# Patient Record
Sex: FEMALE | Race: WHITE | NOT HISPANIC OR LATINO | Employment: OTHER | URBAN - METROPOLITAN AREA
[De-identification: names, ages, dates, MRNs, and addresses within clinical notes are randomized per-mention and may not be internally consistent; named-entity substitution may affect disease eponyms.]

---

## 2017-01-03 DIAGNOSIS — E03.9 HYPOTHYROIDISM, UNSPECIFIED TYPE: ICD-10-CM

## 2017-01-03 DIAGNOSIS — Z00.00 ANNUAL PHYSICAL EXAM: Primary | ICD-10-CM

## 2017-01-03 RX ORDER — LEVOTHYROXINE SODIUM 125 UG/1
TABLET ORAL
Qty: 30 TABLET | Refills: 0 | Status: SHIPPED | OUTPATIENT
Start: 2017-01-03 | End: 2017-01-31 | Stop reason: SDUPTHER

## 2017-01-03 RX ORDER — LEVOTHYROXINE SODIUM 125 UG/1
125 TABLET ORAL DAILY
Qty: 30 TABLET | Refills: 0 | Status: SHIPPED | OUTPATIENT
Start: 2017-01-03 | End: 2017-01-03 | Stop reason: SDUPTHER

## 2017-01-03 NOTE — TELEPHONE ENCOUNTER
Please notify pt that she is overdue for repeat thyroid labs. Per our records her dose was decreased to 125 and repeat labs are needed to determine if the dosing is correct. Please schedule pt for annual exam and for thyroid labs as well as other annual labs as ordered.

## 2017-01-03 NOTE — TELEPHONE ENCOUNTER
Received RF from pharmacy via fax for levothyroxine    msg sent to pt's portal to schedule appt/ please refer        LCV 10/13/15    Please auth/advise

## 2017-01-30 ENCOUNTER — LAB VISIT (OUTPATIENT)
Dept: LAB | Facility: HOSPITAL | Age: 67
End: 2017-01-30
Attending: INTERNAL MEDICINE
Payer: MEDICARE

## 2017-01-30 DIAGNOSIS — Z00.00 ANNUAL PHYSICAL EXAM: ICD-10-CM

## 2017-01-30 DIAGNOSIS — E03.9 HYPOTHYROIDISM, UNSPECIFIED TYPE: ICD-10-CM

## 2017-01-30 DIAGNOSIS — I10 ESSENTIAL HYPERTENSION: ICD-10-CM

## 2017-01-30 LAB
ALBUMIN SERPL BCP-MCNC: 3.6 G/DL
ALP SERPL-CCNC: 108 U/L
ALT SERPL W/O P-5'-P-CCNC: 37 U/L
ANION GAP SERPL CALC-SCNC: 9 MMOL/L
AST SERPL-CCNC: 31 U/L
BASOPHILS # BLD AUTO: 0.03 K/UL
BASOPHILS NFR BLD: 0.4 %
BILIRUB SERPL-MCNC: 0.4 MG/DL
BUN SERPL-MCNC: 13 MG/DL
CALCIUM SERPL-MCNC: 9.2 MG/DL
CHLORIDE SERPL-SCNC: 104 MMOL/L
CHOLEST/HDLC SERPL: 2.6 {RATIO}
CO2 SERPL-SCNC: 28 MMOL/L
CREAT SERPL-MCNC: 0.8 MG/DL
DIFFERENTIAL METHOD: ABNORMAL
EOSINOPHIL # BLD AUTO: 0.1 K/UL
EOSINOPHIL NFR BLD: 1.6 %
ERYTHROCYTE [DISTWIDTH] IN BLOOD BY AUTOMATED COUNT: 15.3 %
EST. GFR  (AFRICAN AMERICAN): >60 ML/MIN/1.73 M^2
EST. GFR  (NON AFRICAN AMERICAN): >60 ML/MIN/1.73 M^2
GLUCOSE SERPL-MCNC: 103 MG/DL
HCT VFR BLD AUTO: 42.3 %
HDL/CHOLESTEROL RATIO: 38 %
HDLC SERPL-MCNC: 166 MG/DL
HDLC SERPL-MCNC: 63 MG/DL
HGB BLD-MCNC: 13.4 G/DL
LDLC SERPL CALC-MCNC: 83.2 MG/DL
LYMPHOCYTES # BLD AUTO: 1.5 K/UL
LYMPHOCYTES NFR BLD: 18.8 %
MCH RBC QN AUTO: 28.2 PG
MCHC RBC AUTO-ENTMCNC: 31.7 %
MCV RBC AUTO: 89 FL
MONOCYTES # BLD AUTO: 0.5 K/UL
MONOCYTES NFR BLD: 6.6 %
NEUTROPHILS # BLD AUTO: 5.7 K/UL
NEUTROPHILS NFR BLD: 72.5 %
NONHDLC SERPL-MCNC: 103 MG/DL
PLATELET # BLD AUTO: 287 K/UL
PMV BLD AUTO: 11.5 FL
POTASSIUM SERPL-SCNC: 4.1 MMOL/L
PROT SERPL-MCNC: 6.9 G/DL
RBC # BLD AUTO: 4.75 M/UL
SODIUM SERPL-SCNC: 141 MMOL/L
T4 FREE SERPL-MCNC: 1.11 NG/DL
TRIGL SERPL-MCNC: 99 MG/DL
TSH SERPL DL<=0.005 MIU/L-ACNC: 0.85 UIU/ML
WBC # BLD AUTO: 7.92 K/UL

## 2017-01-30 PROCEDURE — 80053 COMPREHEN METABOLIC PANEL: CPT

## 2017-01-30 PROCEDURE — 84439 ASSAY OF FREE THYROXINE: CPT

## 2017-01-30 PROCEDURE — 85025 COMPLETE CBC W/AUTO DIFF WBC: CPT

## 2017-01-30 PROCEDURE — 84443 ASSAY THYROID STIM HORMONE: CPT

## 2017-01-30 PROCEDURE — 36415 COLL VENOUS BLD VENIPUNCTURE: CPT | Mod: PO

## 2017-01-30 PROCEDURE — 80061 LIPID PANEL: CPT

## 2017-01-30 RX ORDER — LOSARTAN POTASSIUM 100 MG/1
TABLET ORAL
Qty: 90 TABLET | Refills: 0 | OUTPATIENT
Start: 2017-01-30

## 2017-01-31 DIAGNOSIS — I10 ESSENTIAL HYPERTENSION: ICD-10-CM

## 2017-01-31 NOTE — TELEPHONE ENCOUNTER
Pt's requesting RF on pended rx's sent to preferred pharmacy until her appt with new PCP in March /pt had labs done yesterday and states that she's out of her medication     LCV 10/13/2015    Please auth request

## 2017-01-31 NOTE — TELEPHONE ENCOUNTER
Detailed msg left on pt's VM to schedule appt for additional RF's    msg sent to pt's portal/ please refer

## 2017-01-31 NOTE — TELEPHONE ENCOUNTER
----- Message from Angeline Farr sent at 1/31/2017  2:57 PM CST -----  _  1st Request  _  2nd Request  _  3rd Request    Please refill the medication(s) listed below  Medication #1levothyroxine (SYNTHROID) 125 MCG tablet    Medication #2losartan (COZAAR) 100 MG tablet    Pt will be changing to a new PCP closer to her home so she is requesting about 30 tabs until she sees the new doctor in March thank you. Please call pt an let her know [phone]    Preferred Pharmacy:[facility]  Telephone Fax  [phone]

## 2017-02-01 DIAGNOSIS — I10 ESSENTIAL HYPERTENSION: ICD-10-CM

## 2017-02-01 RX ORDER — LOSARTAN POTASSIUM 100 MG/1
100 TABLET ORAL DAILY
Qty: 30 TABLET | Refills: 0 | Status: SHIPPED | OUTPATIENT
Start: 2017-02-01 | End: 2017-03-09

## 2017-02-01 RX ORDER — LOSARTAN POTASSIUM 100 MG/1
TABLET ORAL
Qty: 90 TABLET | Refills: 0 | OUTPATIENT
Start: 2017-02-01

## 2017-02-01 RX ORDER — LEVOTHYROXINE SODIUM 125 UG/1
TABLET ORAL
Qty: 30 TABLET | Refills: 0 | Status: SHIPPED | OUTPATIENT
Start: 2017-02-01 | End: 2017-03-09 | Stop reason: SDUPTHER

## 2017-02-01 NOTE — TELEPHONE ENCOUNTER
----- Message from Nayana Isaacs sent at 2/1/2017  9:16 AM CST -----  Contact: pt  _  1st Request  x  2nd Request  _  3rd Request    Please refill the medication(s) listed below. Please call the patient when the prescription(s) is ready for  at the phone number (___)(___-_____) .[phone]    Medication #1losartan (COZAAR) 100 MG tablet- wants to have thirty day supply [phone]    Medication #2call her office [phone]      Preferred Pharmacy:

## 2017-02-01 NOTE — TELEPHONE ENCOUNTER
Pt's requesting pended rx's sent to preferred pharmacy until her appt in March/ pt had labs done/please refer

## 2017-03-09 ENCOUNTER — OFFICE VISIT (OUTPATIENT)
Dept: FAMILY MEDICINE | Facility: CLINIC | Age: 67
End: 2017-03-09
Payer: MEDICARE

## 2017-03-09 VITALS
BODY MASS INDEX: 33.91 KG/M2 | HEIGHT: 68 IN | OXYGEN SATURATION: 98 % | SYSTOLIC BLOOD PRESSURE: 136 MMHG | DIASTOLIC BLOOD PRESSURE: 84 MMHG | HEART RATE: 68 BPM | WEIGHT: 223.75 LBS

## 2017-03-09 DIAGNOSIS — Z00.00 ANNUAL PHYSICAL EXAM: Primary | ICD-10-CM

## 2017-03-09 DIAGNOSIS — L71.9 ROSACEA: ICD-10-CM

## 2017-03-09 DIAGNOSIS — E03.2 HYPOTHYROIDISM, IATROGENIC: ICD-10-CM

## 2017-03-09 DIAGNOSIS — I10 ESSENTIAL HYPERTENSION: ICD-10-CM

## 2017-03-09 PROCEDURE — 3075F SYST BP GE 130 - 139MM HG: CPT | Mod: S$GLB,,, | Performed by: FAMILY MEDICINE

## 2017-03-09 PROCEDURE — 3079F DIAST BP 80-89 MM HG: CPT | Mod: S$GLB,,, | Performed by: FAMILY MEDICINE

## 2017-03-09 PROCEDURE — 99397 PER PM REEVAL EST PAT 65+ YR: CPT | Mod: S$GLB,,, | Performed by: FAMILY MEDICINE

## 2017-03-09 PROCEDURE — 99499 UNLISTED E&M SERVICE: CPT | Mod: S$PBB,,, | Performed by: FAMILY MEDICINE

## 2017-03-09 PROCEDURE — 99999 PR PBB SHADOW E&M-EST. PATIENT-LVL III: CPT | Mod: PBBFAC,,, | Performed by: FAMILY MEDICINE

## 2017-03-09 RX ORDER — LOSARTAN POTASSIUM 50 MG/1
50 TABLET ORAL DAILY
Qty: 90 TABLET | Refills: 3 | Status: SHIPPED | OUTPATIENT
Start: 2017-03-09 | End: 2017-11-30 | Stop reason: SDUPTHER

## 2017-03-09 RX ORDER — LIOTHYRONINE SODIUM 5 UG/1
5 TABLET ORAL DAILY
Qty: 90 TABLET | Refills: 3 | Status: SHIPPED | OUTPATIENT
Start: 2017-03-09 | End: 2017-10-03 | Stop reason: SDUPTHER

## 2017-03-09 RX ORDER — LIOTHYRONINE SODIUM 5 UG/1
25 TABLET ORAL DAILY
Qty: 90 TABLET | Refills: 3 | Status: SHIPPED | OUTPATIENT
Start: 2017-03-09 | End: 2017-03-09

## 2017-03-09 RX ORDER — LEVOTHYROXINE SODIUM 125 UG/1
125 TABLET ORAL
Qty: 90 TABLET | Refills: 3 | Status: SHIPPED | OUTPATIENT
Start: 2017-03-09 | End: 2017-11-30 | Stop reason: SDUPTHER

## 2017-03-09 RX ORDER — DOXYCYCLINE 50 MG/1
50 CAPSULE ORAL DAILY
Qty: 30 CAPSULE | Refills: 0 | Status: SHIPPED | OUTPATIENT
Start: 2017-03-09 | End: 2017-04-13 | Stop reason: SDUPTHER

## 2017-03-09 NOTE — PROGRESS NOTES
Subjective:       Patient ID: Violet Mirza is a 66 y.o. female.    Chief Complaint: Establish Care and Knee Pain    HPI Comments: 66 years old female who came to the clinic for her physical examination.  Patient last thyroid test was normal but patient still feels sluggish.  Blood pressure today is stable.  Patient was not taking the losartan.  No chest pain palpitations orthopnea or PND.  Patient is obese with a BMI of 34 currently trying to lose weight.  Patient requests a medicine for the rosacea.    Knee Pain        Review of Systems   Constitutional: Negative.    HENT: Negative.    Eyes: Negative.    Respiratory: Negative.    Cardiovascular: Negative.  Negative for chest pain, palpitations and leg swelling.   Gastrointestinal: Negative.    Endocrine: Negative for cold intolerance, heat intolerance, polydipsia, polyphagia and polyuria.   Genitourinary: Negative.    Musculoskeletal: Negative.    Skin: Negative.    Neurological: Negative.    Psychiatric/Behavioral: Negative.        Objective:      Physical Exam   Constitutional: She is oriented to person, place, and time. She appears well-developed and well-nourished. No distress.   HENT:   Head: Normocephalic and atraumatic.   Right Ear: External ear normal.   Left Ear: External ear normal.   Nose: Nose normal.   Mouth/Throat: Oropharynx is clear and moist. No oropharyngeal exudate.   Eyes: Conjunctivae and EOM are normal. Pupils are equal, round, and reactive to light. Right eye exhibits no discharge. Left eye exhibits no discharge. No scleral icterus.   Neck: Normal range of motion. Neck supple. No JVD present. No tracheal deviation present. No thyromegaly present.   Cardiovascular: Normal rate, regular rhythm, normal heart sounds and intact distal pulses.  Exam reveals no gallop and no friction rub.    No murmur heard.  Pulmonary/Chest: Effort normal and breath sounds normal. No stridor. No respiratory distress. She has no wheezes. She has no rales. She  exhibits no tenderness.   Abdominal: Soft. Bowel sounds are normal. She exhibits no distension and no mass. There is no tenderness. There is no rebound and no guarding.   Musculoskeletal: Normal range of motion. She exhibits no edema or tenderness.   Lymphadenopathy:     She has no cervical adenopathy.   Neurological: She is alert and oriented to person, place, and time. She has normal reflexes. No cranial nerve deficit. She exhibits normal muscle tone. Coordination normal.   Skin: Skin is warm and dry. No rash noted. She is not diaphoretic. No erythema. No pallor.   Psychiatric: She has a normal mood and affect. Her behavior is normal. Judgment and thought content normal.       Assessment:       1. Annual physical exam    2. Hypothyroidism, iatrogenic    3. BMI 34.0-34.9,adult    4. Essential hypertension    5. Rosacea        Plan:         Violet was seen today for establish care and knee pain.    Diagnoses and all orders for this visit:    Annual physical exam    Hypothyroidism, iatrogenic  -     levothyroxine (SYNTHROID) 125 MCG tablet; Take 1 tablet (125 mcg total) by mouth before breakfast. TAKE 1 TABLET(125 MCG) BY MOUTH EVERY DAY  -     liothyronine (CYTOMEL) 5 MCG Tab; Take 1 tablet by mouth once daily.  -     TSH; Future  -     T3; Future  -     T4, free; Future    BMI 34.0-34.9,adult    Essential hypertension  -     losartan (COZAAR) 50 MG tablet; Take 1 tablet (50 mg total) by mouth once daily.  -     Comprehensive metabolic panel; Future  -     Lipid panel; Future    Rosacea  -     doxycycline (MONODOX) 50 MG Cap; Take 1 capsule (50 mg total) by mouth once daily.    Continue monitoring blood pressure at home, low sodium diet.   Diet and physical activity to promote weight loss.

## 2017-03-09 NOTE — MR AVS SNAPSHOT
[facility]   [place]  [address] [address]  Phone: [phone]  Fax: [phone]                  Violet Mirza   3/9/2017 8:00 AM   Office Visit    Description:  Female : 1950   Provider:  Geovany Henning MD   Department:  [facility]           Reason for Visit     Establish Care     Knee Pain           Diagnoses this Visit        Comments    Annual physical exam    -  Primary     Hypothyroidism, iatrogenic         BMI 34.0-34.9,adult         Essential hypertension         Rosacea                To Do List           Future Appointments        Provider Department Dept Phone    2017 8:00 AM Geovany Henning MD [facility] [phone]      Goals (5 Years of Data)     None      Follow-Up and Disposition     Return in about 6 months (around 2017), or if symptoms worsen or fail to improve.       These Medications        Disp Refills Start End    losartan (COZAAR) 50 MG tablet 90 tablet 3 3/9/2017 3/9/2018    Take 1 tablet (50 mg total) by mouth once daily. - Oral    Pharmacy: [facility] [address] [place] [address] [address] [facility] Ph #: [phone]       levothyroxine (SYNTHROID) 125 MCG tablet 90 tablet 3 3/9/2017     Take 1 tablet (125 mcg total) by mouth before breakfast. TAKE 1 TABLET(125 MCG) BY MOUTH EVERY DAY - Oral    Pharmacy: [facility] [address] [place] [address] [address] AT [facility] Ph #: [phone]       Notes to Pharmacy: Pt needs labs and appt prior to additional refills    doxycycline (MONODOX) 50 MG Cap 30 capsule 0 3/9/2017     Take 1 capsule (50 mg total) by mouth once daily. - Oral    Pharmacy: [facility] [address] [place] [address] [address] AT [facility] Ph #: [phone]       liothyronine (CYTOMEL) 5 MCG Tab 90 tablet 3 3/9/2017 3/9/2018    Take 1 tablet (5 mcg total) by mouth once  daily. - Oral    Pharmacy: [id number] [address] AT [facility] [address]  #: [phone]         [facility] On Call     [facility] On Call Nurse Care Line - 24/7 Assistance  Registered nurses in the [facility] provide clinical advisement, health education, appointment booking, and other advisory services.  Call for this free service at [phone].             Medications           Message regarding Medications     Verify the changes and/or additions to your medication regime listed below are the same as discussed with your clinician today.  If any of these changes or additions are incorrect, please notify your healthcare provider.        START taking these NEW medications        Refills    losartan (COZAAR) 50 MG tablet 3    Sig: Take 1 tablet (50 mg total) by mouth once daily.    Class: Normal    Route: Oral    doxycycline (MONODOX) 50 MG Cap 0    Sig: Take 1 capsule (50 mg total) by mouth once daily.    Class: Normal    Route: Oral    liothyronine (CYTOMEL) 5 MCG Tab 3    Sig: Take 1 tablet (5 mcg total) by mouth once daily.    Class: Normal    Route: Oral      CHANGE how you are taking these medications     Start Taking Instead of    levothyroxine (SYNTHROID) 125 MCG tablet levothyroxine (SYNTHROID) 125 MCG tablet    Dosage:  Take 1 tablet (125 mcg total) by mouth before breakfast. TAKE 1 TABLET(125 MCG) BY MOUTH EVERY DAY Dosage:  TAKE 1 TABLET(125 MCG) BY MOUTH EVERY DAY    Reason for Change:  Reorder       STOP taking these medications     glucosamine-chondroitin 500-400 mg tablet Take 1 tablet by mouth 3 (three) times daily.    calcium carbonate-vit D3-min (CALCIUM PLUS) 600 mg calcium- 400 unit Tab Take 1 tablet by mouth once daily.    doxycycline (VIBRAMYCIN) 50 MG capsule Take 50 mg by mouth Daily.           Verify that the below list of medications is an accurate representation of the medications you are currently taking.  If none reported,  "the list may be blank. If incorrect, please contact your healthcare provider. Carry this list with you in case of emergency.           Current Medications     epinastine 0.05 % ophthalmic solution     fish oil-omega-3 fatty acids 300-1,000 mg capsule Take 1 g by mouth once daily.    levothyroxine (SYNTHROID) 125 MCG tablet Take 1 tablet (125 mcg total) by mouth before breakfast. TAKE 1 TABLET(125 MCG) BY MOUTH EVERY DAY    tramadol (ULTRAM) 50 mg tablet Take 50 mg by mouth daily as needed.     VIT C/VIT E/LUTEIN/MIN/OMEGA-3 (OCUVITE ORAL) Take by mouth once daily.    doxycycline (MONODOX) 50 MG Cap Take 1 capsule (50 mg total) by mouth once daily.    liothyronine (CYTOMEL) 5 MCG Tab Take 1 tablet (5 mcg total) by mouth once daily.    losartan (COZAAR) 50 MG tablet Take 1 tablet (50 mg total) by mouth once daily.           Clinical Reference Information           Your Vitals Were     BP Pulse Height Weight SpO2 BMI    136/84 (BP Location: Right arm, Patient Position: Sitting, BP Method: Manual) 68 5' 8" (1.727 m) 101.5 kg (223 lb 12.3 oz) 98% 34.02 kg/m2      Blood Pressure          Most Recent Value    BP  136/84      Allergies as of 3/9/2017     No Known Allergies      Immunizations Administered on Date of Encounter - 3/9/2017     None      Orders Placed During Today's Visit     Future Labs/Procedures Expected by Expires    Comprehensive metabolic panel  3/9/2017 6/7/2017    Lipid panel  3/9/2017 6/7/2017    T3  3/9/2017 6/7/2017    T4, free  3/9/2017 6/7/2017    TSH  3/9/2017 6/7/2017      Instructions      Exercise for a Healthier Heart  You may wonder how you can improve the health of your heart. If youre thinking about exercise, youre on the right track. You dont need to become an athlete, but you do need a certain amount of brisk exercise to help strengthen your heart. If you have been diagnosed with a heart condition, your doctor may recommend exercise to help stabilize your condition. To help make " exercise a habit, choose safe, fun activities.     Exercise with a friend. When activity is fun, you're more likely to stick with it.     Be sure to check with your healthcare provider before starting an exercise program.   Why exercise?  Exercising regularly offers many healthy rewards. It can help you do all of the following:  · Improve your blood cholesterol level to help prevent further heart trouble  · Lower your blood pressure to help prevent a stroke or heart attack  · Control diabetes, or reduce your risk of getting this disease  · Improve your heart and lung function  · Reach and maintain a healthy weight  · Make your muscles stronger and more limber so you can stay active  · Prevent falls and fractures by slowing the loss of bone mass (osteoporosis)  · Manage stress better  · Reduce your blood pressure  · Improve your sense of self and your body image  Exercise tips  Ease into your routine. Set small goals. Then build on them.  Exercise on most days. Aim for a total of 150 or more minutes of moderate to  vigorous intensity activity each week. Consider 40 minutes, 3 to 4 times a week. For best results, activity should last for 40 minutes on average. It is OK to work up to the 40 minute period over time. Examples of moderate-intensity activity is walking 1 mile in 15 minutes or 30 to 45 minutes of yard work.  Step up your daily activity level. Along with your exercise program, try being more active throughout the day. Walk instead of drive. Do more household tasks or yard work.  Choose one or more activities you enjoy. Walking is one of the easiest things you can do. You can also try swimming, riding a bike, dancing, or taking an exercise class.  Stop exercising and call your doctor if you:  · Have chest pain or feel dizzy or lightheaded  · Feel burning, tightness, pressure, or heaviness in your chest, neck, shoulders, back, or arms  · Have unusual shortness of breath  · Have increased joint or muscle  pain  · Have palpitations or an irregular heartbeat   Date Last Reviewed: 5/1/2016  © 8862-2328 [employer]. [address] [place] [address], [place] [address]. All rights reserved. This information is not intended as a substitute for professional medical care. Always follow your healthcare professional's instructions.             Language Assistance Services     ATTENTION: Language assistance services are available, free of charge. Please call [phone].      ATENCIÓN: Si habla nancy, tiene a alexandra disposición servicios gratuitos de asistencia lingüística. Llame al [phone].     CHÚ Ý: N?u b?n nói Ti?ng Vi?t, có các d?ch v? h? tr? ngôn ng? mi?n phí dành cho b?n. G?i s? [phone].         [facility] complies with applicable Federal civil rights laws and does not discriminate on the basis of race, color, national origin, age, disability, or sex.

## 2017-04-13 DIAGNOSIS — L71.9 ROSACEA: ICD-10-CM

## 2017-04-13 RX ORDER — DOXYCYCLINE 50 MG/1
50 CAPSULE ORAL DAILY
Qty: 30 CAPSULE | Refills: 3 | Status: SHIPPED | OUTPATIENT
Start: 2017-04-13 | End: 2017-08-23 | Stop reason: SDUPTHER

## 2017-05-19 ENCOUNTER — TELEPHONE (OUTPATIENT)
Dept: FAMILY MEDICINE | Facility: CLINIC | Age: 67
End: 2017-05-19

## 2017-05-19 NOTE — TELEPHONE ENCOUNTER
Blood work was review from January, last CBC was stable, chest x-ray without evidence of acute processes, patient currently stable for surgical procedure.

## 2017-05-19 NOTE — TELEPHONE ENCOUNTER
Patient  hemodynamically stable for surgical procedure.  Blood work was reviewed from January.  EKG and chest x-ray were normal.  Last CBC was normal.

## 2017-08-23 DIAGNOSIS — L71.9 ROSACEA: ICD-10-CM

## 2017-08-23 RX ORDER — DOXYCYCLINE 50 MG/1
50 CAPSULE ORAL DAILY
Qty: 30 CAPSULE | Refills: 3 | Status: SHIPPED | OUTPATIENT
Start: 2017-08-23 | End: 2017-11-22 | Stop reason: SDUPTHER

## 2017-08-23 NOTE — TELEPHONE ENCOUNTER
----- Message from Xiao Gunter sent at 8/23/2017  1:21 PM CDT -----  Contact: [phone]/self  Patient would like a refill of doxycycline (MONODOX) 50 MG Cap 90 day supply sent to [facility] on [address].  Please advise.

## 2017-09-02 ENCOUNTER — LAB VISIT (OUTPATIENT)
Dept: LAB | Facility: HOSPITAL | Age: 67
End: 2017-09-02
Attending: FAMILY MEDICINE
Payer: MEDICARE

## 2017-09-02 DIAGNOSIS — I10 ESSENTIAL HYPERTENSION: ICD-10-CM

## 2017-09-02 DIAGNOSIS — E03.2 HYPOTHYROIDISM, IATROGENIC: ICD-10-CM

## 2017-09-02 LAB
ALBUMIN SERPL BCP-MCNC: 3.6 G/DL
ALP SERPL-CCNC: 133 U/L
ALT SERPL W/O P-5'-P-CCNC: 19 U/L
ANION GAP SERPL CALC-SCNC: 10 MMOL/L
AST SERPL-CCNC: 23 U/L
BILIRUB SERPL-MCNC: 0.3 MG/DL
BUN SERPL-MCNC: 12 MG/DL
CALCIUM SERPL-MCNC: 9.7 MG/DL
CHLORIDE SERPL-SCNC: 104 MMOL/L
CHOLEST SERPL-MCNC: 222 MG/DL
CHOLEST/HDLC SERPL: 3 {RATIO}
CO2 SERPL-SCNC: 26 MMOL/L
CREAT SERPL-MCNC: 0.8 MG/DL
EST. GFR  (AFRICAN AMERICAN): >60 ML/MIN/1.73 M^2
EST. GFR  (NON AFRICAN AMERICAN): >60 ML/MIN/1.73 M^2
GLUCOSE SERPL-MCNC: 96 MG/DL
HDLC SERPL-MCNC: 74 MG/DL
HDLC SERPL: 33.3 %
LDLC SERPL CALC-MCNC: 126.4 MG/DL
NONHDLC SERPL-MCNC: 148 MG/DL
POTASSIUM SERPL-SCNC: 3.9 MMOL/L
PROT SERPL-MCNC: 7.3 G/DL
SODIUM SERPL-SCNC: 140 MMOL/L
T3 SERPL-MCNC: 105 NG/DL
T4 FREE SERPL-MCNC: 1.18 NG/DL
TRIGL SERPL-MCNC: 108 MG/DL
TSH SERPL DL<=0.005 MIU/L-ACNC: 0.4 UIU/ML

## 2017-09-02 PROCEDURE — 84480 ASSAY TRIIODOTHYRONINE (T3): CPT

## 2017-09-02 PROCEDURE — 36415 COLL VENOUS BLD VENIPUNCTURE: CPT | Mod: PO

## 2017-09-02 PROCEDURE — 80061 LIPID PANEL: CPT

## 2017-09-02 PROCEDURE — 80053 COMPREHEN METABOLIC PANEL: CPT

## 2017-09-02 PROCEDURE — 84439 ASSAY OF FREE THYROXINE: CPT

## 2017-09-02 PROCEDURE — 84443 ASSAY THYROID STIM HORMONE: CPT

## 2017-09-07 ENCOUNTER — OFFICE VISIT (OUTPATIENT)
Dept: FAMILY MEDICINE | Facility: CLINIC | Age: 67
End: 2017-09-07
Payer: MEDICARE

## 2017-09-07 VITALS
WEIGHT: 235.25 LBS | BODY MASS INDEX: 35.65 KG/M2 | HEIGHT: 68 IN | HEART RATE: 75 BPM | OXYGEN SATURATION: 96 % | DIASTOLIC BLOOD PRESSURE: 77 MMHG | SYSTOLIC BLOOD PRESSURE: 130 MMHG

## 2017-09-07 DIAGNOSIS — I10 ESSENTIAL HYPERTENSION: ICD-10-CM

## 2017-09-07 DIAGNOSIS — M17.12 OSTEOARTHRITIS OF LEFT KNEE, UNSPECIFIED OSTEOARTHRITIS TYPE: ICD-10-CM

## 2017-09-07 DIAGNOSIS — E03.9 HYPOTHYROIDISM, UNSPECIFIED TYPE: Primary | ICD-10-CM

## 2017-09-07 DIAGNOSIS — E78.5 DYSLIPIDEMIA: ICD-10-CM

## 2017-09-07 DIAGNOSIS — E66.01 SEVERE OBESITY (BMI 35.0-39.9): ICD-10-CM

## 2017-09-07 PROCEDURE — 1126F AMNT PAIN NOTED NONE PRSNT: CPT | Mod: S$GLB,,, | Performed by: FAMILY MEDICINE

## 2017-09-07 PROCEDURE — 1159F MED LIST DOCD IN RCRD: CPT | Mod: S$GLB,,, | Performed by: FAMILY MEDICINE

## 2017-09-07 PROCEDURE — 99999 PR PBB SHADOW E&M-EST. PATIENT-LVL III: CPT | Mod: PBBFAC,,, | Performed by: FAMILY MEDICINE

## 2017-09-07 PROCEDURE — 3075F SYST BP GE 130 - 139MM HG: CPT | Mod: S$GLB,,, | Performed by: FAMILY MEDICINE

## 2017-09-07 PROCEDURE — 3008F BODY MASS INDEX DOCD: CPT | Mod: S$GLB,,, | Performed by: FAMILY MEDICINE

## 2017-09-07 PROCEDURE — 99499 UNLISTED E&M SERVICE: CPT | Mod: S$PBB,,, | Performed by: FAMILY MEDICINE

## 2017-09-07 PROCEDURE — 99214 OFFICE O/P EST MOD 30 MIN: CPT | Mod: S$GLB,,, | Performed by: FAMILY MEDICINE

## 2017-09-07 PROCEDURE — 3078F DIAST BP <80 MM HG: CPT | Mod: S$GLB,,, | Performed by: FAMILY MEDICINE

## 2017-09-07 RX ORDER — TRAMADOL HYDROCHLORIDE 50 MG/1
50 TABLET ORAL EVERY 12 HOURS PRN
Qty: 40 TABLET | Refills: 0 | Status: SHIPPED | OUTPATIENT
Start: 2017-09-07 | End: 2017-11-29

## 2017-09-07 NOTE — PATIENT INSTRUCTIONS
What is Arthritis?  Arthritis is a disease that affects the joints (the parts where bones meet and move). It can affect any joint in your body. There are many types of arthritis, including osteoarthritis and rheumatoid arthrtitis. If your symptoms are mild, medications may be enough to reduce pain and swelling. For more severe arthritis, surgery may be needed to improve the condition of the joint or replace the joint entirely.                  What causes arthritis?  Cartilage is a smooth substance that protects the ends of your bones and provides cushioning. When you have arthritis, this cartilage breaks down and can no longer protect your bones. The bones rub against each other, causing pain and swelling. Over time, bone spurs (small pieces of rough or splintered bone) may develop, and the joint's range of motion can become limited.  Symptoms  Some of the more common symptoms of arthritis include:  · Joint pain and stiffness. Pain and stiffness get worse with long periods of rest or using a joint too long or too hard.  · Joints that have lost normal shape and motion.  · Tender, inflamed joints. They may look red and feel warm.  · Grinding or popping noise with joint movement.   · Feeling tired all the time.  Reducing symptoms  Following a healthy lifestyle by losing weight and exercising can help reduce symptoms of osteoarthritis. Medicines can be very helpful for arthritis.     Date Last Reviewed: 9/10/2015  © 1359-5134 [employer]. [address] [place] [address], [place] [address]. All rights reserved. This information is not intended as a substitute for professional medical care. Always follow your healthcare professional's instructions.

## 2017-09-07 NOTE — PROGRESS NOTES
Subjective:       Patient ID: Violet Mirza is a 67 y.o. female.    Chief Complaint: Follow-up (x6 month ) and Orders (review blood work result )    67 years old female who came to the clinic for blood pressure check.  Blood pressure today stable.  No chest pain palpitations orthopnea or PND.  Patient with elevated cholesterol.  Last thyroid testing was normal.  Patient with good compliance with medical regimen.  Patient with a BMI of 35 currently trying to lose weight.  Patient requests refill of the medicine for pain of the left knee.      Review of Systems   Constitutional: Negative.    HENT: Negative.    Eyes: Negative.    Respiratory: Negative.    Cardiovascular: Negative.  Negative for chest pain, palpitations and leg swelling.   Gastrointestinal: Negative.    Genitourinary: Negative.    Musculoskeletal: Positive for arthralgias.   Skin: Negative.    Neurological: Negative.    Psychiatric/Behavioral: Negative.        Objective:      Physical Exam   Constitutional: She is oriented to person, place, and time. She appears well-developed and well-nourished. No distress.   HENT:   Head: Normocephalic and atraumatic.   Right Ear: External ear normal.   Left Ear: External ear normal.   Nose: Nose normal.   Mouth/Throat: Oropharynx is clear and moist. No oropharyngeal exudate.   Eyes: Conjunctivae and EOM are normal. Pupils are equal, round, and reactive to light. Right eye exhibits no discharge. Left eye exhibits no discharge. No scleral icterus.   Neck: Normal range of motion. Neck supple. No JVD present. No tracheal deviation present. No thyromegaly present.   Cardiovascular: Normal rate, regular rhythm, normal heart sounds and intact distal pulses.  Exam reveals no gallop and no friction rub.    No murmur heard.  Pulmonary/Chest: Effort normal and breath sounds normal. No stridor. No respiratory distress. She has no wheezes. She has no rales. She exhibits no tenderness.   Abdominal: Soft. Bowel sounds are  normal. She exhibits no distension and no mass. There is no tenderness. There is no rebound and no guarding.   Musculoskeletal: Normal range of motion. She exhibits no edema.        Left knee: Tenderness found. Medial joint line and lateral joint line tenderness noted.   Lymphadenopathy:     She has no cervical adenopathy.   Neurological: She is alert and oriented to person, place, and time. She has normal reflexes. No cranial nerve deficit. She exhibits normal muscle tone. Coordination normal.   Skin: Skin is warm and dry. No rash noted. She is not diaphoretic. No erythema. No pallor.   Psychiatric: She has a normal mood and affect. Her behavior is normal. Judgment and thought content normal.       Assessment:       1. Hypothyroidism, unspecified type    2. Essential hypertension    3. Dyslipidemia    4. Severe obesity (BMI 35.0-39.9)    5. Osteoarthritis of left knee, unspecified osteoarthritis type        Plan:         Violet was seen today for follow-up and orders.    Diagnoses and all orders for this visit:    Hypothyroidism, unspecified type    Essential hypertension    Dyslipidemia    Severe obesity (BMI 35.0-39.9)    Osteoarthritis of left knee, unspecified osteoarthritis type  -     tramadol (ULTRAM) 50 mg tablet; Take 1 tablet (50 mg total) by mouth every 12 (twelve) hours as needed.    Continue monitoring blood pressure at home, low sodium diet.   Diet and physical activity to promote weight loss.  Continue with current medicine for thyroid.

## 2017-10-03 ENCOUNTER — TELEPHONE (OUTPATIENT)
Dept: FAMILY MEDICINE | Facility: CLINIC | Age: 67
End: 2017-10-03

## 2017-10-03 ENCOUNTER — PATIENT MESSAGE (OUTPATIENT)
Dept: FAMILY MEDICINE | Facility: CLINIC | Age: 67
End: 2017-10-03

## 2017-10-03 DIAGNOSIS — E03.2 HYPOTHYROIDISM, IATROGENIC: ICD-10-CM

## 2017-10-03 RX ORDER — LIOTHYRONINE SODIUM 25 UG/1
25 TABLET ORAL DAILY
Qty: 30 TABLET | Refills: 0 | Status: SHIPPED | OUTPATIENT
Start: 2017-10-03 | End: 2017-11-02 | Stop reason: SDUPTHER

## 2017-10-03 NOTE — TELEPHONE ENCOUNTER
Cytomel was increased.    Please notify the patient the prescription is in the pharmacy.  Repeat thyroid testing in a couple of weeks.

## 2017-10-16 ENCOUNTER — PATIENT MESSAGE (OUTPATIENT)
Dept: FAMILY MEDICINE | Facility: CLINIC | Age: 67
End: 2017-10-16

## 2017-10-30 ENCOUNTER — OFFICE VISIT (OUTPATIENT)
Dept: FAMILY MEDICINE | Facility: CLINIC | Age: 67
End: 2017-10-30
Payer: MEDICARE

## 2017-10-30 VITALS
HEIGHT: 68 IN | SYSTOLIC BLOOD PRESSURE: 128 MMHG | HEART RATE: 85 BPM | WEIGHT: 239.44 LBS | BODY MASS INDEX: 36.29 KG/M2 | DIASTOLIC BLOOD PRESSURE: 70 MMHG | OXYGEN SATURATION: 97 %

## 2017-10-30 DIAGNOSIS — J45.901 EXTRINSIC ASTHMA WITH ACUTE EXACERBATION, UNSPECIFIED ASTHMA SEVERITY, UNSPECIFIED WHETHER PERSISTENT: Primary | ICD-10-CM

## 2017-10-30 DIAGNOSIS — H61.23 IMPACTED CERUMEN OF BOTH EARS: ICD-10-CM

## 2017-10-30 DIAGNOSIS — R45.89 ANXIETY ABOUT HEALTH: ICD-10-CM

## 2017-10-30 PROBLEM — F41.8 ANXIETY ABOUT HEALTH: Status: ACTIVE | Noted: 2017-10-30

## 2017-10-30 PROCEDURE — 96372 THER/PROPH/DIAG INJ SC/IM: CPT | Mod: 59,S$GLB,, | Performed by: FAMILY MEDICINE

## 2017-10-30 PROCEDURE — 94640 AIRWAY INHALATION TREATMENT: CPT | Mod: S$GLB,,, | Performed by: FAMILY MEDICINE

## 2017-10-30 PROCEDURE — 99214 OFFICE O/P EST MOD 30 MIN: CPT | Mod: 25,S$GLB,, | Performed by: NURSE PRACTITIONER

## 2017-10-30 PROCEDURE — 99999 PR PBB SHADOW E&M-EST. PATIENT-LVL III: CPT | Mod: PBBFAC,,, | Performed by: NURSE PRACTITIONER

## 2017-10-30 PROCEDURE — 99499 UNLISTED E&M SERVICE: CPT | Mod: S$PBB,,, | Performed by: NURSE PRACTITIONER

## 2017-10-30 RX ORDER — ALBUTEROL SULFATE 90 UG/1
2 AEROSOL, METERED RESPIRATORY (INHALATION) EVERY 6 HOURS PRN
Qty: 1 INHALER | Refills: 1 | Status: SHIPPED | OUTPATIENT
Start: 2017-10-30 | End: 2018-01-30

## 2017-10-30 RX ORDER — CETIRIZINE HYDROCHLORIDE 10 MG/1
10 TABLET ORAL DAILY
Qty: 30 TABLET | Refills: 2 | Status: SHIPPED | OUTPATIENT
Start: 2017-10-30 | End: 2017-11-28 | Stop reason: SDUPTHER

## 2017-10-30 RX ORDER — TRIAMCINOLONE ACETONIDE 40 MG/ML
40 INJECTION, SUSPENSION INTRA-ARTICULAR; INTRAMUSCULAR
Status: COMPLETED | OUTPATIENT
Start: 2017-10-30 | End: 2017-10-30

## 2017-10-30 RX ORDER — IPRATROPIUM BROMIDE AND ALBUTEROL SULFATE 2.5; .5 MG/3ML; MG/3ML
3 SOLUTION RESPIRATORY (INHALATION)
Status: COMPLETED | OUTPATIENT
Start: 2017-10-30 | End: 2017-10-30

## 2017-10-30 RX ORDER — HYDROXYZINE HYDROCHLORIDE 25 MG/1
25 TABLET, FILM COATED ORAL 3 TIMES DAILY PRN
Qty: 90 TABLET | Refills: 0 | Status: SHIPPED | OUTPATIENT
Start: 2017-10-30 | End: 2017-11-03

## 2017-10-30 RX ADMIN — TRIAMCINOLONE ACETONIDE 40 MG: 40 INJECTION, SUSPENSION INTRA-ARTICULAR; INTRAMUSCULAR at 10:10

## 2017-10-30 RX ADMIN — IPRATROPIUM BROMIDE AND ALBUTEROL SULFATE 3 ML: 2.5; .5 SOLUTION RESPIRATORY (INHALATION) at 10:10

## 2017-10-30 NOTE — PROGRESS NOTES
Subjective:       Patient ID: Violet Mirza is a 67 y.o. female.    Chief Complaint: Cough and Shortness of Breath    Shortness of Breath   This is a new problem. The current episode started in the past 7 days. The problem occurs intermittently. The problem has been gradually worsening. Associated symptoms include wheezing. The symptoms are aggravated by any activity, exercise and weather changes. The patient has no known risk factors for DVT/PE. She has tried nothing for the symptoms.   Cough   This is a new problem. The current episode started 1 to 4 weeks ago. The problem has been unchanged. The problem occurs every few minutes. The cough is non-productive. Associated symptoms include shortness of breath and wheezing. The symptoms are aggravated by exercise and cold air. She has tried nothing for the symptoms.     Review of Systems   Respiratory: Positive for cough, chest tightness, shortness of breath and wheezing.    All other systems reviewed and are negative.      Objective:      Physical Exam   Constitutional: She is oriented to person, place, and time. She appears well-developed. No distress.   obese   HENT:   Head: Normocephalic and atraumatic.   Eyes: EOM are normal. Pupils are equal, round, and reactive to light.   Neck: Neck supple. No JVD present. No tracheal deviation present.   Cardiovascular: Normal rate, regular rhythm, normal heart sounds and intact distal pulses.    No murmur heard.  Pulmonary/Chest: Effort normal and breath sounds normal. Tachypnea noted. No respiratory distress. She has no wheezes. She has no rales.   Abdominal: Soft. Bowel sounds are normal. She exhibits no distension and no mass. There is no tenderness.   Musculoskeletal: Normal range of motion. She exhibits no edema or tenderness.   Neurological: She is alert and oriented to person, place, and time. Coordination normal.   Skin: Skin is warm and dry. No erythema. No pallor.   Psychiatric: Her behavior is normal. Judgment  and thought content normal. Her mood appears anxious (crying). Her speech is rapid and/or pressured. Cognition and memory are normal. She expresses no homicidal and no suicidal ideation.   Nursing note and vitals reviewed.      Assessment:       1. Extrinsic asthma with acute exacerbation, unspecified asthma severity, unspecified whether persistent    2. Anxiety about health    3. Impacted cerumen of both ears        Plan:       Violet was seen today for cough and shortness of breath.    Diagnoses and all orders for this visit:    Extrinsic asthma with acute exacerbation, unspecified asthma severity, unspecified whether persistent  -     albuterol 90 mcg/actuation inhaler; Inhale 2 puffs into the lungs every 6 (six) hours as needed for Wheezing. Rescue  -     albuterol-ipratropium 2.5mg-0.5mg/3mL nebulizer solution 3 mL; Take 3 mLs by nebulization one time.  -     triamcinolone acetonide injection 40 mg; Inject 1 mL (40 mg total) into the muscle one time.  -     cetirizine (ZYRTEC) 10 MG tablet; Take 1 tablet (10 mg total) by mouth once daily.    Anxiety about health  -     hydrOXYzine HCl (ATARAX) 25 MG tablet; Take 1 tablet (25 mg total) by mouth 3 (three) times daily as needed for Anxiety.    Impacted cerumen of both ears  -     Ambulatory referral to ENT    Follow-up with PCP if symptoms worsen or fail to improve.

## 2017-11-02 ENCOUNTER — PATIENT MESSAGE (OUTPATIENT)
Dept: FAMILY MEDICINE | Facility: CLINIC | Age: 67
End: 2017-11-02

## 2017-11-02 DIAGNOSIS — E03.2 HYPOTHYROIDISM, IATROGENIC: ICD-10-CM

## 2017-11-02 DIAGNOSIS — F41.9 ANXIETY: Primary | ICD-10-CM

## 2017-11-02 DIAGNOSIS — R06.02 SHORTNESS OF BREATH: ICD-10-CM

## 2017-11-03 PROBLEM — R06.02 SHORTNESS OF BREATH: Status: ACTIVE | Noted: 2017-11-03

## 2017-11-03 PROBLEM — F41.9 ANXIETY: Status: ACTIVE | Noted: 2017-10-30

## 2017-11-03 RX ORDER — ESCITALOPRAM OXALATE 10 MG/1
10 TABLET ORAL DAILY
Qty: 30 TABLET | Refills: 11 | Status: SHIPPED | OUTPATIENT
Start: 2017-11-03 | End: 2017-11-29

## 2017-11-03 RX ORDER — LIOTHYRONINE SODIUM 25 UG/1
25 TABLET ORAL DAILY
Qty: 30 TABLET | Refills: 0 | Status: SHIPPED | OUTPATIENT
Start: 2017-11-03 | End: 2017-12-05 | Stop reason: SDUPTHER

## 2017-11-21 ENCOUNTER — PATIENT MESSAGE (OUTPATIENT)
Dept: FAMILY MEDICINE | Facility: CLINIC | Age: 67
End: 2017-11-21

## 2017-11-22 DIAGNOSIS — L71.9 ROSACEA: ICD-10-CM

## 2017-11-22 RX ORDER — DOXYCYCLINE 50 MG/1
50 CAPSULE ORAL DAILY
Qty: 30 CAPSULE | Refills: 3 | Status: SHIPPED | OUTPATIENT
Start: 2017-11-22 | End: 2018-03-13

## 2017-11-28 DIAGNOSIS — J45.901 EXTRINSIC ASTHMA WITH ACUTE EXACERBATION, UNSPECIFIED ASTHMA SEVERITY, UNSPECIFIED WHETHER PERSISTENT: ICD-10-CM

## 2017-11-28 RX ORDER — CETIRIZINE HYDROCHLORIDE 10 MG/1
10 TABLET ORAL DAILY
Qty: 30 TABLET | Refills: 2 | Status: SHIPPED | OUTPATIENT
Start: 2017-11-28 | End: 2018-02-07

## 2017-11-29 ENCOUNTER — CLINICAL SUPPORT (OUTPATIENT)
Dept: OTOLARYNGOLOGY | Facility: CLINIC | Age: 67
End: 2017-11-29
Payer: MEDICARE

## 2017-11-29 ENCOUNTER — LAB VISIT (OUTPATIENT)
Dept: LAB | Facility: HOSPITAL | Age: 67
End: 2017-11-29
Attending: FAMILY MEDICINE
Payer: MEDICARE

## 2017-11-29 ENCOUNTER — OFFICE VISIT (OUTPATIENT)
Dept: OTOLARYNGOLOGY | Facility: CLINIC | Age: 67
End: 2017-11-29
Payer: MEDICARE

## 2017-11-29 VITALS
HEIGHT: 68 IN | TEMPERATURE: 97 F | DIASTOLIC BLOOD PRESSURE: 79 MMHG | SYSTOLIC BLOOD PRESSURE: 136 MMHG | WEIGHT: 235.44 LBS | HEART RATE: 89 BPM | BODY MASS INDEX: 35.68 KG/M2

## 2017-11-29 DIAGNOSIS — J34.3 HYPERTROPHY OF INFERIOR NASAL TURBINATE: ICD-10-CM

## 2017-11-29 DIAGNOSIS — J34.2 NASAL SEPTAL DEVIATION: ICD-10-CM

## 2017-11-29 DIAGNOSIS — H61.23 BILATERAL IMPACTED CERUMEN: ICD-10-CM

## 2017-11-29 DIAGNOSIS — J30.89 CHRONIC NON-SEASONAL ALLERGIC RHINITIS, UNSPECIFIED TRIGGER: Primary | ICD-10-CM

## 2017-11-29 DIAGNOSIS — E03.2 HYPOTHYROIDISM, IATROGENIC: ICD-10-CM

## 2017-11-29 DIAGNOSIS — H90.3 SENSORINEURAL HEARING LOSS, BILATERAL: Primary | ICD-10-CM

## 2017-11-29 DIAGNOSIS — H90.3 SENSORINEURAL HEARING LOSS (SNHL), BILATERAL: ICD-10-CM

## 2017-11-29 LAB
T3 SERPL-MCNC: 112 NG/DL
T4 FREE SERPL-MCNC: 1 NG/DL
TSH SERPL DL<=0.005 MIU/L-ACNC: <0.01 UIU/ML

## 2017-11-29 PROCEDURE — 92567 TYMPANOMETRY: CPT | Mod: S$GLB,,, | Performed by: AUDIOLOGIST-HEARING AID FITTER

## 2017-11-29 PROCEDURE — 84480 ASSAY TRIIODOTHYRONINE (T3): CPT

## 2017-11-29 PROCEDURE — 99999 PR PBB SHADOW E&M-EST. PATIENT-LVL III: CPT | Mod: PBBFAC,,, | Performed by: OTOLARYNGOLOGY

## 2017-11-29 PROCEDURE — 99204 OFFICE O/P NEW MOD 45 MIN: CPT | Mod: 25,S$GLB,, | Performed by: OTOLARYNGOLOGY

## 2017-11-29 PROCEDURE — 84443 ASSAY THYROID STIM HORMONE: CPT

## 2017-11-29 PROCEDURE — 92557 COMPREHENSIVE HEARING TEST: CPT | Mod: S$GLB,,, | Performed by: AUDIOLOGIST-HEARING AID FITTER

## 2017-11-29 PROCEDURE — 84439 ASSAY OF FREE THYROXINE: CPT

## 2017-11-29 PROCEDURE — 36415 COLL VENOUS BLD VENIPUNCTURE: CPT | Mod: PO

## 2017-11-29 RX ORDER — ESTRADIOL 0.1 MG/G
CREAM VAGINAL
Refills: 12 | COMMUNITY
Start: 2017-11-20 | End: 2018-04-02 | Stop reason: HOSPADM

## 2017-11-29 NOTE — PROGRESS NOTES
Chief Complaint   Patient presents with    Cerumen Impaction     bilateral    .     HPI:  Mrs. Mirza is a 67 year old female who is referred by NP Shyanne Mirza is here to see me today for evaluation of a possible wax impaction in bilateral ears.  She has complaints of hearing loss in the affected ears, but denies pain or drainage.  This has been an issue in the past.  The patient has not been using any sort of ear drop to soften the wax.      She also reports post-nasal drip and nasal congestion which has been present and worsening over the last 1-2 months. She notes nasal congestion, sneezing, and watery eyes  as well. She denies facial pain or pressure, hyposmia, or maxillary tooth pain or pressure. She has been on cetirizine without relief of the post nasal drip.     Past Medical History:   Diagnosis Date    Chondromalacia of right patella     MRI 6/2016 at [facility]    Diverticulitis     GERD (gastroesophageal reflux disease)     HTN (hypertension) 7/26/2013    Hypoglycemia 11/22/2014    OA (osteoarthritis) of knee     bilateraly, followed by Dr. Callejas, [facility], s/p synvisc injections    Osteopenia 7/26/2013    Personal history of kidney stones 7/26/2013    Rosacea 7/26/2013    Tear of medial meniscus of right knee     MRI 6/2016     Social History     Social History    Marital status:      Spouse name: N/A    Number of children: y    Years of education: N/A     Occupational History          Works at Law firm     Social History Main Topics    Smoking status: Former Smoker     Packs/day: 0.25     Years: 30.00     Quit date: 2/1/2005    Smokeless tobacco: Former User    Alcohol use No    Drug use: No    Sexual activity: Not on file     Other Topics Concern    Not on file     Social History Narrative    Originally from [place], living in [place] with son         Past Surgical History:   Procedure Laterality Date    ADENOIDECTOMY      FOOT SURGERY  Bilateral     KNEE ARTHROSCOPY W/ MENISCAL REPAIR Right 5/14    TONSILLECTOMY      TUBAL LIGATION       Family History   Problem Relation Age of Onset    Hypertension Mother     Diabetes Mother     Glaucoma Mother     Cataracts Mother     Hypertension Father     Stroke Father     No Known Problems Sister     No Known Problems Brother     No Known Problems Daughter     No Known Problems Son     No Known Problems Sister     No Known Problems Daughter            Review of Systems  General: negative for chills, fever or weight loss  Psychological: negative for mood changes or depression  Ophthalmic: negative for blurry vision, photophobia or eye pain  ENT: see HPI  Respiratory ROS: no cough, shortness of breath, or wheezing  Cardiovascular: no chest pain or dyspnea on exertion  Gastrointestinal ROS: no abdominal pain, change in bowel habits, or black/ bloody stools  Musculoskeletal ROS: negative for gait disturbance or muscular weakness  Neurological: no syncope or seizures; no ataxia  Dermatological: negative for puritis,  rash and jaundice  Hematologic/lymphatic: no easy bruising, no new lumps or bumps      Physical Exam:    Vitals:    11/29/17 1400   BP: 136/79   Pulse: 89   Temp: 97 °F (36.1 °C)       Constitutional: Well appearing / communicating without difficutly.  NAD.  Eyes: EOM I Bilaterally  Head/Face: Normocephalic.  Negative paranasal sinus pressure/tenderness.  Salivary glands WNL.  House Brackmann I Bilaterally.    Right Ear: External ear normal and ear canal occluded. Decreased hearing is noted.   Left Ear: External ear normal and ear canal normal occluded. Decreased hearing is noted.   Bilateral complete cerumen impactions, removal described in a separate procedure note    Nose: Nasal septal deviation. Inferior Turbinates 3+ bilaterally. No septal perforation. No masses/lesions. External nasal skin appears normal without masses/lesions.  Oral Cavity: Gingiva/lips within normal limits.   Dentition/gingiva healthy appearing. Mucus membranes moist. Floor of mouth soft, no masses palpated. Oral Tongue mobile. Hard Palate appears normal.    Oropharynx: Base of tongue appears normal. No masses/lesions noted. Tonsillar fossa/pharyngeal wall without lesions. Posterior oropharynx WNL.  Soft palate without masses. Midline uvula.   Neck/Lymphatic: No LAD I-VI bilaterally.  No thyromegaly.  No masses noted on exam.    Mirror laryngoscopy/nasopharyngoscopy: Active gag reflex.  Unable to perform.    Neuro/Psychiatric: AOx3.  Normal mood and affect.   Cardiovascular: Normal carotid pulses bilaterally, no increasing jugular venous distention noted at cervical region bilaterally.    Respiratory: Normal respiratory effort, no stridor, no retractions noted.            Assessment:    ICD-10-CM ICD-9-CM    1. Chronic non-seasonal allergic rhinitis, unspecified trigger J30.89 477.9    2. Nasal septal deviation J34.2 470    3. Hypertrophy of inferior nasal turbinate J34.3 478.0    4. Bilateral impacted cerumen H61.23 380.4      The primary encounter diagnosis was Chronic non-seasonal allergic rhinitis, unspecified trigger. Diagnoses of Nasal septal deviation, Hypertrophy of inferior nasal turbinate, and Bilateral impacted cerumen were also pertinent to this visit.      Plan:  No orders of the defined types were placed in this encounter.    Chronic allergic rhinitis:  Continue Zyrtec. Add Flonase and Nasal saline irrigations BID.     We reviewed the patient's recent audiogram and hearing loss in detail.  We discussed that she is not at the point of hearing aid candidacy at this time. We also discussed the use hearing protection when exposed to loud noise, including lawn equipment.      Cerumen impaction:  Removed today without difficulty.  I would recommend the use of a wax softening drop, either over the counter Debrox or mineral oil, on a weekly basis.  I also instructed the patient to avoid Qtips.      Sofia Almanza  MD [facility]

## 2017-11-29 NOTE — LETTER
November 29, 2017      Shyanne Mirza NP  [address] [place] [address]           [facility]  [address] [place] [address], [address]  [place] [address]  Phone: [phone]  Fax: [phone]          Patient: Violet Mirza   MR Number: [id number]   YOB: 1950   Date of Visit: 11/29/2017       Dear Shyanne Mirza:    Thank you for referring Violet Mirza to me for evaluation. Attached you will find relevant portions of my assessment and plan of care.    If you have questions, please do not hesitate to call me. I look forward to following Violet Mirza along with you.    Sincerely,    Sofia Yoedr MD    Enclosure  CC:  No Recipients    If you would like to receive this communication electronically, please contact [email or web address] or [phone] to request more information on [facility] Link access.    For providers and/or their staff who would like to refer a patient to [facility], please contact us through our one-stop-shop provider referral line, [facility], at [phone].    If you feel you have received this communication in error or would no longer like to receive these types of communications, please e-mail [email or web address]

## 2017-11-29 NOTE — PROCEDURES
Ear Cerumen Removal  Date/Time: 11/29/2017 2:23 PM  Performed by: BLAYNE TAYLOR  Authorized by: BLAYNE TAYLOR     Consent Done?:  Yes (Verbal)    Procedure Note    CHIEF COMPLAINT:  Bilateral Cerumen Impaction    Description:  The patient was seated in an exam chair.  An ear speculum was placed in the right EAC and was examined under the microscope.  Suction and/or loop curettes were used to remove a large cerumen impaction.  The tympanic membrane was visualized and was normal in appearance.  The procedure was repeated on the left side in a similar fashion.  The TM was intact and normal on this side as well.  The patient tolerated the procedure well.

## 2017-11-29 NOTE — PROGRESS NOTES
Tracy Keith, F-AAA [facility] [address]  [address]  [place] [address]      Patient: Violet Mirza   MRN: [id number]  : 1950  SANTANA: 2017       AUDIOLOGICAL EVALUATION    CASE HISTORY:    Violet Mirza, 67 y.o., was seen on the above date for an audiological evaluation following cerumen removal by Dr. Almanza. Patient denied hearing loss and tinnitus. No further history significant to hearing loss was reported.    TEST RESULTS:    Imittance testing revealed normal middle ear compliance (Type A tympanograms) in both ears. Speech reception thresholds were obtained at 10 dB HL and 15 dB HL, in the right and left ears, respectively, which was consistent with pure tone results. Word recognitions scores of 100% were obtained in both ears using a presentation level of 50 dB HL in the right ear and 55 dB HL in the left ear.    IMPRESSION:   Audiological testing indicated that Violet Mirza has a mild, high frequency sensorineural hearing loss in both ears.    RECOMMENDATIONS:   It is recommended that she continue to receive audiological monitoring annually to monitor hearing status.     If you should have any questions or concerns regarding the above information, please do not hesitate to contact me at [phone].      _______________________________  Chiki Keith, [facility]  Clinical Audiologist    enclosure: audiogram

## 2017-11-30 ENCOUNTER — TELEPHONE (OUTPATIENT)
Dept: FAMILY MEDICINE | Facility: CLINIC | Age: 67
End: 2017-11-30

## 2017-11-30 DIAGNOSIS — I10 ESSENTIAL HYPERTENSION: ICD-10-CM

## 2017-11-30 DIAGNOSIS — E03.2 HYPOTHYROIDISM, IATROGENIC: Primary | ICD-10-CM

## 2017-11-30 DIAGNOSIS — E03.9 HYPOTHYROIDISM, UNSPECIFIED TYPE: ICD-10-CM

## 2017-11-30 RX ORDER — LOSARTAN POTASSIUM 50 MG/1
50 TABLET ORAL DAILY
Qty: 90 TABLET | Refills: 3 | Status: SHIPPED | OUTPATIENT
Start: 2017-11-30 | End: 2018-02-08 | Stop reason: SDUPTHER

## 2017-11-30 RX ORDER — LEVOTHYROXINE SODIUM 100 UG/1
100 TABLET ORAL
Qty: 90 TABLET | Refills: 0 | Status: SHIPPED | OUTPATIENT
Start: 2017-11-30 | End: 2018-01-19 | Stop reason: SDUPTHER

## 2017-12-01 NOTE — TELEPHONE ENCOUNTER
Low TSH.  Repeat testing in 3 months.  Levothyroxine was decreased to 100 µg.  Please notify the patient.  Prescription was sent to the pharmacy.

## 2017-12-04 ENCOUNTER — PATIENT MESSAGE (OUTPATIENT)
Dept: OTOLARYNGOLOGY | Facility: CLINIC | Age: 67
End: 2017-12-04

## 2017-12-04 RX ORDER — FLUTICASONE PROPIONATE 50 MCG
2 SPRAY, SUSPENSION (ML) NASAL DAILY
Qty: 1 BOTTLE | Refills: 12 | Status: SHIPPED | OUTPATIENT
Start: 2017-12-04 | End: 2018-01-30

## 2017-12-05 ENCOUNTER — PATIENT MESSAGE (OUTPATIENT)
Dept: FAMILY MEDICINE | Facility: CLINIC | Age: 67
End: 2017-12-05

## 2017-12-05 DIAGNOSIS — E03.2 HYPOTHYROIDISM, IATROGENIC: ICD-10-CM

## 2017-12-05 RX ORDER — LIOTHYRONINE SODIUM 25 UG/1
25 TABLET ORAL DAILY
Qty: 30 TABLET | Refills: 3 | Status: SHIPPED | OUTPATIENT
Start: 2017-12-05 | End: 2018-03-02

## 2017-12-05 RX ORDER — TRAMADOL HYDROCHLORIDE 50 MG/1
50 TABLET ORAL EVERY 6 HOURS PRN
Qty: 40 TABLET | Refills: 0 | Status: SHIPPED | OUTPATIENT
Start: 2017-12-05 | End: 2017-12-15

## 2018-01-03 ENCOUNTER — TELEPHONE (OUTPATIENT)
Dept: OTOLARYNGOLOGY | Facility: CLINIC | Age: 68
End: 2018-01-03

## 2018-01-03 NOTE — TELEPHONE ENCOUNTER
Spoke with patient she is c/o post-nasal drip, productive cough clear thick mucus, sinus pressure. Patient wanting to know if a medication can be called in or should she come in for a follow up appointment.. Please advise.

## 2018-01-03 NOTE — TELEPHONE ENCOUNTER
----- Message from Xiao Gunter sent at 1/3/2018 11:04 AM CST -----  Contact: [phone]/self  Patient requesting to speak with you regarding getting medication for nasal drip or appointment.  Patient stated that nasal dripping  is causing her to choke.  Please advise.

## 2018-01-04 ENCOUNTER — OFFICE VISIT (OUTPATIENT)
Dept: OTOLARYNGOLOGY | Facility: CLINIC | Age: 68
End: 2018-01-04
Payer: MEDICARE

## 2018-01-04 ENCOUNTER — HOSPITAL ENCOUNTER (OUTPATIENT)
Dept: RADIOLOGY | Facility: HOSPITAL | Age: 68
Discharge: HOME | End: 2018-01-04
Attending: OTOLARYNGOLOGY
Payer: MEDICARE

## 2018-01-04 VITALS
DIASTOLIC BLOOD PRESSURE: 113 MMHG | HEIGHT: 68 IN | TEMPERATURE: 97 F | WEIGHT: 238 LBS | SYSTOLIC BLOOD PRESSURE: 153 MMHG | HEART RATE: 53 BPM | BODY MASS INDEX: 36.07 KG/M2

## 2018-01-04 DIAGNOSIS — B96.89 ACUTE BACTERIAL SINUSITIS: Primary | ICD-10-CM

## 2018-01-04 DIAGNOSIS — R05.9 COUGH: ICD-10-CM

## 2018-01-04 DIAGNOSIS — J01.90 ACUTE BACTERIAL SINUSITIS: Primary | ICD-10-CM

## 2018-01-04 DIAGNOSIS — J45.901 EXACERBATION OF ASTHMA, UNSPECIFIED ASTHMA SEVERITY, UNSPECIFIED WHETHER PERSISTENT: ICD-10-CM

## 2018-01-04 DIAGNOSIS — J30.89 CHRONIC NON-SEASONAL ALLERGIC RHINITIS, UNSPECIFIED TRIGGER: ICD-10-CM

## 2018-01-04 PROCEDURE — 99999 PR PBB SHADOW E&M-EST. PATIENT-LVL III: CPT | Mod: PBBFAC,,, | Performed by: OTOLARYNGOLOGY

## 2018-01-04 PROCEDURE — 71046 X-RAY EXAM CHEST 2 VIEWS: CPT | Mod: 26,,, | Performed by: RADIOLOGY

## 2018-01-04 PROCEDURE — 71046 X-RAY EXAM CHEST 2 VIEWS: CPT | Mod: TC,FY

## 2018-01-04 PROCEDURE — 99213 OFFICE O/P EST LOW 20 MIN: CPT | Mod: S$GLB,,, | Performed by: OTOLARYNGOLOGY

## 2018-01-04 PROCEDURE — 99499 UNLISTED E&M SERVICE: CPT | Mod: S$GLB,,, | Performed by: OTOLARYNGOLOGY

## 2018-01-04 RX ORDER — LEVOFLOXACIN 500 MG/1
500 TABLET, FILM COATED ORAL DAILY
Qty: 7 TABLET | Refills: 0 | Status: SHIPPED | OUTPATIENT
Start: 2018-01-04 | End: 2018-01-11

## 2018-01-04 RX ORDER — BENZONATATE 200 MG/1
200 CAPSULE ORAL 3 TIMES DAILY PRN
Qty: 30 CAPSULE | Refills: 0 | Status: SHIPPED | OUTPATIENT
Start: 2018-01-04 | End: 2018-01-14

## 2018-01-04 RX ORDER — METHYLPREDNISOLONE ACETATE 40 MG/ML
40 INJECTION, SUSPENSION INTRA-ARTICULAR; INTRALESIONAL; INTRAMUSCULAR; SOFT TISSUE
Status: DISCONTINUED | OUTPATIENT
Start: 2018-01-04 | End: 2018-01-25 | Stop reason: HOSPADM

## 2018-01-04 RX ORDER — TRAMADOL HYDROCHLORIDE 50 MG/1
TABLET ORAL
Refills: 0 | COMMUNITY
Start: 2017-12-18 | End: 2019-11-01

## 2018-01-04 RX ORDER — CODEINE PHOSPHATE AND GUAIFENESIN 10; 100 MG/5ML; MG/5ML
5 SOLUTION ORAL 3 TIMES DAILY PRN
Qty: 240 ML | Refills: 0 | Status: SHIPPED | OUTPATIENT
Start: 2018-01-04 | End: 2018-01-14

## 2018-01-04 NOTE — PROGRESS NOTES
Chief Complaint   Patient presents with    Follow-up     post-nasal drip, cough, nasal congestion, sinus pressure, sore throat. pt reports started a week ago    .     HPI: Violet Mirza is a 67 y.o. female who presents for evaluation of a several day history of nasal congestion and postnasal drip. She has had a cough which has been worse at night.  She describes difficulty breathing at night. There is bilateral nasal obstruction. She does not use sinus rinses or nasal sprays. She admits to midface pain and pressure.  She admits to rhinorrhea and postnasal drip. There is not maxillary tooth pain. She  admits to headaches.  She has not had sinus or nasal surgery. There is no history of sinonasal trauma. She reports that she has had chills and sweats.  She has been having to use her inhaler more frequently.           Past Medical History:   Diagnosis Date    Chondromalacia of right patella     MRI 6/2016 at [facility]    Diverticulitis     GERD (gastroesophageal reflux disease)     HTN (hypertension) 7/26/2013    Hypoglycemia 11/22/2014    OA (osteoarthritis) of knee     bilateraly, followed by Dr. Callejas, [facility], s/p synvisc injections    Osteopenia 7/26/2013    Personal history of kidney stones 7/26/2013    Rosacea 7/26/2013    Tear of medial meniscus of right knee     MRI 6/2016     Social History     Social History    Marital status:      Spouse name: N/A    Number of children: y    Years of education: N/A     Occupational History          Works at Law firm     Social History Main Topics    Smoking status: Former Smoker     Packs/day: 0.25     Years: 30.00     Quit date: 2/1/2005    Smokeless tobacco: Former User    Alcohol use No    Drug use: No    Sexual activity: Not on file     Other Topics Concern    Not on file     Social History Narrative    Originally from [place], living in [place] with son         Past Surgical History:   Procedure  Laterality Date    ADENOIDECTOMY      FOOT SURGERY Bilateral     KNEE ARTHROSCOPY W/ MENISCAL REPAIR Right 5/14    TONSILLECTOMY      TUBAL LIGATION       Family History   Problem Relation Age of Onset    Hypertension Mother     Diabetes Mother     Glaucoma Mother     Cataracts Mother     Hypertension Father     Stroke Father     No Known Problems Sister     No Known Problems Brother     No Known Problems Daughter     No Known Problems Son     No Known Problems Sister     No Known Problems Daughter            Review of Systems  General: negative for chills, fever or weight loss  Psychological: negative for mood changes or depression  Ophthalmic: negative for blurry vision, photophobia or eye pain  ENT: see HPI  Respiratory: no cough, shortness of breath, or wheezing  Cardiovascular: no chest pain or dyspnea on exertion  Gastrointestinal: no abdominal pain, change in bowel habits, or black/ bloody stools  Musculoskeletal: negative for gait disturbance or muscular weakness  Neurological: no syncope or seizures; no ataxia  Dermatological: negative for puritis,  rash and jaundice  Hematologic/lymphatic: no easy bruising, no new lumps or bumps      Physical Exam:    Vitals:    01/04/18 1051   BP: (!) 153/113   Pulse: (!) 53   Temp: 97 °F (36.1 °C)     Pulse Ox: 98% on RA    Constitutional: Well appearing / communicating without difficutly.  NAD.  Eyes: EOM I Bilaterally  Head/Face: Normocephalic.  Negative paranasal sinus pressure/tenderness.  Salivary glands WNL.  House Brackmann I Bilaterally.    Right Ear: Auricle normal appearance. External Auditory Canal within normal limits no lesions or masses,TM w/o masses/lesions/perforations. TM mobility noted.   Left Ear: Auricle normal appearance. External Auditory Canal within normal limits no lesions or masses,TM w/o masses/lesions/perforations. TM mobility noted.  Nose: No gross nasal septal deviation. Inferior Turbinates 3+ bilaterally. No septal  perforation. No masses/lesions. External nasal skin appears normal without masses/lesions. Purulent rhinorrhea and crusting bilateral inferior meatus.  Oral Cavity: Gingiva/lips within normal limits.  Dentition/gingiva healthy appearing. Mucus membranes moist. Floor of mouth soft, no masses palpated. Oral Tongue mobile. Hard Palate appears normal.    Oropharynx: Base of tongue appears normal. No masses/lesions noted. Tonsillar fossa/pharyngeal wall without lesions. Posterior oropharynx WNL.  Soft palate without masses. Midline uvula.   Neck/Lymphatic: No LAD I-VI bilaterally.  No thyromegaly.  No masses noted on exam.    Mirror laryngoscopy/nasopharyngoscopy: Active gag reflex.  Unable to perform.    Neuro/Psychiatric: AOx3.  Normal mood and affect.   Cardiovascular: Normal carotid pulses bilaterally, no increasing jugular venous distention noted at cervical region bilaterally.    Respiratory: Normal respiratory effort, no stridor, no retractions noted.            Assessment:    ICD-10-CM ICD-9-CM    1. Acute bacterial sinusitis J01.90 461.9     B96.89     2. Chronic non-seasonal allergic rhinitis, unspecified trigger J30.89 477.9    3. Cough R05 786.2 X-Ray Chest PA And Lateral   4. Exacerbation of asthma, unspecified asthma severity, unspecified whether persistent J45.901 493.92      The primary encounter diagnosis was Acute bacterial sinusitis. Diagnoses of Chronic non-seasonal allergic rhinitis, unspecified trigger, Cough, and Exacerbation of asthma, unspecified asthma severity, unspecified whether persistent were also pertinent to this visit.      Plan:  Orders Placed This Encounter   Procedures    X-Ray Chest PA And Lateral     Acute non-recurrent sinusitis- he  will begin a course of   levaquin, nasal steroid, and antihistamine as noted above. she will begin nasal saline irrigations minimally twice daily. I will also order a depo-medrol IM injection. I will also obtain a stat chest x ray to rule out  pneumonia. She understands that she is to notify me for persistent symptoms or worsening of symptoms.      Sofia Yoder MD

## 2018-01-05 ENCOUNTER — TELEPHONE (OUTPATIENT)
Dept: OTOLARYNGOLOGY | Facility: CLINIC | Age: 68
End: 2018-01-05

## 2018-01-05 NOTE — TELEPHONE ENCOUNTER
----- Message from Sofia Yoder MD sent at 1/4/2018  4:42 PM CST -----  Please notify patient that her chest x ray did not show any signs of acute infection.  It did show possible vascular congestion and I recommend follow up with Dr. Henning to further assess.

## 2018-01-05 NOTE — TELEPHONE ENCOUNTER
Spoke with patient she was notified of chest xray results, showed no signs of pneumonia but showed vascular congestion. As per Dr Almanza told patient she needs to follow up with Dr Betlran to discuss further treatment. Patient verbalized understanding.

## 2018-01-08 ENCOUNTER — TELEPHONE (OUTPATIENT)
Dept: OTOLARYNGOLOGY | Facility: CLINIC | Age: 68
End: 2018-01-08

## 2018-01-08 RX ORDER — HYDROCODONE BITARTRATE AND HOMATROPINE METHYLBROMIDE ORAL SOLUTION 5; 1.5 MG/5ML; MG/5ML
5 LIQUID ORAL EVERY 6 HOURS PRN
Qty: 120 ML | Refills: 0 | Status: SHIPPED | OUTPATIENT
Start: 2018-01-08 | End: 2018-01-30

## 2018-01-08 NOTE — TELEPHONE ENCOUNTER
Spoke with patient she was notified Dr Almanza sent in another prescription for cough medication. She has an appointment with Dr Horvath on 1/16/18 for the follow up

## 2018-01-08 NOTE — TELEPHONE ENCOUNTER
Spoke with patient, she is calling to let Dr Almanza know she can't take the guaifenesin-codeine, medication makes patient's stomach upset. She is taking the Tessalon but patient reports she is still coughing and having trouble breathing. Discussed with patient Dr Almanza did want her to follow up with Dr Horvath sooner than March 1st if she is not better. Spoke with Nadine they scheduled her for a sooner appointment on 1/16/18. Patient wanting to know until the appointment if she could have something else prescribed for the cough.?

## 2018-01-08 NOTE — TELEPHONE ENCOUNTER
Please notify patient that I will send in another cough syrup for her but it too may cause nausea.  If she continues to have SOB, despite taking the antibiotics and her albuterol for her asthma I do recommend the soonest available with Dr. Horvath or one of his partners or urgent care.

## 2018-01-16 ENCOUNTER — OFFICE VISIT (OUTPATIENT)
Dept: FAMILY MEDICINE | Facility: CLINIC | Age: 68
End: 2018-01-16
Payer: MEDICARE

## 2018-01-16 VITALS
HEIGHT: 68 IN | OXYGEN SATURATION: 95 % | HEART RATE: 102 BPM | SYSTOLIC BLOOD PRESSURE: 140 MMHG | DIASTOLIC BLOOD PRESSURE: 82 MMHG | WEIGHT: 238 LBS | BODY MASS INDEX: 36.07 KG/M2

## 2018-01-16 DIAGNOSIS — E66.01 SEVERE OBESITY (BMI 35.0-39.9): ICD-10-CM

## 2018-01-16 DIAGNOSIS — I49.3 FREQUENT PVCS: ICD-10-CM

## 2018-01-16 DIAGNOSIS — M79.89 LEG SWELLING: ICD-10-CM

## 2018-01-16 DIAGNOSIS — R09.89 PULMONARY VASCULAR CONGESTION: ICD-10-CM

## 2018-01-16 DIAGNOSIS — R94.31 ABNORMAL EKG: ICD-10-CM

## 2018-01-16 DIAGNOSIS — E78.5 DYSLIPIDEMIA: ICD-10-CM

## 2018-01-16 DIAGNOSIS — E03.9 HYPOTHYROIDISM, UNSPECIFIED TYPE: ICD-10-CM

## 2018-01-16 DIAGNOSIS — R06.02 SHORTNESS OF BREATH: Primary | ICD-10-CM

## 2018-01-16 PROCEDURE — 93005 ELECTROCARDIOGRAM TRACING: CPT | Mod: S$GLB,,, | Performed by: FAMILY MEDICINE

## 2018-01-16 PROCEDURE — 99214 OFFICE O/P EST MOD 30 MIN: CPT | Mod: S$GLB,,, | Performed by: FAMILY MEDICINE

## 2018-01-16 PROCEDURE — 99999 PR PBB SHADOW E&M-EST. PATIENT-LVL IV: CPT | Mod: PBBFAC,,, | Performed by: FAMILY MEDICINE

## 2018-01-16 PROCEDURE — 99499 UNLISTED E&M SERVICE: CPT | Mod: S$GLB,,, | Performed by: FAMILY MEDICINE

## 2018-01-16 PROCEDURE — 93010 ELECTROCARDIOGRAM REPORT: CPT | Mod: S$GLB,,, | Performed by: INTERNAL MEDICINE

## 2018-01-16 RX ORDER — POTASSIUM CHLORIDE 600 MG/1
8 TABLET, FILM COATED, EXTENDED RELEASE ORAL DAILY
Qty: 30 TABLET | Refills: 0 | Status: ON HOLD | OUTPATIENT
Start: 2018-01-16 | End: 2018-01-25 | Stop reason: HOSPADM

## 2018-01-16 RX ORDER — HYDROCHLOROTHIAZIDE 12.5 MG/1
12.5 CAPSULE ORAL DAILY
Qty: 30 CAPSULE | Refills: 0 | Status: ON HOLD | OUTPATIENT
Start: 2018-01-16 | End: 2018-01-25 | Stop reason: HOSPADM

## 2018-01-16 NOTE — PROGRESS NOTES
Subjective:       Patient ID: Violet Mirza is a 67 y.o. female.    Chief Complaint: No chief complaint on file.    67 years old female came to the clinic with episodic shortness of breath associated with cough for the last couple of weeks.  Last chest x-ray with evidence of pulmonary vascular congestion.  Patient also reports legs swelling sometimes.  No palpitations orthopnea or PND.  Patient with a BMI of 36 currently trying to lose weight.      Review of Systems   Constitutional: Negative.    HENT: Negative.    Eyes: Negative.    Respiratory: Negative.    Cardiovascular: Positive for leg swelling. Negative for chest pain and palpitations.   Gastrointestinal: Negative.    Genitourinary: Negative.    Musculoskeletal: Negative.    Skin: Negative.    Neurological: Negative.    Psychiatric/Behavioral: Negative.        Objective:      Physical Exam   Constitutional: She is oriented to person, place, and time. She appears well-developed and well-nourished. No distress.   HENT:   Head: Normocephalic and atraumatic.   Right Ear: External ear normal.   Left Ear: External ear normal.   Nose: Nose normal.   Mouth/Throat: Oropharynx is clear and moist. No oropharyngeal exudate.   Eyes: Conjunctivae and EOM are normal. Pupils are equal, round, and reactive to light. Right eye exhibits no discharge. Left eye exhibits no discharge. No scleral icterus.   Neck: Normal range of motion. Neck supple. No JVD present. No tracheal deviation present. No thyromegaly present.   Cardiovascular: Normal rate, regular rhythm, normal heart sounds and intact distal pulses.  Exam reveals no gallop and no friction rub.    No murmur heard.  Pulmonary/Chest: Effort normal and breath sounds normal. No stridor. No respiratory distress. She has no wheezes. She has no rales. She exhibits no tenderness.   Abdominal: Soft. Bowel sounds are normal. She exhibits no distension and no mass. There is no tenderness. There is no rebound and no guarding.    Musculoskeletal: Normal range of motion. She exhibits no edema or tenderness.   Lymphadenopathy:     She has no cervical adenopathy.   Neurological: She is alert and oriented to person, place, and time. She has normal reflexes. No cranial nerve deficit. She exhibits normal muscle tone. Coordination normal.   Skin: Skin is warm and dry. No rash noted. She is not diaphoretic. No erythema. No pallor.   Psychiatric: She has a normal mood and affect. Her behavior is normal. Judgment and thought content normal.       Assessment:       1. Shortness of breath    2. Leg swelling    3. Pulmonary vascular congestion    4. Severe obesity (BMI 35.0-39.9)    5. Hypothyroidism, unspecified type    6. Dyslipidemia        Plan:         Diagnoses and all orders for this visit:    Shortness of breath  -     EKG 12-lead  -     2D echo with color flow doppler; Future    Leg swelling  -     hydroCHLOROthiazide (MICROZIDE) 12.5 mg capsule; Take 1 capsule (12.5 mg total) by mouth once daily.  -     TSH; Future  -     T3; Future  -     T4, free; Future  -     Brain natriuretic peptide; Future  -     Cortisol; Future  -     potassium chloride (KLOR-CON) 8 MEQ TbSR; Take 1 tablet (8 mEq total) by mouth once daily.    Pulmonary vascular congestion  -     Ambulatory referral to Pulmonology  -     EKG 12-lead  -     2D echo with color flow doppler; Future    Severe obesity (BMI 35.0-39.9)  -     Comprehensive metabolic panel; Future  -     Lipid panel; Future  -     TSH; Future    Hypothyroidism, unspecified type  -     TSH; Future  -     T3; Future  -     T4, free; Future    Dyslipidemia     Diet and physical activity to promote weight loss.  EKG was abnormal.  Cardiology referral was done.

## 2018-01-19 ENCOUNTER — LAB VISIT (OUTPATIENT)
Dept: LAB | Facility: HOSPITAL | Age: 68
End: 2018-01-19
Attending: FAMILY MEDICINE
Payer: MEDICARE

## 2018-01-19 ENCOUNTER — TELEPHONE (OUTPATIENT)
Dept: FAMILY MEDICINE | Facility: CLINIC | Age: 68
End: 2018-01-19

## 2018-01-19 ENCOUNTER — TELEPHONE (OUTPATIENT)
Dept: CARDIOLOGY | Facility: CLINIC | Age: 68
End: 2018-01-19

## 2018-01-19 DIAGNOSIS — R79.89 ELEVATED BRAIN NATRIURETIC PEPTIDE (BNP) LEVEL: ICD-10-CM

## 2018-01-19 DIAGNOSIS — R74.8 ABNORMAL LIVER ENZYMES: Primary | ICD-10-CM

## 2018-01-19 DIAGNOSIS — E03.9 HYPOTHYROIDISM, UNSPECIFIED TYPE: ICD-10-CM

## 2018-01-19 DIAGNOSIS — E66.01 SEVERE OBESITY (BMI 35.0-39.9): ICD-10-CM

## 2018-01-19 DIAGNOSIS — M79.89 LEG SWELLING: ICD-10-CM

## 2018-01-19 DIAGNOSIS — R73.01 IMPAIRED FASTING BLOOD SUGAR: ICD-10-CM

## 2018-01-19 LAB
ALBUMIN SERPL BCP-MCNC: 3.4 G/DL
ALP SERPL-CCNC: 125 U/L
ALT SERPL W/O P-5'-P-CCNC: 73 U/L
ANION GAP SERPL CALC-SCNC: 11 MMOL/L
AST SERPL-CCNC: 46 U/L
BILIRUB SERPL-MCNC: 0.6 MG/DL
BNP SERPL-MCNC: 873 PG/ML
BUN SERPL-MCNC: 12 MG/DL
CALCIUM SERPL-MCNC: 9.2 MG/DL
CHLORIDE SERPL-SCNC: 103 MMOL/L
CHOLEST SERPL-MCNC: 125 MG/DL
CHOLEST/HDLC SERPL: 2.5 {RATIO}
CO2 SERPL-SCNC: 27 MMOL/L
CORTIS SERPL-MCNC: 18.8 UG/DL
CREAT SERPL-MCNC: 0.9 MG/DL
EST. GFR  (AFRICAN AMERICAN): >60 ML/MIN/1.73 M^2
EST. GFR  (NON AFRICAN AMERICAN): >60 ML/MIN/1.73 M^2
GLUCOSE SERPL-MCNC: 135 MG/DL
HDLC SERPL-MCNC: 51 MG/DL
HDLC SERPL: 40.8 %
LDLC SERPL CALC-MCNC: 56.8 MG/DL
NONHDLC SERPL-MCNC: 74 MG/DL
POTASSIUM SERPL-SCNC: 3.7 MMOL/L
PROT SERPL-MCNC: 6.6 G/DL
SODIUM SERPL-SCNC: 141 MMOL/L
T3 SERPL-MCNC: 83 NG/DL
T4 FREE SERPL-MCNC: 1.13 NG/DL
TRIGL SERPL-MCNC: 86 MG/DL
TSH SERPL DL<=0.005 MIU/L-ACNC: 0.06 UIU/ML

## 2018-01-19 PROCEDURE — 84480 ASSAY TRIIODOTHYRONINE (T3): CPT

## 2018-01-19 PROCEDURE — 84443 ASSAY THYROID STIM HORMONE: CPT

## 2018-01-19 PROCEDURE — 82533 TOTAL CORTISOL: CPT

## 2018-01-19 PROCEDURE — 80061 LIPID PANEL: CPT

## 2018-01-19 PROCEDURE — 80053 COMPREHEN METABOLIC PANEL: CPT

## 2018-01-19 PROCEDURE — 83880 ASSAY OF NATRIURETIC PEPTIDE: CPT

## 2018-01-19 PROCEDURE — 36415 COLL VENOUS BLD VENIPUNCTURE: CPT | Mod: PO

## 2018-01-19 PROCEDURE — 84439 ASSAY OF FREE THYROXINE: CPT

## 2018-01-19 RX ORDER — LEVOTHYROXINE SODIUM 75 UG/1
75 TABLET ORAL
Qty: 30 TABLET | Refills: 1 | Status: SHIPPED | OUTPATIENT
Start: 2018-01-19 | End: 2018-03-02 | Stop reason: SDUPTHER

## 2018-01-19 NOTE — TELEPHONE ENCOUNTER
----- Message from Nayana Naylor sent at 1/19/2018  2:57 PM CST -----  Contact: self / [phone]  Patient is requesting a call back. Please advise

## 2018-01-20 ENCOUNTER — PATIENT MESSAGE (OUTPATIENT)
Dept: FAMILY MEDICINE | Facility: CLINIC | Age: 68
End: 2018-01-20

## 2018-01-20 NOTE — TELEPHONE ENCOUNTER
TSH was low.  Levothyroxine was reduced to 75 µg.  Prescription was sent to the pharmacy.  Repeat testing in 8 weeks.      BMP was positive possible heart weakness .  Cardiology referral was done.    Elevated blood sugar.  Testing to rule out diabetes was ordered.    Elevated liver enzymes.  Abdominal ultrasound was ordered and hepatitis panel.     Please notify the patient with appointments.

## 2018-01-22 ENCOUNTER — TELEPHONE (OUTPATIENT)
Dept: CARDIOLOGY | Facility: CLINIC | Age: 68
End: 2018-01-22

## 2018-01-22 ENCOUNTER — HOSPITAL ENCOUNTER (INPATIENT)
Facility: HOSPITAL | Age: 68
LOS: 3 days | Discharge: HOME | DRG: 286 | End: 2018-01-25
Attending: EMERGENCY MEDICINE | Admitting: INTERNAL MEDICINE
Payer: MEDICARE

## 2018-01-22 ENCOUNTER — CLINICAL SUPPORT (OUTPATIENT)
Dept: CARDIOLOGY | Facility: CLINIC | Age: 68
End: 2018-01-22
Attending: FAMILY MEDICINE
Payer: MEDICARE

## 2018-01-22 DIAGNOSIS — I48.0 PAROXYSMAL ATRIAL FIBRILLATION: ICD-10-CM

## 2018-01-22 DIAGNOSIS — I42.8 NONISCHEMIC CARDIOMYOPATHY: ICD-10-CM

## 2018-01-22 DIAGNOSIS — I50.9 NEW ONSET OF CONGESTIVE HEART FAILURE: Primary | ICD-10-CM

## 2018-01-22 DIAGNOSIS — I50.21 ACUTE SYSTOLIC HEART FAILURE: ICD-10-CM

## 2018-01-22 DIAGNOSIS — I10 ESSENTIAL HYPERTENSION: ICD-10-CM

## 2018-01-22 DIAGNOSIS — R06.02 SHORTNESS OF BREATH: ICD-10-CM

## 2018-01-22 DIAGNOSIS — R09.89 PULMONARY VASCULAR CONGESTION: ICD-10-CM

## 2018-01-22 DIAGNOSIS — R06.02 SHORTNESS OF BREATH: Primary | ICD-10-CM

## 2018-01-22 DIAGNOSIS — I48.91 ATRIAL FIBRILLATION: ICD-10-CM

## 2018-01-22 LAB
ALBUMIN SERPL BCP-MCNC: 3.9 G/DL
ALP SERPL-CCNC: 146 U/L
ALT SERPL W/O P-5'-P-CCNC: 80 U/L
ANION GAP SERPL CALC-SCNC: 12 MMOL/L
ANION GAP SERPL CALC-SCNC: 12 MMOL/L
AST SERPL-CCNC: 50 U/L
BILIRUB SERPL-MCNC: 0.7 MG/DL
BNP SERPL-MCNC: 1148 PG/ML
BUN SERPL-MCNC: 14 MG/DL
BUN SERPL-MCNC: 14 MG/DL
CALCIUM SERPL-MCNC: 10 MG/DL
CALCIUM SERPL-MCNC: 10 MG/DL
CHLORIDE SERPL-SCNC: 103 MMOL/L
CHLORIDE SERPL-SCNC: 103 MMOL/L
CO2 SERPL-SCNC: 27 MMOL/L
CO2 SERPL-SCNC: 27 MMOL/L
CREAT SERPL-MCNC: 0.8 MG/DL
CREAT SERPL-MCNC: 0.8 MG/DL
ERYTHROCYTE [DISTWIDTH] IN BLOOD BY AUTOMATED COUNT: 16.5 %
EST. GFR  (AFRICAN AMERICAN): >60 ML/MIN/1.73 M^2
EST. GFR  (AFRICAN AMERICAN): >60 ML/MIN/1.73 M^2
EST. GFR  (NON AFRICAN AMERICAN): >60 ML/MIN/1.73 M^2
EST. GFR  (NON AFRICAN AMERICAN): >60 ML/MIN/1.73 M^2
ESTIMATED PA SYSTOLIC PRESSURE: 68.29
GLUCOSE SERPL-MCNC: 118 MG/DL
GLUCOSE SERPL-MCNC: 118 MG/DL
HCT VFR BLD AUTO: 47.2 %
HGB BLD-MCNC: 15 G/DL
INR PPP: 1.1
MCH RBC QN AUTO: 27.5 PG
MCHC RBC AUTO-ENTMCNC: 31.8 G/DL
MCV RBC AUTO: 86 FL
MITRAL VALVE REGURGITATION: ABNORMAL
PLATELET # BLD AUTO: 321 K/UL
PMV BLD AUTO: 11.1 FL
POTASSIUM SERPL-SCNC: 3.5 MMOL/L
POTASSIUM SERPL-SCNC: 3.5 MMOL/L
PROT SERPL-MCNC: 7.2 G/DL
PROTHROMBIN TIME: 11.1 SEC
RBC # BLD AUTO: 5.46 M/UL
RETIRED EF AND QEF - SEE NOTES: 10 (ref 55–65)
SODIUM SERPL-SCNC: 142 MMOL/L
SODIUM SERPL-SCNC: 142 MMOL/L
TRICUSPID VALVE REGURGITATION: ABNORMAL
TROPONIN I SERPL DL<=0.01 NG/ML-MCNC: 0.04 NG/ML
WBC # BLD AUTO: 13.05 K/UL

## 2018-01-22 PROCEDURE — 85027 COMPLETE CBC AUTOMATED: CPT

## 2018-01-22 PROCEDURE — 93010 ELECTROCARDIOGRAM REPORT: CPT | Mod: S$GLB,,, | Performed by: INTERNAL MEDICINE

## 2018-01-22 PROCEDURE — 94640 AIRWAY INHALATION TREATMENT: CPT

## 2018-01-22 PROCEDURE — 25000003 PHARM REV CODE 250: Performed by: INTERNAL MEDICINE

## 2018-01-22 PROCEDURE — 21400001 HC TELEMETRY ROOM

## 2018-01-22 PROCEDURE — 93005 ELECTROCARDIOGRAM TRACING: CPT

## 2018-01-22 PROCEDURE — 96374 THER/PROPH/DIAG INJ IV PUSH: CPT

## 2018-01-22 PROCEDURE — 99285 EMERGENCY DEPT VISIT HI MDM: CPT | Mod: 25

## 2018-01-22 PROCEDURE — 63600175 PHARM REV CODE 636 W HCPCS: Performed by: NURSE PRACTITIONER

## 2018-01-22 PROCEDURE — 80053 COMPREHEN METABOLIC PANEL: CPT

## 2018-01-22 PROCEDURE — 96372 THER/PROPH/DIAG INJ SC/IM: CPT

## 2018-01-22 PROCEDURE — 94761 N-INVAS EAR/PLS OXIMETRY MLT: CPT

## 2018-01-22 PROCEDURE — 83880 ASSAY OF NATRIURETIC PEPTIDE: CPT

## 2018-01-22 PROCEDURE — 99223 1ST HOSP IP/OBS HIGH 75: CPT | Mod: ,,, | Performed by: INTERNAL MEDICINE

## 2018-01-22 PROCEDURE — 93005 ELECTROCARDIOGRAM TRACING: CPT | Mod: S$GLB,,, | Performed by: FAMILY MEDICINE

## 2018-01-22 PROCEDURE — 25000242 PHARM REV CODE 250 ALT 637 W/ HCPCS: Performed by: NURSE PRACTITIONER

## 2018-01-22 PROCEDURE — 93306 TTE W/DOPPLER COMPLETE: CPT | Mod: S$GLB,,, | Performed by: INTERNAL MEDICINE

## 2018-01-22 PROCEDURE — 96374 THER/PROPH/DIAG INJ IV PUSH: CPT | Mod: 59,S$GLB,, | Performed by: INTERNAL MEDICINE

## 2018-01-22 PROCEDURE — 25000003 PHARM REV CODE 250: Performed by: NURSE PRACTITIONER

## 2018-01-22 PROCEDURE — 96375 TX/PRO/DX INJ NEW DRUG ADDON: CPT

## 2018-01-22 PROCEDURE — 85610 PROTHROMBIN TIME: CPT

## 2018-01-22 PROCEDURE — 84484 ASSAY OF TROPONIN QUANT: CPT

## 2018-01-22 PROCEDURE — 25000003 PHARM REV CODE 250: Performed by: EMERGENCY MEDICINE

## 2018-01-22 RX ORDER — DILTIAZEM HYDROCHLORIDE 5 MG/ML
20 INJECTION INTRAVENOUS
Status: COMPLETED | OUTPATIENT
Start: 2018-01-22 | End: 2018-01-22

## 2018-01-22 RX ORDER — FUROSEMIDE 10 MG/ML
20 INJECTION INTRAMUSCULAR; INTRAVENOUS ONCE
Status: COMPLETED | OUTPATIENT
Start: 2018-01-22 | End: 2018-01-22

## 2018-01-22 RX ORDER — ONDANSETRON 8 MG/1
8 TABLET, ORALLY DISINTEGRATING ORAL EVERY 8 HOURS PRN
Status: DISCONTINUED | OUTPATIENT
Start: 2018-01-22 | End: 2018-01-25 | Stop reason: HOSPADM

## 2018-01-22 RX ORDER — SODIUM CHLORIDE 0.9 % (FLUSH) 0.9 %
3 SYRINGE (ML) INJECTION
Status: DISCONTINUED | OUTPATIENT
Start: 2018-01-22 | End: 2018-01-25 | Stop reason: HOSPADM

## 2018-01-22 RX ORDER — ENOXAPARIN SODIUM 100 MG/ML
1 INJECTION SUBCUTANEOUS
Status: DISCONTINUED | OUTPATIENT
Start: 2018-01-22 | End: 2018-01-24

## 2018-01-22 RX ORDER — PANTOPRAZOLE SODIUM 40 MG/1
40 TABLET, DELAYED RELEASE ORAL DAILY
Status: DISCONTINUED | OUTPATIENT
Start: 2018-01-23 | End: 2018-01-25 | Stop reason: HOSPADM

## 2018-01-22 RX ORDER — ASPIRIN 81 MG/1
81 TABLET ORAL DAILY
Status: DISCONTINUED | OUTPATIENT
Start: 2018-01-23 | End: 2018-01-25 | Stop reason: HOSPADM

## 2018-01-22 RX ORDER — LOSARTAN POTASSIUM 50 MG/1
50 TABLET ORAL DAILY
Status: DISCONTINUED | OUTPATIENT
Start: 2018-01-23 | End: 2018-01-25 | Stop reason: HOSPADM

## 2018-01-22 RX ORDER — HYDROCODONE BITARTRATE AND ACETAMINOPHEN 10; 325 MG/1; MG/1
1 TABLET ORAL EVERY 4 HOURS PRN
Status: DISCONTINUED | OUTPATIENT
Start: 2018-01-22 | End: 2018-01-25 | Stop reason: HOSPADM

## 2018-01-22 RX ORDER — CETIRIZINE HYDROCHLORIDE 10 MG/1
10 TABLET ORAL DAILY
Status: DISCONTINUED | OUTPATIENT
Start: 2018-01-23 | End: 2018-01-25 | Stop reason: HOSPADM

## 2018-01-22 RX ORDER — LIOTHYRONINE SODIUM 25 UG/1
25 TABLET ORAL DAILY
Status: DISCONTINUED | OUTPATIENT
Start: 2018-01-23 | End: 2018-01-25 | Stop reason: HOSPADM

## 2018-01-22 RX ORDER — TRAMADOL HYDROCHLORIDE 50 MG/1
50 TABLET ORAL EVERY 6 HOURS PRN
Status: DISCONTINUED | OUTPATIENT
Start: 2018-01-22 | End: 2018-01-25 | Stop reason: HOSPADM

## 2018-01-22 RX ORDER — LEVOTHYROXINE SODIUM 75 UG/1
75 TABLET ORAL
Status: DISCONTINUED | OUTPATIENT
Start: 2018-01-23 | End: 2018-01-25 | Stop reason: HOSPADM

## 2018-01-22 RX ORDER — AMIODARONE HYDROCHLORIDE 200 MG/1
400 TABLET ORAL 2 TIMES DAILY
Status: DISCONTINUED | OUTPATIENT
Start: 2018-01-22 | End: 2018-01-25 | Stop reason: HOSPADM

## 2018-01-22 RX ORDER — ALBUTEROL SULFATE 0.83 MG/ML
2.5 SOLUTION RESPIRATORY (INHALATION) EVERY 4 HOURS PRN
Status: DISCONTINUED | OUTPATIENT
Start: 2018-01-22 | End: 2018-01-25 | Stop reason: HOSPADM

## 2018-01-22 RX ORDER — POLYETHYLENE GLYCOL 3350 17 G/17G
17 POWDER, FOR SOLUTION ORAL 2 TIMES DAILY PRN
Status: DISCONTINUED | OUTPATIENT
Start: 2018-01-22 | End: 2018-01-25 | Stop reason: HOSPADM

## 2018-01-22 RX ORDER — LOPERAMIDE HYDROCHLORIDE 2 MG/1
2 CAPSULE ORAL
Status: DISCONTINUED | OUTPATIENT
Start: 2018-01-22 | End: 2018-01-25 | Stop reason: HOSPADM

## 2018-01-22 RX ORDER — RAMELTEON 8 MG/1
8 TABLET ORAL NIGHTLY PRN
Status: DISCONTINUED | OUTPATIENT
Start: 2018-01-22 | End: 2018-01-25 | Stop reason: HOSPADM

## 2018-01-22 RX ORDER — ACETAMINOPHEN 325 MG/1
650 TABLET ORAL EVERY 4 HOURS PRN
Status: DISCONTINUED | OUTPATIENT
Start: 2018-01-22 | End: 2018-01-25 | Stop reason: HOSPADM

## 2018-01-22 RX ADMIN — RAMELTEON 8 MG: 8 TABLET, FILM COATED ORAL at 10:01

## 2018-01-22 RX ADMIN — ENOXAPARIN SODIUM 100 MG: 100 INJECTION SUBCUTANEOUS at 05:01

## 2018-01-22 RX ADMIN — ALBUTEROL SULFATE 2.5 MG: 2.5 SOLUTION RESPIRATORY (INHALATION) at 11:01

## 2018-01-22 RX ADMIN — ALBUTEROL SULFATE 2.5 MG: 2.5 SOLUTION RESPIRATORY (INHALATION) at 05:01

## 2018-01-22 RX ADMIN — AMIODARONE HYDROCHLORIDE 400 MG: 200 TABLET ORAL at 05:01

## 2018-01-22 RX ADMIN — POTASSIUM BICARBONATE 50 MEQ: 25 TABLET, EFFERVESCENT ORAL at 03:01

## 2018-01-22 RX ADMIN — FUROSEMIDE 20 MG: 10 INJECTION, SOLUTION INTRAMUSCULAR; INTRAVENOUS at 03:01

## 2018-01-22 RX ADMIN — DILTIAZEM HYDROCHLORIDE 20 MG: 5 INJECTION INTRAVENOUS at 02:01

## 2018-01-22 NOTE — SUBJECTIVE & OBJECTIVE
Past Medical History:   Diagnosis Date    Chondromalacia of right patella     MRI 6/2016 at [facility]    Diverticulitis     GERD (gastroesophageal reflux disease)     HTN (hypertension) 7/26/2013    Hypoglycemia 11/22/2014    OA (osteoarthritis) of knee     bilateraly, followed by Dr. Callejas, [facility], s/p synvisc injections    Osteopenia 7/26/2013    Personal history of kidney stones 7/26/2013    Rosacea 7/26/2013    Tear of medial meniscus of right knee     MRI 6/2016       Past Surgical History:   Procedure Laterality Date    ADENOIDECTOMY      FOOT SURGERY Bilateral     KNEE ARTHROSCOPY W/ MENISCAL REPAIR Right 5/14    TONSILLECTOMY      TUBAL LIGATION         Review of patient's allergies indicates:  No Known Allergies    Current Facility-Administered Medications on File Prior to Encounter   Medication    methylPREDNISolone acetate injection 40 mg     Current Outpatient Prescriptions on File Prior to Encounter   Medication Sig    albuterol 90 mcg/actuation inhaler Inhale 2 puffs into the lungs every 6 (six) hours as needed for Wheezing. Rescue (Patient taking differently: Inhale 2 puffs into the lungs 2 (two) times daily. Rescue)    doxycycline (MONODOX) 50 MG Cap Take 1 capsule (50 mg total) by mouth once daily.    epinastine 0.05 % ophthalmic solution Place 2 drops into both eyes 2 (two) times daily.     hydroCHLOROthiazide (MICROZIDE) 12.5 mg capsule Take 1 capsule (12.5 mg total) by mouth once daily.    levothyroxine (SYNTHROID) 75 MCG tablet Take 1 tablet (75 mcg total) by mouth before breakfast.    liothyronine (CYTOMEL) 25 MCG Tab Take 1 tablet (25 mcg total) by mouth once daily.    losartan (COZAAR) 50 MG tablet Take 1 tablet (50 mg total) by mouth once daily.    potassium chloride (KLOR-CON) 8 MEQ TbSR Take 1 tablet (8 mEq total) by mouth once daily.    cetirizine (ZYRTEC) 10 MG tablet Take 1 tablet (10 mg total) by mouth once daily.    ESTRACE 0.01 % (0.1  mg/gram) vaginal cream USE 1GM THREE TIMES A WEEK THEN 1/2 GM TWICE A WEEK FOR DURATION VAGINALLY    fluticasone (FLONASE) 50 mcg/actuation nasal spray 2 sprays by Each Nare route once daily.    hydrocodone-homatropine 5-1.5 mg/5 ml (HYCODAN) 5-1.5 mg/5 mL Syrp Take 5 mLs by mouth every 6 (six) hours as needed (cough).    traMADol (ULTRAM) 50 mg tablet TK 1 T PO Q 6 H PRN     Family History     Problem Relation (Age of Onset)    Cataracts Mother    Diabetes Mother    Glaucoma Mother    Hypertension Mother, Father    No Known Problems Sister, Brother, Daughter, Son, Sister, Daughter    Stroke Father        Social History Main Topics    Smoking status: Former Smoker     Packs/day: 0.25     Years: 30.00     Quit date: 2/1/2005    Smokeless tobacco: Former User    Alcohol use No    Drug use: No    Sexual activity: Not on file     Review of Systems   Constitution: Positive for weight loss (5 pounds since starting HCTZ). Negative for diaphoresis, malaise/fatigue and weight gain.   HENT: Negative.    Eyes: Negative.    Cardiovascular: Positive for dyspnea on exertion, leg swelling, orthopnea and paroxysmal nocturnal dyspnea. Negative for chest pain, irregular heartbeat, near-syncope, palpitations and syncope.   Respiratory: Positive for cough, shortness of breath, sputum production and wheezing.    Endocrine: Negative.    Hematologic/Lymphatic: Negative.    Skin: Negative.    Musculoskeletal: Negative.    Gastrointestinal: Negative.    Genitourinary: Negative.    Neurological: Negative.  Negative for dizziness.   Psychiatric/Behavioral: Negative.    Allergic/Immunologic: Negative.      Objective:     Vital Signs (Most Recent):  Temp: 98.1 °F (36.7 °C) (01/22/18 1304)  Pulse: (!) 146 (01/22/18 1304)  Resp: (!) 24 (01/22/18 1304)  BP: (!) 153/48 (01/22/18 1418)  SpO2: 98 % (01/22/18 1304) Vital Signs (24h Range):  Temp:  [98.1 °F (36.7 °C)] 98.1 °F (36.7 °C)  Pulse:  [146] 146  Resp:  [24] 24  SpO2:  [98 %] 98 %  BP:  (153-164)/(48-94) 153/48     Weight: 102.1 kg (225 lb)  Body mass index is 34.21 kg/m².    SpO2: 98 %  O2 Device (Oxygen Therapy): room air    No intake or output data in the 24 hours ending 01/22/18 1535    Lines/Drains/Airways     Peripheral Intravenous Line                 Peripheral IV - Single Lumen 01/22/18 1331 Right Antecubital less than 1 day                Physical Exam   Constitutional: She is oriented to person, place, and time. She appears well-developed and well-nourished. No distress.   HENT:   Head: Normocephalic and atraumatic.   Eyes: Right eye exhibits no discharge. Left eye exhibits no discharge.   Neck: JVD present.   Cardiovascular: Normal rate and regular rhythm.  Frequent extrasystoles are present. Exam reveals no gallop.    Murmur heard.  Pulmonary/Chest: Effort normal. She has wheezes. She has rales.   Abdominal: Soft. Bowel sounds are normal.   Musculoskeletal: She exhibits no edema.   Neurological: She is alert and oriented to person, place, and time.   Skin: Skin is warm and dry. She is not diaphoretic.   Psychiatric: She has a normal mood and affect. Her behavior is normal. Judgment and thought content normal.       Significant Labs:   BMP:   Recent Labs  Lab 01/22/18  1351   *  118*     142   K 3.5  3.5     103   CO2 27  27   BUN 14  14   CREATININE 0.8  0.8   CALCIUM 10.0  10.0   , CMP   Recent Labs  Lab 01/22/18  1351     142   K 3.5  3.5     103   CO2 27  27   *  118*   BUN 14  14   CREATININE 0.8  0.8   CALCIUM 10.0  10.0   PROT 7.2   ALBUMIN 3.9   BILITOT 0.7   ALKPHOS 146*   AST 50*   ALT 80*   ANIONGAP 12  12   ESTGFRAFRICA >60  >60   EGFRNONAA >60  >60   , CBC   Recent Labs  Lab 01/22/18  1351   WBC 13.05*   HGB 15.0   HCT 47.2      , INR   Recent Labs  Lab 01/22/18  1351   INR 1.1   , Lipid Panel No results for input(s): CHOL, HDL, LDLCALC, TRIG, CHOLHDL in the last 48 hours., Troponin   Recent Labs  Lab  01/22/18  1351   TROPONINI 0.040*    and All pertinent lab results from the last 24 hours have been reviewed.    Significant Imaging: Echocardiogram:   2D echo with color flow doppler:   Results for orders placed or performed in visit on 01/22/18   2D echo with color flow doppler   Result Value Ref Range    EF 10 (A) 55 - 65    Mitral Valve Regurgitation MODERATE TO SEVERE (A)     Est. PA Systolic Pressure 68.29 (A)     Tricuspid Valve Regurgitation MODERATE (A)

## 2018-01-22 NOTE — HOSPITAL COURSE
01/22/2018 Patient to be admitted to telemetry for diuresis. Amiodarone initiated as well as IV Lasix, and Lovenox. Home ARB continued.   01/23/2018 SOB improved. SBP elevated overnight to the 180s. Intermittent tachycardia. Lasix increased to 40 mg daily. 900 cc output documented overnight. K+ 3.2 and replaced.   01/24/2017 Date of Procedure:  01/24/2018    A. Indication/Pre-Operative Diagnosis     The patient is a 67 year old female that was referred for catheterization by Dr. Josiah Brandt for atrial fibrillation and cardiomypathy or decreased LV function.     B. Summary/Post-Operative Diagnosis       Normal coronary arteries.    Diastolic dysfunction.    Severely depressed EF.    NICM.    C. HPI     I have reviewed the history and physical completed on 01/24/2018. The patient has been examined and I concur with the findings from 01/24/2018.     Patient history was obtained from the patient.     Counseling: The patient was counseled regarding the potential risks and benefits of this procedure, as well as possible alternative approaches to the problem and gave informed consent.    The ASA Score was Class III.     [facility] Risk Score for MACE is 2.80%. [facility] Risk Score for Death is 0.60%.     Height: 68 in.      Weight: 233 lbs.      BMI: 35.40 kg/m2    OUTPATIENT MEDICATIONS: Medications were reviewed.  ALLERGIES: Allergies were reviewed.    Laboratory data revealed:     01/22/2018 INR  1.1, PTI  11.1   01/24/2018 GLU  106, HGB  13.3, NA  134, K  4.5, HCT  43.2, PLT  298, CREAT  0.9, WBCIR  12.76, BUN  18            ACS Enzymes:     01/22/2018 TROP  0.040   01/23/2018 TROP  0.042              D. Hemodynamic Results     LVEDP (Pre): 29 mmHg  LVEDP (Post): 30 mmHg  Ejection Fraction: 10%  Global LV Function: severely depressed    E. Angiographic Results     Diagnostic:          Patient has a right dominant coronary artery.        - Left Main Coronary Artery:             The LM is normal. There is DEEPALI 3  flow.     - Left Anterior Descending Artery:             The LAD is normal. There is DEEPALI 3 flow.     - Left Circumflex Artery:             The LCX is normal. There is DEEPALI 3 flow.     - Right Coronary Artery:             The RCA is normal. There is DEEPALI 3 flow.     - Radial:             The radial was not studied.      Intervention          Radial:              The following items were used: Us Probe Cover.    F. Details of Procedure     PROCEDURES PERFORMED: LHC, Left Ventriculogram and Coronary Angio    ANESTHESIA: Conscious sedation was achieved with 100 mcg of FENTANYL 100MCG/2ML and 2 mg of Versed. Local anesthesia was achieved with 20 ml of Lidocaine 1%. Moderate conscious sedation was performed and cardiorespiratory functions were monitored the   entire procedure by Varsha Munoz RN. Sedation began at 01:00 PM and concluded at 01:23 PM, totalling 22.8 minutes.     PRIMARY SURGEON: Go Duran MD    COMPLICATIONS: There were no complications.    Medications given on sterile field: Lidocaine 1% (20 ml).    Medications given during procedure: Heparin 1000u/500cc Bag (2 bags), Verapamil Inj 2ml / 5 Mg (1.25 mg), Heparin 1000u/ml Inj (3750 units), Nitroglycerine (tridil) 5mg/ml (1250 mcg), Fentanyl 100mcg/2ml (100 mcg) and Versed (2 mg).    The patient was brought to the catheterization laboratory after premedication with oral Benadryl 50 mg. Bilateral groin prepped and draped. Right radial prepped and draped. The Kit, Manifold was flushed and connected to contrast. A Us Probe Cover was   applied to the right radial. After local anesthesia, a Sheath 4/5 Fr Glide Slender was inserted into the right Radial artery.     AORTA    A Wire Bentson 035 X 260 was inserted into the Aorta.     RCA    A Cath 5fr Alexx was inserted into the ostial RCA. The Wire Bentson 035 X 260 was removed. Angiography performed in multiple views in the right coronary arteries.     LM    The Cath 5fr Alexx was repositioned into the  ostial LM. Angiography performed in multiple views in the left coronary arteries. The Cath 5fr Alexx was removed.     Left  VENTRICLE    Attempted to insert Cath 5fr Pigtail 125cm into the left ventricle. The Cath 5fr Pigtail 125cm was removed. A Cath 4fr Pigtail 145 was inserted into the left ventricle. Hemodynamics recorded in the left ventricle. Angiography performed in the left   ventricle.     The Cath 4fr Pigtail 145 was removed. Total Visipaque injected was 70.0 ml. Total Visipaque used was 150.0 ml. The Sheath 4/5 Fr Glide Slender was removed. A Closure Vascband Radial R was applied to the right radial.       Fluoroscopy Time 5.5 minutes   Radiation Dose 727.5 mGy   Contrast Injected 70 ml Visipaque   Contrast Used  150 ml Visipaque            Procedure log documented by RT Jesusita and verified by Go Duran MD    ESTIMATED BLOOD LOSS is < 50 cc.    SPECIMEN: No specimen.     G. Recommendations     1. Routine post-cath care.  2. Maximize medical management.  3. Risk factor reductions.  4. Weight loss.  5. medical management of HFrEF    I certify that I was present for catheter insertion, catheter manipulation, angiography, angiographic interpretation, and all interventional procedures performed on this patient.     This document has been electronically    SIGNED BY: Go Duran MD On: 01/24/2018 13:54  01/25/2017 Patient was recovered overnight without incident. K+ noted 5.2 after initiation of Aldactone- discontinued. Medical management of HF optimized. Appropriate for discharge to follow up in clinic next week with BNP and BMP.

## 2018-01-22 NOTE — ASSESSMENT & PLAN NOTE
Echo today:   CONCLUSIONS     1 - Severely depressed left ventricular systolic function (EF 10-15%).     2 - Right ventricular enlargement with moderately to severely depressed systolic function.     3 - Biatrial enlargement.     4 - Pulmonary hypertension. The estimated PA systolic pressure is 68 mmHg.     5 - Moderate to severe mitral regurgitation.     6 - Moderate tricuspid regurgitation.     7 - Increased central venous pressure.  Etiology of HF ischemic vs arrhythmia induced; will need ischemic evaluation once near euvolemic- will add Aldactone prior to DC as well   CXR with pulmonary vascular congestion, elevated BNP, +JVD and rales on exam  K+ 3.5- replaced  Lasix 20 mg IV tonight and will monitor response   FLP in AM

## 2018-01-22 NOTE — TELEPHONE ENCOUNTER
Pt was seen today for echocardiogram.  Patient found to be in afib with RVR.  EKG performed and transmitted.  Patient noted to have poor EF based on echo images.  Patient dyspneic.  Dr. Belle at bedside.  Patient agreed to go to [facility].  Dr. Duran notified that patient would be going via personal vehicle with son.  Report called to [facility].

## 2018-01-22 NOTE — HPI
Violet Mirza is a 66 y/o Female who was sent to the ED following echocardiogram today.   Patient reports progressive SOB since December. She has been evaluated by her PCP who referred her to cardiology and ordered an echo. She also noted edema to her BLE which improved with the initiation of HCTZ. She denies chest pain, palps, syncope, dizziness. Endorses PND, orthopnea, productive cough. Patient was noted to be in AF RVR with rate in the 130s. LVEF 10-15%. CXR with pulmonary vascular congestion, elevated BNP, and mildly elevated TNI. Patient was given Cardizem IV in the ED with conversion to SR with frequent PVCs.

## 2018-01-22 NOTE — ED PROVIDER NOTES
Encounter Date: 1/22/2018       History     Chief Complaint   Patient presents with    Shortness of Breath     SOB x 1 week, had ultrasound today.  Was sent from Dr Duran's office for admission for CHF     68 y/o F presents for sob since October 2017.  She has been seen by her pcp several times for these symptoms.  She was initially dx with asthma and rx albuterol.  She reports improvement for a month or so.  She then returned to pcp for cough and sob in Dec.  She had an xray that was + for vascular congestion and told that she needed further work up.  Last week pt legs began to swell and she became more sob.  Pt was rx lasix last Tuesday and has taken one dose of lasix and potassium daily since last Wednesday.  Pt was scheduled for outpatient echo.  She had an echocardiogram done today and was told she had CHF and needed to come to ED and that she would see Dr. Duran immediately.      Pt denies chest pain.  + palpitations, sob, SANTANA, PND.  No diaphoresis.  No n/v.  No abd pain.           Review of patient's allergies indicates:  No Known Allergies  Past Medical History:   Diagnosis Date    Chondromalacia of right patella     MRI 6/2016 at [facility]    Diverticulitis     GERD (gastroesophageal reflux disease)     HTN (hypertension) 7/26/2013    Hypoglycemia 11/22/2014    OA (osteoarthritis) of knee     bilateraly, followed by Dr. Callejas, [facility], s/p synvisc injections    Osteopenia 7/26/2013    Personal history of kidney stones 7/26/2013    Rosacea 7/26/2013    Tear of medial meniscus of right knee     MRI 6/2016     Past Surgical History:   Procedure Laterality Date    ADENOIDECTOMY      FOOT SURGERY Bilateral     KNEE ARTHROSCOPY W/ MENISCAL REPAIR Right 5/14    TONSILLECTOMY      TUBAL LIGATION       Family History   Problem Relation Age of Onset    Hypertension Mother     Diabetes Mother     Glaucoma Mother     Cataracts Mother     Hypertension Father     Stroke  Father     No Known Problems Sister     No Known Problems Brother     No Known Problems Daughter     No Known Problems Son     No Known Problems Sister     No Known Problems Daughter      Social History   Substance Use Topics    Smoking status: Former Smoker     Packs/day: 0.25     Years: 30.00     Quit date: 2/1/2005    Smokeless tobacco: Former User    Alcohol use No     Review of Systems   Constitutional: Positive for activity change. Negative for appetite change, chills, diaphoresis and fever.   HENT: Negative for congestion.    Respiratory: Positive for cough and shortness of breath. Negative for chest tightness.    Cardiovascular: Positive for palpitations. Negative for chest pain and leg swelling.   Gastrointestinal: Negative for abdominal pain, diarrhea, nausea and vomiting.   Endocrine: Negative for polydipsia and polyphagia.   Genitourinary: Negative for difficulty urinating and dysuria.   Musculoskeletal: Negative for myalgias.        BLE swelling     Skin: Negative for pallor and rash.   Allergic/Immunologic: Negative for immunocompromised state.   Neurological: Negative for dizziness, weakness, numbness and headaches.   Psychiatric/Behavioral: Negative for confusion. The patient is nervous/anxious.    All other systems reviewed and are negative.      Physical Exam     Initial Vitals [01/22/18 1304]   BP Pulse Resp Temp SpO2   (!) 164/94 (!) 146 (!) 24 98.1 °F (36.7 °C) 98 %      MAP       117.33         Physical Exam    Nursing note and vitals reviewed.  Constitutional: She appears well-developed and well-nourished. She is not diaphoretic. No distress.   HENT:   Head: Normocephalic and atraumatic.   Mouth/Throat: Oropharynx is clear and moist.   Eyes: Conjunctivae and EOM are normal.   Neck: Normal range of motion. Neck supple.   Cardiovascular: Normal heart sounds and intact distal pulses.   afib with RVR   Pulmonary/Chest: Breath sounds normal. No respiratory distress. She has no wheezes. She  has no rhonchi. She has no rales.   Abdominal: Soft. Bowel sounds are normal. She exhibits no distension. There is no tenderness. There is no rebound and no guarding.   Musculoskeletal: Normal range of motion.   Trace BLE edema   Neurological: She is alert and oriented to person, place, and time.   Skin: Skin is warm and dry. No erythema.   Psychiatric: Judgment and thought content normal.   anxious         ED Course   Procedures  Labs Reviewed   CBC WITHOUT DIFFERENTIAL - Abnormal; Notable for the following:        Result Value    WBC 13.05 (*)     RBC 5.46 (*)     MCHC 31.8 (*)     RDW 16.5 (*)     All other components within normal limits   BASIC METABOLIC PANEL - Abnormal; Notable for the following:     Glucose 118 (*)     All other components within normal limits    Narrative:     Basic Metabolic Panel was cancelled on 01/22/2018 at 14:27 by DORIS;   absorbed by other test CMP   TROPONIN I - Abnormal; Notable for the following:     Troponin I 0.040 (*)     All other components within normal limits   B-TYPE NATRIURETIC PEPTIDE - Abnormal; Notable for the following:     BNP 1,148 (*)     All other components within normal limits   COMPREHENSIVE METABOLIC PANEL - Abnormal; Notable for the following:     Glucose 118 (*)     Alkaline Phosphatase 146 (*)     AST 50 (*)     ALT 80 (*)     All other components within normal limits   COMPREHENSIVE METABOLIC PANEL   PROTIME-INR   PROTIME-INR     EKG Readings: (Independently Interpreted)   Initial Reading: No STEMI. Rhythm: Atrial Fibrillation. Heart Rate: 134. Ectopy: PVCs. Clinical Impression: Atrial Fibrillation with RVR with PVCs   Other EKG Interpretations: Repeat EKG at 1519:  NSR at 100 bpm with frequent PVCs       X-Rays:   Independently Interpreted Readings:   Chest X-Ray: Cardiomegaly present. Cannot rule out left sided pleural effusion, no PTX     Medical Decision Making:   Initial Assessment:   68 y/o F presents for sob since October 2017.  She has been seen by  her pcp several times for these symptoms.  She was initially dx with asthma and rx albuterol.  She reports improvement for a month or so.  She then returned to pcp for cough and sob in Dec.  She had an xray that was + for vascular congestion and told that she needed further work up.  Last week pt legs began to swell and she became more sob.  Pt was rx lasix last Tuesday and has taken one dose of lasix and potassium daily since last Wednesday.  Pt was scheduled for outpatient echo.  She had an echocardiogram done today and was told she had CHF and needed to come to ED and that she would see Dr. Duran immediately.      Pt denies chest pain.  + palpitations, sob, SANTANA, PND.  No diaphoresis.  No n/v.  No abd pain.           Differential Diagnosis:   DDX: new onset CHF, arrhythmia, ACS, metabolic derangement, dehydration, pneumonia  Independently Interpreted Test(s):   I have ordered and independently interpreted X-rays - see prior notes.  I have ordered and independently interpreted EKG Reading(s) - see prior notes  Clinical Tests:   Lab Tests: Ordered and Reviewed  The following lab test(s) were unremarkable: CBC and CMP       <> Summary of Lab: Elevated trop  Radiological Study: Ordered and Reviewed  Medical Tests: Ordered and Reviewed  ED Management:  1430:  Bernadette Menard NP consulted and someone from Dr. Duran's team will see the patient in the ED  1455:  Dr. Duran in ED to evaluate the patient. Labs pending    Labs + for elevated BNP.  PT has been admitted by Dr. Duran's team.      Other:   I have discussed this case with another health care provider.                   ED Course      Clinical Impression:   The primary encounter diagnosis was New onset of congestive heart failure. Diagnoses of Shortness of breath and Atrial fibrillation were also pertinent to this visit.    Disposition:   Disposition: Admitted  Condition: Stable                        Arti Black MD  01/22/18 3506

## 2018-01-22 NOTE — PROGRESS NOTES
Pt was seen today for echocardiogram.  Patient found to be in afib with RVR.  EKG performed and transmitted.  Patient noted to have poor EF based on echo images.  Patient dyspneic.  Dr. Belle at bedside.  Patient agreed to go to [facility].  Dr. Duran notified that patient would be going via personal vehicle with son.  Report called to [facility].

## 2018-01-22 NOTE — ASSESSMENT & PLAN NOTE
SBP elevated in ED and improved with administration of Cardizem. Currently 120  Home Losartan continued

## 2018-01-22 NOTE — ED NOTES
APPEARANCE: Alert, oriented and in no acute distress.  PERIPHERAL VASCULAR: peripheral pulses present. Normal cap refill. No edema. Warm to touch.    RESPIRATORY:Normal rate and effort, breath sounds clear bilaterally throughout chest. Respirations are equal and unlabored no obvious signs of distress.  GASTRO: soft, bowel sounds normal, no tenderness, no abdominal distention.  MUSC: Full ROM. No bony tenderness or soft tissue tenderness. No obvious deformity.  SKIN: Skin is warm and dry, normal skin turgor, mucous membranes moist.  NEURO: 5/5 strength major flexors/extensors bilaterally. Sensory intact to light touch bilaterally. Kimberly coma scale: eyes open spontaneously-4, oriented & converses-5, obeys commands-6. No neurological abnormalities.   MENTAL STATUS: awake, alert and aware of environment.  EYE: PERRL, both eyes: pupils brisk and reactive to light. Normal size.  ENT: EARS: no obvious drainage. NOSE: no active bleeding.

## 2018-01-22 NOTE — ASSESSMENT & PLAN NOTE
Presented in AF RVR 130s, converted to SR with FPVCs after administration of Cardizem IVP  Amiodarone initiated as well as Lovenox; anticipate initiation of BB and NOAC prior to DC

## 2018-01-22 NOTE — H&P
[facility]  Cardiology  History and Physical     Patient Name: Violet Mirza  MRN: [id number]  Admission Date: 1/22/2018  Code Status: No Order   Attending Provider: Arit Black MD   Primary Care Physician: Geovany Henning MD  Principal Problem:Acute systolic heart failure    Patient information was obtained from patient and ER records.     Subjective:     Chief Complaint:  SOB     HPI:  Violet Mirza is a 66 y/o Female who was sent to the ED following echocardiogram today.   Patient reports progressive SOB since December. She has been evaluated by her PCP who referred her to cardiology and ordered an echo. She also noted edema to her BLE which improved with the initiation of HCTZ. She denies chest pain, palps, syncope, dizziness. Endorses PND, orthopnea, productive cough. Patient was noted to be in AF RVR with rate in the 130s. LVEF 10-15%. CXR with pulmonary vascular congestion, elevated BNP, and mildly elevated TNI. Patient was given Cardizem IV in the ED with conversion to SR with frequent PVCs.        Past Medical History:   Diagnosis Date    Chondromalacia of right patella     MRI 6/2016 at [facility]    Diverticulitis     GERD (gastroesophageal reflux disease)     HTN (hypertension) 7/26/2013    Hypoglycemia 11/22/2014    OA (osteoarthritis) of knee     bilateraly, followed by Dr. Callejas, [facility], s/p synvisc injections    Osteopenia 7/26/2013    Personal history of kidney stones 7/26/2013    Rosacea 7/26/2013    Tear of medial meniscus of right knee     MRI 6/2016       Past Surgical History:   Procedure Laterality Date    ADENOIDECTOMY      FOOT SURGERY Bilateral     KNEE ARTHROSCOPY W/ MENISCAL REPAIR Right 5/14    TONSILLECTOMY      TUBAL LIGATION         Review of patient's allergies indicates:  No Known Allergies    Current Facility-Administered Medications on File Prior to Encounter   Medication    methylPREDNISolone acetate  injection 40 mg     Current Outpatient Prescriptions on File Prior to Encounter   Medication Sig    albuterol 90 mcg/actuation inhaler Inhale 2 puffs into the lungs every 6 (six) hours as needed for Wheezing. Rescue (Patient taking differently: Inhale 2 puffs into the lungs 2 (two) times daily. Rescue)    doxycycline (MONODOX) 50 MG Cap Take 1 capsule (50 mg total) by mouth once daily.    epinastine 0.05 % ophthalmic solution Place 2 drops into both eyes 2 (two) times daily.     hydroCHLOROthiazide (MICROZIDE) 12.5 mg capsule Take 1 capsule (12.5 mg total) by mouth once daily.    levothyroxine (SYNTHROID) 75 MCG tablet Take 1 tablet (75 mcg total) by mouth before breakfast.    liothyronine (CYTOMEL) 25 MCG Tab Take 1 tablet (25 mcg total) by mouth once daily.    losartan (COZAAR) 50 MG tablet Take 1 tablet (50 mg total) by mouth once daily.    potassium chloride (KLOR-CON) 8 MEQ TbSR Take 1 tablet (8 mEq total) by mouth once daily.    cetirizine (ZYRTEC) 10 MG tablet Take 1 tablet (10 mg total) by mouth once daily.    ESTRACE 0.01 % (0.1 mg/gram) vaginal cream USE 1GM THREE TIMES A WEEK THEN 1/2 GM TWICE A WEEK FOR DURATION VAGINALLY    fluticasone (FLONASE) 50 mcg/actuation nasal spray 2 sprays by Each Nare route once daily.    hydrocodone-homatropine 5-1.5 mg/5 ml (HYCODAN) 5-1.5 mg/5 mL Syrp Take 5 mLs by mouth every 6 (six) hours as needed (cough).    traMADol (ULTRAM) 50 mg tablet TK 1 T PO Q 6 H PRN     Family History     Problem Relation (Age of Onset)    Cataracts Mother    Diabetes Mother    Glaucoma Mother    Hypertension Mother, Father    No Known Problems Sister, Brother, Daughter, Son, Sister, Daughter    Stroke Father        Social History Main Topics    Smoking status: Former Smoker     Packs/day: 0.25     Years: 30.00     Quit date: 2/1/2005    Smokeless tobacco: Former User    Alcohol use No    Drug use: No    Sexual activity: Not on file     Review of Systems   Constitution:  Positive for weight loss (5 pounds since starting HCTZ). Negative for diaphoresis, malaise/fatigue and weight gain.   HENT: Negative.    Eyes: Negative.    Cardiovascular: Positive for dyspnea on exertion, leg swelling, orthopnea and paroxysmal nocturnal dyspnea. Negative for chest pain, irregular heartbeat, near-syncope, palpitations and syncope.   Respiratory: Positive for cough, shortness of breath, sputum production and wheezing.    Endocrine: Negative.    Hematologic/Lymphatic: Negative.    Skin: Negative.    Musculoskeletal: Negative.    Gastrointestinal: Negative.    Genitourinary: Negative.    Neurological: Negative.  Negative for dizziness.   Psychiatric/Behavioral: Negative.    Allergic/Immunologic: Negative.      Objective:     Vital Signs (Most Recent):  Temp: 98.1 °F (36.7 °C) (01/22/18 1304)  Pulse: (!) 146 (01/22/18 1304)  Resp: (!) 24 (01/22/18 1304)  BP: (!) 153/48 (01/22/18 1418)  SpO2: 98 % (01/22/18 1304) Vital Signs (24h Range):  Temp:  [98.1 °F (36.7 °C)] 98.1 °F (36.7 °C)  Pulse:  [146] 146  Resp:  [24] 24  SpO2:  [98 %] 98 %  BP: (153-164)/(48-94) 153/48     Weight: 102.1 kg (225 lb)  Body mass index is 34.21 kg/m².    SpO2: 98 %  O2 Device (Oxygen Therapy): room air    No intake or output data in the 24 hours ending 01/22/18 1535    Lines/Drains/Airways     Peripheral Intravenous Line                 Peripheral IV - Single Lumen 01/22/18 1331 Right Antecubital less than 1 day                Physical Exam   Constitutional: She is oriented to person, place, and time. She appears well-developed and well-nourished. No distress.   HENT:   Head: Normocephalic and atraumatic.   Eyes: Right eye exhibits no discharge. Left eye exhibits no discharge.   Neck: JVD present.   Cardiovascular: Normal rate and regular rhythm.  Frequent extrasystoles are present. Exam reveals no gallop.    Murmur heard.  Pulmonary/Chest: Effort normal. She has wheezes. She has rales.   Abdominal: Soft. Bowel sounds are  normal.   Musculoskeletal: She exhibits no edema.   Neurological: She is alert and oriented to person, place, and time.   Skin: Skin is warm and dry. She is not diaphoretic.   Psychiatric: She has a normal mood and affect. Her behavior is normal. Judgment and thought content normal.       Significant Labs:   BMP:   Recent Labs  Lab 01/22/18  1351   *  118*     142   K 3.5  3.5     103   CO2 27  27   BUN 14  14   CREATININE 0.8  0.8   CALCIUM 10.0  10.0   , CMP   Recent Labs  Lab 01/22/18  1351     142   K 3.5  3.5     103   CO2 27  27   *  118*   BUN 14  14   CREATININE 0.8  0.8   CALCIUM 10.0  10.0   PROT 7.2   ALBUMIN 3.9   BILITOT 0.7   ALKPHOS 146*   AST 50*   ALT 80*   ANIONGAP 12  12   ESTGFRAFRICA >60  >60   EGFRNONAA >60  >60   , CBC   Recent Labs  Lab 01/22/18  1351   WBC 13.05*   HGB 15.0   HCT 47.2      , INR   Recent Labs  Lab 01/22/18  1351   INR 1.1   , Lipid Panel No results for input(s): CHOL, HDL, LDLCALC, TRIG, CHOLHDL in the last 48 hours., Troponin   Recent Labs  Lab 01/22/18  1351   TROPONINI 0.040*    and All pertinent lab results from the last 24 hours have been reviewed.    Significant Imaging: Echocardiogram:   2D echo with color flow doppler:   Results for orders placed or performed in visit on 01/22/18   2D echo with color flow doppler   Result Value Ref Range    EF 10 (A) 55 - 65    Mitral Valve Regurgitation MODERATE TO SEVERE (A)     Est. PA Systolic Pressure 68.29 (A)     Tricuspid Valve Regurgitation MODERATE (A)      Assessment and Plan:     * Acute systolic heart failure    Echo today:   CONCLUSIONS     1 - Severely depressed left ventricular systolic function (EF 10-15%).     2 - Right ventricular enlargement with moderately to severely depressed systolic function.     3 - Biatrial enlargement.     4 - Pulmonary hypertension. The estimated PA systolic pressure is 68 mmHg.     5 - Moderate to severe mitral  regurgitation.     6 - Moderate tricuspid regurgitation.     7 - Increased central venous pressure.  Etiology of HF ischemic vs arrhythmia induced; will need ischemic evaluation once near euvolemic- will add Aldactone prior to DC as well   CXR with pulmonary vascular congestion, elevated BNP, +JVD and rales on exam  K+ 3.5- replaced  Lasix 20 mg IV tonight and will monitor response   FLP in AM        Paroxysmal atrial fibrillation    Presented in AF RVR 130s, converted to SR with FPVCs after administration of Cardizem IVP  Amiodarone initiated as well as Lovenox; anticipate initiation of BB and NOAC prior to DC         HTN (hypertension)    SBP elevated in ED and improved with administration of Cardizem. Currently 120  Home Losartan continued            VTE Risk Mitigation     None          Jaren Medel, RAMAN  Cardiology   [facility]

## 2018-01-22 NOTE — ED NOTES
67 year old female presents to ed cc of sob. Patient had recent ultrasound and was told to come to er for further evaluation of chf. Patient states sob with exertion/ chest pain that does not radiate. Patient awake alert ox4 placed on cardiac monitor bp cuff and continuous pulse ox. Patient family at bedside nurse will continue to monitor

## 2018-01-23 LAB
ANION GAP SERPL CALC-SCNC: 13 MMOL/L
BASOPHILS # BLD AUTO: 0.03 K/UL
BASOPHILS NFR BLD: 0.2 %
BUN SERPL-MCNC: 11 MG/DL
CALCIUM SERPL-MCNC: 9.4 MG/DL
CHLORIDE SERPL-SCNC: 100 MMOL/L
CHOLEST SERPL-MCNC: 137 MG/DL
CHOLEST/HDLC SERPL: 2.9 {RATIO}
CO2 SERPL-SCNC: 26 MMOL/L
CREAT SERPL-MCNC: 0.8 MG/DL
DIFFERENTIAL METHOD: ABNORMAL
EOSINOPHIL # BLD AUTO: 0 K/UL
EOSINOPHIL NFR BLD: 0.3 %
ERYTHROCYTE [DISTWIDTH] IN BLOOD BY AUTOMATED COUNT: 16.4 %
EST. GFR  (AFRICAN AMERICAN): >60 ML/MIN/1.73 M^2
EST. GFR  (NON AFRICAN AMERICAN): >60 ML/MIN/1.73 M^2
ESTIMATED AVG GLUCOSE: 120 MG/DL
GLUCOSE SERPL-MCNC: 117 MG/DL
HBA1C MFR BLD HPLC: 5.8 %
HCT VFR BLD AUTO: 43.3 %
HDLC SERPL-MCNC: 47 MG/DL
HDLC SERPL: 34.3 %
HGB BLD-MCNC: 13.5 G/DL
LDLC SERPL CALC-MCNC: 72.4 MG/DL
LYMPHOCYTES # BLD AUTO: 2.4 K/UL
LYMPHOCYTES NFR BLD: 19.6 %
MCH RBC QN AUTO: 26.7 PG
MCHC RBC AUTO-ENTMCNC: 31.2 G/DL
MCV RBC AUTO: 86 FL
MONOCYTES # BLD AUTO: 0.8 K/UL
MONOCYTES NFR BLD: 6.5 %
NEUTROPHILS # BLD AUTO: 9.1 K/UL
NEUTROPHILS NFR BLD: 73.1 %
NONHDLC SERPL-MCNC: 90 MG/DL
PLATELET # BLD AUTO: 295 K/UL
PMV BLD AUTO: 11.3 FL
POTASSIUM SERPL-SCNC: 3.2 MMOL/L
RBC # BLD AUTO: 5.06 M/UL
SODIUM SERPL-SCNC: 139 MMOL/L
TRIGL SERPL-MCNC: 88 MG/DL
TROPONIN I SERPL DL<=0.01 NG/ML-MCNC: 0.04 NG/ML
WBC # BLD AUTO: 12.46 K/UL

## 2018-01-23 PROCEDURE — 63600175 PHARM REV CODE 636 W HCPCS: Performed by: NURSE PRACTITIONER

## 2018-01-23 PROCEDURE — 83036 HEMOGLOBIN GLYCOSYLATED A1C: CPT

## 2018-01-23 PROCEDURE — 80061 LIPID PANEL: CPT

## 2018-01-23 PROCEDURE — 93005 ELECTROCARDIOGRAM TRACING: CPT

## 2018-01-23 PROCEDURE — 25000003 PHARM REV CODE 250: Performed by: NURSE PRACTITIONER

## 2018-01-23 PROCEDURE — 80048 BASIC METABOLIC PNL TOTAL CA: CPT

## 2018-01-23 PROCEDURE — 94761 N-INVAS EAR/PLS OXIMETRY MLT: CPT

## 2018-01-23 PROCEDURE — 99233 SBSQ HOSP IP/OBS HIGH 50: CPT | Mod: ,,, | Performed by: INTERNAL MEDICINE

## 2018-01-23 PROCEDURE — 63600175 PHARM REV CODE 636 W HCPCS: Performed by: INTERNAL MEDICINE

## 2018-01-23 PROCEDURE — 85025 COMPLETE CBC W/AUTO DIFF WBC: CPT

## 2018-01-23 PROCEDURE — 36415 COLL VENOUS BLD VENIPUNCTURE: CPT

## 2018-01-23 PROCEDURE — 84484 ASSAY OF TROPONIN QUANT: CPT

## 2018-01-23 PROCEDURE — 21400001 HC TELEMETRY ROOM

## 2018-01-23 RX ORDER — ONDANSETRON 2 MG/ML
4 INJECTION INTRAMUSCULAR; INTRAVENOUS EVERY 4 HOURS PRN
Status: DISCONTINUED | OUTPATIENT
Start: 2018-01-23 | End: 2018-01-25 | Stop reason: HOSPADM

## 2018-01-23 RX ORDER — POTASSIUM CHLORIDE 20 MEQ/15ML
40 SOLUTION ORAL
Status: DISCONTINUED | OUTPATIENT
Start: 2018-01-23 | End: 2018-01-23

## 2018-01-23 RX ORDER — POTASSIUM CHLORIDE 20 MEQ/1
40 TABLET, EXTENDED RELEASE ORAL
Status: COMPLETED | OUTPATIENT
Start: 2018-01-23 | End: 2018-01-23

## 2018-01-23 RX ORDER — SPIRONOLACTONE 25 MG/1
25 TABLET ORAL DAILY
Status: DISCONTINUED | OUTPATIENT
Start: 2018-01-23 | End: 2018-01-25

## 2018-01-23 RX ORDER — FUROSEMIDE 10 MG/ML
40 INJECTION INTRAMUSCULAR; INTRAVENOUS ONCE
Status: COMPLETED | OUTPATIENT
Start: 2018-01-23 | End: 2018-01-23

## 2018-01-23 RX ORDER — DIPHENHYDRAMINE HCL 25 MG
50 CAPSULE ORAL ONCE
Status: DISCONTINUED | OUTPATIENT
Start: 2018-01-24 | End: 2018-01-25 | Stop reason: HOSPADM

## 2018-01-23 RX ORDER — METOPROLOL TARTRATE 50 MG/1
50 TABLET ORAL 2 TIMES DAILY
Status: DISCONTINUED | OUTPATIENT
Start: 2018-01-23 | End: 2018-01-24

## 2018-01-23 RX ADMIN — POTASSIUM CHLORIDE 40 MEQ: 20 TABLET, EXTENDED RELEASE ORAL at 10:01

## 2018-01-23 RX ADMIN — ENOXAPARIN SODIUM 100 MG: 100 INJECTION SUBCUTANEOUS at 04:01

## 2018-01-23 RX ADMIN — ASPIRIN 81 MG: 81 TABLET, COATED ORAL at 09:01

## 2018-01-23 RX ADMIN — SPIRONOLACTONE 25 MG: 25 TABLET, FILM COATED ORAL at 09:01

## 2018-01-23 RX ADMIN — METOPROLOL TARTRATE 50 MG: 50 TABLET ORAL at 09:01

## 2018-01-23 RX ADMIN — LEVOTHYROXINE SODIUM 75 MCG: 75 TABLET ORAL at 05:01

## 2018-01-23 RX ADMIN — AMIODARONE HYDROCHLORIDE 400 MG: 200 TABLET ORAL at 09:01

## 2018-01-23 RX ADMIN — LOSARTAN POTASSIUM 50 MG: 50 TABLET, FILM COATED ORAL at 09:01

## 2018-01-23 RX ADMIN — TRAMADOL HYDROCHLORIDE 50 MG: 50 TABLET, COATED ORAL at 12:01

## 2018-01-23 RX ADMIN — ENOXAPARIN SODIUM 100 MG: 100 INJECTION SUBCUTANEOUS at 05:01

## 2018-01-23 RX ADMIN — POTASSIUM CHLORIDE 40 MEQ: 20 TABLET, EXTENDED RELEASE ORAL at 12:01

## 2018-01-23 RX ADMIN — LIOTHYRONINE SODIUM 25 MCG: 25 TABLET ORAL at 09:01

## 2018-01-23 RX ADMIN — PANTOPRAZOLE SODIUM 40 MG: 40 TABLET, DELAYED RELEASE ORAL at 09:01

## 2018-01-23 RX ADMIN — FUROSEMIDE 40 MG: 10 INJECTION, SOLUTION INTRAMUSCULAR; INTRAVENOUS at 09:01

## 2018-01-23 RX ADMIN — ONDANSETRON 4 MG: 2 INJECTION INTRAMUSCULAR; INTRAVENOUS at 04:01

## 2018-01-23 RX ADMIN — TRAMADOL HYDROCHLORIDE 50 MG: 50 TABLET, COATED ORAL at 07:01

## 2018-01-23 RX ADMIN — CETIRIZINE HYDROCHLORIDE 10 MG: 10 TABLET, FILM COATED ORAL at 09:01

## 2018-01-23 NOTE — PROGRESS NOTES
.Pharmacy New Medication Education    Patient accepted medication education.    Pharmacy educated patient on name and purpose of medications and possible side effects, using the teach-back method.     D/C Current Inpatient Medication Orders   D/C acetaminophen tablet 650 mg   D/C albuterol nebulizer solution 2.5 mg   D/C amiodarone tablet 400 mg   D/C aspirin EC tablet 81 mg   D/C cetirizine tablet 10 mg   D/C enoxaparin injection 100 mg   D/C furosemide injection 40 mg   D/C hydrocodone-acetaminophen 10-325mg per tablet 1 tablet   D/C levothyroxine tablet 75 mcg   D/C liothyronine tablet 25 mcg   D/C loperamide capsule 2 mg   D/C losartan tablet 50 mg   D/C ondansetron disintegrating tablet 8 mg   D/C pantoprazole EC tablet 40 mg   D/C polyethylene glycol packet 17 g   D/C potassium chloride 10% solution 40 mEq   D/C ramelteon tablet 8 mg   D/C sodium chloride 0.9% flush 3 mL   D/C spironolactone tablet 25 mg   D/C traMADol tablet 50 mg       Learners of pharmacy medication education included:  Patient    Patient +/- learner response:  Verbalized Understanding, Teachback

## 2018-01-23 NOTE — ED NOTES
Awake and alert, resp labored with exertion,  Pt remains on O2 per Nc.  Denies pain,  Non productive cough noted. Lungs noted with decreased breath sounds to posterior lobes. abd soft with + BS noted to abd.  1+ edema noted to lower legs and ankles.  Pt restless and continuously getting out of bed and taking self off of monitor.  Instructed to remain in bed and pt states that she can't stand to stay in the bed.

## 2018-01-23 NOTE — PLAN OF CARE
"Patient AAOx3. Denies any pain. SOB on exertion. Patient has BBS diminished in the bases. Non productive cough. BLE edema +1. Patient instructed on bed and call light. Patient refuses bed alarm. Patient states  "I usually walk all night. I have not slept in two days. "  "

## 2018-01-23 NOTE — SUBJECTIVE & OBJECTIVE
Past Medical History:   Diagnosis Date    Chondromalacia of right patella     MRI 6/2016 at [facility]    Diverticulitis     GERD (gastroesophageal reflux disease)     HTN (hypertension) 7/26/2013    Hypoglycemia 11/22/2014    OA (osteoarthritis) of knee     bilateraly, followed by Dr. Callejas, [facility], s/p synvisc injections    Osteopenia 7/26/2013    Personal history of kidney stones 7/26/2013    Rosacea 7/26/2013    Tear of medial meniscus of right knee     MRI 6/2016       Past Surgical History:   Procedure Laterality Date    ADENOIDECTOMY      FOOT SURGERY Bilateral     KNEE ARTHROSCOPY W/ MENISCAL REPAIR Right 5/14    TONSILLECTOMY      TUBAL LIGATION         Review of patient's allergies indicates:  No Known Allergies    No current facility-administered medications on file prior to encounter.      Current Outpatient Prescriptions on File Prior to Encounter   Medication Sig    albuterol 90 mcg/actuation inhaler Inhale 2 puffs into the lungs every 6 (six) hours as needed for Wheezing. Rescue (Patient taking differently: Inhale 2 puffs into the lungs 2 (two) times daily. Rescue)    doxycycline (MONODOX) 50 MG Cap Take 1 capsule (50 mg total) by mouth once daily.    epinastine 0.05 % ophthalmic solution Place 2 drops into both eyes 2 (two) times daily.     hydroCHLOROthiazide (MICROZIDE) 12.5 mg capsule Take 1 capsule (12.5 mg total) by mouth once daily.    levothyroxine (SYNTHROID) 75 MCG tablet Take 1 tablet (75 mcg total) by mouth before breakfast.    liothyronine (CYTOMEL) 25 MCG Tab Take 1 tablet (25 mcg total) by mouth once daily.    losartan (COZAAR) 50 MG tablet Take 1 tablet (50 mg total) by mouth once daily.    potassium chloride (KLOR-CON) 8 MEQ TbSR Take 1 tablet (8 mEq total) by mouth once daily.    cetirizine (ZYRTEC) 10 MG tablet Take 1 tablet (10 mg total) by mouth once daily.    ESTRACE 0.01 % (0.1 mg/gram) vaginal cream USE 1GM THREE TIMES A WEEK THEN  1/2 GM TWICE A WEEK FOR DURATION VAGINALLY    fluticasone (FLONASE) 50 mcg/actuation nasal spray 2 sprays by Each Nare route once daily.    hydrocodone-homatropine 5-1.5 mg/5 ml (HYCODAN) 5-1.5 mg/5 mL Syrp Take 5 mLs by mouth every 6 (six) hours as needed (cough).    traMADol (ULTRAM) 50 mg tablet TK 1 T PO Q 6 H PRN     Family History     Problem Relation (Age of Onset)    Cataracts Mother    Diabetes Mother    Glaucoma Mother    Hypertension Mother, Father    No Known Problems Sister, Brother, Daughter, Son, Sister, Daughter    Stroke Father        Social History Main Topics    Smoking status: Former Smoker     Packs/day: 0.25     Years: 30.00     Quit date: 2/1/2005    Smokeless tobacco: Former User    Alcohol use No    Drug use: No    Sexual activity: Not on file     Review of Systems   Constitution: Positive for weight loss (5 pounds since starting HCTZ). Negative for diaphoresis, malaise/fatigue and weight gain.   HENT: Negative.    Eyes: Negative.    Cardiovascular: Positive for dyspnea on exertion, leg swelling, orthopnea and paroxysmal nocturnal dyspnea. Negative for chest pain, irregular heartbeat, near-syncope, palpitations and syncope.   Respiratory: Positive for cough, shortness of breath, sputum production and wheezing.    Endocrine: Negative.    Hematologic/Lymphatic: Negative.    Skin: Negative.    Musculoskeletal: Negative.    Gastrointestinal: Negative.    Genitourinary: Negative.    Neurological: Negative.  Negative for dizziness.   Psychiatric/Behavioral: Negative.    Allergic/Immunologic: Negative.      Objective:     Vital Signs (Most Recent):  Temp: 96.3 °F (35.7 °C) (01/23/18 0412)  Pulse: (!) 55 (01/23/18 0023)  Resp: 20 (01/23/18 0736)  BP: (!) 158/76 (01/23/18 0736)  SpO2: 97 % (01/23/18 0801) Vital Signs (24h Range):  Temp:  [96.3 °F (35.7 °C)-98.1 °F (36.7 °C)] 96.3 °F (35.7 °C)  Pulse:  [] 55  Resp:  [12-24] 20  SpO2:  [94 %-98 %] 97 %  BP: (145-181)/() 158/76      Weight: 102.1 kg (225 lb)  Body mass index is 34.21 kg/m².    SpO2: 97 %  O2 Device (Oxygen Therapy): room air      Intake/Output Summary (Last 24 hours) at 01/23/18 0943  Last data filed at 01/23/18 0558   Gross per 24 hour   Intake                0 ml   Output              900 ml   Net             -900 ml       Lines/Drains/Airways     Peripheral Intravenous Line                 Peripheral IV - Single Lumen 01/22/18 1331 Right Antecubital less than 1 day                Physical Exam   Constitutional: She is oriented to person, place, and time. She appears well-developed and well-nourished. No distress.   HENT:   Head: Normocephalic and atraumatic.   Eyes: Right eye exhibits no discharge. Left eye exhibits no discharge.   Neck: JVD present.   Cardiovascular: Normal rate and regular rhythm.  Frequent extrasystoles are present. Exam reveals no gallop.    Murmur heard.  Pulmonary/Chest: Effort normal. She has wheezes. She has rales.   Abdominal: Soft. Bowel sounds are normal.   Musculoskeletal: She exhibits no edema.   Neurological: She is alert and oriented to person, place, and time.   Skin: Skin is warm and dry. She is not diaphoretic.   Psychiatric: She has a normal mood and affect. Her behavior is normal. Judgment and thought content normal.       Significant Labs:   BMP:     Recent Labs  Lab 01/22/18  1351 01/23/18  0540   *  118* 117*     142 139   K 3.5  3.5 3.2*     103 100   CO2 27  27 26   BUN 14  14 11   CREATININE 0.8  0.8 0.8   CALCIUM 10.0  10.0 9.4   , CMP     Recent Labs  Lab 01/22/18  1351 01/23/18  0540     142 139   K 3.5  3.5 3.2*     103 100   CO2 27  27 26   *  118* 117*   BUN 14  14 11   CREATININE 0.8  0.8 0.8   CALCIUM 10.0  10.0 9.4   PROT 7.2  --    ALBUMIN 3.9  --    BILITOT 0.7  --    ALKPHOS 146*  --    AST 50*  --    ALT 80*  --    ANIONGAP 12  12 13   ESTGFRAFRICA >60  >60 >60   EGFRNONAA >60  >60 >60   , CBC     Recent  Labs  Lab 01/22/18  1351 01/23/18  0540   WBC 13.05* 12.46   HGB 15.0 13.5   HCT 47.2 43.3    295   , INR     Recent Labs  Lab 01/22/18  1351   INR 1.1   , Lipid Panel     Recent Labs  Lab 01/23/18  0540   CHOL 137   HDL 47   LDLCALC 72.4   TRIG 88   CHOLHDL 34.3   , Troponin     Recent Labs  Lab 01/22/18  1351 01/23/18  0540   TROPONINI 0.040* 0.042*    and All pertinent lab results from the last 24 hours have been reviewed.    Significant Imaging: Echocardiogram:   2D echo with color flow doppler:   Results for orders placed or performed in visit on 01/22/18   2D echo with color flow doppler   Result Value Ref Range    EF 10 (A) 55 - 65    Mitral Valve Regurgitation MODERATE TO SEVERE (A)     Est. PA Systolic Pressure 68.29 (A)     Tricuspid Valve Regurgitation MODERATE (A)

## 2018-01-23 NOTE — PLAN OF CARE
During morning assessment, pt refusing bed alarm and non slip socks, pt ambulatory in room, pt educated on fall and safety precautions, pt verbalized understanding, nurse to notify charge nurse.

## 2018-01-23 NOTE — PLAN OF CARE
Problem: Patient Care Overview  Goal: Plan of Care Review  Outcome: Ongoing (interventions implemented as appropriate)  Pt's SpO2 97% on RA. Prn tx not required. No respiratory distress noted. Will continue to monitor SpO2.

## 2018-01-23 NOTE — PLAN OF CARE
TN went to meet with patient, no family present at bedside.  Patient is independent and lives with her son at home.  She does not have home health or medical equipment at home. Her son will provide transport home on discharge. Patient has a follow-up scheduled with Dr. Duran already. No anticipated discharge needs, will continue to follow.    Future Appointments  Date Time Provider Department Center   2/6/2018 1:30 PM Everardo Casarez MD [facility] [address]   2/8/2018 9:40 AM Go Duran MD [facility] CARD[facility]   3/1/2018 8:00 AM Geovany Henning MD [facility]        01/23/18 0806   Discharge Assessment   Assessment Type Discharge Planning Assessment   Confirmed/corrected address and phone number on facesheet? Yes   Assessment information obtained from? Patient   Prior to hospitilization cognitive status: Alert/Oriented   Prior to hospitalization functional status: Independent   Current cognitive status: Alert/Oriented   Current Functional Status: Independent   Facility Arrived From: Home   Lives With child(edna), adult   Able to Return to Prior Arrangements yes   Is patient able to care for self after discharge? Yes   Who are your caregiver(s) and their phone number(s)? Shyam Mirza Son [phone]    Patient's perception of discharge disposition home or selfcare   Readmission Within The Last 30 Days no previous admission in last 30 days   Equipment Currently Used at Home none   Do you have any problems affording any of your prescribed medications? No   Is the patient taking medications as prescribed? yes   Does the patient have transportation home? Yes   Transportation Available family or friend will provide   Dialysis Name and Scheduled days N/A   Does the patient receive services at the [facility]? No   Discharge Plan A Home with family   Discharge Plan B Home with family;Home Health   Patient/Family In Agreement With Plan yes     Jazzmine Turner RN  Transition  Navigator  [phone]

## 2018-01-23 NOTE — ASSESSMENT & PLAN NOTE
Echo:   CONCLUSIONS     1 - Severely depressed left ventricular systolic function (EF 10-15%).     2 - Right ventricular enlargement with moderately to severely depressed systolic function.     3 - Biatrial enlargement.     4 - Pulmonary hypertension. The estimated PA systolic pressure is 68 mmHg.     5 - Moderate to severe mitral regurgitation.     6 - Moderate tricuspid regurgitation.     7 - Increased central venous pressure.  Etiology of HF ischemic vs arrhythmia induced  LHC planned potentially for 01/24/2018 if near euvolemic  CXR with pulmonary vascular congestion, elevated BNP, +JVD and rales on exam- improved but not to baseline   K+ 3.2- replaced  Lasix increased to 40 mg  900cc output documented overnight   Continue ARB, BB and aldactone initiated this AM

## 2018-01-23 NOTE — CONSULTS
Food & Nutrition  Education    Diet Education:  CHF and nutrition  Time Spent:15 min  Learners: Patient      Nutrition Education provided with handouts: CHF/nutrition and seasoning alternatives      Comments: Met with patient and reviewed benefits of low sodium diet for fluid retention issues r/t CHF. Patient receptive. Discussed label reading, seasoning alternatives and foods to avoid. Provided f/u resources.      All questions and concerns answered. Dietitian's contact information provided.       Follow-Up: LOS    Please Re-consult as needed        Thanks!    Eva Neely RD

## 2018-01-23 NOTE — PROGRESS NOTES
[facility]  Cardiology  Progress Note    Patient Name: Violet Mirza  MRN: [id number]  Admission Date: 1/22/2018  Hospital Length of Stay: 1 days  Code Status: Full Code   Attending Physician: Josiah Kelly MD   Primary Care Physician: Geovany Henning MD  Expected Discharge Date:   Principal Problem:Acute systolic heart failure    Subjective:     Hospital Course:   01/22/2018 Patient to be admitted to telemetry for diuresis. Amiodarone initiated as well as IV Lasix, and Lovenox. Home ARB continued.   01/23/2018 SOB improved. SBP elevated overnight to the 180s. Intermittent tachycardia. Lasix increased to 40 mg daily. 900 cc output documented overnight. K+ 3.2 and replaced.     Past Medical History:   Diagnosis Date    Chondromalacia of right patella     MRI 6/2016 at [facility]    Diverticulitis     GERD (gastroesophageal reflux disease)     HTN (hypertension) 7/26/2013    Hypoglycemia 11/22/2014    OA (osteoarthritis) of knee     bilateraly, followed by Dr. Callejas, [facility], s/p synvisc injections    Osteopenia 7/26/2013    Personal history of kidney stones 7/26/2013    Rosacea 7/26/2013    Tear of medial meniscus of right knee     MRI 6/2016       Past Surgical History:   Procedure Laterality Date    ADENOIDECTOMY      FOOT SURGERY Bilateral     KNEE ARTHROSCOPY W/ MENISCAL REPAIR Right 5/14    TONSILLECTOMY      TUBAL LIGATION         Review of patient's allergies indicates:  No Known Allergies    No current facility-administered medications on file prior to encounter.      Current Outpatient Prescriptions on File Prior to Encounter   Medication Sig    albuterol 90 mcg/actuation inhaler Inhale 2 puffs into the lungs every 6 (six) hours as needed for Wheezing. Rescue (Patient taking differently: Inhale 2 puffs into the lungs 2 (two) times daily. Rescue)    doxycycline (MONODOX) 50 MG Cap Take 1 capsule (50 mg total) by mouth once daily.     epinastine 0.05 % ophthalmic solution Place 2 drops into both eyes 2 (two) times daily.     hydroCHLOROthiazide (MICROZIDE) 12.5 mg capsule Take 1 capsule (12.5 mg total) by mouth once daily.    levothyroxine (SYNTHROID) 75 MCG tablet Take 1 tablet (75 mcg total) by mouth before breakfast.    liothyronine (CYTOMEL) 25 MCG Tab Take 1 tablet (25 mcg total) by mouth once daily.    losartan (COZAAR) 50 MG tablet Take 1 tablet (50 mg total) by mouth once daily.    potassium chloride (KLOR-CON) 8 MEQ TbSR Take 1 tablet (8 mEq total) by mouth once daily.    cetirizine (ZYRTEC) 10 MG tablet Take 1 tablet (10 mg total) by mouth once daily.    ESTRACE 0.01 % (0.1 mg/gram) vaginal cream USE 1GM THREE TIMES A WEEK THEN 1/2 GM TWICE A WEEK FOR DURATION VAGINALLY    fluticasone (FLONASE) 50 mcg/actuation nasal spray 2 sprays by Each Nare route once daily.    hydrocodone-homatropine 5-1.5 mg/5 ml (HYCODAN) 5-1.5 mg/5 mL Syrp Take 5 mLs by mouth every 6 (six) hours as needed (cough).    traMADol (ULTRAM) 50 mg tablet TK 1 T PO Q 6 H PRN     Family History     Problem Relation (Age of Onset)    Cataracts Mother    Diabetes Mother    Glaucoma Mother    Hypertension Mother, Father    No Known Problems Sister, Brother, Daughter, Son, Sister, Daughter    Stroke Father        Social History Main Topics    Smoking status: Former Smoker     Packs/day: 0.25     Years: 30.00     Quit date: 2/1/2005    Smokeless tobacco: Former User    Alcohol use No    Drug use: No    Sexual activity: Not on file     Review of Systems   Constitution: Positive for weight loss (5 pounds since starting HCTZ). Negative for diaphoresis, malaise/fatigue and weight gain.   HENT: Negative.    Eyes: Negative.    Cardiovascular: Positive for dyspnea on exertion, leg swelling, orthopnea and paroxysmal nocturnal dyspnea. Negative for chest pain, irregular heartbeat, near-syncope, palpitations and syncope.   Respiratory: Positive for cough, shortness of  breath, sputum production and wheezing.    Endocrine: Negative.    Hematologic/Lymphatic: Negative.    Skin: Negative.    Musculoskeletal: Negative.    Gastrointestinal: Negative.    Genitourinary: Negative.    Neurological: Negative.  Negative for dizziness.   Psychiatric/Behavioral: Negative.    Allergic/Immunologic: Negative.      Objective:     Vital Signs (Most Recent):  Temp: 96.3 °F (35.7 °C) (01/23/18 0412)  Pulse: (!) 55 (01/23/18 0023)  Resp: 20 (01/23/18 0736)  BP: (!) 158/76 (01/23/18 0736)  SpO2: 97 % (01/23/18 0801) Vital Signs (24h Range):  Temp:  [96.3 °F (35.7 °C)-98.1 °F (36.7 °C)] 96.3 °F (35.7 °C)  Pulse:  [] 55  Resp:  [12-24] 20  SpO2:  [94 %-98 %] 97 %  BP: (145-181)/() 158/76     Weight: 102.1 kg (225 lb)  Body mass index is 34.21 kg/m².    SpO2: 97 %  O2 Device (Oxygen Therapy): room air      Intake/Output Summary (Last 24 hours) at 01/23/18 0943  Last data filed at 01/23/18 0558   Gross per 24 hour   Intake                0 ml   Output              900 ml   Net             -900 ml       Lines/Drains/Airways     Peripheral Intravenous Line                 Peripheral IV - Single Lumen 01/22/18 1331 Right Antecubital less than 1 day                Physical Exam   Constitutional: She is oriented to person, place, and time. She appears well-developed and well-nourished. No distress.   HENT:   Head: Normocephalic and atraumatic.   Eyes: Right eye exhibits no discharge. Left eye exhibits no discharge.   Neck: JVD present.   Cardiovascular: Normal rate and regular rhythm.  Frequent extrasystoles are present. Exam reveals no gallop.    Murmur heard.  Pulmonary/Chest: Effort normal. She has wheezes. She has rales.   Abdominal: Soft. Bowel sounds are normal.   Musculoskeletal: She exhibits no edema.   Neurological: She is alert and oriented to person, place, and time.   Skin: Skin is warm and dry. She is not diaphoretic.   Psychiatric: She has a normal mood and affect. Her behavior is  normal. Judgment and thought content normal.       Significant Labs:   BMP:     Recent Labs  Lab 01/22/18  1351 01/23/18  0540   *  118* 117*     142 139   K 3.5  3.5 3.2*     103 100   CO2 27  27 26   BUN 14  14 11   CREATININE 0.8  0.8 0.8   CALCIUM 10.0  10.0 9.4   , CMP     Recent Labs  Lab 01/22/18  1351 01/23/18  0540     142 139   K 3.5  3.5 3.2*     103 100   CO2 27  27 26   *  118* 117*   BUN 14  14 11   CREATININE 0.8  0.8 0.8   CALCIUM 10.0  10.0 9.4   PROT 7.2  --    ALBUMIN 3.9  --    BILITOT 0.7  --    ALKPHOS 146*  --    AST 50*  --    ALT 80*  --    ANIONGAP 12  12 13   ESTGFRAFRICA >60  >60 >60   EGFRNONAA >60  >60 >60   , CBC     Recent Labs  Lab 01/22/18  1351 01/23/18  0540   WBC 13.05* 12.46   HGB 15.0 13.5   HCT 47.2 43.3    295   , INR     Recent Labs  Lab 01/22/18  1351   INR 1.1   , Lipid Panel     Recent Labs  Lab 01/23/18  0540   CHOL 137   HDL 47   LDLCALC 72.4   TRIG 88   CHOLHDL 34.3   , Troponin     Recent Labs  Lab 01/22/18  1351 01/23/18  0540   TROPONINI 0.040* 0.042*    and All pertinent lab results from the last 24 hours have been reviewed.    Significant Imaging: Echocardiogram:   2D echo with color flow doppler:   Results for orders placed or performed in visit on 01/22/18   2D echo with color flow doppler   Result Value Ref Range    EF 10 (A) 55 - 65    Mitral Valve Regurgitation MODERATE TO SEVERE (A)     Est. PA Systolic Pressure 68.29 (A)     Tricuspid Valve Regurgitation MODERATE (A)      Assessment and Plan:     Brief HPI: Patient seen this morning on rounds. Reports improved SOB and orthopnea. Edema, fatigue, and activity intolerance remains. POC discussed for continued diuresis today and potential LHC tomorrow.    * Acute systolic heart failure    Echo:   CONCLUSIONS     1 - Severely depressed left ventricular systolic function (EF 10-15%).     2 - Right ventricular enlargement with moderately to severely  depressed systolic function.     3 - Biatrial enlargement.     4 - Pulmonary hypertension. The estimated PA systolic pressure is 68 mmHg.     5 - Moderate to severe mitral regurgitation.     6 - Moderate tricuspid regurgitation.     7 - Increased central venous pressure.  Etiology of HF ischemic vs arrhythmia induced  LHC planned potentially for 01/24/2018 if near euvolemic  CXR with pulmonary vascular congestion, elevated BNP, +JVD and rales on exam- improved but not to baseline   K+ 3.2- replaced  Lasix increased to 40 mg  900cc output documented overnight   Continue ARB, BB and aldactone initiated this AM        Paroxysmal atrial fibrillation    Presented in AF RVR 130s, converted to SR with FPVCs after administration of Cardizem IVP  Amiodarone and Lovenox continued  BB initiated and will initiate NOAC prior to DC         HTN (hypertension)    SBP 140s-180s Losartan continued, Aldactone added to regimen as well as BB            VTE Risk Mitigation         Ordered     Low Risk of VTE  Once      01/22/18 2016     enoxaparin injection 100 mg  Every 12 hours (non-standard times)     Route:  Subcutaneous        01/22/18 6368          Jaren Medel NP  Cardiology  [facility]

## 2018-01-23 NOTE — ASSESSMENT & PLAN NOTE
Presented in AF RVR 130s, converted to SR with FPVCs after administration of Cardizem IVP  Amiodarone and Lovenox continued  BB initiated and will initiate NOAC prior to DC

## 2018-01-24 LAB
ANION GAP SERPL CALC-SCNC: 10 MMOL/L
BASOPHILS # BLD AUTO: 0.03 K/UL
BASOPHILS NFR BLD: 0.2 %
BUN SERPL-MCNC: 18 MG/DL
CALCIUM SERPL-MCNC: 9.1 MG/DL
CHLORIDE SERPL-SCNC: 97 MMOL/L
CO2 SERPL-SCNC: 27 MMOL/L
CREAT SERPL-MCNC: 0.9 MG/DL
DIFFERENTIAL METHOD: ABNORMAL
EOSINOPHIL # BLD AUTO: 0.2 K/UL
EOSINOPHIL NFR BLD: 1.4 %
ERYTHROCYTE [DISTWIDTH] IN BLOOD BY AUTOMATED COUNT: 16.6 %
EST. GFR  (AFRICAN AMERICAN): >60 ML/MIN/1.73 M^2
EST. GFR  (NON AFRICAN AMERICAN): >60 ML/MIN/1.73 M^2
GLUCOSE SERPL-MCNC: 106 MG/DL
HCT VFR BLD AUTO: 43.2 %
HGB BLD-MCNC: 13.3 G/DL
LYMPHOCYTES # BLD AUTO: 2.7 K/UL
LYMPHOCYTES NFR BLD: 21.2 %
MCH RBC QN AUTO: 26.8 PG
MCHC RBC AUTO-ENTMCNC: 30.8 G/DL
MCV RBC AUTO: 87 FL
MONOCYTES # BLD AUTO: 1 K/UL
MONOCYTES NFR BLD: 7.6 %
NEUTROPHILS # BLD AUTO: 8.8 K/UL
NEUTROPHILS NFR BLD: 69.4 %
PLATELET # BLD AUTO: 298 K/UL
PMV BLD AUTO: 11.1 FL
POTASSIUM SERPL-SCNC: 4.5 MMOL/L
RBC # BLD AUTO: 4.96 M/UL
SODIUM SERPL-SCNC: 134 MMOL/L
WBC # BLD AUTO: 12.76 K/UL

## 2018-01-24 PROCEDURE — B211YZZ FLUOROSCOPY OF MULTIPLE CORONARY ARTERIES USING OTHER CONTRAST: ICD-10-PCS | Performed by: INTERNAL MEDICINE

## 2018-01-24 PROCEDURE — 4A023N7 MEASUREMENT OF CARDIAC SAMPLING AND PRESSURE, LEFT HEART, PERCUTANEOUS APPROACH: ICD-10-PCS | Performed by: INTERNAL MEDICINE

## 2018-01-24 PROCEDURE — 21400001 HC TELEMETRY ROOM

## 2018-01-24 PROCEDURE — 94761 N-INVAS EAR/PLS OXIMETRY MLT: CPT

## 2018-01-24 PROCEDURE — 25500020 PHARM REV CODE 255

## 2018-01-24 PROCEDURE — 93458 L HRT ARTERY/VENTRICLE ANGIO: CPT

## 2018-01-24 PROCEDURE — 85025 COMPLETE CBC W/AUTO DIFF WBC: CPT

## 2018-01-24 PROCEDURE — 99152 MOD SED SAME PHYS/QHP 5/>YRS: CPT | Mod: ,,, | Performed by: INTERNAL MEDICINE

## 2018-01-24 PROCEDURE — 36415 COLL VENOUS BLD VENIPUNCTURE: CPT

## 2018-01-24 PROCEDURE — 93458 L HRT ARTERY/VENTRICLE ANGIO: CPT | Mod: 26,,, | Performed by: INTERNAL MEDICINE

## 2018-01-24 PROCEDURE — 25000003 PHARM REV CODE 250

## 2018-01-24 PROCEDURE — B215YZZ FLUOROSCOPY OF LEFT HEART USING OTHER CONTRAST: ICD-10-PCS | Performed by: INTERNAL MEDICINE

## 2018-01-24 PROCEDURE — 27200085 CATH LAB PROCEDURE

## 2018-01-24 PROCEDURE — 80048 BASIC METABOLIC PNL TOTAL CA: CPT

## 2018-01-24 PROCEDURE — 63600175 PHARM REV CODE 636 W HCPCS

## 2018-01-24 PROCEDURE — 63600175 PHARM REV CODE 636 W HCPCS: Performed by: INTERNAL MEDICINE

## 2018-01-24 PROCEDURE — 25000003 PHARM REV CODE 250: Performed by: NURSE PRACTITIONER

## 2018-01-24 RX ORDER — METOPROLOL SUCCINATE 50 MG/1
50 TABLET, EXTENDED RELEASE ORAL DAILY
Status: DISCONTINUED | OUTPATIENT
Start: 2018-01-24 | End: 2018-01-25

## 2018-01-24 RX ORDER — FUROSEMIDE 40 MG/1
40 TABLET ORAL DAILY
Status: DISCONTINUED | OUTPATIENT
Start: 2018-01-24 | End: 2018-01-25 | Stop reason: HOSPADM

## 2018-01-24 RX ADMIN — METOPROLOL TARTRATE 50 MG: 50 TABLET ORAL at 08:01

## 2018-01-24 RX ADMIN — FUROSEMIDE 40 MG: 40 TABLET ORAL at 03:01

## 2018-01-24 RX ADMIN — PANTOPRAZOLE SODIUM 40 MG: 40 TABLET, DELAYED RELEASE ORAL at 08:01

## 2018-01-24 RX ADMIN — SPIRONOLACTONE 25 MG: 25 TABLET, FILM COATED ORAL at 08:01

## 2018-01-24 RX ADMIN — LOSARTAN POTASSIUM 50 MG: 50 TABLET, FILM COATED ORAL at 08:01

## 2018-01-24 RX ADMIN — ASPIRIN 81 MG: 81 TABLET, COATED ORAL at 08:01

## 2018-01-24 RX ADMIN — APIXABAN 5 MG: 5 TABLET, FILM COATED ORAL at 09:01

## 2018-01-24 RX ADMIN — LIOTHYRONINE SODIUM 25 MCG: 25 TABLET ORAL at 08:01

## 2018-01-24 RX ADMIN — LEVOTHYROXINE SODIUM 75 MCG: 75 TABLET ORAL at 05:01

## 2018-01-24 RX ADMIN — CETIRIZINE HYDROCHLORIDE 10 MG: 10 TABLET, FILM COATED ORAL at 08:01

## 2018-01-24 RX ADMIN — METOPROLOL SUCCINATE 50 MG: 50 TABLET, EXTENDED RELEASE ORAL at 09:01

## 2018-01-24 RX ADMIN — AMIODARONE HYDROCHLORIDE 400 MG: 200 TABLET ORAL at 09:01

## 2018-01-24 RX ADMIN — ONDANSETRON 4 MG: 2 INJECTION INTRAMUSCULAR; INTRAVENOUS at 07:01

## 2018-01-24 RX ADMIN — AMIODARONE HYDROCHLORIDE 400 MG: 200 TABLET ORAL at 08:01

## 2018-01-24 NOTE — INTERVAL H&P NOTE
The patient has been examined and the H&P has been reviewed:    I concur with the findings and no changes have occurred since H&P was written.    Anesthesia/Surgery risks, benefits and alternative options discussed and understood by patient/family.          Active Hospital Problems    Diagnosis  POA    *Acute systolic heart failure [I50.21]  Unknown    Paroxysmal atrial fibrillation [I48.0]  Unknown    Shortness of breath [R06.02]  Yes    HTN (hypertension) [I10]  Yes      Resolved Hospital Problems    Diagnosis Date Resolved POA   No resolved problems to display.

## 2018-01-24 NOTE — PLAN OF CARE
Problem: Patient Care Overview  Goal: Plan of Care Review  Outcome: Ongoing (interventions implemented as appropriate)  Plan of care reviewed with patient. Patient verbalized complete understanding. Fall/safety precautions maintained. Bed alarm refused. Slip resistant socks on. Bed in lowest position, locked, call light within reach. Side rails up x's 2. Nurse instructed patient to notify staff for any assistance and pt verbalized complete understanding.       Pt remains with non productive cough, IV lasix given for tx of heart failure, pt vomited after oral potassium pills today, /Erica,NP made aware, IV zofran given, pt going for [facility] tomorrow, Pt SR with PVCs on telemetry, no ectopy or true red alarms noted.

## 2018-01-24 NOTE — NURSING TRANSFER
Patient transferred to floor on tele monitor. VSS no c/o of pain.  Patient attached to tele monitor upon arrival in room.   Right radial site reviewed with Sally MORAN.  CDI, no hematoma no bleeding.  All questions answered..

## 2018-01-24 NOTE — PLAN OF CARE
Problem: Patient Care Overview  Goal: Plan of Care Review  Outcome: Ongoing (interventions implemented as appropriate)  Pt on RA spo2 94%.  No respiratory distress.  Will continue to monitor.

## 2018-01-24 NOTE — PLAN OF CARE
Problem: Patient Care Overview  Goal: Plan of Care Review  Outcome: Ongoing (interventions implemented as appropriate)  Plan of care reviewed with patient. Voices understanding. [facility] recovery until 730pm. Right radial access with no signs of bleeding or hematoma noted. NSR on monitor with no red alarms noted. Will continue to monitor.

## 2018-01-24 NOTE — PROGRESS NOTES
.Pharmacy New Medication Education    Patient accepted medication education.    Pharmacy educated patient on name and purpose of medications and possible side effects, using the teach-back method.     diphenhydrAMINE capsule 50 mg     ondansetron injection 4 mg       Learners of pharmacy medication education included:  Patient    Patient +/- learner response:  Verbalized Understanding, Teachback

## 2018-01-24 NOTE — PLAN OF CARE
Problem: Patient Care Overview  Goal: Plan of Care Review  Outcome: Ongoing (interventions implemented as appropriate)  Pt's SpO2 98% on RA. Prn tx not required. No respiratory distress noted. Will continue to monitor SpO2.

## 2018-01-24 NOTE — PROCEDURES
Post Procedure Note: [facility]    The pt was brought to the cath lab and under sterile technique, right radial access was obtained without difficulty. Images were obtained in multiple views and normal coronaries noted with severely depressed LVEF. Please see full report for details. The pt tolerated the procedure well without complications. Radial band closure device used with successful hemostasis.     Vitals:    01/24/18 0730 01/24/18 0746 01/24/18 1000 01/24/18 1207   BP:       BP Location:       Patient Position:       Pulse: 80 74 75 78   Resp:       Temp:       TempSrc:       SpO2:  98%  97%   Weight:       Height:             Gen: NAD  Ext: 2+ radial pulse, no evidence of hematoma    Plan:  -Post cath care per protocol  -Medical management of NICM

## 2018-01-25 VITALS
DIASTOLIC BLOOD PRESSURE: 82 MMHG | HEART RATE: 80 BPM | BODY MASS INDEX: 35.55 KG/M2 | WEIGHT: 234.56 LBS | HEIGHT: 68 IN | OXYGEN SATURATION: 94 % | TEMPERATURE: 97 F | SYSTOLIC BLOOD PRESSURE: 112 MMHG | RESPIRATION RATE: 16 BRPM

## 2018-01-25 DIAGNOSIS — I10 ESSENTIAL HYPERTENSION: ICD-10-CM

## 2018-01-25 DIAGNOSIS — I50.21 ACUTE SYSTOLIC HEART FAILURE: Primary | ICD-10-CM

## 2018-01-25 LAB
ANION GAP SERPL CALC-SCNC: 8 MMOL/L
BASOPHILS # BLD AUTO: 0.02 K/UL
BASOPHILS NFR BLD: 0.2 %
BUN SERPL-MCNC: 20 MG/DL
CALCIUM SERPL-MCNC: 9.1 MG/DL
CHLORIDE SERPL-SCNC: 100 MMOL/L
CO2 SERPL-SCNC: 30 MMOL/L
CREAT SERPL-MCNC: 0.9 MG/DL
DIFFERENTIAL METHOD: ABNORMAL
EOSINOPHIL # BLD AUTO: 0.1 K/UL
EOSINOPHIL NFR BLD: 1.1 %
ERYTHROCYTE [DISTWIDTH] IN BLOOD BY AUTOMATED COUNT: 16.5 %
EST. GFR  (AFRICAN AMERICAN): >60 ML/MIN/1.73 M^2
EST. GFR  (NON AFRICAN AMERICAN): >60 ML/MIN/1.73 M^2
GLUCOSE SERPL-MCNC: 93 MG/DL
HCT VFR BLD AUTO: 42.5 %
HGB BLD-MCNC: 13 G/DL
LYMPHOCYTES # BLD AUTO: 2.2 K/UL
LYMPHOCYTES NFR BLD: 21.3 %
MCH RBC QN AUTO: 26.7 PG
MCHC RBC AUTO-ENTMCNC: 30.6 G/DL
MCV RBC AUTO: 87 FL
MONOCYTES # BLD AUTO: 0.9 K/UL
MONOCYTES NFR BLD: 8.2 %
NEUTROPHILS # BLD AUTO: 7.1 K/UL
NEUTROPHILS NFR BLD: 69 %
PLATELET # BLD AUTO: 273 K/UL
PMV BLD AUTO: 11.1 FL
POTASSIUM SERPL-SCNC: 5.2 MMOL/L
RBC # BLD AUTO: 4.86 M/UL
SODIUM SERPL-SCNC: 138 MMOL/L
WBC # BLD AUTO: 10.33 K/UL

## 2018-01-25 PROCEDURE — 25000003 PHARM REV CODE 250: Performed by: NURSE PRACTITIONER

## 2018-01-25 PROCEDURE — 85025 COMPLETE CBC W/AUTO DIFF WBC: CPT

## 2018-01-25 PROCEDURE — 99239 HOSP IP/OBS DSCHRG MGMT >30: CPT | Mod: ,,, | Performed by: NURSE PRACTITIONER

## 2018-01-25 PROCEDURE — 94761 N-INVAS EAR/PLS OXIMETRY MLT: CPT

## 2018-01-25 PROCEDURE — 80048 BASIC METABOLIC PNL TOTAL CA: CPT

## 2018-01-25 PROCEDURE — 36415 COLL VENOUS BLD VENIPUNCTURE: CPT

## 2018-01-25 RX ORDER — AMIODARONE HYDROCHLORIDE 400 MG/1
400 TABLET ORAL 2 TIMES DAILY
Qty: 14 TABLET | Refills: 0 | Status: SHIPPED | OUTPATIENT
Start: 2018-01-25 | End: 2018-01-30 | Stop reason: HOSPADM

## 2018-01-25 RX ORDER — AMIODARONE HYDROCHLORIDE 200 MG/1
200 TABLET ORAL DAILY
Qty: 30 TABLET | Refills: 11 | Status: SHIPPED | OUTPATIENT
Start: 2018-02-02 | End: 2018-01-30 | Stop reason: HOSPADM

## 2018-01-25 RX ORDER — FUROSEMIDE 40 MG/1
40 TABLET ORAL DAILY
Qty: 30 TABLET | Refills: 11 | Status: SHIPPED | OUTPATIENT
Start: 2018-01-25 | End: 2018-01-30 | Stop reason: DRUGHIGH

## 2018-01-25 RX ORDER — POLYETHYLENE GLYCOL 3350 17 G/17G
17 POWDER, FOR SOLUTION ORAL DAILY
Status: DISCONTINUED | OUTPATIENT
Start: 2018-01-25 | End: 2018-01-25 | Stop reason: HOSPADM

## 2018-01-25 RX ORDER — METOPROLOL SUCCINATE 100 MG/1
100 TABLET, EXTENDED RELEASE ORAL DAILY
Qty: 30 TABLET | Refills: 11 | Status: SHIPPED | OUTPATIENT
Start: 2018-01-25 | End: 2018-09-21 | Stop reason: SDUPTHER

## 2018-01-25 RX ORDER — POLYETHYLENE GLYCOL 3350 17 G/17G
17 POWDER, FOR SOLUTION ORAL 2 TIMES DAILY PRN
Qty: 30 EACH | Refills: 0 | Status: SHIPPED | OUTPATIENT
Start: 2018-01-25 | End: 2018-03-13

## 2018-01-25 RX ORDER — METOPROLOL SUCCINATE 50 MG/1
100 TABLET, EXTENDED RELEASE ORAL DAILY
Status: DISCONTINUED | OUTPATIENT
Start: 2018-01-25 | End: 2018-01-25 | Stop reason: HOSPADM

## 2018-01-25 RX ADMIN — LOSARTAN POTASSIUM 50 MG: 50 TABLET, FILM COATED ORAL at 08:01

## 2018-01-25 RX ADMIN — METOPROLOL SUCCINATE 100 MG: 50 TABLET, EXTENDED RELEASE ORAL at 08:01

## 2018-01-25 RX ADMIN — CETIRIZINE HYDROCHLORIDE 10 MG: 10 TABLET, FILM COATED ORAL at 08:01

## 2018-01-25 RX ADMIN — PANTOPRAZOLE SODIUM 40 MG: 40 TABLET, DELAYED RELEASE ORAL at 08:01

## 2018-01-25 RX ADMIN — AMIODARONE HYDROCHLORIDE 400 MG: 200 TABLET ORAL at 08:01

## 2018-01-25 RX ADMIN — FUROSEMIDE 40 MG: 40 TABLET ORAL at 08:01

## 2018-01-25 RX ADMIN — ASPIRIN 81 MG: 81 TABLET, COATED ORAL at 08:01

## 2018-01-25 RX ADMIN — APIXABAN 5 MG: 5 TABLET, FILM COATED ORAL at 08:01

## 2018-01-25 RX ADMIN — LEVOTHYROXINE SODIUM 75 MCG: 75 TABLET ORAL at 06:01

## 2018-01-25 RX ADMIN — LIOTHYRONINE SODIUM 25 MCG: 25 TABLET ORAL at 09:01

## 2018-01-25 RX ADMIN — POLYETHYLENE GLYCOL 3350 17 G: 17 POWDER, FOR SOLUTION ORAL at 09:01

## 2018-01-25 NOTE — PLAN OF CARE
Follow-up With  Details  Why  Contact Info   Go Duran MD  Go on 2/8/2018  @ 9:40am  [address]  [address]  [place] [address]  [phone]   Phyllis Gunter MD  Go on 4/16/2018  @ 9am  [address]  [address]  [place] [address]  [phone]   Jaren Medel NP  Go on 1/30/2018  This clinic will contact you with time.  [address]  [place] [address]  [phone]     Patient is not amicable to priority care clinic.  [facility] sent new PCP appt with Dr. Gunter as per patient request. Her daughter is coming to pick her up from the hospital.     01/25/18 1130   Final Note   Assessment Type Final Discharge Note   Discharge Disposition Home   Hospital Follow Up  Appt(s) scheduled? Yes   Discharge plans and expectations educations in teach back method with documentation complete? Yes   Right Care Referral Info   Post Acute Recommendation No Care

## 2018-01-25 NOTE — PLAN OF CARE
Problem: Cardiac: Heart Failure (Adult)  Goal: Signs and Symptoms of Listed Potential Problems Will be Absent, Minimized or Managed (Cardiac: Heart Failure)  Signs and symptoms of listed potential problems will be absent, minimized or managed by discharge/transition of care (reference Cardiac: Heart Failure (Adult) CPG).   Outcome: Unable to achieve outcome(s) by discharge  Will continue to monitor outpatient and take new medications.

## 2018-01-25 NOTE — PLAN OF CARE
Problem: Patient Care Overview  Goal: Plan of Care Review  Outcome: Ongoing (interventions implemented as appropriate)  Pt on RA with sats of 95%.  Will continue to monitor.

## 2018-01-25 NOTE — DISCHARGE SUMMARY
[facility]  Cardiology  Discharge Summary      Patient Name: Violet Mirza  MRN: [id number]  Admission Date: 1/22/2018  Hospital Length of Stay: 3 days  Discharge Date and Time:  01/25/2018 9:39 AM  Attending Physician: Josiah Brandt MD    Discharging Provider: Jaren Medel NP  Primary Care Physician: Geovany Henning MD    HPI:   Violet Mirza is a 66 y/o Female who was sent to the ED following echocardiogram today.   Patient reports progressive SOB since December. She has been evaluated by her PCP who referred her to cardiology and ordered an echo. She also noted edema to her BLE which improved with the initiation of HCTZ. She denies chest pain, palps, syncope, dizziness. Endorses PND, orthopnea, productive cough. Patient was noted to be in AF RVR with rate in the 130s. LVEF 10-15%. CXR with pulmonary vascular congestion, elevated BNP, and mildly elevated TNI. Patient was given Cardizem IV in the ED with conversion to SR with frequent PVCs.        Procedure(s) (LRB):  Left heart cath (Left)     Indwelling Lines/Drains at time of discharge:  Lines/Drains/Airways          No matching active lines, drains, or airways          Hospital Course:  01/22/2018 Patient to be admitted to telemetry for diuresis. Amiodarone initiated as well as IV Lasix, and Lovenox. Home ARB continued.   01/23/2018 SOB improved. SBP elevated overnight to the 180s. Intermittent tachycardia. Lasix increased to 40 mg daily. 900 cc output documented overnight. K+ 3.2 and replaced.   01/24/2017 Date of Procedure:  01/24/2018    A. Indication/Pre-Operative Diagnosis     The patient is a 67 year old female that was referred for catheterization by Dr. Josiah Brandt for atrial fibrillation and cardiomypathy or decreased LV function.     B. Summary/Post-Operative Diagnosis       Normal coronary arteries.    Diastolic dysfunction.    Severely depressed EF.    NICM.    C. HPI     I have reviewed the history and  physical completed on 01/24/2018. The patient has been examined and I concur with the findings from 01/24/2018.     Patient history was obtained from the patient.     Counseling: The patient was counseled regarding the potential risks and benefits of this procedure, as well as possible alternative approaches to the problem and gave informed consent.    The ASA Score was Class III.     [facility] Risk Score for MACE is 2.80%. [facility] Risk Score for Death is 0.60%.     Height: 68 in.      Weight: 233 lbs.      BMI: 35.40 kg/m2    OUTPATIENT MEDICATIONS: Medications were reviewed.  ALLERGIES: Allergies were reviewed.    Laboratory data revealed:     01/22/2018 INR  1.1, PTI  11.1   01/24/2018 GLU  106, HGB  13.3, NA  134, K  4.5, HCT  43.2, PLT  298, CREAT  0.9, WBCIR  12.76, BUN  18            ACS Enzymes:     01/22/2018 TROP  0.040   01/23/2018 TROP  0.042              D. Hemodynamic Results     LVEDP (Pre): 29 mmHg  LVEDP (Post): 30 mmHg  Ejection Fraction: 10%  Global LV Function: severely depressed    E. Angiographic Results     Diagnostic:          Patient has a right dominant coronary artery.        - Left Main Coronary Artery:             The LM is normal. There is DEEPALI 3 flow.     - Left Anterior Descending Artery:             The LAD is normal. There is DEEPALI 3 flow.     - Left Circumflex Artery:             The LCX is normal. There is DEEPALI 3 flow.     - Right Coronary Artery:             The RCA is normal. There is DEEPALI 3 flow.     - Radial:             The radial was not studied.      Intervention          Radial:              The following items were used: Us Probe Cover.    F. Details of Procedure     PROCEDURES PERFORMED: LHC, Left Ventriculogram and Coronary Angio    ANESTHESIA: Conscious sedation was achieved with 100 mcg of FENTANYL 100MCG/2ML and 2 mg of Versed. Local anesthesia was achieved with 20 ml of Lidocaine 1%. Moderate conscious sedation was performed and cardiorespiratory functions were  monitored the   entire procedure by Varsha Munoz RN. Sedation began at 01:00 PM and concluded at 01:23 PM, totalling 22.8 minutes.     PRIMARY SURGEON: Go Duran MD    COMPLICATIONS: There were no complications.    Medications given on sterile field: Lidocaine 1% (20 ml).    Medications given during procedure: Heparin 1000u/500cc Bag (2 bags), Verapamil Inj 2ml / 5 Mg (1.25 mg), Heparin 1000u/ml Inj (3750 units), Nitroglycerine (tridil) 5mg/ml (1250 mcg), Fentanyl 100mcg/2ml (100 mcg) and Versed (2 mg).    The patient was brought to the catheterization laboratory after premedication with oral Benadryl 50 mg. Bilateral groin prepped and draped. Right radial prepped and draped. The Kit, Manifold was flushed and connected to contrast. A Us Probe Cover was   applied to the right radial. After local anesthesia, a Sheath 4/5 Fr Glide Slender was inserted into the right Radial artery.     AORTA    A Wire Bentson 035 X 260 was inserted into the Aorta.     RCA    A Cath 5fr Alexx was inserted into the ostial RCA. The Wire Bentson 035 X 260 was removed. Angiography performed in multiple views in the right coronary arteries.     LM    The Cath 5fr Alexx was repositioned into the ostial LM. Angiography performed in multiple views in the left coronary arteries. The Cath 5fr Alexx was removed.     Left  VENTRICLE    Attempted to insert Cath 5fr Pigtail 125cm into the left ventricle. The Cath 5fr Pigtail 125cm was removed. A Cath 4fr Pigtail 145 was inserted into the left ventricle. Hemodynamics recorded in the left ventricle. Angiography performed in the left   ventricle.     The Cath 4fr Pigtail 145 was removed. Total Visipaque injected was 70.0 ml. Total Visipaque used was 150.0 ml. The Sheath 4/5 Fr Glide Slender was removed. A Closure Vascband Radial R was applied to the right radial.       Fluoroscopy Time 5.5 minutes   Radiation Dose 727.5 mGy   Contrast Injected 70 ml Visipaque   Contrast Used  150 ml Visipaque             Procedure log documented by RT Jesusita and verified by Go Duran MD    ESTIMATED BLOOD LOSS is < 50 cc.    SPECIMEN: No specimen.     G. Recommendations     1. Routine post-cath care.  2. Maximize medical management.  3. Risk factor reductions.  4. Weight loss.  5. medical management of HFrEF    I certify that I was present for catheter insertion, catheter manipulation, angiography, angiographic interpretation, and all interventional procedures performed on this patient.     This document has been electronically    SIGNED BY: Go Duran MD On: 01/24/2018 13:54  01/25/2017 Patient was recovered overnight without incident. K+ noted 5.2 after initiation of Aldactone- discontinued. Medical management of HF optimized. Appropriate for discharge to follow up in clinic next week with BNP and BMP.     Consults:   Consults         Status Ordering Provider     Inpatient consult to Registered Dietitian/Nutritionist  Once     Provider:  (Not yet assigned)    Completed APOLLO HAZEL          Significant Diagnostic Studies: Labs:   BMP:   Recent Labs  Lab 01/24/18 0444 01/25/18  0456    93   * 138   K 4.5 5.2*   CL 97 100   CO2 27 30*   BUN 18 20   CREATININE 0.9 0.9   CALCIUM 9.1 9.1   , CMP   Recent Labs  Lab 01/24/18 0444 01/25/18  0456   * 138   K 4.5 5.2*   CL 97 100   CO2 27 30*    93   BUN 18 20   CREATININE 0.9 0.9   CALCIUM 9.1 9.1   ANIONGAP 10 8   ESTGFRAFRICA >60 >60   EGFRNONAA >60 >60   , CBC   Recent Labs  Lab 01/24/18 0444 01/25/18  0456   WBC 12.76* 10.33   HGB 13.3 13.0   HCT 43.2 42.5    273   , INR   Lab Results   Component Value Date    INR 1.1 01/22/2018   , Lipid Panel   Lab Results   Component Value Date    CHOL 137 01/23/2018    HDL 47 01/23/2018    LDLCALC 72.4 01/23/2018    TRIG 88 01/23/2018    CHOLHDL 34.3 01/23/2018   , Troponin   Recent Labs  Lab 01/23/18  0540   TROPONINI 0.042*   , A1C:   Recent Labs  Lab 01/23/18  0580    HGBA1C 5.8*    and All labs within the past 24 hours have been reviewed  Cardiac Graphics: Echocardiogram:   2D echo with color flow doppler:   Results for orders placed or performed in visit on 01/22/18   2D echo with color flow doppler   Result Value Ref Range    EF 10 (A) 55 - 65    Mitral Valve Regurgitation MODERATE TO SEVERE (A)     Est. PA Systolic Pressure 68.29 (A)     Tricuspid Valve Regurgitation MODERATE (A)        Pending Diagnostic Studies:     None          Final Active Diagnoses:    Diagnosis Date Noted POA    PRINCIPAL PROBLEM:  Acute systolic heart failure [I50.21] 01/22/2018 Unknown    Paroxysmal atrial fibrillation [I48.0] 01/22/2018 Unknown    Shortness of breath [R06.02] 11/03/2017 Yes    HTN (hypertension) [I10] 07/26/2013 Yes      Problems Resolved During this Admission:    Diagnosis Date Noted Date Resolved POA     No new Assessment & Plan notes have been filed under this hospital service since the last note was generated.  Service: Cardiology      Discharged Condition: good    Disposition: Home or Self Care    Follow Up:    Patient Instructions:     Basic metabolic panel   Standing Status: Future  Standing Exp. Date: 03/26/19     Diet Cardiac     Activity as tolerated     Lifting restrictions   Order Comments: No lifting over 10 pounds with right hand for 1 week     Notify your health care provider if you experience any of the following:  redness, tenderness, or signs of infection (pain, swelling, redness, odor or green/yellow discharge around incision site)     Notify your health care provider if you experience any of the following:  severe uncontrolled pain     Notify your health care provider if you experience any of the following:  difficulty breathing or increased cough     Notify your health care provider if you experience any of the following:  persistent dizziness, light-headedness, or visual disturbances     Notify your health care provider if you experience any of the  following:   Order Comments: Weigh yourself every morning. If you gain 2-3 pounds in a 24 hour period or 5 pounds in 1 week, take an extra dose of Lasix and notify office.  2 gm sodium diet daily (2000 mg)  Amiodarone 400 mg twice a day for 7 days then decrease to 200 mg daily. Pharmacy will deliver both bottles       Medications:  Reconciled Home Medications:   Current Discharge Medication List      START taking these medications    Details   !! amiodarone (PACERONE) 200 MG Tab Take 1 tablet (200 mg total) by mouth once daily.  Qty: 30 tablet, Refills: 11      !! amiodarone (PACERONE) 400 MG tablet Take 1 tablet (400 mg total) by mouth 2 (two) times daily.  Qty: 14 tablet, Refills: 0      apixaban 5 mg Tab Take 1 tablet (5 mg total) by mouth 2 (two) times daily.  Qty: 60 tablet, Refills: 11      furosemide (LASIX) 40 MG tablet Take 1 tablet (40 mg total) by mouth once daily.  Qty: 30 tablet, Refills: 11      metoprolol succinate (TOPROL-XL) 100 MG 24 hr tablet Take 1 tablet (100 mg total) by mouth once daily.  Qty: 30 tablet, Refills: 11      polyethylene glycol (GLYCOLAX) 17 gram PwPk Take 17 g by mouth 2 (two) times daily as needed.  Qty: 30 each, Refills: 0       !! - Potential duplicate medications found. Please discuss with provider.      CONTINUE these medications which have NOT CHANGED    Details   albuterol 90 mcg/actuation inhaler Inhale 2 puffs into the lungs every 6 (six) hours as needed for Wheezing. Rescue  Qty: 1 Inhaler, Refills: 1    Associated Diagnoses: Extrinsic asthma with acute exacerbation, unspecified asthma severity, unspecified whether persistent      doxycycline (MONODOX) 50 MG Cap Take 1 capsule (50 mg total) by mouth once daily.  Qty: 30 capsule, Refills: 3    Associated Diagnoses: Rosacea      epinastine 0.05 % ophthalmic solution Place 2 drops into both eyes 2 (two) times daily.   Refills: 12      levothyroxine (SYNTHROID) 75 MCG tablet Take 1 tablet (75 mcg total) by mouth before  breakfast.  Qty: 30 tablet, Refills: 1    Comments: Pt needs labs and appt prior to additional refills  Associated Diagnoses: Hypothyroidism, unspecified type      liothyronine (CYTOMEL) 25 MCG Tab Take 1 tablet (25 mcg total) by mouth once daily.  Qty: 30 tablet, Refills: 3    Associated Diagnoses: Hypothyroidism, iatrogenic      losartan (COZAAR) 50 MG tablet Take 1 tablet (50 mg total) by mouth once daily.  Qty: 90 tablet, Refills: 3    Associated Diagnoses: Essential hypertension      cetirizine (ZYRTEC) 10 MG tablet Take 1 tablet (10 mg total) by mouth once daily.  Qty: 30 tablet, Refills: 2    Associated Diagnoses: Extrinsic asthma with acute exacerbation, unspecified asthma severity, unspecified whether persistent      ESTRACE 0.01 % (0.1 mg/gram) vaginal cream USE 1GM THREE TIMES A WEEK THEN 1/2 GM TWICE A WEEK FOR DURATION VAGINALLY  Refills: 12      fluticasone (FLONASE) 50 mcg/actuation nasal spray 2 sprays by Each Nare route once daily.  Qty: 1 Bottle, Refills: 12      hydrocodone-homatropine 5-1.5 mg/5 ml (HYCODAN) 5-1.5 mg/5 mL Syrp Take 5 mLs by mouth every 6 (six) hours as needed (cough).  Qty: 120 mL, Refills: 0      traMADol (ULTRAM) 50 mg tablet TK 1 T PO Q 6 H PRN  Refills: 0         STOP taking these medications       hydroCHLOROthiazide (MICROZIDE) 12.5 mg capsule Comments:   Reason for Stopping:         potassium chloride (KLOR-CON) 8 MEQ TbSR Comments:   Reason for Stopping:               Time spent on the discharge of patient: 45 minutes    Jaren Medel NP  Cardiology  [facility]

## 2018-01-25 NOTE — NURSING
Discharge instructions and education given to patient. Voices understanding. IV site and telemetry discontinued without adverse reaction. Patient discharged with no acute distress noted.

## 2018-01-25 NOTE — PHYSICIAN QUERY
PT Name: Violet Mirza  MR #: [id number]     Physician Query Form - Documentation Clarification      CDS/: Stefani Armenta               Contact information: [email or web address]    This form is a permanent document in the medical record.     Query Date: January 25, 2018    By submitting this query, we are merely seeking further clarification of documentation. Please utilize your independent clinical judgment when addressing the question(s) below.    The Medical record reflects the following:    Supporting Clinical Findings Location in Medical Record     Normal coronary arteries.    Diastolic dysfunction.    Severely depressed EF.    NICM.    Newly diagnosed Cardiomyopathy    Arrhythmogenic induced CM?   Cath report           H&P    H&P                                                                            Doctor, Please specify diagnosis or diagnoses associated with above clinical findings.    Provider Use Only      Please specify the type of Cardiomyopathy:    [  x   ] Dilated Cardiomyopathy  [     ] Congestive Cardiomyopathy  [     ] Other:_________________                                                                                                               [  ] Clinically undetermined

## 2018-01-25 NOTE — PROGRESS NOTES
.Pharmacy New Medication Education    Patient accepted medication education.    Pharmacy educated patient on name and purpose of medications and possible side effects, using the teach-back method.     D/C Current Inpatient Medication Orders   D/C acetaminophen tablet 650 mg   D/C albuterol nebulizer solution 2.5 mg   D/C amiodarone tablet 400 mg   D/C apixaban tablet 5 mg   D/C aspirin EC tablet 81 mg   D/C cetirizine tablet 10 mg   D/C diphenhydrAMINE capsule 50 mg   D/C furosemide tablet 40 mg   D/C hydrocodone-acetaminophen 10-325mg per tablet 1 tablet   D/C levothyroxine tablet 75 mcg   D/C liothyronine tablet 25 mcg   D/C loperamide capsule 2 mg   D/C losartan tablet 50 mg   D/C metoprolol succinate (TOPROL-XL) 24 hr tablet 50 mg   D/C ondansetron disintegrating tablet 8 mg   D/C ondansetron injection 4 mg   D/C pantoprazole EC tablet 40 mg   D/C polyethylene glycol packet 17 g   D/C ramelteon tablet 8 mg   D/C sodium chloride 0.9% flush 3 mL   D/C spironolactone tablet 25 mg   D/C traMADol tablet 50 mg       Learners of pharmacy medication education included:  Patient    Patient +/- learner response:  Verbalized Understanding, Teachback

## 2018-01-26 ENCOUNTER — TELEPHONE (OUTPATIENT)
Dept: CARDIOLOGY | Facility: CLINIC | Age: 68
End: 2018-01-26

## 2018-01-26 ENCOUNTER — PATIENT OUTREACH (OUTPATIENT)
Dept: ADMINISTRATIVE | Facility: CLINIC | Age: 68
End: 2018-01-26

## 2018-01-26 NOTE — TELEPHONE ENCOUNTER
----- Message from Jaren Medel NP sent at 1/25/2018  4:37 PM CST -----  Please schedule patient BMP, BNP on Monday and a follow up with me on Tuesday.

## 2018-01-29 ENCOUNTER — PATIENT MESSAGE (OUTPATIENT)
Dept: PULMONOLOGY | Facility: CLINIC | Age: 68
End: 2018-01-29

## 2018-01-29 ENCOUNTER — LAB VISIT (OUTPATIENT)
Dept: LAB | Facility: HOSPITAL | Age: 68
End: 2018-01-29
Attending: NURSE PRACTITIONER
Payer: MEDICARE

## 2018-01-29 DIAGNOSIS — I42.8 NONISCHEMIC CARDIOMYOPATHY: ICD-10-CM

## 2018-01-29 DIAGNOSIS — I10 ESSENTIAL HYPERTENSION: ICD-10-CM

## 2018-01-29 DIAGNOSIS — I50.9 NEW ONSET OF CONGESTIVE HEART FAILURE: ICD-10-CM

## 2018-01-29 DIAGNOSIS — I48.0 PAROXYSMAL ATRIAL FIBRILLATION: ICD-10-CM

## 2018-01-29 DIAGNOSIS — I50.21 ACUTE SYSTOLIC HEART FAILURE: ICD-10-CM

## 2018-01-29 LAB
ANION GAP SERPL CALC-SCNC: 9 MMOL/L
BNP SERPL-MCNC: 1067 PG/ML
BUN SERPL-MCNC: 20 MG/DL
CALCIUM SERPL-MCNC: 10 MG/DL
CHLORIDE SERPL-SCNC: 99 MMOL/L
CO2 SERPL-SCNC: 35 MMOL/L
CREAT SERPL-MCNC: 0.9 MG/DL
EST. GFR  (AFRICAN AMERICAN): >60 ML/MIN/1.73 M^2
EST. GFR  (NON AFRICAN AMERICAN): >60 ML/MIN/1.73 M^2
GLUCOSE SERPL-MCNC: 104 MG/DL
POTASSIUM SERPL-SCNC: 4.1 MMOL/L
SODIUM SERPL-SCNC: 143 MMOL/L

## 2018-01-29 PROCEDURE — 83880 ASSAY OF NATRIURETIC PEPTIDE: CPT

## 2018-01-29 PROCEDURE — 80048 BASIC METABOLIC PNL TOTAL CA: CPT

## 2018-01-29 PROCEDURE — 36415 COLL VENOUS BLD VENIPUNCTURE: CPT

## 2018-01-30 ENCOUNTER — PATIENT MESSAGE (OUTPATIENT)
Dept: CARDIOLOGY | Facility: CLINIC | Age: 68
End: 2018-01-30

## 2018-01-30 ENCOUNTER — PATIENT MESSAGE (OUTPATIENT)
Dept: CARDIOLOGY | Facility: HOSPITAL | Age: 68
End: 2018-01-30

## 2018-01-30 ENCOUNTER — OFFICE VISIT (OUTPATIENT)
Dept: CARDIOLOGY | Facility: CLINIC | Age: 68
End: 2018-01-30
Payer: MEDICARE

## 2018-01-30 VITALS
DIASTOLIC BLOOD PRESSURE: 76 MMHG | WEIGHT: 234 LBS | HEART RATE: 63 BPM | SYSTOLIC BLOOD PRESSURE: 113 MMHG | BODY MASS INDEX: 35.58 KG/M2 | OXYGEN SATURATION: 97 %

## 2018-01-30 DIAGNOSIS — I10 ESSENTIAL HYPERTENSION: ICD-10-CM

## 2018-01-30 DIAGNOSIS — I50.21 ACUTE SYSTOLIC HEART FAILURE: ICD-10-CM

## 2018-01-30 DIAGNOSIS — I48.0 PAROXYSMAL ATRIAL FIBRILLATION: Primary | ICD-10-CM

## 2018-01-30 PROCEDURE — 93000 ELECTROCARDIOGRAM COMPLETE: CPT | Mod: S$GLB,,, | Performed by: INTERNAL MEDICINE

## 2018-01-30 PROCEDURE — 99499 UNLISTED E&M SERVICE: CPT | Mod: S$GLB,,, | Performed by: NURSE PRACTITIONER

## 2018-01-30 PROCEDURE — 1126F AMNT PAIN NOTED NONE PRSNT: CPT | Mod: S$GLB,,, | Performed by: NURSE PRACTITIONER

## 2018-01-30 PROCEDURE — 99214 OFFICE O/P EST MOD 30 MIN: CPT | Mod: S$GLB,,, | Performed by: NURSE PRACTITIONER

## 2018-01-30 PROCEDURE — 99999 PR PBB SHADOW E&M-EST. PATIENT-LVL III: CPT | Mod: PBBFAC,,, | Performed by: NURSE PRACTITIONER

## 2018-01-30 PROCEDURE — 3008F BODY MASS INDEX DOCD: CPT | Mod: S$GLB,,, | Performed by: NURSE PRACTITIONER

## 2018-01-30 PROCEDURE — 1159F MED LIST DOCD IN RCRD: CPT | Mod: S$GLB,,, | Performed by: NURSE PRACTITIONER

## 2018-01-30 RX ORDER — FUROSEMIDE 40 MG/1
40 TABLET ORAL 2 TIMES DAILY
Qty: 60 TABLET | Refills: 0 | Status: SHIPPED | OUTPATIENT
Start: 2018-01-30 | End: 2018-03-06 | Stop reason: SDUPTHER

## 2018-01-30 NOTE — PROGRESS NOTES
Subjective:       Patient ID: Violet Mirza is a 67 y.o. female with HTN, new onset HFrEF and PAF here for hospital follow up    Chief Complaint: Hospital Follow up   Violet Mirza is a 68 y/o Female recently hospitalized with ADHF.  Patient reported progressive SOB since December.   Patient was noted to be in AF RVR with rate in the 130s.   LVEF 10-15%.   CXR with pulmonary vascular congestion, elevated BNP, and mildly elevated TNI. Patient was given Cardizem IV in the ED with conversion to SR with frequent PVCs. 01/22/2018 Patient to be admitted to telemetry for diuresis. Amiodarone initiated as well as IV Lasix, and Lovenox. Home ARB continued.   01/23/2018 SOB improved. SBP elevated overnight to the 180s. Intermittent tachycardia. Lasix increased to 40 mg daily. 900 cc output documented overnight. K+ 3.2 and replaced.   01/24/2017 Patient underwent LHC:    Normal coronary arteries.    Diastolic dysfunction.    Severely depressed EF.      01/25/2017 Patient was recovered overnight without incident. K+ noted 5.2 after initiation of Aldactone- discontinued.   Outpatient cardiac regimen includes:  Toprol Xl 100 mg  Losartan 50 mg  Eliquis  Lasix 40 mg QD  Amiodarone 400 mg BID x 10 days then 200 mg daily   01/29/2018 Labs:  BNP 1,067 (01/22 1,148)   K+ 4.1  BUN 20  Cr 1.9  Patient forgot to do daily weights until yesterday morning and noted she was up 3 pounds- took extra 40 mg Lasix. She weighed again this AM and was up 3 pounds from yesterday- took extra Lasix. Her SOB and cough have worsened since discharge but is not as severe as it was when she presented to the ED. Cough is not productive. +orhtopmea She has 1+ pitting edema to the knee bilaterally. She has been reading labels and has been compliant with 2 gm sodium diet. Drinking 3; 16 oz bottles of water a day. Patient reports compliance with medications. BP is at goal.   She is concerned regarding pulmonary side effects of Amiodarone. She is also  concerned with bleeding risks with NOAC. Discontinue Amio at this time, continue Eliquis. Referral for GAIL closure evaluation and AF ablation (if AF recurs).   EKG today with SR noted  Increase Lasix to 80 mg twice daily today and tomorrow then increase Lasix to 40 mg BID  CMP and BNP on 02/07/2017 and follow up on 02/08/2018   Patient is active in portal- instructed to message with daily weights.  Proceed to the ED if symptoms worsen        Review of Systems   Constitutional: Negative for diaphoresis and fatigue.   HENT: Negative.    Eyes: Negative.    Respiratory: Positive for cough and shortness of breath. Negative for chest tightness and wheezing.    Cardiovascular: Positive for leg swelling. Negative for chest pain and palpitations.   Gastrointestinal: Negative.    Endocrine: Negative.    Genitourinary: Negative.    Musculoskeletal: Positive for arthralgias.   Skin: Negative.    Allergic/Immunologic: Negative.    Neurological: Negative.  Negative for dizziness and light-headedness.   Hematological: Negative.    Psychiatric/Behavioral: Negative.        Objective:      Physical Exam   Constitutional: She is oriented to person, place, and time. She appears well-developed and well-nourished. No distress.   HENT:   Head: Normocephalic and atraumatic.   Eyes: Right eye exhibits no discharge. Left eye exhibits no discharge.   Neck: JVD present.   Cardiovascular: Normal rate, regular rhythm and intact distal pulses.  Exam reveals no gallop and no friction rub.    Murmur heard.  Pulmonary/Chest: Effort normal. No accessory muscle usage. No tachypnea. She has decreased breath sounds. She has no wheezes. She has no rhonchi. She has no rales.   Abdominal: Soft. Bowel sounds are normal.   Musculoskeletal: She exhibits edema (1+ to knee).   Neurological: She is alert and oriented to person, place, and time.   Skin: Skin is warm and dry. Capillary refill takes less than 2 seconds. She is not diaphoretic.   Psychiatric: She  has a normal mood and affect. Her behavior is normal. Judgment and thought content normal.       Assessment:       1. Paroxysmal atrial fibrillation    2. Acute systolic heart failure    3. Essential hypertension        Plan:       Diagnoses and all orders for this visit:    Paroxysmal atrial fibrillation  -     EKG 12-lead  -     Ambulatory Referral to Electrophysiology    Acute systolic heart failure  -     furosemide (LASIX) 40 MG tablet; Take 1 tablet (40 mg total) by mouth 2 (two) times daily.  -     Brain natriuretic peptide; Future  -     Comprehensive metabolic panel; Future    Essential hypertension      Violet Mirza is a 66 y/o Female recently hospitalized with ADHF.  Patient reported progressive SOB since December.   Patient was noted to be in AF RVR with rate in the 130s.   LVEF 10-15%.   CXR with pulmonary vascular congestion, elevated BNP, and mildly elevated TNI. Patient was given Cardizem IV in the ED with conversion to SR with frequent PVCs. 01/22/2018 Patient to be admitted to telemetry for diuresis. Amiodarone initiated as well as IV Lasix, and Lovenox. Home ARB continued.   01/23/2018 SOB improved. SBP elevated overnight to the 180s. Intermittent tachycardia. Lasix increased to 40 mg daily. 900 cc output documented overnight. K+ 3.2 and replaced.   01/24/2017 Patient underwent LHC:    Normal coronary arteries.    Diastolic dysfunction.    Severely depressed EF.      01/25/2017 Patient was recovered overnight without incident. K+ noted 5.2 after initiation of Aldactone- discontinued.   Outpatient cardiac regimen includes:  Toprol Xl 100 mg  Losartan 50 mg  Eliquis  Lasix 40 mg QD  Amiodarone 400 mg BID x 10 days then 200 mg daily   01/29/2018 Labs:  BNP 1,067 (01/22 1,148)   K+ 4.1  BUN 20  Cr 1.9  Patient forgot to do daily weights until yesterday morning and noted she was up 3 pounds- took extra 40 mg Lasix. She weighed again this AM and was up 3 pounds from yesterday- took extra Lasix. Her  SOB and cough have worsened since discharge but is not as severe as it was when she presented to the ED. Cough is not productive. +orhtopmea She has 1+ pitting edema to the knee bilaterally. She has been reading labels and has been compliant with 2 gm sodium diet. Drinking 3; 16 oz bottles of water a day. Patient reports compliance with medications. BP is at goal.   She is concerned regarding pulmonary side effects of Amiodarone. She is also concerned with bleeding risks with NOAC. Discontinue Amio at this time, continue Eliquis. Referral for GAIL closure evaluation and AF ablation (if AF recurs).   EKG today with SR noted  Increase Lasix to 80 mg twice daily today and tomorrow then Lasix 40 mg BID up from previous QD frequency   CMP and BNP on 02/07/2017 and follow up on 02/08/2018   Patient is active in portal- instructed to message with daily weights.  Proceed to the ED if symptoms worsen      Vitals:    01/30/18 1414   BP: 113/76   Pulse: 63   SpO2: 97%   Weight: 106.1 kg (234 lb)     Current Outpatient Prescriptions on File Prior to Visit   Medication Sig Dispense Refill    apixaban 5 mg Tab Take 1 tablet (5 mg total) by mouth 2 (two) times daily. 60 tablet 11    cetirizine (ZYRTEC) 10 MG tablet Take 1 tablet (10 mg total) by mouth once daily. 30 tablet 2    doxycycline (MONODOX) 50 MG Cap Take 1 capsule (50 mg total) by mouth once daily. 30 capsule 3    epinastine 0.05 % ophthalmic solution Place 2 drops into both eyes 2 (two) times daily.   12    ESTRACE 0.01 % (0.1 mg/gram) vaginal cream USE 1GM THREE TIMES A WEEK THEN 1/2 GM TWICE A WEEK FOR DURATION VAGINALLY  12    levothyroxine (SYNTHROID) 75 MCG tablet Take 1 tablet (75 mcg total) by mouth before breakfast. 30 tablet 1    liothyronine (CYTOMEL) 25 MCG Tab Take 1 tablet (25 mcg total) by mouth once daily. 30 tablet 3    losartan (COZAAR) 50 MG tablet Take 1 tablet (50 mg total) by mouth once daily. 90 tablet 3    metoprolol succinate (TOPROL-XL)  100 MG 24 hr tablet Take 1 tablet (100 mg total) by mouth once daily. 30 tablet 11    polyethylene glycol (GLYCOLAX) 17 gram PwPk Take 17 g by mouth 2 (two) times daily as needed. 30 each 0    traMADol (ULTRAM) 50 mg tablet TK 1 T PO Q 6 H PRN  0    [DISCONTINUED] albuterol 90 mcg/actuation inhaler Inhale 2 puffs into the lungs every 6 (six) hours as needed for Wheezing. Rescue (Patient taking differently: Inhale 2 puffs into the lungs 2 (two) times daily. Rescue) 1 Inhaler 1    [DISCONTINUED] amiodarone (PACERONE) 200 MG Tab Take 1 tablet (200 mg total) by mouth once daily. 30 tablet 11    [DISCONTINUED] amiodarone (PACERONE) 400 MG tablet Take 1 tablet (400 mg total) by mouth 2 (two) times daily. 14 tablet 0    [DISCONTINUED] fluticasone (FLONASE) 50 mcg/actuation nasal spray 2 sprays by Each Nare route once daily. 1 Bottle 12    [DISCONTINUED] furosemide (LASIX) 40 MG tablet Take 1 tablet (40 mg total) by mouth once daily. 30 tablet 11    [DISCONTINUED] hydrocodone-homatropine 5-1.5 mg/5 ml (HYCODAN) 5-1.5 mg/5 mL Syrp Take 5 mLs by mouth every 6 (six) hours as needed (cough). 120 mL 0     No current facility-administered medications on file prior to visit.

## 2018-02-01 ENCOUNTER — PATIENT MESSAGE (OUTPATIENT)
Dept: CARDIOLOGY | Facility: CLINIC | Age: 68
End: 2018-02-01

## 2018-02-05 DIAGNOSIS — I48.0 PAROXYSMAL ATRIAL FIBRILLATION: Primary | ICD-10-CM

## 2018-02-07 ENCOUNTER — INITIAL CONSULT (OUTPATIENT)
Dept: CARDIOLOGY | Facility: CLINIC | Age: 68
End: 2018-02-07
Payer: MEDICARE

## 2018-02-07 ENCOUNTER — HOSPITAL ENCOUNTER (OUTPATIENT)
Dept: CARDIOLOGY | Facility: HOSPITAL | Age: 68
Discharge: HOME | End: 2018-02-07
Attending: INTERNAL MEDICINE
Payer: MEDICARE

## 2018-02-07 VITALS
HEART RATE: 64 BPM | DIASTOLIC BLOOD PRESSURE: 83 MMHG | BODY MASS INDEX: 34.5 KG/M2 | SYSTOLIC BLOOD PRESSURE: 128 MMHG | HEIGHT: 68 IN | WEIGHT: 227.63 LBS

## 2018-02-07 DIAGNOSIS — E05.90 HYPERTHYROIDISM: ICD-10-CM

## 2018-02-07 DIAGNOSIS — E03.2 HYPOTHYROIDISM, IATROGENIC: ICD-10-CM

## 2018-02-07 DIAGNOSIS — I48.0 PAROXYSMAL ATRIAL FIBRILLATION: Primary | ICD-10-CM

## 2018-02-07 DIAGNOSIS — I50.21 ACUTE SYSTOLIC HEART FAILURE: ICD-10-CM

## 2018-02-07 DIAGNOSIS — I48.0 PAROXYSMAL ATRIAL FIBRILLATION: ICD-10-CM

## 2018-02-07 DIAGNOSIS — I10 ESSENTIAL HYPERTENSION: ICD-10-CM

## 2018-02-07 PROCEDURE — 93225 XTRNL ECG REC<48 HRS REC: CPT

## 2018-02-07 PROCEDURE — 3008F BODY MASS INDEX DOCD: CPT | Mod: S$GLB,,, | Performed by: INTERNAL MEDICINE

## 2018-02-07 PROCEDURE — 1159F MED LIST DOCD IN RCRD: CPT | Mod: S$GLB,,, | Performed by: INTERNAL MEDICINE

## 2018-02-07 PROCEDURE — 99205 OFFICE O/P NEW HI 60 MIN: CPT | Mod: S$GLB,,, | Performed by: INTERNAL MEDICINE

## 2018-02-07 PROCEDURE — 99499 UNLISTED E&M SERVICE: CPT | Mod: S$GLB,,, | Performed by: INTERNAL MEDICINE

## 2018-02-07 PROCEDURE — 99999 PR PBB SHADOW E&M-EST. PATIENT-LVL III: CPT | Mod: PBBFAC,,, | Performed by: INTERNAL MEDICINE

## 2018-02-07 NOTE — LETTER
February 7, 2018      Jaren Medel, RAMAN  [address]  [address]  [facility] [address]           [facility] Arrhythmia  [address], [address]  [facility] [address]  Phone: [phone]          Patient: Violet Mirza   MR Number: [id number]   YOB: 1950   Date of Visit: 2/7/2018       Dear Jaren Medel:    Thank you for referring Violet Mirza to me for evaluation. Attached you will find relevant portions of my assessment and plan of care.    If you have questions, please do not hesitate to call me. I look forward to following Violet Mirza along with you.    Sincerely,    Chriss Loco MD    Enclosure  CC:  No Recipients    If you would like to receive this communication electronically, please contact [email or web address] or [phone] to request more information on [employer] access.    For providers and/or their staff who would like to refer a patient to [facility], please contact us through our one-stop-shop provider referral line, [facility] [place], operated by [facility], at [phone].    If you feel you have received this communication in error or would no longer like to receive these types of communications, please e-mail [email or web address]

## 2018-02-07 NOTE — PROGRESS NOTES
Subjective:    Patient ID:  Violet Mirza is a 67 y.o. female who presents for evaluation of No chief complaint on file.    Referring Practitioner and Cardiologist: Jaren Medel NP and Go Duran MD  Primary Care Physician: Phyllis Gunter MD    HPI  I had the pleasure of seeing Mrs. Mirza today in our [facility] electrophysiology clinic for her newly diagnosed atrial fibrillation. As you are aware she is a pleasant 67 year-old woman with hypertension and hypothyroidism. Beginning in 2017 she began to develop a cough and shortness of breath. She was seen for this and was thought to have anxiety and asthma. She was given an inhaler which did not help much. She then began to have orthopnea. A 2d echocardiogram was ordered by Dr. Henning which was performed on 2018. She was in atrial fibrillation with RVR during the echocardiogram and her left ventricular ejection fraction was severely depressed at 10-15% with mildly enlarged LVEDD and moderately enlarged left atrium with severely depressed right ventricular function and pulmonary hypertension. She was admitted to [facility] for treatment and initiation of goal directed medical therapy for heart failure. In the [facility] ER she was given diltiazem. She converted spontaneously to sinus rhythm. An angiogram was performed by Dr. Duran on 2018 noting normal coronary arteries. She was started on amiodarone and eliquis. She followed up in cardiology clinic on 2018 and expressed concern about amiodarone and anticoagulation. Amiodarone was stopped (reports 3 family members that have  from amiodarone induced lung disease) and she was referred here for further evaluation. Of note her TSH and FT4 in the past on prior dosages of levothyroxine were consistent with hyperthyroidism. Recent lab values on 2017 and 2018 show suppressed TSH and normal T4. Her synthroid dosage was recently reduced. She has no  history of GI bleeding. Colonoscopy in 2015 showed diverticulosis.    ECHO 1/22/2018 CONCLUSIONS     1 - Severely depressed left ventricular systolic function (EF 10-15%).     2 - Right ventricular enlargement with moderately to severely depressed systolic function.     3 - Biatrial enlargement.     4 - Pulmonary hypertension. The estimated PA systolic pressure is 68 mmHg.     5 - Moderate to severe mitral regurgitation.     6 - Moderate tricuspid regurgitation.     7 - Increased central venous pressure.    I reviewed all available electrocardiograms in [facility] (starting 1/16/2018) which show sinus rhythm/tachycardia with left axis shift and incomplete RBBB with frequent PVCs except for 1/22/2018 which showed AF with RVR. An ECG from her clinic visit after starting amiodarone on 1/30/2018 shows sinus rhythm with a rate of 68 bpm. PVC foci appear to be RBBB, concordant precordial, R in limb lead 1, superiorly directed, likely basal septal focus. All of her QTc on ECGs are over 500msec.    My interpretation of today's in clinic electrocardiogram is sinus rhythm with occasional PVCs     Review of Systems   Constitution: Negative for weakness and malaise/fatigue.   HENT: Negative.    Eyes: Negative for blurred vision and visual disturbance.   Cardiovascular: Negative for chest pain, dyspnea on exertion, irregular heartbeat, leg swelling, near-syncope, orthopnea, palpitations, paroxysmal nocturnal dyspnea and syncope.   Respiratory: Negative for cough and shortness of breath.    Hematologic/Lymphatic: Negative for bleeding problem. Does not bruise/bleed easily.   Skin: Negative.    Musculoskeletal: Negative.    Gastrointestinal: Negative for hematochezia and melena.   Neurological: Positive for headaches. Negative for dizziness, focal weakness and light-headedness.        Objective:    Physical Exam   Constitutional: She is oriented to person, place, and time. She appears well-developed and well-nourished. No distress.    HENT:   Head: Normocephalic and atraumatic.   Eyes: Conjunctivae are normal. Right eye exhibits no discharge. Left eye exhibits no discharge.   Neck: Neck supple. No JVD present.   Cardiovascular: Normal rate, regular rhythm and normal heart sounds.  Exam reveals no gallop and no friction rub.    No murmur heard.  Pulmonary/Chest: Effort normal and breath sounds normal. No respiratory distress. She has no wheezes. She has no rales.   Abdominal: Soft. Bowel sounds are normal. She exhibits no distension. There is no tenderness. There is no rebound.   Musculoskeletal: She exhibits no edema.   Neurological: She is alert and oriented to person, place, and time.   Skin: Skin is warm and dry. No rash noted. She is not diaphoretic. No erythema.   Psychiatric: She has a normal mood and affect. Her behavior is normal. Judgment and thought content normal.         Assessment:       1. Paroxysmal atrial fibrillation    2. Essential hypertension    3. Acute systolic heart failure    4. Hypothyroidism, iatrogenic         Plan:    In summary, Mrs. Mirza is a pleasant 67 year-old woman with hypertension and hypothyroidism with currently suppressed TSH and recent synthroid dose adjustment who was recently diagnosed with paroxysmal atrial fibrillation, frequent PVCs and severe biventricular systolic heart failure. Her symptoms are currently improved with medical therapy. I had a long discussion with the patient about the pathophysiology and risks of atrial fibrillation and its basic pathophysiology, including its health implications and treatment options with the patient. Specifically, I addressed the need for CVA (stroke) prophylaxis with aspirin versus oral anticoagulation (warfarin vs DOACs, discussed bleeding risks, irreversibility of DOACs vs Vitamin K/plasma reversal of warfarin, and need to come to the ER for any head trauma for CT scanning even if asymptomatic). Her [id number] score is 4 and I recommend lifelong  anticoagulation as long as is tolerated. Currently she does not meet indication for GAIL occlusion as she has no contraindication to anticoagulation and patient preference to not be on anticoagulation is not an approved indication for the procedure. Due to her severe heart failure I do recommend rhythm control therapy. Due to her severely depressed LVEF and prolonged QTc, the only option is amiodarone.  I recommend a short course of amiodarone with repeat echocardiogram in 2 months. Hopefully her EF improves. Would then endorse PVI followed by removal amiodarone 3 months post-procedure. I also recommend holter monitor to quantify PVC burden. Also of prime importance is to achieve a euthyroid state as hyperthyroidism may be the culprit of the above at worst and at the least would exacerbate systolic dysfunction and AF.     Resume amiodarone, 400mg bid x 2 weeks then 100mg daily  Continue goal directed medical therapy for systolic heart failure  ECHO in 2 months just prior to return visit with me  Continue eliquis  48 hour holter monitor  Needs sleep study at some point however would wait for a few more months of heart failure therapy  Refer to endocrinology    Thank you for allowing me to participate in the care of this patient. Please do not hesitate to call me with any questions or concerns.    Chriss Loco MD, PhD  Cardiac Electrophysiology

## 2018-02-07 NOTE — PATIENT INSTRUCTIONS
1.) Resume amiodarone 400mg twice daily for 2 weeks, then take 100mg daily thereafter (half of the 200mg tablet). When you run out of this prescription contact me for the new prescription as the old one has been discontinued.      Understanding Atrial Fibrillation    An arrhythmia is any problem with the speed or pattern of the heartbeat. Atrial fibrillation (AFib) is a common type of arrhythmia. It causes fast, chaotic electrical signals in the atria. This leads to poor functioning of the heart. It also affects how much blood your heart can pump out to the body.  Afib may occur once in a while and go away on its own. Or it may continue for longer periods and need treatment.  AFib can lead to serious problems, such as stroke. Your healthcare provider will need to monitor and manage it.  What happens during atrial fibrillation?   The heart has an electrical system that sends signals to control the heartbeat. As signals move through the heart, they tell the hearts upper chambers (atria) and lower chambers (ventricles) when to squeeze (contract) and relax. This lets blood move through the heart and out to the body and lungs.  With AFib, the atria receive abnormal signals. This causes them to contract in a fast and irregular way, and out of sync with the ventricles. When this happens, the atria also have a harder time moving blood into the ventricles. Blood may then pool in the atria, which increases the risk for blood clots and stroke. The ventricles also may contract too quickly and irregularly. As a result, they may not pump blood to the body and lungs as well as they should. This can weaken the heart muscle over time and cause heart failure.  What causes atrial fibrillation?  AFib is more common in older adults. It has many possible causes including:  · Coronary artery disease  · Heart valve disease  · Heart attack  · Heart surgery  · High blood pressure  · Thyroid disease  · Diabetes  · Lung disease  · Sleep  apnea  · Heavy alcohol use  In some cases of AFib, doctors do not know the cause.  What are the symptoms of atrial fibrillation?  AFib may or may not cause symptoms. If symptoms do occur, they may include:  · A fast, pounding, irregular heartbeat  · Shortness of breath  · Tiredness  · Dizziness or fainting  · Chest pain  How is atrial fibrillation treated?  Treatments for AFib can include any of the options below.  · Medicines. You may be prescribed:  ¨ Heart rate medicines to help slow down the heartbeat  ¨ Heart rhythm medicines to help the heart beat more regularly  ¨ Anti-clotting medicines to help reduce the risk for blood clots and stroke  · Electrical cardioversion. Your healthcare provider uses special pads or paddles to send one or more brief electrical shocks to the heart. This can help reset the heartbeat to normal.  · Ablation. Long, thin tubes called catheters are threaded through a blood vessel to the heart. There, the catheters send out hot or cold energy to the areas causing the abnormal signals. This energy destroys the problem tissue or cells. This improves the chances that your heart will stay in normal rhythm without using medicines. If your heart rate and rhythm cant be controlled, you may need ablation and a pacemaker. These will help control the heart rate and regularity of the heartbeat.  · Surgery. During surgery, your healthcare provider may use different methods to create scar tissue in the areas of the heart causing the abnormal signals. The scar tissue disrupts the abnormal signals and may stop AFib from occurring.  What are the complications of atrial fibrillation?  These can include:  · Blood clots  · Stroke  · Heart failure. This problem occurs when the heart muscle weakens so much that it can no longer pump blood well.  When should I call my healthcare provider?  Call your healthcare provider right away if you have any of these:  · Symptoms that dont get better with treatment, or  get worse  · New symptoms   Date Last Reviewed: 5/1/2016  © 2827-8485 [employer], [place]. [address] [place] [address], [place] [address]. All rights reserved. This information is not intended as a substitute for professional medical care. Always follow your healthcare professional's instructions.

## 2018-02-08 ENCOUNTER — OFFICE VISIT (OUTPATIENT)
Dept: CARDIOLOGY | Facility: CLINIC | Age: 68
End: 2018-02-08
Payer: MEDICARE

## 2018-02-08 VITALS
HEIGHT: 68 IN | DIASTOLIC BLOOD PRESSURE: 90 MMHG | OXYGEN SATURATION: 96 % | BODY MASS INDEX: 34.56 KG/M2 | SYSTOLIC BLOOD PRESSURE: 132 MMHG | HEART RATE: 79 BPM | WEIGHT: 228 LBS

## 2018-02-08 DIAGNOSIS — F41.9 ANXIETY: ICD-10-CM

## 2018-02-08 DIAGNOSIS — I34.0 MITRAL VALVE INSUFFICIENCY, UNSPECIFIED ETIOLOGY: ICD-10-CM

## 2018-02-08 DIAGNOSIS — I48.0 PAROXYSMAL ATRIAL FIBRILLATION: ICD-10-CM

## 2018-02-08 DIAGNOSIS — R06.02 SHORTNESS OF BREATH: ICD-10-CM

## 2018-02-08 DIAGNOSIS — K21.9 GASTROESOPHAGEAL REFLUX DISEASE WITHOUT ESOPHAGITIS: ICD-10-CM

## 2018-02-08 DIAGNOSIS — I10 ESSENTIAL HYPERTENSION: ICD-10-CM

## 2018-02-08 DIAGNOSIS — J45.901 EXACERBATION OF ASTHMA, UNSPECIFIED ASTHMA SEVERITY, UNSPECIFIED WHETHER PERSISTENT: ICD-10-CM

## 2018-02-08 DIAGNOSIS — E03.2 HYPOTHYROIDISM, IATROGENIC: ICD-10-CM

## 2018-02-08 DIAGNOSIS — I50.20 SYSTOLIC HEART FAILURE, UNSPECIFIED HEART FAILURE CHRONICITY: Primary | ICD-10-CM

## 2018-02-08 PROCEDURE — 99213 OFFICE O/P EST LOW 20 MIN: CPT | Mod: PO | Performed by: INTERNAL MEDICINE

## 2018-02-08 PROCEDURE — 1126F AMNT PAIN NOTED NONE PRSNT: CPT | Mod: S$GLB,,, | Performed by: INTERNAL MEDICINE

## 2018-02-08 PROCEDURE — 99999 PR PBB SHADOW E&M-EST. PATIENT-LVL III: CPT | Mod: PBBFAC,,, | Performed by: INTERNAL MEDICINE

## 2018-02-08 PROCEDURE — 99214 OFFICE O/P EST MOD 30 MIN: CPT | Mod: S$GLB,,, | Performed by: INTERNAL MEDICINE

## 2018-02-08 PROCEDURE — 3008F BODY MASS INDEX DOCD: CPT | Mod: S$GLB,,, | Performed by: INTERNAL MEDICINE

## 2018-02-08 PROCEDURE — 1159F MED LIST DOCD IN RCRD: CPT | Mod: S$GLB,,, | Performed by: INTERNAL MEDICINE

## 2018-02-08 RX ORDER — LOSARTAN POTASSIUM 100 MG/1
100 TABLET ORAL DAILY
Qty: 90 TABLET | Refills: 3 | Status: SHIPPED | OUTPATIENT
Start: 2018-02-08 | End: 2018-09-11 | Stop reason: SDUPTHER

## 2018-02-08 RX ORDER — AMIODARONE HYDROCHLORIDE 100 MG/1
100 TABLET ORAL DAILY
Qty: 90 TABLET | Refills: 3 | Status: SHIPPED | OUTPATIENT
Start: 2018-02-08 | End: 2018-08-06

## 2018-02-08 RX ORDER — AMIODARONE HYDROCHLORIDE 400 MG/1
400 TABLET ORAL 2 TIMES DAILY
Qty: 28 TABLET | Refills: 0 | Status: SHIPPED | OUTPATIENT
Start: 2018-02-08 | End: 2018-02-22

## 2018-02-08 RX ORDER — LOSARTAN POTASSIUM 100 MG/1
100 TABLET ORAL DAILY
Qty: 90 TABLET | Refills: 3 | Status: SHIPPED | OUTPATIENT
Start: 2018-02-08 | End: 2018-02-08 | Stop reason: SDUPTHER

## 2018-02-08 RX ORDER — AMIODARONE HYDROCHLORIDE 400 MG/1
400 TABLET ORAL 2 TIMES DAILY
Qty: 28 TABLET | Refills: 0 | Status: SHIPPED | OUTPATIENT
Start: 2018-02-08 | End: 2018-02-08 | Stop reason: SDUPTHER

## 2018-02-08 RX ORDER — AMIODARONE HYDROCHLORIDE 100 MG/1
100 TABLET ORAL DAILY
Qty: 90 TABLET | Refills: 3 | Status: SHIPPED | OUTPATIENT
Start: 2018-02-08 | End: 2018-02-08 | Stop reason: SDUPTHER

## 2018-02-08 NOTE — PROGRESS NOTES
Subjective:    Patient ID:  Violet Mirza is a 67 y.o. female who presents for follow-up of Congestive Heart Failure and Atrial Fibrillation      HPI,   66 y/o female with hx of HFrEF (EF 10% with CHRISTINA 5.6 cm) recently diagnosed, Pafib (CHADSVasc 4 for CHF, HTN, Age, Female) on chronic anticoagulation with Eliquis, moderate to severe MR on 2DE, HTN, hypothyroidism, GERD who presents for f/u. Initially presented to ED with worsening SOB/LE edema, found to be in ADHF and Pafib, had spontaneous conversion to SR, EF 10%, LHC with normal coronaries, diuresed appropriately and D/C'd home in improved condition. Was seen by Abbe Medel recently. Was also seen by Dr Loco, restarted on Amio, currently with Holter on, and plan for repeat 2DE in March. She is doing well from a cardiac perspective and currently with NYHA FC II symptoms. Has SANTANA with moderate activity and able to accomplish ADL's. Denies CP, orthopnea, PND, palps, syncope, LE edema. Compliant with meds and following low salt diet. Is down 12 lbs over the past few weeks and feeling well.     Review of Systems   Constitution: Negative for weakness and malaise/fatigue.   HENT: Negative for congestion.    Eyes: Negative for blurred vision.   Cardiovascular: Positive for dyspnea on exertion. Negative for chest pain, claudication, cyanosis, irregular heartbeat, leg swelling, near-syncope, orthopnea, palpitations, paroxysmal nocturnal dyspnea and syncope.   Respiratory: Negative for shortness of breath.    Endocrine: Negative for polyuria.   Hematologic/Lymphatic: Negative for bleeding problem.   Skin: Negative for itching and rash.   Musculoskeletal: Negative for joint swelling, muscle cramps and muscle weakness.   Gastrointestinal: Negative for abdominal pain, hematemesis, hematochezia, melena, nausea and vomiting.   Genitourinary: Negative for dysuria and hematuria.   Neurological: Negative for dizziness, focal weakness, headaches, light-headedness and loss  of balance.   Psychiatric/Behavioral: Negative for depression. The patient is not nervous/anxious.         Objective:    Physical Exam   Constitutional: She is oriented to person, place, and time. She appears well-developed and well-nourished.   HENT:   Head: Normocephalic and atraumatic.   Neck: Neck supple. No JVD present.   Cardiovascular: Normal rate and regular rhythm.    Murmur heard.   Medium-pitched blowing midsystolic murmur is present with a grade of 2/6  at the apex  Pulses:       Carotid pulses are 2+ on the right side, and 2+ on the left side.       Radial pulses are 2+ on the right side, and 2+ on the left side.        Femoral pulses are 2+ on the right side, and 2+ on the left side.       Dorsalis pedis pulses are 2+ on the right side, and 2+ on the left side.        Posterior tibial pulses are 2+ on the right side, and 2+ on the left side.   Pulmonary/Chest: Effort normal and breath sounds normal.   Abdominal: Soft. Bowel sounds are normal.   Musculoskeletal: She exhibits no edema.   Neurological: She is alert and oriented to person, place, and time.   Skin: Skin is warm and dry.   Psychiatric: She has a normal mood and affect. Her behavior is normal. Thought content normal.         Assessment:       1. Systolic heart failure, unspecified heart failure chronicity    2. Paroxysmal atrial fibrillation    3. Essential hypertension    4. Shortness of breath    5. Anxiety    6. Exacerbation of asthma, unspecified asthma severity, unspecified whether persistent    7. Hypothyroidism, iatrogenic    8. Gastroesophageal reflux disease without esophagitis    9. Mitral valve insufficiency, unspecified etiology      66 y/o female with hx and presentation as above. Doing well from a cardiac perspective, compensated from a HF perspective, and currently with NYHA FC II symptoms. Will increase losartan. Unable to tolerate spironolactone in hospital due to hyperkalemia. Symptoms controlled, so no new HF meds for now  and will and will uptitrate current meds. We discussed the etiology, evaluation, and management of CHF.        Plan:       -Increase losartan to 100 mg daily  -Rx sent for Amio per Dr Jon's instructions, as she was running out  -F/u in 3 months

## 2018-02-08 NOTE — LETTER
February 8, 2018      Geovany Henning MD  [address] [address]           [facility]  [address], [address]  [facility] [address]  Phone: [phone]          Patient: Violet Mirza   MR Number: [id number]   YOB: 1950   Date of Visit: 2/8/2018       Dear Dr. Geovany Henning:    Thank you for referring Violet Mirza to me for evaluation. Attached you will find relevant portions of my assessment and plan of care.    If you have questions, please do not hesitate to call me. I look forward to following Violet Mirza along with you.    Sincerely,    Go Duran MD    Enclosure  CC:  No Recipients    If you would like to receive this communication electronically, please contact [email or web address] or [phone] to request more information on [employer] Link access.    For providers and/or their staff who would like to refer a patient to [facility], please contact us through our one-stop-shop provider referral line, [facility], at [phone].    If you feel you have received this communication in error or would no longer like to receive these types of communications, please e-mail [email or web address]

## 2018-02-12 PROCEDURE — 93227 XTRNL ECG REC<48 HR R&I: CPT | Mod: ,,, | Performed by: INTERNAL MEDICINE

## 2018-02-14 ENCOUNTER — PATIENT MESSAGE (OUTPATIENT)
Dept: CARDIOLOGY | Facility: HOSPITAL | Age: 68
End: 2018-02-14

## 2018-02-19 ENCOUNTER — PATIENT MESSAGE (OUTPATIENT)
Dept: CARDIOLOGY | Facility: CLINIC | Age: 68
End: 2018-02-19

## 2018-02-19 ENCOUNTER — TELEPHONE (OUTPATIENT)
Dept: ELECTROPHYSIOLOGY | Facility: CLINIC | Age: 68
End: 2018-02-19

## 2018-02-19 NOTE — TELEPHONE ENCOUNTER
----- Message from Chriss Loco MD sent at 2/19/2018  1:48 PM CST -----  Please notify the patient that her monitor did not show any atrial fibrillation.

## 2018-02-20 ENCOUNTER — PATIENT MESSAGE (OUTPATIENT)
Dept: CARDIOLOGY | Facility: CLINIC | Age: 68
End: 2018-02-20

## 2018-02-27 ENCOUNTER — PATIENT MESSAGE (OUTPATIENT)
Dept: CARDIOLOGY | Facility: CLINIC | Age: 68
End: 2018-02-27

## 2018-03-01 ENCOUNTER — HOSPITAL ENCOUNTER (OUTPATIENT)
Dept: CARDIOLOGY | Facility: HOSPITAL | Age: 68
Discharge: HOME | End: 2018-03-01
Attending: INTERNAL MEDICINE
Payer: MEDICARE

## 2018-03-01 DIAGNOSIS — I48.0 PAROXYSMAL ATRIAL FIBRILLATION: ICD-10-CM

## 2018-03-01 DIAGNOSIS — I50.21 ACUTE SYSTOLIC HEART FAILURE: ICD-10-CM

## 2018-03-01 LAB
GLOBAL PERICARDIAL EFFUSION: ABNORMAL
MITRAL VALVE REGURGITATION: ABNORMAL
RETIRED EF AND QEF - SEE NOTES: 30 (ref 55–65)

## 2018-03-01 PROCEDURE — 93306 TTE W/DOPPLER COMPLETE: CPT

## 2018-03-01 PROCEDURE — 93306 TTE W/DOPPLER COMPLETE: CPT | Mod: 26,,, | Performed by: INTERNAL MEDICINE

## 2018-03-02 ENCOUNTER — PATIENT MESSAGE (OUTPATIENT)
Dept: CARDIOLOGY | Facility: CLINIC | Age: 68
End: 2018-03-02

## 2018-03-02 ENCOUNTER — TELEPHONE (OUTPATIENT)
Dept: ELECTROPHYSIOLOGY | Facility: CLINIC | Age: 68
End: 2018-03-02

## 2018-03-02 DIAGNOSIS — E03.9 HYPOTHYROIDISM, UNSPECIFIED TYPE: ICD-10-CM

## 2018-03-02 RX ORDER — LEVOTHYROXINE SODIUM 75 UG/1
75 TABLET ORAL
Qty: 30 TABLET | Refills: 2 | Status: SHIPPED | OUTPATIENT
Start: 2018-03-02 | End: 2018-06-29 | Stop reason: SDUPTHER

## 2018-03-02 NOTE — TELEPHONE ENCOUNTER
Discussed echo results. It was scheduled earlier than I wanted. However EF is improving, now 30-35%. Will talk more in clinic in 2 months about PVI vs anti-arrhythmic therapy. Will likely order another echo to see if EF normalized at next visit.

## 2018-03-03 DIAGNOSIS — E03.9 HYPOTHYROIDISM, UNSPECIFIED TYPE: ICD-10-CM

## 2018-03-05 RX ORDER — LEVOTHYROXINE SODIUM 75 UG/1
75 TABLET ORAL
Qty: 90 TABLET | Refills: 4 | Status: SHIPPED | OUTPATIENT
Start: 2018-03-05 | End: 2018-03-13

## 2018-03-06 DIAGNOSIS — I50.21 ACUTE SYSTOLIC HEART FAILURE: ICD-10-CM

## 2018-03-06 RX ORDER — FUROSEMIDE 40 MG/1
40 TABLET ORAL 2 TIMES DAILY
Qty: 60 TABLET | Refills: 0 | Status: SHIPPED | OUTPATIENT
Start: 2018-03-06 | End: 2018-04-02 | Stop reason: SDUPTHER

## 2018-03-06 NOTE — TELEPHONE ENCOUNTER
----- Message from Neal Yadav, PharmD sent at 3/6/2018  9:55 AM CST -----  Pt is now taking her lasix 40mg bid. Her original rx was for qd. Please send over a new rx to [facility] in [place] so we can fill for pt

## 2018-03-12 ENCOUNTER — TELEPHONE (OUTPATIENT)
Dept: OTOLARYNGOLOGY | Facility: CLINIC | Age: 68
End: 2018-03-12

## 2018-03-12 NOTE — TELEPHONE ENCOUNTER
----- Message from Zena Saenz sent at 3/12/2018  9:06 AM CDT -----  Contact: Self [phone]  Patient is requesting to be seen today due to a chest cold. Please advice

## 2018-03-13 ENCOUNTER — OFFICE VISIT (OUTPATIENT)
Dept: OTOLARYNGOLOGY | Facility: CLINIC | Age: 68
End: 2018-03-13
Payer: MEDICARE

## 2018-03-13 VITALS
WEIGHT: 229.06 LBS | DIASTOLIC BLOOD PRESSURE: 82 MMHG | SYSTOLIC BLOOD PRESSURE: 125 MMHG | TEMPERATURE: 97 F | HEIGHT: 68 IN | BODY MASS INDEX: 34.72 KG/M2 | HEART RATE: 72 BPM

## 2018-03-13 DIAGNOSIS — J01.90 ACUTE BACTERIAL SINUSITIS: Primary | ICD-10-CM

## 2018-03-13 DIAGNOSIS — R05.9 COUGH: ICD-10-CM

## 2018-03-13 DIAGNOSIS — B96.89 ACUTE BACTERIAL SINUSITIS: Primary | ICD-10-CM

## 2018-03-13 DIAGNOSIS — J34.2 NASAL SEPTAL DEVIATION: ICD-10-CM

## 2018-03-13 DIAGNOSIS — J30.89 CHRONIC NON-SEASONAL ALLERGIC RHINITIS, UNSPECIFIED TRIGGER: ICD-10-CM

## 2018-03-13 PROCEDURE — 99999 PR PBB SHADOW E&M-EST. PATIENT-LVL III: CPT | Mod: PBBFAC,,, | Performed by: OTOLARYNGOLOGY

## 2018-03-13 PROCEDURE — 3074F SYST BP LT 130 MM HG: CPT | Mod: CPTII,S$GLB,, | Performed by: OTOLARYNGOLOGY

## 2018-03-13 PROCEDURE — 3079F DIAST BP 80-89 MM HG: CPT | Mod: CPTII,S$GLB,, | Performed by: OTOLARYNGOLOGY

## 2018-03-13 PROCEDURE — 99214 OFFICE O/P EST MOD 30 MIN: CPT | Mod: S$GLB,,, | Performed by: OTOLARYNGOLOGY

## 2018-03-13 RX ORDER — METHYLPREDNISOLONE 4 MG/1
TABLET ORAL
Refills: 0 | COMMUNITY
Start: 2018-03-07 | End: 2018-04-02 | Stop reason: HOSPADM

## 2018-03-13 RX ORDER — FLUTICASONE PROPIONATE 50 MCG
2 SPRAY, SUSPENSION (ML) NASAL DAILY
Qty: 1 BOTTLE | Refills: 12 | Status: SHIPPED | OUTPATIENT
Start: 2018-03-13 | End: 2018-09-11

## 2018-03-13 RX ORDER — GABAPENTIN 300 MG/1
CAPSULE ORAL
Refills: 0 | COMMUNITY
Start: 2018-03-08 | End: 2018-09-11

## 2018-03-13 RX ORDER — BENZONATATE 200 MG/1
200 CAPSULE ORAL 3 TIMES DAILY PRN
Qty: 30 CAPSULE | Refills: 0 | Status: SHIPPED | OUTPATIENT
Start: 2018-03-13 | End: 2018-03-23

## 2018-03-13 RX ORDER — AMOXICILLIN AND CLAVULANATE POTASSIUM 875; 125 MG/1; MG/1
1 TABLET, FILM COATED ORAL 2 TIMES DAILY
Qty: 20 TABLET | Refills: 0 | Status: SHIPPED | OUTPATIENT
Start: 2018-03-13 | End: 2018-03-23

## 2018-03-13 NOTE — PROGRESS NOTES
Chief Complaint   Patient presents with    Nasal Congestion     bilateral,     Cough     productive cough, pt reports coughing up yellow mucus symptoms started a week ago    Sore Throat   .     HPI:  Mrs. Mirza presents with a 7 day history of increased nasal congestion associated with facial pressure and pain. She has had an associated cough along with sore throat.  She states that the symptoms have been worsening.  She has not noticed any over the counter medications that have improved her symptoms.  She denies fever, chills.  She feels her baseline allergy symptoms are worse with increased pollen counts recently. She feels that this preceded her current symptoms.       Past Medical History:   Diagnosis Date    Chondromalacia of right patella     MRI 6/2016 at [facility]    Diverticulitis     GERD (gastroesophageal reflux disease)     HTN (hypertension) 7/26/2013    Hypoglycemia 11/22/2014    OA (osteoarthritis) of knee     bilateraly, followed by Dr. Callejas, [facility], s/p synvisc injections    Osteopenia 7/26/2013    Personal history of kidney stones 7/26/2013    Rosacea 7/26/2013    Tear of medial meniscus of right knee     MRI 6/2016     Social History     Social History    Marital status:      Spouse name: N/A    Number of children: y    Years of education: N/A     Occupational History          Works at Law firm     Social History Main Topics    Smoking status: Former Smoker     Packs/day: 0.25     Years: 30.00     Quit date: 2/1/2005    Smokeless tobacco: Former User    Alcohol use No    Drug use: No    Sexual activity: Not on file     Other Topics Concern    Not on file     Social History Narrative    Originally from [place], living in [place] with son         Past Surgical History:   Procedure Laterality Date    ADENOIDECTOMY      FOOT SURGERY Bilateral     KNEE ARTHROSCOPY W/ MENISCAL REPAIR Right 5/14    TONSILLECTOMY      TUBAL LIGATION        Family History   Problem Relation Age of Onset    Hypertension Mother     Diabetes Mother     Glaucoma Mother     Cataracts Mother     Hypertension Father     Stroke Father     No Known Problems Sister     No Known Problems Brother     No Known Problems Daughter     No Known Problems Son     No Known Problems Sister     No Known Problems Daughter            Review of Systems  General: negative for chills, fever or weight loss  Psychological: negative for mood changes or depression  Ophthalmic: negative for blurry vision, photophobia or eye pain  ENT: see HPI  Respiratory: no cough, shortness of breath, or wheezing  Cardiovascular: no chest pain or dyspnea on exertion  Gastrointestinal: no abdominal pain, change in bowel habits, or black/ bloody stools  Musculoskeletal: negative for gait disturbance or muscular weakness  Neurological: no syncope or seizures; no ataxia  Dermatological: negative for puritis,  rash and jaundice  Hematologic/lymphatic: no easy bruising, no new lumps or bumps      Physical Exam:    Vitals:    03/13/18 0847   BP: 125/82   Pulse: 72   Temp: 97 °F (36.1 °C)       Constitutional: Well appearing / communicating without difficutly.  NAD.  Eyes: EOM I Bilaterally  Head/Face: Normocephalic.  Negative paranasal sinus pressure/tenderness.  Salivary glands WNL.  House Brackmann I Bilaterally.    Right Ear: Auricle normal appearance. External Auditory Canal within normal limits no lesions or masses,TM w/o masses/lesions/perforations. TM mobility noted.   Left Ear: Auricle normal appearance. External Auditory Canal within normal limits no lesions or masses,TM w/o masses/lesions/perforations. TM mobility noted.  Nose:Nasal septal deviation. Inferior Turbinates 3+ bilaterally. No septal perforation. No masses/lesions. External nasal skin appears normal without masses/lesions. Purulence noted in bilateral inferior meatus.   Oral Cavity: Gingiva/lips within normal limits.   Dentition/gingiva healthy appearing. Mucus membranes moist. Floor of mouth soft, no masses palpated. Oral Tongue mobile. Hard Palate appears normal.    Oropharynx: Base of tongue appears normal. No masses/lesions noted. Tonsillar fossa/pharyngeal wall without lesions. Posterior oropharynx WNL.  Soft palate without masses. Midline uvula.   Neck/Lymphatic: No LAD I-VI bilaterally.  No thyromegaly.  No masses noted on exam.    Mirror laryngoscopy/nasopharyngoscopy: Active gag reflex.  Unable to perform.    Neuro/Psychiatric: AOx3.  Normal mood and affect.   Cardiovascular: Normal carotid pulses bilaterally, no increasing jugular venous distention noted at cervical region bilaterally.    Respiratory: Normal respiratory effort, no stridor, no retractions noted.            Assessment:    ICD-10-CM ICD-9-CM    1. Acute bacterial sinusitis J01.90 461.9     B96.89     2. Chronic non-seasonal allergic rhinitis, unspecified trigger J30.89 477.9    3. Cough R05 786.2    4. Nasal septal deviation J34.2 470      The primary encounter diagnosis was Acute bacterial sinusitis. Diagnoses of Chronic non-seasonal allergic rhinitis, unspecified trigger, Cough, and Nasal septal deviation were also pertinent to this visit.      Plan:  No orders of the defined types were placed in this encounter.    Augmentin 875mg PO BID for 10 days  Flonase 2 sprays per nostril daily.  Tessalon 200mg PO TID prn.  Nasal saline irrigations BID  Follow up if symptoms worsen or fail to improve.       Sofia Yoder MD

## 2018-03-22 ENCOUNTER — PATIENT MESSAGE (OUTPATIENT)
Dept: OTOLARYNGOLOGY | Facility: CLINIC | Age: 68
End: 2018-03-22

## 2018-03-23 RX ORDER — DOXYCYCLINE HYCLATE 100 MG
100 TABLET ORAL 2 TIMES DAILY
Qty: 28 TABLET | Refills: 0 | Status: SHIPPED | OUTPATIENT
Start: 2018-03-23 | End: 2018-04-06

## 2018-04-02 ENCOUNTER — OFFICE VISIT (OUTPATIENT)
Dept: CARDIOLOGY | Facility: CLINIC | Age: 68
End: 2018-04-02
Payer: MEDICARE

## 2018-04-02 VITALS
HEIGHT: 68 IN | HEART RATE: 67 BPM | SYSTOLIC BLOOD PRESSURE: 112 MMHG | DIASTOLIC BLOOD PRESSURE: 72 MMHG | OXYGEN SATURATION: 95 % | WEIGHT: 232 LBS | BODY MASS INDEX: 35.16 KG/M2

## 2018-04-02 DIAGNOSIS — I87.2 VENOUS INSUFFICIENCY OF BOTH LOWER EXTREMITIES: Primary | ICD-10-CM

## 2018-04-02 DIAGNOSIS — I50.20 SYSTOLIC HEART FAILURE, UNSPECIFIED HEART FAILURE CHRONICITY: ICD-10-CM

## 2018-04-02 DIAGNOSIS — I10 ESSENTIAL HYPERTENSION: ICD-10-CM

## 2018-04-02 DIAGNOSIS — I50.21 ACUTE SYSTOLIC HEART FAILURE: ICD-10-CM

## 2018-04-02 DIAGNOSIS — I48.0 PAROXYSMAL ATRIAL FIBRILLATION: ICD-10-CM

## 2018-04-02 PROCEDURE — 3074F SYST BP LT 130 MM HG: CPT | Mod: CPTII,S$GLB,, | Performed by: INTERNAL MEDICINE

## 2018-04-02 PROCEDURE — 99213 OFFICE O/P EST LOW 20 MIN: CPT | Mod: S$GLB,,, | Performed by: INTERNAL MEDICINE

## 2018-04-02 PROCEDURE — 99499 UNLISTED E&M SERVICE: CPT | Mod: S$GLB,,, | Performed by: INTERNAL MEDICINE

## 2018-04-02 PROCEDURE — 3078F DIAST BP <80 MM HG: CPT | Mod: CPTII,S$GLB,, | Performed by: INTERNAL MEDICINE

## 2018-04-02 PROCEDURE — 99999 PR PBB SHADOW E&M-EST. PATIENT-LVL IV: CPT | Mod: PBBFAC,,, | Performed by: INTERNAL MEDICINE

## 2018-04-02 RX ORDER — FUROSEMIDE 40 MG/1
40 TABLET ORAL 2 TIMES DAILY
Qty: 60 TABLET | Refills: 0 | Status: SHIPPED | OUTPATIENT
Start: 2018-04-02 | End: 2018-05-04 | Stop reason: SDUPTHER

## 2018-04-02 NOTE — PROGRESS NOTES
Subjective:   Patient ID:  Violet Mirza is a 67 y.o. female who presents for evaluation and treatment of Varicose Veins; Chronic Venous Insufficiency; and venous claudication      HPI:     She is here for treatment of venous reflux disease complicated with varicose veins, spider veins, an venous claudication. No DVT. She is s/p R calf phlebectomy twice and multiple spider veins saline injections. She has HTN, obesity with BMI 35, HLP, PAF, and CHF with EF 30-35%. She worked as  for years. Currently retired. She does not wear compression stockings.         Patient Active Problem List    Diagnosis Date Noted    Venous insufficiency of both lower extremities 2018         S/p R calf phlebectomy     and        Spider veins injections-saline   Several times   Dermatologist-Heather             Mitral regurgitation 2018    HFrEF (heart failure with reduced ejection fraction) 2018    Paroxysmal atrial fibrillation 2018    Shortness of breath 2017    Asthma exacerbation 10/30/2017    Anxiety 10/30/2017    GERD (gastroesophageal reflux disease) 10/13/2015    Left knee DJD 2015     S/p Synvisc   Dr. Callejas      Hypothyroidism, iatrogenic 2014     TPO neg       HTN (hypertension) 2013    Osteopenia 2013    History of peptic ulcer 2013     EGD   Dr. Gomez      Personal history of kidney stones 2013    Rosacea 2013           Right Arm BP - Sittin/70  Left Arm BP - Sittin/72        LABS    LAST HbA1c  Lab Results   Component Value Date    HGBA1C 5.8 (H) 2018       Lipid panel  Lab Results   Component Value Date    CHOL 137 2018    CHOL 125 2018    CHOL 222 (H) 2017     Lab Results   Component Value Date    HDL 47 2018    HDL 51 2018    HDL 74 2017     Lab Results   Component Value Date    LDLCALC 72.4 2018    LDLCALC 56.8 (L) 2018    LDLCALC  126.4 09/02/2017     Lab Results   Component Value Date    TRIG 88 01/23/2018    TRIG 86 01/19/2018    TRIG 108 09/02/2017     Lab Results   Component Value Date    CHOLHDL 34.3 01/23/2018    CHOLHDL 40.8 01/19/2018    CHOLHDL 33.3 09/02/2017            Review of Systems   Constitution: Negative for diaphoresis, weakness, night sweats, weight gain and weight loss.   HENT: Negative for congestion.    Eyes: Negative for blurred vision, discharge and double vision.   Cardiovascular: Negative for chest pain, claudication, cyanosis, dyspnea on exertion, irregular heartbeat, leg swelling, near-syncope, orthopnea, palpitations, paroxysmal nocturnal dyspnea and syncope.   Respiratory: Negative for cough, shortness of breath and wheezing.    Endocrine: Negative for cold intolerance, heat intolerance and polyphagia.   Hematologic/Lymphatic: Negative for adenopathy and bleeding problem. Does not bruise/bleed easily.   Skin: Positive for color change (varicose veins ). Negative for dry skin and nail changes.   Musculoskeletal: Negative for arthritis, back pain, falls, joint pain, myalgias and neck pain.   Gastrointestinal: Negative for bloating, abdominal pain, change in bowel habit and constipation.   Genitourinary: Negative for bladder incontinence, dysuria, flank pain, genital sores and missed menses.   Neurological: Negative for aphonia, brief paralysis, difficulty with concentration and dizziness.   Psychiatric/Behavioral: Negative for altered mental status and memory loss. The patient does not have insomnia.    Allergic/Immunologic: Negative for environmental allergies.       Objective:   Physical Exam   Constitutional: She is oriented to person, place, and time. She appears well-developed and well-nourished. She is not intubated.   HENT:   Head: Normocephalic and atraumatic.   Right Ear: External ear normal.   Left Ear: External ear normal.   Mouth/Throat: Oropharynx is clear and moist.   Eyes: Conjunctivae and EOM are  normal. Pupils are equal, round, and reactive to light. Right eye exhibits no discharge. Left eye exhibits no discharge. No scleral icterus.   Neck: Normal range of motion. Neck supple. Normal carotid pulses, no hepatojugular reflux and no JVD present. Carotid bruit is not present. No tracheal deviation present. No thyromegaly present.   Cardiovascular: Normal rate, regular rhythm, S1 normal and S2 normal.   No extrasystoles are present. PMI is not displaced.  Exam reveals no gallop, no S3, no distant heart sounds, no friction rub and no midsystolic click.    No murmur heard.  Pulses:       Carotid pulses are 2+ on the right side, and 2+ on the left side.       Radial pulses are 2+ on the right side, and 2+ on the left side.        Femoral pulses are 2+ on the right side, and 2+ on the left side.       Popliteal pulses are 2+ on the right side, and 2+ on the left side.        Dorsalis pedis pulses are 2+ on the right side, and 2+ on the left side.        Posterior tibial pulses are 2+ on the right side, and 2+ on the left side.   Pulmonary/Chest: Effort normal and breath sounds normal. No accessory muscle usage or stridor. No apnea, no tachypnea and no bradypnea. She is not intubated. No respiratory distress. She has no decreased breath sounds. She has no wheezes. She has no rales. She exhibits no tenderness and no bony tenderness.   Abdominal: She exhibits no distension, no pulsatile liver, no abdominal bruit, no ascites, no pulsatile midline mass and no mass. There is no tenderness. There is no rebound and no guarding.   Musculoskeletal: Normal range of motion. She exhibits no edema or tenderness.   Lymphadenopathy:     She has no cervical adenopathy.   Neurological: She is alert and oriented to person, place, and time. She has normal reflexes. No cranial nerve deficit. Coordination normal.   Skin: Skin is warm. No rash noted. No erythema. No pallor.     No ulcers      Indurated varicose veins-R calf and L  popliteal area      Multiple spider veins       No hyperpigmentation               Psychiatric: She has a normal mood and affect. Her behavior is normal. Judgment and thought content normal.       Assessment:     1. Venous insufficiency of both lower extremities    2. Essential hypertension    3. Systolic heart failure, unspecified heart failure chronicity    4. Paroxysmal atrial fibrillation        Plan:         Weigh loss  Exercise  Low salt diet        Compression stockings 20-30 mmHg        Elevate legs when resting           When discussed the multiple treatment options   EVLT, phlebectomy, and sclerotherapy if clinically indicated         Continue with current medical plan and lifestyle changes.  Return sooner for concerns or questions. If symptoms persist go to the ED  I have reviewed all pertinent data on this patient       I have reviewed the patient's medical history in detail and updated the computerized patient record.    Orders Placed This Encounter   Procedures    COMPRESSION STOCKINGS     Knee high     Order Specific Question:   Pressure amount:     Answer:   20-30 mmHg    US Lower Extremity Veins Bilateral Insufficiency     Standing Status:   Future     Standing Expiration Date:   4/2/2019     Order Specific Question:   May the Radiologist modify the order per protocol to meet the clinical needs of the patient?     Answer:   Yes       Follow up as scheduled. Return sooner for concerns or questions  Follow up in 4 months         Continue general cardiology and EP care with Dr. Duran + Dr. Loco            She expressed verbal understanding and agreed with the plan        Patient's Medications   New Prescriptions    No medications on file   Previous Medications    AMIODARONE (PACERONE) 100 MG TAB    Take 1 tablet (100 mg total) by mouth once daily.    APIXABAN 5 MG TAB    Take 1 tablet (5 mg total) by mouth 2 (two) times daily.    DOXYCYCLINE (VIBRA-TABS) 100 MG TABLET    Take 1 tablet (100 mg  total) by mouth 2 (two) times daily.    FLUTICASONE (FLONASE) 50 MCG/ACTUATION NASAL SPRAY    2 sprays (100 mcg total) by Each Nare route once daily.    FUROSEMIDE (LASIX) 40 MG TABLET    Take 1 tablet (40 mg total) by mouth 2 (two) times daily.    GABAPENTIN (NEURONTIN) 300 MG CAPSULE    TK 1 C PO TID    LEVOTHYROXINE (SYNTHROID) 75 MCG TABLET    Take 1 tablet (75 mcg total) by mouth before breakfast.    LOSARTAN (COZAAR) 100 MG TABLET    Take 1 tablet (100 mg total) by mouth once daily.    METOPROLOL SUCCINATE (TOPROL-XL) 100 MG 24 HR TABLET    Take 1 tablet (100 mg total) by mouth once daily.    TRAMADOL (ULTRAM) 50 MG TABLET    TK 1 T PO Q 6 H PRN   Modified Medications    No medications on file   Discontinued Medications

## 2018-04-02 NOTE — PATIENT INSTRUCTIONS
Understanding Chronic Venous Insufficiency  Problems with the veins in the legs may lead to chronic venous insufficiency (CVI). CVI means that there is a long-term problem with the veins not being able to pump blood back to your heart. When this happens, blood stays in the legs and causes swelling and aching.   Two problems that may lead to chronic venous insufficiency are:  · Damaged valves. Valves keep blood flowing from the legs through the blood vessels and back to the heart. When the valves are damaged, blood does not flow as well.   · Deep vein thrombosis (DVT). Blood clots may form in the deep veins of the legs. This may cause pain, redness, and swelling in the legs. It may also block the flow of blood back to the heart. Seek immediate medical care if you have these symptoms.  · A blood clot in the leg can also break off and travel to the lungs. This is called pulmonary embolism (PE). In the lungs, the clot can cut off the flow of blood. This may cause chest pain, trouble breathing, sweating, a fast heartbeat, coughing (may cough up blood), and fainting. It is a medical emergency and may cause death. Call 911 if you have these symptoms.  · Healthcare providers call the two conditions, DVT and PE, venous thromboembolism (VTE).  CVI cant be cured, but you can control leg swelling to reduce the likelihood of ulcers (sores).  Recognizing the symptoms  Be aware of the following:  · If you stand or sit with your feet down for long periods, your legs may ache or feel heavy.  · Swollen ankles are possibly the most common symptom of CVI.  · As swelling increases, the skin over your ankles may show red spots or a brownish tinge. The skin may feel leathery or scaly, and may start to itch.  · If swelling is not controlled, an ulcer (open wound) may form.  What you can do  Reduce your risk of developing ulcers by doing the following:  · Increase blood flow back to your heart by elevating your legs, exercising daily,  and wearing elastic stockings.  · Boost blood flow in your legs by losing excess weight.  · If you must stand or sit in one place for a period of time, keep your blood moving by wiggling your toes, shifting your body position, and rising up on the balls of your feet.    Date Last Reviewed: 5/1/2016  © 0977-0472 [employer]. [address] [place] [address], [place] [address]. All rights reserved. This information is not intended as a substitute for professional medical care. Always follow your healthcare professional's instructions.        Self-Care for Spider and Varicose Veins  Your healthcare provider may suggest that you try self-care. Exercising and maintaining a healthy weight may keep problem veins from getting worse. Wearing elastic stockings and elevating your legs can help improve blood flow. Taking breaks when you sit or stand helps, too.     The top of the elastic stocking should be below the bend in your knee for a proper fit.   Exercising  Exercising is good for your veins because it improves blood flow. Walking, cycling, or swimming are great exercises for vein health. But be sure to check with your healthcare provider before starting any exercise program. Also, keep these hints in mind:  · When exercising, start out slowly and try to build up to 30 minutes on most days.  · Elevate your legs above heart level after exercise to keep blood from pooling in veins.  Maintaining a healthy weight  Being overweight puts extra pressure on your veins. To maintain a healthy weight, try these tips:  · Choose lean meats, fish, and skinless chicken.  · Use low-fat dairy products.  · Eat foods high in fiber, such as whole grains, fruits, and vegetables.  · Cut down on sugar, salt, and saturated and trans fats.  · Exercise regularly.  Wearing elastic stockings  Elastic stockings gently squeeze veins so blood flows upward. If you need elastic stockings, your healthcare provider can prescribe them for you. Follow  your healthcare providers advice about how and when to wear them. Elastic stockings come in several different levels of pressure. Ask your healthcare provider which level of pressure would benefit you the most.   Elevating your legs  Raising your legs above heart level will help relieve swelling and keep blood from pooling in veins. Try to elevate your legs for 15 to 20 minutes at the end of the day, and whenever youre relaxing. To make sure your legs are raised above heart level, prop them up on cushions or large pillows.  When sitting and standing  To keep blood moving when you have to sit or stand for long periods, try these tips:  · At work, take walking breaks instead of coffee breaks. Walk during your lunch hour. Or try flexing your feet up and down 10 times each hour.  · When standing, raise yourself up and down on your toes, or rock back and forth on your heels.  Date Last Reviewed: 5/1/2016  © 0369-8713 [employer]. [address] [place] [address]. All rights reserved. This information is not intended as a substitute for professional medical care. Always follow your healthcare professional's instructions.        Understanding Spider and Varicose Veins  Do you often hide your legs because of the way they look? You may have noticed tiny red or blue bursts (spider veins). Or maybe you have veins that bulge or look twisted (varicose veins). If so, there are treatments that can help  What are the symptoms?  Spider veins or varicose veins may never be a problem. But sometimes they can cause legs to ache or swell. Your legs may also feel heavy and tired, or like theyre burning. These symptoms may be more severe at the end of the day. Prolonged sitting or standing can also make your symptoms worse.  Who gets spider and varicose veins?  Anyone can get spider or varicose veins. But vein problems tend to be hereditary (run in families). Other factors that can affect veins  include:  · Pregnancy, hormones, and birth control pills  · A job where you stand or sit a lot  · Extra weight or lack of exercise  · Age     Spider veins look like tiny webs on the ankles, legs, and upper thighs.      Ropy, dark blue, red, or flesh-colored varicose veins are most common on the thighs, calves, and feet.     What can be done?  Spider and varicose veins can affect the way you feel about yourself. Talk to your healthcare provider about your concerns. There are treatments that can ease symptoms and make your legs look better.  Your treatment choices  Treatment may include self-care, sclerotherapy (injecting veins with a chemical), surgery, or newer nonsurgical minimally invasive therapies. Spider veins and some varicose veins can be treated with sclerotherapy. Large varicose veins can often be treated with newer minimally invasive procedures and, in rare cases, surgery may be needed.   Date Last Reviewed: 5/1/2016  © 3405-2909 [employer]. [address] [place] [address]. All rights reserved. This information is not intended as a substitute for professional medical care. Always follow your healthcare professional's instructions.        Endovenous Laser Treatment (EVLT) for Varicose Veins  Endovenous laser treatment (EVLT) is a procedure that uses laser heat to treat varicose veins.   Varicose veins are swollen and enlarged veins. They occur most often in the legs. Varicose veins can develop when valves in your veins become damaged. This causes problems with blood flow. Over time, too much blood collects in the veins. The veins may bulge, twist, and stand out under your skin. They can also cause symptoms such as aching, cramping, or swelling in your legs.  During EVLT, heat created using a laser is sent into the vein through a thin, flexible tube (catheter). This closes off blood flow in the main problem vein.  Getting ready for your treatment  Follow any instructions from your  healthcare provider.  Tell your healthcare provider if you:  · Are pregnant or think you may be pregnant  · Are breastfeeding  · Smoke or use alcohol on a regular basis  · Have other health problems, such as diabetes or kidney problems  Tell your provider about any medicines you are taking. You may need to stop taking all or some of these before the procedure. This includes:  · Medicines that can thin your blood or prevent clotting (anticoagulants)  · All prescription medicines  · Over-the-counter medicines such as aspirin or ibuprofen  · Street drugs  · Herbs, vitamins, and other supplements  Follow any directions youre given for not eating or drinking before the treatment.  The day of your treatment    The treatment takes 45 to 60 minutes. The entire treatment (including time to prepare and recover) takes about 1 to 3 hours. You can go home the same day. For the treatment:  · Youll lie down on a hospital bed.  · An imaging method, such as ultrasound, is used to guide the procedure.  · The leg to be treated is injected with numbing medicine.  · Once your leg is numb, a needle makes a small hole (puncture) in the vein to be treated.  · The catheter containing the laser heat source is inserted into your vein.  · More numbing medicine may be injected around the vein.  · Once the catheter is in the right position, it is then slowly drawn backward. As the catheter sends out heat, the vein is closed off.  · In some cases, other side branch varicose veins may be removed or tied off through several small cuts (incisions).  · When the treatment is done, the catheter is removed. Pressure is applied to the insertion site to stop any bleeding. An elastic compression stocking or a bandage may then be put on your leg.  Recovering at home  Once at home, follow all the instructions youve been given. Be sure to:  · Take all medicines as directed  · Care for the catheter insertion site as directed  · Check for signs of infection  at the catheter insertion site (see below)  · Wear elastic stockings or bandages as directed  · Keep your legs raised (elevated) as directed  · Walk a few times a day  · Avoid heavy exercise, lifting, and standing for long periods as advised  · Avoid air travel, hot baths, saunas, or whirlpools as advised  Call your healthcare provider  Call your healthcare provider if you have any of the following:  · Fever of 100.4°F (38°C) or higher, or as directed by your provider  · Chest pain or trouble breathing  · Signs of infection at the catheter insertion site. These include greater redness or swelling (inflammation), warmth, increasing pain, bleeding, or bad-smelling discharge.  · Severe numbness or tingling in the treated leg  · Severe pain or swelling in the treated leg   Follow-up  Youll have a follow-up visit with your healthcare provider within a week. An ultrasound will likely be done to check for problems, such as blood clots. Your provider will discuss more treatments with you, if needed.   Risk and possible complications  These include:  · Bleeding  · Infection  · Blood clots  · Damage to the nerves in the treated area  · Irritation or burning of the skin over the treated vein  · Treatment doesn't improve the look or the symptoms of the problem veins  · Risks of any medicines used during the treatment   Date Last Reviewed: 5/1/2016  © 0755-3670 [employer]. [address] [place] [address]. All rights reserved. This information is not intended as a substitute for professional medical care. Always follow your healthcare professional's instructions.        Radiofrequency Ablation (RFA) Treatment for Varicose Veins  Radiofrequency ablation (RFA) is a procedure to treat varicose veins. It uses heat created from radiofrequency (RF).  Varicose veins are swollen, enlarged veins. They happen most often in the legs. Varicose veins can develop when valves in your veins become damaged. This causes  problems with blood flow. Over time, too much blood collects in your veins. The veins may bulge, twist, and stand out under your skin. They can also cause symptoms such as aching, cramping, or swelling in your legs.  During RFA treatment, RF heat is sent into your vein through a thin, flexible tube (catheter). This closes off blood flow in the main problem vein.     With RFA treatment, a catheter that contains RF heat is used to seal off the main problem vein.   Getting ready for your treatment  Follow any instructions from your healthcare provider.  Tell your provider if you:  · Are pregnant or think you may be pregnant  · Are breastfeeding  · Smoke or use alcohol on a regular basis  · Have any allergies or intolerances to certain medicines. Explain what reaction you have had to these medicines in the past.  Tell your provider about any medicines you are taking. You may need to stop taking all or some of these before the test. This includes:  · Medicines that can thin your blood or prevent clotting (anticoagulants)  · All prescription medicines  · Over-the-counter medicines such as aspirin or ibuprofen  · Street drugs  · Herbs, vitamins, and other supplements  Follow any directions youre given for not eating or drinking before the procedure.  The day of your treatment  The treatment takes 45 to 60 minutes. The entire treatment (including time to prepare and recover) takes about 1 to 3 hours. You can go home the same day. For the treatment:   · Youll lie down on a hospital bed.  · An imaging method, such as ultrasound, is used to guide the procedure.  · The leg to be treated is injected with numbing medicine.  · Once your leg is numb, a needle makes a small hole (puncture) in the vein to be treated.  · The catheter with the RF heat source is inserted into your vein.  · More numbing medicine may be injected around your vein.  · Once the catheter is in the right position, it is then slowly drawn backward. As the  catheter sends out heat, the vein is closed off.  · In some cases, other side branch varicose veins may be removed or tied off through a few small cuts (incisions).  · When the treatment is done, the catheter is removed. Pressure is applied to the insertion site to stop any bleeding. An elastic compression stocking or a bandage may then be put on your leg.  Recovering at home  Once at home, follow all the instructions youve been given. Be sure to:  · Take all medicines as directed  · Care for the catheter insertion site as directed  · Check for signs of infection at the catheter insertion site (see below)  · Wear elastic stockings or bandages as directed  · Keep your legs raised (elevated) as directed  · Walk a few times a day  · Avoid heavy exercise, lifting, and standing for long periods as advised  · Avoid air travel, hot baths, saunas, or whirlpools as advised  Call your healthcare provider  Call your healthcare provider if you have any of the following:  · Fever of 100.4°F (38°C) or higher, or as directed by your provider  · Chest pain or trouble breathing  · Signs of infection at the catheter insertion site. These include increased redness or swelling (inflammation), warmth, increasing pain, bleeding, or bad-smelling discharge.  · Severe numbness or tingling in the treated leg  · Severe pain or swelling in the treated leg    Follow-up  Youll have a follow-up visit with your healthcare provider within a week. An ultrasound will be done to check for problems, such as blood clots. Your provider will discuss further treatments with you, if needed.  Risks and possible complications   These include the following:  · Bleeding  · Infection  · Blood clots  · Damage to the nerves in the treated area  · Irritation or burning of the skin over the treated vein  · Treatment doesn't improve the look or the symptoms of the problem veins  · Risks of any medicines used during the treatment   Date Last Reviewed: 5/1/2016  ©  2683-9202 [employer]. [address] [place] [address], [place] [address]. All rights reserved. This information is not intended as a substitute for professional medical care. Always follow your healthcare professional's instructions.

## 2018-04-03 ENCOUNTER — TELEPHONE (OUTPATIENT)
Dept: CARDIOLOGY | Facility: CLINIC | Age: 68
End: 2018-04-03

## 2018-04-03 NOTE — TELEPHONE ENCOUNTER
Left detailed message, venous US scheduled on 4/9.     Asked her to call us back if that day or time was not convenient.

## 2018-04-03 NOTE — TELEPHONE ENCOUNTER
----- Message from Josiah Kelly MD sent at 4/2/2018 12:46 PM CDT -----      Please order an venous ultrasound         Thanks        ZN

## 2018-04-04 ENCOUNTER — OFFICE VISIT (OUTPATIENT)
Dept: CARDIOLOGY | Facility: CLINIC | Age: 68
End: 2018-04-04
Payer: MEDICARE

## 2018-04-04 VITALS
SYSTOLIC BLOOD PRESSURE: 112 MMHG | DIASTOLIC BLOOD PRESSURE: 62 MMHG | BODY MASS INDEX: 34.98 KG/M2 | HEIGHT: 68 IN | WEIGHT: 230.81 LBS | HEART RATE: 65 BPM

## 2018-04-04 DIAGNOSIS — I10 ESSENTIAL HYPERTENSION: ICD-10-CM

## 2018-04-04 DIAGNOSIS — I48.0 PAROXYSMAL ATRIAL FIBRILLATION: Primary | ICD-10-CM

## 2018-04-04 DIAGNOSIS — I50.20 SYSTOLIC HEART FAILURE, UNSPECIFIED HEART FAILURE CHRONICITY: ICD-10-CM

## 2018-04-04 PROCEDURE — 3078F DIAST BP <80 MM HG: CPT | Mod: CPTII,S$GLB,, | Performed by: INTERNAL MEDICINE

## 2018-04-04 PROCEDURE — 99999 PR PBB SHADOW E&M-EST. PATIENT-LVL III: CPT | Mod: PBBFAC,,, | Performed by: INTERNAL MEDICINE

## 2018-04-04 PROCEDURE — 93005 ELECTROCARDIOGRAM TRACING: CPT | Mod: S$GLB,,, | Performed by: INTERNAL MEDICINE

## 2018-04-04 PROCEDURE — 99215 OFFICE O/P EST HI 40 MIN: CPT | Mod: S$GLB,,, | Performed by: INTERNAL MEDICINE

## 2018-04-04 PROCEDURE — 93010 ELECTROCARDIOGRAM REPORT: CPT | Mod: S$GLB,,, | Performed by: INTERNAL MEDICINE

## 2018-04-04 PROCEDURE — 3074F SYST BP LT 130 MM HG: CPT | Mod: CPTII,S$GLB,, | Performed by: INTERNAL MEDICINE

## 2018-04-04 PROCEDURE — 99499 UNLISTED E&M SERVICE: CPT | Mod: S$GLB,,, | Performed by: INTERNAL MEDICINE

## 2018-04-04 NOTE — PROGRESS NOTES
Subjective:    Patient ID:  Violet Mirza is a 67 y.o. female who presents for follow-up of Paroxysmal atrial fibrillation       Referring Practitioner and Cardiologist: Jaren Medel NP and Go Duran MD  Primary Care Physician: Phyllis Gunter MD    HPI  Prior Hx:  I had the pleasure of seeing Mrs. Mirza today in our [facility] electrophysiology clinic in follow-up for her atrial fibrillation with associated AF mediated cardiomyopathy. As you are aware she is a pleasant 67 year-old woman with hypertension and hypothyroidism. Beginning in 2017 she began to develop a cough and shortness of breath. She was seen for this and was thought to have anxiety and asthma. She was given an inhaler which did not help much. She then began to have orthopnea. A 2d echocardiogram was ordered by Dr. Henning which was performed on 2018. She was in atrial fibrillation with RVR during the echocardiogram and her left ventricular ejection fraction was severely depressed at 10-15% with mildly enlarged LVEDD and moderately enlarged left atrium with severely depressed right ventricular function and pulmonary hypertension. She was admitted to [facility] for treatment and initiation of goal directed medical therapy for heart failure. In the [facility] ER she was given diltiazem. She converted spontaneously to sinus rhythm. An angiogram was performed by Dr. Duran on 2018 noting normal coronary arteries. She was started on amiodarone and eliquis. She followed up in cardiology clinic on 2018 and expressed concern about amiodarone and anticoagulation. Amiodarone was stopped (reports 3 family members that have  from amiodarone induced lung disease) and she was referred here for further evaluation. Of note her TSH and FT4 in the past on prior dosages of levothyroxine were consistent with hyperthyroidism. Recent lab values on 2017 and 2018 show suppressed TSH and normal  T4. Her synthroid dosage was recently reduced. She has no history of GI bleeding. Colonoscopy in 2015 showed diverticulosis.     ECHO 1/22/2018 CONCLUSIONS     1 - Severely depressed left ventricular systolic function (EF 10-15%).     2 - Right ventricular enlargement with moderately to severely depressed systolic function.     3 - Biatrial enlargement.     4 - Pulmonary hypertension. The estimated PA systolic pressure is 68 mmHg.     5 - Moderate to severe mitral regurgitation.     6 - Moderate tricuspid regurgitation.     7 - Increased central venous pressure.     I reviewed all available electrocardiograms in [facility] (starting 1/16/2018) which show sinus rhythm/tachycardia with left axis shift and incomplete RBBB with frequent PVCs except for 1/22/2018 which showed AF with RVR. An ECG from her clinic visit after starting amiodarone on 1/30/2018 shows sinus rhythm with a rate of 68 bpm. PVC foci appear to be RBBB, concordant precordial, R in limb lead 1, superiorly directed, likely basal septal focus. All of her QTc on ECGs are over 500msec.    Interim Hx:  At our initial visit we discussed continuing a short course of amiodarone to allow her EF to recover with repeat echo in 3 months. Her echo was repeated earlier than 3 months and noted an improved EF to 30-35%. Holter monitor showed no AF and rare PVCs. She feels well and denies any shortness of breath or palpitations.     My interpretation of today's in clinic electrocardiogram is normal sinus rhythm with a rate of 65 bpm.    Review of Systems   Constitution: Negative for weakness and malaise/fatigue.   HENT: Negative for congestion and sore throat.    Eyes: Negative for blurred vision and visual disturbance.   Cardiovascular: Negative for chest pain, dyspnea on exertion, irregular heartbeat, leg swelling, near-syncope, orthopnea, palpitations, paroxysmal nocturnal dyspnea and syncope.   Respiratory: Negative.  Negative for cough and shortness of breath.   "  Hematologic/Lymphatic: Negative for bleeding problem. Does not bruise/bleed easily.   Skin: Negative.    Gastrointestinal: Negative for hematochezia and melena.   Neurological: Negative for dizziness and focal weakness.        Objective:    Physical Exam   Constitutional: She is oriented to person, place, and time. She appears well-developed and well-nourished. No distress.   HENT:   Head: Normocephalic and atraumatic.   Eyes: Conjunctivae are normal. Right eye exhibits no discharge. Left eye exhibits no discharge.   Neck: Neck supple. No JVD present.   Cardiovascular: Normal rate, regular rhythm and normal heart sounds.  Exam reveals no gallop and no friction rub.    No murmur heard.  Pulmonary/Chest: Effort normal and breath sounds normal. No respiratory distress. She has no wheezes. She has no rales.   Abdominal: Soft. Bowel sounds are normal. She exhibits no distension. There is no tenderness. There is no rebound.   Musculoskeletal: She exhibits no edema.   Neurological: She is alert and oriented to person, place, and time.   Skin: Skin is warm and dry. She is not diaphoretic.   Psychiatric: She has a normal mood and affect. Her behavior is normal. Judgment and thought content normal.   Vitals reviewed.        Assessment:       1. Paroxysmal atrial fibrillation    2. Systolic heart failure, unspecified heart failure chronicity    3. Essential hypertension         Plan:       In summary, Mrs. Mirza is a pleasant 67 year-old woman with hypertension and hypothyroidism with currently suppressed TSH and recent synthroid dose adjustment who was recently diagnosed with paroxysmal atrial fibrillation, frequent PVCs and severe biventricular nonischemic systolic heart failure. Her EF is improving with sinus rhythm. Discussed she is at risk for recurrent AF with decompensation of her systolic heart function. While there is no "cure" for atrial fibrillation, PVI offers best long-term rhythm strategy. Recommend PVI and " then stopping amiodarone 3 months after. If her EF completely normalizes would switch to a class 1c anti-arrhythmic for medical rhythm control. I spent about a half hour discussing the nature of PVI including transseptal puncture. We discussed risks and benefits at length. Our discussion included, but was not limited to the risk of death, infection, bleeding, stroke, MI, cardiac perforation, embolism, thrombosis, cardiac tamponade, vascular injury, AE fistula, injury to phrenic nerve, pulmonary vein stenosis and other organic injury including the possibility for need for surgery or pacemaker implantation.  She is leaning towards doing the procedure but would like to think about it and will discuss on the phone when I talk with her about her repeat echo results.    Plan  Repeat 2d echo to evaluate for continued EF recovery. Will discuss over the phone, if she decides to proceed then will proceed with below..    CTA to delineate PV anatomy  RF-PVI with carto  Start protonix 2 weeks prior to procedure and continue for 30 days after  BECKY day of, cancel if in sinus rhythm  Hold eliquis morning of procedure    Thank you for allowing me to participate in the care of this patient. Please do not hesitate to call me with any questions or concerns.    Chriss Loco MD, PhD  Cardiac Electrophysiology

## 2018-04-05 ENCOUNTER — PATIENT MESSAGE (OUTPATIENT)
Dept: CARDIOLOGY | Facility: CLINIC | Age: 68
End: 2018-04-05

## 2018-04-09 ENCOUNTER — TELEPHONE (OUTPATIENT)
Dept: CARDIOLOGY | Facility: CLINIC | Age: 68
End: 2018-04-09

## 2018-04-09 ENCOUNTER — PATIENT MESSAGE (OUTPATIENT)
Dept: CARDIOLOGY | Facility: CLINIC | Age: 68
End: 2018-04-09

## 2018-04-09 ENCOUNTER — HOSPITAL ENCOUNTER (OUTPATIENT)
Dept: RADIOLOGY | Facility: HOSPITAL | Age: 68
Discharge: HOME | End: 2018-04-09
Attending: INTERNAL MEDICINE
Payer: MEDICARE

## 2018-04-09 DIAGNOSIS — I87.2 VENOUS INSUFFICIENCY OF BOTH LOWER EXTREMITIES: ICD-10-CM

## 2018-04-09 PROCEDURE — 93970 EXTREMITY STUDY: CPT | Mod: TC

## 2018-04-09 PROCEDURE — 93970 EXTREMITY STUDY: CPT | Mod: 26,,, | Performed by: RADIOLOGY

## 2018-04-09 NOTE — TELEPHONE ENCOUNTER
----- Message from Molly Gee sent at 4/6/2018 11:14 AM CDT -----  Contact: lili with [facility] [phone]  Rep requested an order for the patient for compression socks be faxed to [phone]. Please call and advise.

## 2018-04-11 ENCOUNTER — TELEPHONE (OUTPATIENT)
Dept: CARDIOLOGY | Facility: CLINIC | Age: 68
End: 2018-04-11

## 2018-04-11 DIAGNOSIS — I87.2 VENOUS INSUFFICIENCY OF LOWER EXTREMITY, UNSPECIFIED LATERALITY: Primary | ICD-10-CM

## 2018-04-12 ENCOUNTER — HOSPITAL ENCOUNTER (OUTPATIENT)
Dept: CARDIOLOGY | Facility: HOSPITAL | Age: 68
Discharge: HOME | End: 2018-04-12
Attending: INTERNAL MEDICINE
Payer: MEDICARE

## 2018-04-12 DIAGNOSIS — I50.20 SYSTOLIC HEART FAILURE, UNSPECIFIED HEART FAILURE CHRONICITY: ICD-10-CM

## 2018-04-12 DIAGNOSIS — I48.0 PAROXYSMAL ATRIAL FIBRILLATION: ICD-10-CM

## 2018-04-12 LAB
DIASTOLIC DYSFUNCTION: YES
ESTIMATED PA SYSTOLIC PRESSURE: 16.81
MITRAL VALVE MOBILITY: NORMAL
RETIRED EF AND QEF - SEE NOTES: 45 (ref 55–65)
TRICUSPID VALVE REGURGITATION: ABNORMAL

## 2018-04-12 PROCEDURE — 93307 TTE W/O DOPPLER COMPLETE: CPT

## 2018-04-12 PROCEDURE — 93307 TTE W/O DOPPLER COMPLETE: CPT | Mod: 26,,, | Performed by: INTERNAL MEDICINE

## 2018-04-13 ENCOUNTER — TELEPHONE (OUTPATIENT)
Dept: ELECTROPHYSIOLOGY | Facility: CLINIC | Age: 68
End: 2018-04-13

## 2018-04-13 NOTE — TELEPHONE ENCOUNTER
Called to discuss echo results with patient. EF continues to improve. Now it is 45-50% which is mildly depressed. I also wanted to discuss scheduling her PVI. Message left.

## 2018-04-16 ENCOUNTER — TELEPHONE (OUTPATIENT)
Dept: ELECTROPHYSIOLOGY | Facility: CLINIC | Age: 68
End: 2018-04-16

## 2018-04-16 NOTE — TELEPHONE ENCOUNTER
Spoke with patient regarding her ECHO results. She wants to proceed with PVI-RFA (Carto) however wants to wait until July. Will have my nurse call to discuss tentative dates.    Plan  Carto  CTA  BECKY morning of, cancel if in sinus rhythm  Hold eliquis morning of  Anesthesia

## 2018-04-17 ENCOUNTER — PATIENT MESSAGE (OUTPATIENT)
Dept: CARDIOLOGY | Facility: CLINIC | Age: 68
End: 2018-04-17

## 2018-04-18 ENCOUNTER — PATIENT MESSAGE (OUTPATIENT)
Dept: CARDIOLOGY | Facility: CLINIC | Age: 68
End: 2018-04-18

## 2018-04-19 ENCOUNTER — TELEPHONE (OUTPATIENT)
Dept: CARDIOLOGY | Facility: HOSPITAL | Age: 68
End: 2018-04-19

## 2018-04-19 DIAGNOSIS — I50.22 CHRONIC SYSTOLIC HEART FAILURE: Primary | ICD-10-CM

## 2018-04-19 NOTE — TELEPHONE ENCOUNTER
Discussed with patient. She has elected to NOT proceed with ablation. She wants to exhaust medical therapy first while trying to get off amiodarone. Would repeat a 2d echo July/August. If EF normal then would stop amiodarone and 1 month later start class 1c agent. If EF remains <50-55% would stop amiodarone and then start either sotalol or tikosyn after a 3 month washout. Would then see in clinic 3 months later (6 months from now.)

## 2018-05-04 ENCOUNTER — PATIENT MESSAGE (OUTPATIENT)
Dept: CARDIOLOGY | Facility: CLINIC | Age: 68
End: 2018-05-04

## 2018-05-04 DIAGNOSIS — I50.21 ACUTE SYSTOLIC HEART FAILURE: ICD-10-CM

## 2018-05-05 RX ORDER — FUROSEMIDE 40 MG/1
40 TABLET ORAL 2 TIMES DAILY
Qty: 60 TABLET | Refills: 0 | Status: SHIPPED | OUTPATIENT
Start: 2018-05-05 | End: 2018-06-07

## 2018-05-15 NOTE — PROGRESS NOTES
Subjective:       Patient ID: Violet Mirza is a 67 y.o. female.    Chief Complaint: Hypothyroidism     HPI   Ms. Mirza is a very pleasant female patient with Atrial Fib, CHF and HTN here today for evaluation of Hypothyroidism     The patient states that she was diagnosed with CHF and hospitalized in Jan. 2018   She initially presented with SOB which started in 12/2017. She was evaluated by her PCP who then referred her to Cardiology and ordered an echo. She also noted bilateral lower extremity edema. Upon arrival to the ER, she was noted to be in Atrial Fib with a RVR with rate in the 130's. LVEF 10-15% at that time. CXR revealed pulmonary vascular congestion    The patient was admitted to telemetry for diuresis    The patient was started on Amiodarone at that time and is currently taking 100 mg once daily   She is followed by the [facility]thmia clinic - Dr. Loco     With regards to the Hypothyroidism:   The patient states she was started on Levothyroxine and Cytomel ~ 38 years ago by an outside provider to assist with weight loss after having her son     The patient states thyroid medication has been prescribed by different providers over the past 10-12 years.    She was noted to have a suppressed TSH level in 11/2017   The patient states Levothyroxine was adjusted from 100 mcg to 75 mcg once daily in 02/2018 per Cardiology.  She states Cytomel was also discontinued in 02/2018     Last TSH - 0.062 uIU/mL from 01-19/2018     The patient denies palpitations at this time   Denies frequent bowel movements     She endorses occasional fatigue, tremors and heat intolerance     Denies vision changes     The patient denies recent contrast     She is not taking Biotin   Denies use of lithium     Review of Systems   Constitutional: Positive for fatigue.   HENT: Negative for sore throat, trouble swallowing and voice change.    Eyes: Negative for visual disturbance.   Respiratory: Negative for shortness of breath.     Cardiovascular: Negative for chest pain and palpitations.   Gastrointestinal: Negative for abdominal pain, constipation, diarrhea, nausea and vomiting.   Endocrine: Positive for heat intolerance.   Genitourinary: Negative for dysuria.   Musculoskeletal: Negative for neck pain.   Skin: Negative for rash.   Allergic/Immunologic: Negative for immunocompromised state.   Neurological: Positive for tremors.   Hematological: Does not bruise/bleed easily.   Psychiatric/Behavioral: Negative for confusion and decreased concentration.       Objective:      Physical Exam   Constitutional: She appears well-developed.   HENT:   Right Ear: External ear normal.   Left Ear: External ear normal.   Nose: Nose normal.   Hearing normal  Dentition good    Neck: No tracheal deviation present. No thyromegaly present.   Cardiovascular: Normal rate.    No murmur heard.  Pulmonary/Chest: Effort normal and breath sounds normal.   Abdominal: Soft. There is no tenderness. No hernia.   Musculoskeletal: She exhibits no edema.   Gait normal  No clubbing or cyanosis noted   Neurological: She displays normal reflexes. No cranial nerve deficit.   Skin: No rash noted.   No nodules       Psychiatric: She has a normal mood and affect. Judgment normal.   Nursing note and vitals reviewed.      LABS:   Results for FLOWER BONE (MRN [id number]) as of 5/16/2018 14:47   Ref. Range 11/12/2014 08:59 10/14/2015 07:10 8/31/2016 08:35 1/30/2017 07:43 9/2/2017 08:46 11/29/2017 07:48 1/19/2018 08:13   TSH Latest Ref Range: 0.400 - 4.000 uIU/mL 0.01 (L) <0.010 (L) 0.014 (L) 0.851 0.402 <0.010 (L) 0.062 (L)   T3, Total Latest Ref Range: 60 - 180 ng/dL     105 112 83   T4, Free Latest Ref Range: 0.8 - 1.8 ng/dL 1.8         Free T4 Latest Ref Range: 0.71 - 1.51 ng/dL  1.61 (H) 1.41 1.11 1.18 1.00 1.13     Results for FLOWER BONE (MRN [id number]) as of 5/16/2018 14:47   Ref. Range 1/23/2018 05:40   Hemoglobin A1C Latest Ref Range: 4.0 - 5.6 % 5.8 (H)   Estimated  Avg Glucose Latest Ref Range: 68 - 131 mg/dL 120     Results for FLOWER BONE (MRN [id number]) as of 5/16/2018 14:47   Ref. Range 2/7/2018 07:43   Sodium Latest Ref Range: 136 - 145 mmol/L 139   Potassium Latest Ref Range: 3.5 - 5.1 mmol/L 3.7   Chloride Latest Ref Range: 95 - 110 mmol/L 100   CO2 Latest Ref Range: 23 - 29 mmol/L 29   Anion Gap Latest Ref Range: 8 - 16 mmol/L 10   BUN, Bld Latest Ref Range: 8 - 23 mg/dL 20   Creatinine Latest Ref Range: 0.5 - 1.4 mg/dL 0.8   eGFR if non African American Latest Ref Range: >60 mL/min/1.73 m^2 >60   eGFR if  Latest Ref Range: >60 mL/min/1.73 m^2 >60   Glucose Latest Ref Range: 70 - 110 mg/dL 100   Calcium Latest Ref Range: 8.7 - 10.5 mg/dL 9.6     Assessment:       1. Other specified hypothyroidism    2. Paroxysmal atrial fibrillation    3. Systolic heart failure, unspecified HF chronicity    4. Essential hypertension        Plan:       1. Hypothyroidism   -- Clinically and biochemically euythroid  -- Goal is a normal TSH   -- Avoid exogenous hyperthyroidism as this can accelerate bone loss and increase risk of CV complications.   -- Reviewed usual times of thyroid hormone changes  -- Reviewed that symptoms of hypothyroidism man not correlate with TSH, and a normal TSH is the goal of therapy. Symptoms are not a justification for over treatment.   -- will message patient with test results     2. Atrial fib   -- stable with current regimen   -- discussed effects of Amiodarone on thyroid function   -- recommend periodic monitoring of TFTs   -- continue to follow with Electrophysiology as advised     3. Heart failure   -- followed by Cardiology     4. HTN   -- at goal with current regimen   -- encouraged to follow a low salt diet       Will message patient with test results     She is advised to notify the office with any additional questions or concerns

## 2018-05-16 ENCOUNTER — LAB VISIT (OUTPATIENT)
Dept: LAB | Facility: HOSPITAL | Age: 68
End: 2018-05-16
Payer: MEDICARE

## 2018-05-16 ENCOUNTER — OFFICE VISIT (OUTPATIENT)
Dept: ENDOCRINOLOGY | Facility: CLINIC | Age: 68
End: 2018-05-16
Payer: MEDICARE

## 2018-05-16 VITALS
HEIGHT: 68 IN | WEIGHT: 229.31 LBS | BODY MASS INDEX: 34.75 KG/M2 | DIASTOLIC BLOOD PRESSURE: 83 MMHG | HEART RATE: 71 BPM | SYSTOLIC BLOOD PRESSURE: 121 MMHG

## 2018-05-16 DIAGNOSIS — E03.8 OTHER SPECIFIED HYPOTHYROIDISM: ICD-10-CM

## 2018-05-16 DIAGNOSIS — E03.8 OTHER SPECIFIED HYPOTHYROIDISM: Primary | ICD-10-CM

## 2018-05-16 DIAGNOSIS — I50.20 SYSTOLIC HEART FAILURE, UNSPECIFIED HF CHRONICITY: ICD-10-CM

## 2018-05-16 DIAGNOSIS — I10 ESSENTIAL HYPERTENSION: ICD-10-CM

## 2018-05-16 DIAGNOSIS — I48.0 PAROXYSMAL ATRIAL FIBRILLATION: ICD-10-CM

## 2018-05-16 LAB
T4 FREE SERPL-MCNC: 1.23 NG/DL
THYROPEROXIDASE IGG SERPL-ACNC: <6 IU/ML
TSH SERPL DL<=0.005 MIU/L-ACNC: 5.88 UIU/ML

## 2018-05-16 PROCEDURE — 99999 PR PBB SHADOW E&M-EST. PATIENT-LVL III: CPT | Mod: PBBFAC,,, | Performed by: INTERNAL MEDICINE

## 2018-05-16 PROCEDURE — 84443 ASSAY THYROID STIM HORMONE: CPT

## 2018-05-16 PROCEDURE — 83520 IMMUNOASSAY QUANT NOS NONAB: CPT

## 2018-05-16 PROCEDURE — 86376 MICROSOMAL ANTIBODY EACH: CPT

## 2018-05-16 PROCEDURE — 3079F DIAST BP 80-89 MM HG: CPT | Mod: CPTII,S$GLB,, | Performed by: INTERNAL MEDICINE

## 2018-05-16 PROCEDURE — 3074F SYST BP LT 130 MM HG: CPT | Mod: CPTII,S$GLB,, | Performed by: INTERNAL MEDICINE

## 2018-05-16 PROCEDURE — 84439 ASSAY OF FREE THYROXINE: CPT

## 2018-05-16 PROCEDURE — 99204 OFFICE O/P NEW MOD 45 MIN: CPT | Mod: S$GLB,,, | Performed by: INTERNAL MEDICINE

## 2018-05-16 PROCEDURE — 36415 COLL VENOUS BLD VENIPUNCTURE: CPT

## 2018-05-16 NOTE — LETTER
May 17, 2018      Chriss Loco MD  [address] [facility] [address]           [facility] - Endocrinology  [address]  [facility] [address]  Phone: [phone]          Patient: Violet Mirza   MR Number: [id number]   YOB: 1950   Date of Visit: 5/16/2018       Dear Dr. Chriss Loco:    Thank you for referring Violet Mirza to me for evaluation. Attached you will find relevant portions of my assessment and plan of care.    If you have questions, please do not hesitate to call me. I look forward to following Violet Mirza along with you.    Sincerely,    Jasmyn Magallanes, NP    Enclosure  CC:  No Recipients    If you would like to receive this communication electronically, please contact [email or web address] or [phone] to request more information on [facility] access.    For providers and/or their staff who would like to refer a patient to [facility], please contact us through our one-stop-shop provider referral line, [facility], at [phone].    If you feel you have received this communication in error or would no longer like to receive these types of communications, please e-mail [email or web address]

## 2018-05-17 ENCOUNTER — PATIENT MESSAGE (OUTPATIENT)
Dept: ENDOCRINOLOGY | Facility: CLINIC | Age: 68
End: 2018-05-17

## 2018-05-17 ENCOUNTER — TELEPHONE (OUTPATIENT)
Dept: ENDOCRINOLOGY | Facility: CLINIC | Age: 68
End: 2018-05-17

## 2018-05-17 DIAGNOSIS — E03.8 OTHER SPECIFIED HYPOTHYROIDISM: Primary | ICD-10-CM

## 2018-05-17 LAB — TSH RECEP AB SER-ACNC: <1 IU/L

## 2018-05-18 ENCOUNTER — PATIENT MESSAGE (OUTPATIENT)
Dept: ENDOCRINOLOGY | Facility: CLINIC | Age: 68
End: 2018-05-18

## 2018-05-19 ENCOUNTER — PATIENT MESSAGE (OUTPATIENT)
Dept: ENDOCRINOLOGY | Facility: CLINIC | Age: 68
End: 2018-05-19

## 2018-05-21 ENCOUNTER — PATIENT MESSAGE (OUTPATIENT)
Dept: ENDOCRINOLOGY | Facility: CLINIC | Age: 68
End: 2018-05-21

## 2018-06-07 DIAGNOSIS — I50.21 ACUTE SYSTOLIC HEART FAILURE: ICD-10-CM

## 2018-06-07 RX ORDER — FUROSEMIDE 40 MG/1
40 TABLET ORAL 2 TIMES DAILY
Qty: 60 TABLET | Refills: 0 | Status: SHIPPED | OUTPATIENT
Start: 2018-06-07 | End: 2018-07-06

## 2018-06-18 ENCOUNTER — PATIENT MESSAGE (OUTPATIENT)
Dept: ENDOCRINOLOGY | Facility: CLINIC | Age: 68
End: 2018-06-18

## 2018-06-21 ENCOUNTER — TELEPHONE (OUTPATIENT)
Dept: BARIATRICS | Facility: CLINIC | Age: 68
End: 2018-06-21

## 2018-06-21 NOTE — TELEPHONE ENCOUNTER
----- Message from Shady Patrick sent at 6/21/2018  1:11 PM CDT -----  Contact: Pt  Needs Advice    Reason for call:    Would like a call regarding sleeve surgery;.   Communication Preference: [phone]   Additional Information:

## 2018-06-21 NOTE — TELEPHONE ENCOUNTER
dwp I will send insurance info to [facility] to verify benefits . Pt said her bmi fluctuates betweeen 34-35. Told pt insurance companies do not cover bariatric sx for pts with have a bmi under 35 . pts 35 and over need to have cormorbidites, 40 and over don't need cormorbidities

## 2018-06-29 ENCOUNTER — LAB VISIT (OUTPATIENT)
Dept: LAB | Facility: HOSPITAL | Age: 68
End: 2018-06-29
Attending: INTERNAL MEDICINE
Payer: MEDICARE

## 2018-06-29 ENCOUNTER — TELEPHONE (OUTPATIENT)
Dept: ENDOCRINOLOGY | Facility: CLINIC | Age: 68
End: 2018-06-29

## 2018-06-29 DIAGNOSIS — E03.9 HYPOTHYROIDISM, UNSPECIFIED TYPE: ICD-10-CM

## 2018-06-29 DIAGNOSIS — E03.8 OTHER SPECIFIED HYPOTHYROIDISM: ICD-10-CM

## 2018-06-29 LAB — TSH SERPL DL<=0.005 MIU/L-ACNC: 2.03 UIU/ML

## 2018-06-29 PROCEDURE — 36415 COLL VENOUS BLD VENIPUNCTURE: CPT

## 2018-06-29 PROCEDURE — 84443 ASSAY THYROID STIM HORMONE: CPT

## 2018-06-29 RX ORDER — LEVOTHYROXINE SODIUM 75 UG/1
TABLET ORAL
Qty: 32 TABLET | Refills: 2
Start: 2018-06-29 | End: 2019-02-04 | Stop reason: SDUPTHER

## 2018-06-29 NOTE — TELEPHONE ENCOUNTER
Patient notified of test results. TSH is now within the normal range. Informed to continue current dose of Levothyroxine 75 mcg - 1 tab po once daily except on Sundays take an extra 1/2 tab. Patient verbalized understanding.

## 2018-07-06 DIAGNOSIS — I50.21 ACUTE SYSTOLIC HEART FAILURE: ICD-10-CM

## 2018-07-06 RX ORDER — FUROSEMIDE 40 MG/1
40 TABLET ORAL 2 TIMES DAILY
Qty: 60 TABLET | Refills: 0 | Status: SHIPPED | OUTPATIENT
Start: 2018-07-06 | End: 2018-08-06

## 2018-07-10 ENCOUNTER — PATIENT MESSAGE (OUTPATIENT)
Dept: ENDOCRINOLOGY | Facility: CLINIC | Age: 68
End: 2018-07-10

## 2018-07-11 ENCOUNTER — PATIENT MESSAGE (OUTPATIENT)
Dept: ENDOCRINOLOGY | Facility: CLINIC | Age: 68
End: 2018-07-11

## 2018-07-11 DIAGNOSIS — L65.9 HAIR LOSS: Primary | ICD-10-CM

## 2018-07-12 ENCOUNTER — PATIENT MESSAGE (OUTPATIENT)
Dept: ENDOCRINOLOGY | Facility: CLINIC | Age: 68
End: 2018-07-12

## 2018-07-13 ENCOUNTER — PATIENT MESSAGE (OUTPATIENT)
Dept: CARDIOLOGY | Facility: CLINIC | Age: 68
End: 2018-07-13

## 2018-07-13 NOTE — TELEPHONE ENCOUNTER
Spoke to Ms. Mirza, recommendations given to stop amiodarone per Dr. Loco and continue with plan to have echo on 7/19 as previously scheduled.     Pt verbalizes understanding and appreciates calls.

## 2018-07-19 ENCOUNTER — HOSPITAL ENCOUNTER (OUTPATIENT)
Dept: CARDIOLOGY | Facility: HOSPITAL | Age: 68
Discharge: HOME | End: 2018-07-19
Attending: INTERNAL MEDICINE
Payer: MEDICARE

## 2018-07-19 DIAGNOSIS — I50.22 CHRONIC SYSTOLIC HEART FAILURE: ICD-10-CM

## 2018-07-19 LAB
DIASTOLIC DYSFUNCTION: YES
ESTIMATED PA SYSTOLIC PRESSURE: 23.98
MITRAL VALVE MOBILITY: NORMAL
MITRAL VALVE REGURGITATION: ABNORMAL
RETIRED EF AND QEF - SEE NOTES: 30 (ref 55–65)
TRICUSPID VALVE REGURGITATION: ABNORMAL

## 2018-07-19 PROCEDURE — 93306 TTE W/DOPPLER COMPLETE: CPT | Mod: 26,,, | Performed by: INTERNAL MEDICINE

## 2018-07-19 PROCEDURE — 93306 TTE W/DOPPLER COMPLETE: CPT

## 2018-07-20 ENCOUNTER — TELEPHONE (OUTPATIENT)
Dept: ELECTROPHYSIOLOGY | Facility: CLINIC | Age: 68
End: 2018-07-20

## 2018-07-20 NOTE — TELEPHONE ENCOUNTER
Notified Ms. Mirza of her repeat ECHO. Surprisingly LV function has worsened from 45-50% now down to 30-35% despite being in sinus rhythm. This likely indicates she has a primary cardiomyopathy unrelated to AF and her AF episode last year worsened an already existing CMP, possibly hyperthyroid related?    She has an appointment with Dr. Kelly in 2 weeks. We recently stopped amiodarone due to side effects/hair loss. Discussed alternative anti-arrhythmic therapy would be sotalol after a 2-3 month amiodarone washoud period, which would require a 3 day hospital stay. She is not sure if she would want to do that. Will request to see her in [facility] in 2 months and will discuss further.

## 2018-07-21 ENCOUNTER — PATIENT MESSAGE (OUTPATIENT)
Dept: CARDIOLOGY | Facility: CLINIC | Age: 68
End: 2018-07-21

## 2018-07-21 DIAGNOSIS — I48.0 PAROXYSMAL ATRIAL FIBRILLATION: Primary | ICD-10-CM

## 2018-07-24 ENCOUNTER — PATIENT MESSAGE (OUTPATIENT)
Dept: ENDOCRINOLOGY | Facility: CLINIC | Age: 68
End: 2018-07-24

## 2018-07-24 NOTE — TELEPHONE ENCOUNTER
Spoke with Ms. Mirza    Questions answered re amiodarone and Dr. Jon's recommendations for EKG.    Pt states she would like to wait to do EKG when she sees Dr. Kelly on 8/6.    Orders for EKG entered, will ask MA to assist in getting on María schedule for 8/6 at 0900.

## 2018-07-26 DIAGNOSIS — I48.0 PAF (PAROXYSMAL ATRIAL FIBRILLATION): Primary | ICD-10-CM

## 2018-08-06 ENCOUNTER — OFFICE VISIT (OUTPATIENT)
Dept: CARDIOLOGY | Facility: CLINIC | Age: 68
End: 2018-08-06
Payer: MEDICARE

## 2018-08-06 VITALS
BODY MASS INDEX: 35.31 KG/M2 | WEIGHT: 233 LBS | DIASTOLIC BLOOD PRESSURE: 72 MMHG | OXYGEN SATURATION: 100 % | HEIGHT: 68 IN | SYSTOLIC BLOOD PRESSURE: 124 MMHG

## 2018-08-06 DIAGNOSIS — I50.21 ACUTE SYSTOLIC HEART FAILURE: ICD-10-CM

## 2018-08-06 DIAGNOSIS — I87.2 VENOUS INSUFFICIENCY OF BOTH LOWER EXTREMITIES: ICD-10-CM

## 2018-08-06 DIAGNOSIS — E66.01 CLASS 2 SEVERE OBESITY DUE TO EXCESS CALORIES WITH SERIOUS COMORBIDITY AND BODY MASS INDEX (BMI) OF 35.0 TO 35.9 IN ADULT: ICD-10-CM

## 2018-08-06 DIAGNOSIS — I48.0 PAROXYSMAL ATRIAL FIBRILLATION: Primary | ICD-10-CM

## 2018-08-06 DIAGNOSIS — I34.0 MITRAL VALVE INSUFFICIENCY, UNSPECIFIED ETIOLOGY: ICD-10-CM

## 2018-08-06 PROBLEM — E66.09 OBESITY DUE TO EXCESS CALORIES: Status: ACTIVE | Noted: 2018-08-06

## 2018-08-06 PROCEDURE — 3074F SYST BP LT 130 MM HG: CPT | Mod: CPTII,S$GLB,, | Performed by: INTERNAL MEDICINE

## 2018-08-06 PROCEDURE — 99999 PR PBB SHADOW E&M-EST. PATIENT-LVL III: CPT | Mod: PBBFAC,,, | Performed by: INTERNAL MEDICINE

## 2018-08-06 PROCEDURE — 3078F DIAST BP <80 MM HG: CPT | Mod: CPTII,S$GLB,, | Performed by: INTERNAL MEDICINE

## 2018-08-06 PROCEDURE — 93000 ELECTROCARDIOGRAM COMPLETE: CPT | Mod: S$GLB,,, | Performed by: INTERNAL MEDICINE

## 2018-08-06 PROCEDURE — 99215 OFFICE O/P EST HI 40 MIN: CPT | Mod: S$GLB,,, | Performed by: INTERNAL MEDICINE

## 2018-08-06 RX ORDER — FUROSEMIDE 40 MG/1
40 TABLET ORAL 2 TIMES DAILY
Qty: 60 TABLET | Refills: 0 | Status: CANCELLED | OUTPATIENT
Start: 2018-08-06 | End: 2019-08-06

## 2018-08-06 RX ORDER — FUROSEMIDE 40 MG/1
40 TABLET ORAL 2 TIMES DAILY
Qty: 60 TABLET | Refills: 0 | Status: SHIPPED | OUTPATIENT
Start: 2018-08-06 | End: 2018-09-11 | Stop reason: SDUPTHER

## 2018-08-06 NOTE — TELEPHONE ENCOUNTER
----- Message from Larissa Alesia sent at 8/6/2018  8:19 AM CDT -----  Contact: Kayla, [facility], [phone]  Called in requesting patient's Furosemide 40 mg prescription refill. Please advise.

## 2018-08-06 NOTE — TELEPHONE ENCOUNTER
[facility]  Lasix 40 mg take 1 Po Bid

## 2018-08-06 NOTE — PROGRESS NOTES
Subjective:   Patient ID:  Violet Mirza is a 68 y.o. female who presents for evaluation and treatment of Chronic Venous Insufficiency; Varicose Veins; and Leg Swelling      HPI:     She is here for treatment of venous reflux disease complicated with indurated varicose veins, spider veins, no ulcers, and no venous claudication. No DVT. She is s/p R calf phlebectomy twice and multiple spider veins saline injections. She has HTN, obesity with BMI 35, HLP, PAF, and CHF with EF 30-35% + normal coronaries. She worked as  for years. Currently retired. She started to wear compression stockings.       ECG today: NSR with PVCs in a bigeminy pattern      Venous ultrasound 4/2018     R GSV 3.2 mm without reflux    R LSV 3.6 mm with reflux 1160 msec     L GSV 5.7 mm without reflux   L LSV 3.2 mm without reflux               Patient Active Problem List    Diagnosis Date Noted    Obesity due to excess calories 08/06/2018    Venous insufficiency of both lower extremities 04/02/2018         S/p R calf phlebectomy    2014 and 2011       Spider veins injections-saline   Several times   Dermatologist-Heather       Venous ultrasound 4/2018     R GSV 3.2 mm without reflux    R LSV 3.6 mm with reflux 1160 msec     L GSV 5.7 mm without reflux   L LSV 3.2 mm without reflux                 Mitral regurgitation 02/08/2018    HFrEF (heart failure with reduced ejection fraction) 01/22/2018    Paroxysmal atrial fibrillation 01/22/2018    Shortness of breath 11/03/2017    Asthma exacerbation 10/30/2017    Anxiety 10/30/2017    GERD (gastroesophageal reflux disease) 10/13/2015    Left knee DJD 01/26/2015     S/p Synvisc 2015  Dr. Callejas      Hypothyroidism 11/22/2014     TPO neg 11/14      HTN (hypertension) 07/26/2013    Osteopenia 07/26/2013    History of peptic ulcer 07/26/2013     EGD 9/09  Dr. Gomez      Personal history of kidney stones 07/26/2013    Rosacea 07/26/2013           Right Arm BP - Sitting:  120/70  Left Arm BP - Sittin/72        LABS    LAST HbA1c  Lab Results   Component Value Date    HGBA1C 5.8 (H) 2018       Lipid panel  Lab Results   Component Value Date    CHOL 137 2018    CHOL 125 2018    CHOL 222 (H) 2017     Lab Results   Component Value Date    HDL 47 2018    HDL 51 2018    HDL 74 2017     Lab Results   Component Value Date    LDLCALC 72.4 2018    LDLCALC 56.8 (L) 2018    LDLCALC 126.4 2017     Lab Results   Component Value Date    TRIG 88 2018    TRIG 86 2018    TRIG 108 2017     Lab Results   Component Value Date    CHOLHDL 34.3 2018    CHOLHDL 40.8 2018    CHOLHDL 33.3 2017            Review of Systems   Constitution: Negative for diaphoresis, weakness, night sweats, weight gain and weight loss.   HENT: Negative for congestion.    Eyes: Negative for blurred vision, discharge and double vision.   Cardiovascular: Negative for chest pain, claudication, cyanosis, dyspnea on exertion, irregular heartbeat, leg swelling, near-syncope, orthopnea, palpitations, paroxysmal nocturnal dyspnea and syncope.   Respiratory: Negative for cough, shortness of breath and wheezing.    Endocrine: Negative for cold intolerance, heat intolerance and polyphagia.   Hematologic/Lymphatic: Negative for adenopathy and bleeding problem. Does not bruise/bleed easily.   Skin: Positive for color change (varicose veins ). Negative for dry skin and nail changes.   Musculoskeletal: Negative for arthritis, back pain, falls, joint pain, myalgias and neck pain.   Gastrointestinal: Negative for bloating, abdominal pain, change in bowel habit and constipation.   Genitourinary: Negative for bladder incontinence, dysuria, flank pain, genital sores and missed menses.   Neurological: Negative for aphonia, brief paralysis, difficulty with concentration and dizziness.   Psychiatric/Behavioral: Negative for altered mental status and  memory loss. The patient does not have insomnia.    Allergic/Immunologic: Negative for environmental allergies.       Objective:   Physical Exam   Constitutional: She is oriented to person, place, and time. She appears well-developed and well-nourished. She is not intubated.   HENT:   Head: Normocephalic and atraumatic.   Right Ear: External ear normal.   Left Ear: External ear normal.   Mouth/Throat: Oropharynx is clear and moist.   Eyes: Conjunctivae and EOM are normal. Pupils are equal, round, and reactive to light. Right eye exhibits no discharge. Left eye exhibits no discharge. No scleral icterus.   Neck: Normal range of motion. Neck supple. Normal carotid pulses, no hepatojugular reflux and no JVD present. Carotid bruit is not present. No tracheal deviation present. No thyromegaly present.   Cardiovascular: Normal rate, regular rhythm, S1 normal and S2 normal.   Occasional extrasystoles are present. PMI is not displaced.  Exam reveals no gallop, no S3, no distant heart sounds, no friction rub and no midsystolic click.    No murmur heard.  Pulses:       Carotid pulses are 2+ on the right side, and 2+ on the left side.       Radial pulses are 2+ on the right side, and 2+ on the left side.        Femoral pulses are 2+ on the right side, and 2+ on the left side.       Popliteal pulses are 2+ on the right side, and 2+ on the left side.        Dorsalis pedis pulses are 2+ on the right side, and 2+ on the left side.        Posterior tibial pulses are 2+ on the right side, and 2+ on the left side.   Pulmonary/Chest: Effort normal and breath sounds normal. No accessory muscle usage or stridor. No apnea, no tachypnea and no bradypnea. She is not intubated. No respiratory distress. She has no decreased breath sounds. She has no wheezes. She has no rales. She exhibits no tenderness and no bony tenderness.   Abdominal: She exhibits no distension, no pulsatile liver, no abdominal bruit, no ascites, no pulsatile midline mass  and no mass. There is no tenderness. There is no rebound and no guarding.   Musculoskeletal: Normal range of motion. She exhibits no edema or tenderness.   Lymphadenopathy:     She has no cervical adenopathy.   Neurological: She is alert and oriented to person, place, and time. She has normal reflexes. No cranial nerve deficit. Coordination normal.   Skin: Skin is warm. No rash noted. No erythema. No pallor.     No ulcers      Indurated varicose veins-R calf and L popliteal area      Multiple spider veins       No hyperpigmentation               Psychiatric: She has a normal mood and affect. Her behavior is normal. Judgment and thought content normal.       Assessment:     1. Paroxysmal atrial fibrillation    2. Venous insufficiency of both lower extremities    3. Mitral valve insufficiency, unspecified etiology    4. Class 2 severe obesity due to excess calories with serious comorbidity and body mass index (BMI) of 35.0 to 35.9 in adult        Plan:         Weigh loss  Exercise  Low salt diet        Compression stockings 20-30 mmHg  Will provide information to insurance company for R LSV EVLT + possible phlebectomy   Elevate legs when resting           Regarding PAF   She is in NSR today   She will follow up with Dr. Loco   She will stay off amiodarone for now as recommended           Continue with current medical plan and lifestyle changes.  Return sooner for concerns or questions. If symptoms persist go to the ED  I have reviewed all pertinent data on this patient       I have reviewed the patient's medical history in detail and updated the computerized patient record.    Orders Placed This Encounter   Procedures    IN OFFICE EKG 12-LEAD (to Muse)     Order Specific Question:   Diagnosis     Answer:   PAF (paroxysmal atrial fibrillation) [[id number]]       Follow up as scheduled. Return sooner for concerns or questions  Follow up PRN          Continue general cardiology and EP care with Dr. Duran +   Claudy            She expressed verbal understanding and agreed with the plan            Patient's Medications   New Prescriptions    FUROSEMIDE (LASIX) 40 MG TABLET    TAKE 1 TABLET BY MOUTH ONCE DAILY   Previous Medications    APIXABAN (ELIQUIS) 5 MG TAB    TAKE 1 TABLET BY MOUTH TWO TIMES A DAY    APIXABAN 5 MG TAB    Take 1 tablet (5 mg total) by mouth 2 (two) times daily.    FLUTICASONE (FLONASE) 50 MCG/ACTUATION NASAL SPRAY    2 sprays (100 mcg total) by Each Nare route once daily.    GABAPENTIN (NEURONTIN) 300 MG CAPSULE    TK 1 C PO TID    LEVOTHYROXINE (SYNTHROID) 75 MCG TABLET    Take 1 tablet by mouth once daily except on Sundays take 1.5 tabs    LOSARTAN (COZAAR) 100 MG TABLET    Take 1 tablet (100 mg total) by mouth once daily.    LOSARTAN (COZAAR) 100 MG TABLET    TAKE 1 TABLET BY MOUTH ONCE DAILY    METOPROLOL SUCCINATE (TOPROL-XL) 100 MG 24 HR TABLET    Take 1 tablet (100 mg total) by mouth once daily.    TRAMADOL (ULTRAM) 50 MG TABLET    TK 1 T PO Q 6 H PRN   Modified Medications    Modified Medication Previous Medication    FUROSEMIDE (LASIX) 40 MG TABLET furosemide (LASIX) 40 MG tablet       Take 1 tablet (40 mg total) by mouth 2 (two) times daily.    Take 1 tablet (40 mg total) by mouth 2 (two) times daily.   Discontinued Medications

## 2018-08-06 NOTE — PATIENT INSTRUCTIONS
Understanding Chronic Venous Insufficiency  Problems with the veins in the legs may lead to chronic venous insufficiency (CVI). CVI means that there is a long-term problem with the veins not being able to pump blood back to your heart. When this happens, blood stays in the legs and causes swelling and aching.   Two problems that may lead to chronic venous insufficiency are:  · Damaged valves. Valves keep blood flowing from the legs through the blood vessels and back to the heart. When the valves are damaged, blood does not flow as well.   · Deep vein thrombosis (DVT). Blood clots may form in the deep veins of the legs. This may cause pain, redness, and swelling in the legs. It may also block the flow of blood back to the heart. Seek immediate medical care if you have these symptoms.  · A blood clot in the leg can also break off and travel to the lungs. This is called pulmonary embolism (PE). In the lungs, the clot can cut off the flow of blood. This may cause chest pain, trouble breathing, sweating, a fast heartbeat, coughing (may cough up blood), and fainting. It is a medical emergency and may cause death. Call 911 if you have these symptoms.  · Healthcare providers call the two conditions, DVT and PE, venous thromboembolism (VTE).  CVI cant be cured, but you can control leg swelling to reduce the likelihood of ulcers (sores).  Recognizing the symptoms  Be aware of the following:  · If you stand or sit with your feet down for long periods, your legs may ache or feel heavy.  · Swollen ankles are possibly the most common symptom of CVI.  · As swelling increases, the skin over your ankles may show red spots or a brownish tinge. The skin may feel leathery or scaly, and may start to itch.  · If swelling is not controlled, an ulcer (open wound) may form.  What you can do  Reduce your risk of developing ulcers by doing the following:  · Increase blood flow back to your heart by elevating your legs, exercising daily,  and wearing elastic stockings.  · Boost blood flow in your legs by losing excess weight.  · If you must stand or sit in one place for a period of time, keep your blood moving by wiggling your toes, shifting your body position, and rising up on the balls of your feet.    Date Last Reviewed: 5/1/2016  © 2838-0737 [employer]. [address] [place] [address], [place] [address]. All rights reserved. This information is not intended as a substitute for professional medical care. Always follow your healthcare professional's instructions.

## 2018-08-07 ENCOUNTER — TELEPHONE (OUTPATIENT)
Dept: CARDIOLOGY | Facility: CLINIC | Age: 68
End: 2018-08-07

## 2018-08-07 DIAGNOSIS — I87.2 VENOUS INSUFFICIENCY OF LOWER EXTREMITY, UNSPECIFIED LATERALITY: Primary | ICD-10-CM

## 2018-08-07 NOTE — TELEPHONE ENCOUNTER
----- Message from Josiah Kelly MD sent at 8/6/2018  9:18 AM CDT -----      Please place orders for EVLT      Thanks      ZN

## 2018-08-09 DIAGNOSIS — I87.2 VENOUS INSUFFICIENCY OF LOWER EXTREMITY, UNSPECIFIED LATERALITY: Primary | ICD-10-CM

## 2018-08-29 ENCOUNTER — TELEPHONE (OUTPATIENT)
Dept: CARDIOLOGY | Facility: CLINIC | Age: 68
End: 2018-08-29

## 2018-08-29 NOTE — TELEPHONE ENCOUNTER
Left detailed message, I apologized for the miscommunication.     Asked patient to return my call.

## 2018-08-29 NOTE — TELEPHONE ENCOUNTER
----- Message from Caity Francisco sent at 8/29/2018  3:37 PM CDT -----  Contact: Self. [phone]  Patient would like to speak with you about rescheduling her procedure because she was never informed she had one scheduled til she received a text message today. Please advise

## 2018-08-30 ENCOUNTER — TELEPHONE (OUTPATIENT)
Dept: CARDIOLOGY | Facility: CLINIC | Age: 68
End: 2018-08-30

## 2018-08-30 NOTE — TELEPHONE ENCOUNTER
----- Message from Noe May sent at 8/29/2018  6:37 PM CDT -----  Contact: self  Wants to reschedule procedure for September 5th     [phone]

## 2018-09-11 ENCOUNTER — OFFICE VISIT (OUTPATIENT)
Dept: NEUROLOGY | Facility: HOSPITAL | Age: 68
End: 2018-09-11
Attending: SURGERY
Payer: MEDICARE

## 2018-09-11 VITALS
HEART RATE: 66 BPM | SYSTOLIC BLOOD PRESSURE: 146 MMHG | TEMPERATURE: 98 F | WEIGHT: 233 LBS | HEIGHT: 68 IN | DIASTOLIC BLOOD PRESSURE: 81 MMHG | BODY MASS INDEX: 35.31 KG/M2

## 2018-09-11 DIAGNOSIS — I50.22 CHRONIC SYSTOLIC HEART FAILURE: ICD-10-CM

## 2018-09-11 DIAGNOSIS — D17.1 LIPOMA OF ANTERIOR CHEST WALL: ICD-10-CM

## 2018-09-11 DIAGNOSIS — I87.2 VENOUS INSUFFICIENCY OF BOTH LOWER EXTREMITIES: ICD-10-CM

## 2018-09-11 DIAGNOSIS — I48.0 PAROXYSMAL ATRIAL FIBRILLATION: Primary | ICD-10-CM

## 2018-09-11 DIAGNOSIS — Z79.01 CURRENT USE OF LONG TERM ANTICOAGULATION: ICD-10-CM

## 2018-09-11 PROCEDURE — 99214 OFFICE O/P EST MOD 30 MIN: CPT | Performed by: SURGERY

## 2018-09-11 RX ORDER — DICLOFENAC SODIUM 10 MG/G
GEL TOPICAL
Refills: 1 | COMMUNITY
Start: 2018-06-22 | End: 2019-11-01

## 2018-09-11 NOTE — PROGRESS NOTES
"NOLANETS:  [facility]  A collaboration between [facility] and [facility]      PATIENT: Violet Mirza  MRN: [id number]  DATE: 9/11/2018    Subjective:      Chief Complaint: Consult (removeal of a lipoma by collar bone)      Vitals:   Vitals:    09/11/18 1004   BP: (!) 146/81   Pulse: 66   Temp: 97.8 °F (36.6 °C)   TempSrc: Oral   Weight: 105.7 kg (233 lb 0.4 oz)   Height: 5' 8" (1.727 m)        Karnofsky Score:     Diagnosis:   1. Paroxysmal atrial fibrillation    2. Chronic systolic heart failure    3. Current use of long term anticoagulation    4. Venous insufficiency of both lower extremities    5. Obesity, Class II, BMI 35-39.9, with comorbidity    6. Lipoma of anterior chest wall         Oncologic History: na    Interval History: 67 y/o female self referred fo rchest lipoma wanting excision. History of life threatenting MI Jan 2018, CHF, 10% EF, history o fA fib, venous insufficiency. and now on eliquis.  Some shortness of breath with exertion. Morbid obesity.    Past Medical History:  Past Medical History:   Diagnosis Date    Chondromalacia of right patella     MRI 6/2016 at [facility]    Diverticulitis     GERD (gastroesophageal reflux disease)     HTN (hypertension) 7/26/2013    Hypoglycemia 11/22/2014    OA (osteoarthritis) of knee     bilateraly, followed by Dr. Callejas, [facility], s/p synvisc injections    Osteopenia 7/26/2013    Personal history of kidney stones 7/26/2013    Rosacea 7/26/2013    Tear of medial meniscus of right knee     MRI 6/2016       Past Surgical History:  Past Surgical History:   Procedure Laterality Date    ADENOIDECTOMY      FOOT SURGERY Bilateral     KNEE ARTHROSCOPY W/ MENISCAL REPAIR Right 5/14    TONSILLECTOMY      TUBAL LIGATION         Family History:  Family History   Problem Relation Age of Onset    Hypertension Mother     Diabetes Mother     " Glaucoma Mother     Cataracts Mother     Hypertension Father     Stroke Father     No Known Problems Sister     No Known Problems Brother     No Known Problems Daughter     No Known Problems Son     No Known Problems Sister     No Known Problems Daughter        Allergies:  Review of patient's allergies indicates:   Allergen Reactions    Aldactone [spironolactone] Other (See Comments)     Hyperkalemia when initiated on 01/23/2018        Medications:  Current Outpatient Medications   Medication Sig Dispense Refill    apixaban (ELIQUIS) 5 mg Tab TAKE 1 TABLET BY MOUTH TWO TIMES A DAY 60 tablet 11    diclofenac sodium 1 % Gel LILIANA 4 GRAMS TOPICALLY AA QID PRN  1    furosemide (LASIX) 40 MG tablet TAKE 1 TABLET BY MOUTH ONCE DAILY (Patient taking differently: Take 40 mg by mouth 2 (two) times daily. ) 90 tablet 9    levothyroxine (SYNTHROID) 75 MCG tablet Take 1 tablet by mouth once daily except on Sundays take 1.5 tabs 32 tablet 2    losartan (COZAAR) 100 MG tablet TAKE 1 TABLET BY MOUTH ONCE DAILY 90 tablet 3    traMADol (ULTRAM) 50 mg tablet TK 1 T PO Q 6 H PRN  0     No current facility-administered medications for this visit.        Review of Systems   Constitutional: Positive for activity change (decreased) and fatigue. Negative for appetite change, chills, fever and unexpected weight change.   HENT: Negative.  Negative for ear pain, rhinorrhea and sinus pressure.    Eyes: Negative.    Respiratory: Positive for shortness of breath. Negative for cough, chest tightness and wheezing.    Cardiovascular: Positive for leg swelling. Negative for chest pain and palpitations.   Gastrointestinal: Negative.  Negative for abdominal distention, abdominal pain, anal bleeding, blood in stool, constipation, diarrhea, nausea, rectal pain and vomiting.        GERD   Genitourinary: Negative.  Negative for difficulty urinating, dysuria and frequency.   Musculoskeletal: Positive for arthralgias. Negative for back pain  and gait problem.   Skin: Negative.  Negative for color change, pallor and rash.   Allergic/Immunologic: Negative.  Negative for environmental allergies, food allergies and immunocompromised state.   Neurological: Negative.  Negative for dizziness, seizures, syncope, weakness and light-headedness.   Hematological: Bruises/bleeds easily.   Psychiatric/Behavioral: Negative for dysphoric mood. The patient is nervous/anxious.       Objective:      Physical Exam   Constitutional: She is oriented to person, place, and time. She appears well-developed and well-nourished.   HENT:   Head: Normocephalic.   Eyes: No scleral icterus.   Neck: No JVD present. No tracheal deviation present.   Cardiovascular: Normal rate, regular rhythm and intact distal pulses.   No murmur heard.  Pulmonary/Chest: Effort normal and breath sounds normal. She has no rales.   Abdominal: Soft. Bowel sounds are normal.   obese   Musculoskeletal: Normal range of motion. She exhibits no edema.   Neurological: She is alert and oriented to person, place, and time.   Skin: Skin is warm and dry.   3 x 3 cm lipoma R anterior superior chest wall.mobile.    Spider veins both LE's   Nursing note and vitals reviewed.     Assessment:       1. Paroxysmal atrial fibrillation    2. Chronic systolic heart failure    3. Current use of long term anticoagulation    4. Venous insufficiency of both lower extremities    5. Obesity, Class II, BMI 35-39.9, with comorbidity    6. Lipoma of anterior chest wall        Laboratory Data:    Neuroendocrine Labs Latest Ref Rng & Units 9/11/2018 8/6/2018 6/29/2018 5/16/2018 5/16/2018 4/4/2018 4/2/2018   VITAMIN B12 200 - 1,100 pg/mL - - - - - - -   TSH 0.400 - 4.000 uIU/mL - - 2.027 5.877(H) - - -   25 HYDROXY VIT D2 See Note: ng/mL - - - - - - -   25 HYDROXY VIT D3 See Note: ng/mL - - - - - - -   WBC 3.90 - 12.70 K/uL - - - - - - -   HGB 12.0 - 16.0 g/dL - - - - - - -   HCT 37.0 - 48.5 % - - - - - - -   PLATLETS 150 - 350 K/uL - - -  - - - -   PT 9.0 - 12.5 sec - - - - - - -   INR 0.8 - 1.2 - - - - - - -   GLUCOSE 70 - 110 mg/dL - - - - - - -   BUN 8 - 23 mg/dL - - - - - - -   CREATININE 0.5 - 1.4 mg/dL - - - - - - -    - 145 mmol/L - - - - - - -   K 3.5 - 5.1 mmol/L - - - - - - -   CHLORIDE 95 - 110 mmol/L - - - - - - -   CO2 23 - 29 mmol/L - - - - - - -   CALCIUM 8.7 - 10.5 mg/dL - - - - - - -   PROTEIN, TOTAL 6.0 - 8.4 g/dL - - - - - - -   ALBUMIN 3.5 - 5.2 g/dL - - - - - - -   TOTAL BILIRUBIN 0.1 - 1.0 mg/dL - - - - - - -   ALK PHOSPHATASE 55 - 135 U/L - - - - - - -   SGOT (AST) 10 - 40 U/L - - - - - - -   SGPT (ALT) 10 - 44 U/L - - - - - - -   TRIGLYCERIDES 30 - 150 mg/dL - - - - - - -   CHOLERSTEROL 120 - 199 mg/dL - - - - - - -   HDL 40 - 75 mg/dL - - - - - - -   LDL 63.0 - 159.0 mg/dL - - - - - - -   HEMOGLOBIN A1C 4.0 - 5.6 % - - - - - - -   Weight - 233 lbs 233 lbs - - 229 lbs 5 oz 230 lbs 13 oz 232 lbs         Impression:  Lipoma right anterior chest wall.  History of myocardial infarction with congestive heart failure and decreased ejection fraction within the last 12 months.  Chronic anticoagulation with Eliquis.  Desires excision of lipoma.  Will need clearance from Cardiology regarding discontinuation of Eliquis perioperatively    patient appears to be in sinus rhythm at present. Compensated heart failure.  Plan:       Increased risk of bleeding into cavity with postop hematoma after lipoma excision due to Eliquis.    Follow up with cardiologist later this month who will determine whether it will be okay to stop Eliquis for 6 -7 days perioperatively.  Return after seeing cardiologist if patient wishes to proceed with lipoma              NOLA Werner MD, FACS  Professor of Surgery, [facility]  Neuroendocrine Surgery, Hepatic/Pancreatic & General Surgery  [address], [address]  [place]  [address]  ph. [phone]; [phone]  fax. [phone]

## 2018-09-21 RX ORDER — METOPROLOL SUCCINATE 100 MG/1
100 TABLET, EXTENDED RELEASE ORAL DAILY
Qty: 30 TABLET | Refills: 11 | Status: SHIPPED | OUTPATIENT
Start: 2018-09-21 | End: 2018-10-04

## 2018-09-21 RX ORDER — METOPROLOL SUCCINATE 100 MG/1
100 TABLET, EXTENDED RELEASE ORAL DAILY
COMMUNITY
End: 2018-09-21 | Stop reason: SDUPTHER

## 2018-09-21 RX ORDER — METOPROLOL SUCCINATE 100 MG/1
100 TABLET, EXTENDED RELEASE ORAL DAILY
Qty: 30 TABLET | Refills: 11 | OUTPATIENT
Start: 2018-09-21 | End: 2018-10-04 | Stop reason: SDUPTHER

## 2018-09-21 NOTE — TELEPHONE ENCOUNTER
Returned pts call. Metoprolol was removed from med list, she cannot get refills due to this. Will send refill request to ordering provider.

## 2018-09-21 NOTE — TELEPHONE ENCOUNTER
----- Message from Estrellita Echevarria sent at 9/21/2018 10:35 AM CDT -----  Contact: Patient  JPTORIE- Patient states her metoprolol medication is not on her Chart and can't get it filled. Please call the patient as soon as possible at  [phone]

## 2018-10-04 ENCOUNTER — OFFICE VISIT (OUTPATIENT)
Dept: CARDIOLOGY | Facility: CLINIC | Age: 68
End: 2018-10-04
Payer: MEDICARE

## 2018-10-04 VITALS
SYSTOLIC BLOOD PRESSURE: 134 MMHG | WEIGHT: 234.63 LBS | HEIGHT: 68 IN | DIASTOLIC BLOOD PRESSURE: 84 MMHG | BODY MASS INDEX: 35.56 KG/M2

## 2018-10-04 DIAGNOSIS — I48.0 PAF (PAROXYSMAL ATRIAL FIBRILLATION): ICD-10-CM

## 2018-10-04 DIAGNOSIS — I48.0 PAROXYSMAL ATRIAL FIBRILLATION: Primary | ICD-10-CM

## 2018-10-04 DIAGNOSIS — I50.22 CHRONIC SYSTOLIC HEART FAILURE: ICD-10-CM

## 2018-10-04 DIAGNOSIS — I10 ESSENTIAL HYPERTENSION: ICD-10-CM

## 2018-10-04 PROCEDURE — 3075F SYST BP GE 130 - 139MM HG: CPT | Mod: CPTII,,, | Performed by: INTERNAL MEDICINE

## 2018-10-04 PROCEDURE — 93005 ELECTROCARDIOGRAM TRACING: CPT | Mod: PBBFAC,PO | Performed by: INTERNAL MEDICINE

## 2018-10-04 PROCEDURE — 99999 PR PBB SHADOW E&M-EST. PATIENT-LVL III: CPT | Mod: PBBFAC,,, | Performed by: INTERNAL MEDICINE

## 2018-10-04 PROCEDURE — 1101F PT FALLS ASSESS-DOCD LE1/YR: CPT | Mod: CPTII,,, | Performed by: INTERNAL MEDICINE

## 2018-10-04 PROCEDURE — 93010 ELECTROCARDIOGRAM REPORT: CPT | Mod: S$PBB,,, | Performed by: INTERNAL MEDICINE

## 2018-10-04 PROCEDURE — 99214 OFFICE O/P EST MOD 30 MIN: CPT | Mod: S$PBB,,, | Performed by: INTERNAL MEDICINE

## 2018-10-04 PROCEDURE — 3079F DIAST BP 80-89 MM HG: CPT | Mod: CPTII,,, | Performed by: INTERNAL MEDICINE

## 2018-10-04 PROCEDURE — 99213 OFFICE O/P EST LOW 20 MIN: CPT | Mod: PBBFAC,PO,25 | Performed by: INTERNAL MEDICINE

## 2018-10-04 RX ORDER — METOPROLOL SUCCINATE 200 MG/1
200 TABLET, EXTENDED RELEASE ORAL DAILY
Qty: 90 TABLET | Refills: 3 | Status: SHIPPED | OUTPATIENT
Start: 2018-10-04 | End: 2019-11-06

## 2018-10-04 NOTE — PROGRESS NOTES
Subjective:    Patient ID:  Violet Mirza is a 68 y.o. female who presents for follow-up of Paroxysmal atrial fibrillation       Referring Practitioner and Cardiologist: Jaren Medel NP and Go Duran MD  Primary Care Physician: Phyllis Gunter MD    HPI  Prior Hx:  I had the pleasure of seeing Mrs. Mirza today in our [facility] electrophysiology clinic in follow-up for her atrial fibrillation with associated AF mediated cardiomyopathy. As you are aware she is a pleasant 67 year-old woman with hypertension and hypothyroidism. Beginning in 2017 she began to develop a cough and shortness of breath. She was seen for this and was thought to have anxiety and asthma. She was given an inhaler which did not help much. She then began to have orthopnea. A 2d echocardiogram was ordered by Dr. Henning which was performed on 2018. She was in atrial fibrillation with RVR during the echocardiogram and her left ventricular ejection fraction was severely depressed at 10-15% with mildly enlarged LVEDD and moderately enlarged left atrium with severely depressed right ventricular function and pulmonary hypertension. She was admitted to [facility] for treatment and initiation of goal directed medical therapy for heart failure. In the [facility] ER she was given diltiazem. She converted spontaneously to sinus rhythm. An angiogram was performed by Dr. Duran on 2018 noting normal coronary arteries. She was started on amiodarone and eliquis. She followed up in cardiology clinic on 2018 and expressed concern about amiodarone and anticoagulation. Amiodarone was stopped (reports 3 family members that have  from amiodarone induced lung disease) and she was referred here for further evaluation. Of note her TSH and FT4 in the past on prior dosages of levothyroxine were consistent with hyperthyroidism. Recent lab values on 2017 and 2018 show suppressed TSH and normal  T4. Her synthroid dosage was recently reduced. She has no history of GI bleeding. Colonoscopy in 2015 showed diverticulosis.     I reviewed all available electrocardiograms in [facility] (starting 1/16/2018) which show sinus rhythm/tachycardia with left axis shift and incomplete RBBB with frequent PVCs except for 1/22/2018 which showed AF with RVR. An ECG from her clinic visit after starting amiodarone on 1/30/2018 shows sinus rhythm with a rate of 68 bpm. PVC foci appear to be RBBB, concordant precordial, R in limb lead 1, superiorly directed, likely basal septal focus. All of her QTc on ECGs are over 500msec.    At our initial visit we discussed continuing a short course of amiodarone to allow her EF to recover with repeat echo in 3 months. Repeat ECHOs noted EF remains in the 30-35% range. She desired to stop amiodarone and after numerous discussions elected to not proceed with PVI.     Interim Hx:  Ms. Mirza returns for follow-up discussion. She feels great.      My interpretation of today's in clinic electrocardiogram is normal sinus rhythm with a rate of 78 bpm.    Review of Systems   Constitution: Negative for weakness and malaise/fatigue.   HENT: Negative for congestion and sore throat.    Eyes: Negative for blurred vision and visual disturbance.   Cardiovascular: Negative for chest pain, dyspnea on exertion, irregular heartbeat, leg swelling, near-syncope, orthopnea, palpitations, paroxysmal nocturnal dyspnea and syncope.   Respiratory: Negative.  Negative for cough and shortness of breath.    Hematologic/Lymphatic: Negative for bleeding problem. Does not bruise/bleed easily.   Skin: Negative.    Gastrointestinal: Negative for hematochezia and melena.   Neurological: Negative for dizziness and focal weakness.        Objective:    Physical Exam   Constitutional: She is oriented to person, place, and time. She appears well-developed and well-nourished. No distress.   HENT:   Head: Normocephalic and atraumatic.    Eyes: Conjunctivae are normal. Right eye exhibits no discharge. Left eye exhibits no discharge.   Neck: Neck supple. No JVD present.   Cardiovascular: Normal rate, regular rhythm and normal heart sounds. Exam reveals no gallop and no friction rub.   No murmur heard.  Pulmonary/Chest: Effort normal and breath sounds normal. No respiratory distress. She has no wheezes. She has no rales.   Abdominal: Soft. Bowel sounds are normal. She exhibits no distension. There is no tenderness. There is no rebound.   Musculoskeletal: She exhibits no edema.   Neurological: She is alert and oriented to person, place, and time.   Skin: Skin is warm and dry. She is not diaphoretic.   Psychiatric: She has a normal mood and affect. Her behavior is normal. Judgment and thought content normal.   Vitals reviewed.        Assessment:       1. Paroxysmal atrial fibrillation    2. Chronic systolic heart failure    3. Essential hypertension         Plan:       In summary, Mrs. Mirza is a pleasant 67 year-old woman with hypertension and hypothyroidism with currently suppressed TSH and recent synthroid dose adjustment who was recently diagnosed with paroxysmal atrial fibrillation, frequent PVCs and severe biventricular nonischemic systolic heart failure. Despite sinus rhythm she has had persistent EF of 30-35%. She likely has NICMP unrelated to AF and AF is secondary. She would like to remain off anti-arrhythmic therapy. Tikosyn/sotalol are contraindicated. She does not desire amiodarone in the future leaving multaq as the only alternative which she may not be able to afford. If AF puts her back into symptomatic heart failure then multaq would be contraindicated at that time. Recommend increasing toprol xl to 200mg daily and repeat echo in 3 months. She would like to try xarelto due to cost of eliquis. Informed her xarelto may be just as expensive. She does not desire warfarin therapy.    RTC in 3 months with echo prior.    Thank you for  allowing me to participate in the care of this patient. Please do not hesitate to call me with any questions or concerns.    Chriss Loco MD, PhD  Cardiac Electrophysiology

## 2018-10-11 ENCOUNTER — PATIENT MESSAGE (OUTPATIENT)
Dept: CARDIOLOGY | Facility: CLINIC | Age: 68
End: 2018-10-11

## 2018-10-15 ENCOUNTER — PATIENT MESSAGE (OUTPATIENT)
Dept: CARDIOLOGY | Facility: CLINIC | Age: 68
End: 2018-10-15

## 2018-10-19 RX ORDER — FUROSEMIDE 40 MG/1
40 TABLET ORAL 2 TIMES DAILY
Qty: 60 TABLET | Refills: 11 | Status: SHIPPED | OUTPATIENT
Start: 2018-10-19 | End: 2020-01-13 | Stop reason: SDUPTHER

## 2018-10-19 NOTE — NURSING
Spoke with patient, Patient informed to arrive at [facility] at 0730 on Wednesday Oct. 24th. Patient informed to have last dose of Xarelto on Sunday. Patient verbalized understanding of all pre-op instructions. Unit number given to patient if any issues, questions, or concerns should arise.

## 2018-10-19 NOTE — TELEPHONE ENCOUNTER
----- Message from Alice Ortiz sent at 10/19/2018  2:51 PM CDT -----  Patient would like a refill on his lasix 40mg please sent to [facility]

## 2018-10-23 NOTE — NURSING
Called pt. Pt verbalized understanding of new arrival time 8:30 Wed 10/24/18. Pt states she does not have transportation home.  Explained to pt she can have procedure but with no oral pain/relaxing meds given if she chooses to drive herself home. Pt verbalized understanding.

## 2018-10-24 ENCOUNTER — TELEPHONE (OUTPATIENT)
Dept: CARDIOLOGY | Facility: HOSPITAL | Age: 68
End: 2018-10-24

## 2018-10-24 ENCOUNTER — TELEPHONE (OUTPATIENT)
Dept: ELECTROPHYSIOLOGY | Facility: CLINIC | Age: 68
End: 2018-10-24

## 2018-10-24 ENCOUNTER — HOSPITAL ENCOUNTER (OUTPATIENT)
Facility: HOSPITAL | Age: 68
Discharge: HOME | End: 2018-10-24
Attending: INTERNAL MEDICINE | Admitting: INTERNAL MEDICINE
Payer: MEDICARE

## 2018-10-24 VITALS
OXYGEN SATURATION: 98 % | BODY MASS INDEX: 30.31 KG/M2 | HEART RATE: 79 BPM | SYSTOLIC BLOOD PRESSURE: 151 MMHG | HEIGHT: 68 IN | WEIGHT: 200 LBS | RESPIRATION RATE: 20 BRPM | TEMPERATURE: 99 F | DIASTOLIC BLOOD PRESSURE: 87 MMHG

## 2018-10-24 DIAGNOSIS — I87.2 VENOUS INSUFFICIENCY OF RIGHT LOWER EXTREMITY: Primary | ICD-10-CM

## 2018-10-24 DIAGNOSIS — I87.2 VENOUS INSUFFICIENCY OF LOWER EXTREMITY, UNSPECIFIED LATERALITY: ICD-10-CM

## 2018-10-24 DIAGNOSIS — I87.2 VENOUS INSUFFICIENCY: ICD-10-CM

## 2018-10-24 PROCEDURE — 25000003 PHARM REV CODE 250: Performed by: INTERNAL MEDICINE

## 2018-10-24 PROCEDURE — C1733 CATH, EP, OTHR THAN COOL-TIP: HCPCS

## 2018-10-24 PROCEDURE — 36478 ENDOVENOUS LASER 1ST VEIN: CPT | Mod: RT,,, | Performed by: INTERNAL MEDICINE

## 2018-10-24 RX ORDER — DIAZEPAM 5 MG/1
10 TABLET ORAL ONCE
Status: DISCONTINUED | OUTPATIENT
Start: 2018-10-24 | End: 2018-10-24 | Stop reason: HOSPADM

## 2018-10-24 RX ADMIN — LIDOCAINE HYDROCHLORIDE: 10 INJECTION, SOLUTION EPIDURAL; INFILTRATION; INTRACAUDAL; PERINEURAL at 10:10

## 2018-10-24 NOTE — TELEPHONE ENCOUNTER
----- Message from Sun Duran MA sent at 10/24/2018 11:02 AM CDT -----  Contact: Patient      ----- Message -----  From: Keisha Reynolds  Sent: 10/24/2018  10:44 AM  To: Claudy CACERES Staff    Pt is requesting a copy of the fax that was sent to [employer]. Pt would like to follow up with them . Pt can be reached  @ [phone].

## 2018-10-24 NOTE — H&P
Progress Notes        Subjective:   Patient ID:  Violet Mirza is a 68 y.o. female who presents for evaluation and treatment of Chronic Venous Insufficiency; Varicose Veins; and Leg Swelling        HPI:      She is here for treatment of venous reflux disease complicated with indurated varicose veins, spider veins, no ulcers, and no venous claudication. No DVT. She is s/p R calf phlebectomy twice and multiple spider veins saline injections. She has HTN, obesity with BMI 35, HLP, PAF, and CHF with EF 30-35% + normal coronaries. She worked as  for years. Currently retired. She started to wear compression stockings.         ECG today: NSR with PVCs in a bigeminy pattern        Venous ultrasound 4/2018                 R GSV 3.2 mm without reflux               R LSV 3.6 mm with reflux 1160 msec                 L GSV 5.7 mm without reflux              L LSV 3.2 mm without reflux                                     Patient Active Problem List     Diagnosis Date Noted    Obesity due to excess calories 08/06/2018    Venous insufficiency of both lower extremities 04/02/2018             S/p R calf phlebectomy               2014 and 2011                   Spider veins injections-saline              Several times              Dermatologist-Heather         Venous ultrasound 4/2018                 R GSV 3.2 mm without reflux               R LSV 3.6 mm with reflux 1160 msec                 L GSV 5.7 mm without reflux              L LSV 3.2 mm without reflux                                 Mitral regurgitation 02/08/2018    HFrEF (heart failure with reduced ejection fraction) 01/22/2018    Paroxysmal atrial fibrillation 01/22/2018    Shortness of breath 11/03/2017    Asthma exacerbation 10/30/2017    Anxiety 10/30/2017    GERD (gastroesophageal reflux disease) 10/13/2015    Left knee DJD 01/26/2015       S/p Synvisc 2015  Dr. Callejas       Hypothyroidism 11/22/2014       TPO neg 11/14       HTN  (hypertension) 2013    Osteopenia 2013    History of peptic ulcer 2013       EGD   Dr. Gomez       Personal history of kidney stones 2013    Rosacea 2013               Right Arm BP - Sittin/70  Left Arm BP - Sittin/72           LABS     LAST HbA1c        Lab Results   Component Value Date     HGBA1C 5.8 (H) 2018         Lipid panel        Lab Results   Component Value Date     CHOL 137 2018     CHOL 125 2018     CHOL 222 (H) 2017            Lab Results   Component Value Date     HDL 47 2018     HDL 51 2018     HDL 74 2017            Lab Results   Component Value Date     LDLCALC 72.4 2018     LDLCALC 56.8 (L) 2018     LDLCALC 126.4 2017            Lab Results   Component Value Date     TRIG 88 2018     TRIG 86 2018     TRIG 108 2017            Lab Results   Component Value Date     CHOLHDL 34.3 2018     CHOLHDL 40.8 2018     CHOLHDL 33.3 2017               Review of Systems   Constitution: Negative for diaphoresis, weakness, night sweats, weight gain and weight loss.   HENT: Negative for congestion.    Eyes: Negative for blurred vision, discharge and double vision.   Cardiovascular: Negative for chest pain, claudication, cyanosis, dyspnea on exertion, irregular heartbeat, leg swelling, near-syncope, orthopnea, palpitations, paroxysmal nocturnal dyspnea and syncope.   Respiratory: Negative for cough, shortness of breath and wheezing.    Endocrine: Negative for cold intolerance, heat intolerance and polyphagia.   Hematologic/Lymphatic: Negative for adenopathy and bleeding problem. Does not bruise/bleed easily.   Skin: Positive for color change (varicose veins ). Negative for dry skin and nail changes.   Musculoskeletal: Negative for arthritis, back pain, falls, joint pain, myalgias and neck pain.   Gastrointestinal: Negative for bloating, abdominal pain, change in bowel  habit and constipation.   Genitourinary: Negative for bladder incontinence, dysuria, flank pain, genital sores and missed menses.   Neurological: Negative for aphonia, brief paralysis, difficulty with concentration and dizziness.   Psychiatric/Behavioral: Negative for altered mental status and memory loss. The patient does not have insomnia.    Allergic/Immunologic: Negative for environmental allergies.         Objective:   Physical Exam   Constitutional: She is oriented to person, place, and time. She appears well-developed and well-nourished. She is not intubated.   HENT:   Head: Normocephalic and atraumatic.   Right Ear: External ear normal.   Left Ear: External ear normal.   Mouth/Throat: Oropharynx is clear and moist.   Eyes: Conjunctivae and EOM are normal. Pupils are equal, round, and reactive to light. Right eye exhibits no discharge. Left eye exhibits no discharge. No scleral icterus.   Neck: Normal range of motion. Neck supple. Normal carotid pulses, no hepatojugular reflux and no JVD present. Carotid bruit is not present. No tracheal deviation present. No thyromegaly present.   Cardiovascular: Normal rate, regular rhythm, S1 normal and S2 normal.   Occasional extrasystoles are present. PMI is not displaced.  Exam reveals no gallop, no S3, no distant heart sounds, no friction rub and no midsystolic click.    No murmur heard.  Pulses:       Carotid pulses are 2+ on the right side, and 2+ on the left side.       Radial pulses are 2+ on the right side, and 2+ on the left side.        Femoral pulses are 2+ on the right side, and 2+ on the left side.       Popliteal pulses are 2+ on the right side, and 2+ on the left side.        Dorsalis pedis pulses are 2+ on the right side, and 2+ on the left side.        Posterior tibial pulses are 2+ on the right side, and 2+ on the left side.   Pulmonary/Chest: Effort normal and breath sounds normal. No accessory muscle usage or stridor. No apnea, no tachypnea and no  bradypnea. She is not intubated. No respiratory distress. She has no decreased breath sounds. She has no wheezes. She has no rales. She exhibits no tenderness and no bony tenderness.   Abdominal: She exhibits no distension, no pulsatile liver, no abdominal bruit, no ascites, no pulsatile midline mass and no mass. There is no tenderness. There is no rebound and no guarding.   Musculoskeletal: Normal range of motion. She exhibits no edema or tenderness.   Lymphadenopathy:     She has no cervical adenopathy.   Neurological: She is alert and oriented to person, place, and time. She has normal reflexes. No cranial nerve deficit. Coordination normal.   Skin: Skin is warm. No rash noted. No erythema. No pallor.     No ulcers      Indurated varicose veins-R calf and L popliteal area      Multiple spider veins       No hyperpigmentation               Psychiatric: She has a normal mood and affect. Her behavior is normal. Judgment and thought content normal.         Assessment:      1. Paroxysmal atrial fibrillation    2. Venous insufficiency of both lower extremities    3. Mitral valve insufficiency, unspecified etiology    4. Class 2 severe obesity due to excess calories with serious comorbidity and body mass index (BMI) of 35.0 to 35.9 in adult          Plan:            Weigh loss  Exercise  Low salt diet           Compression stockings 20-30 mmHg  Will provide information to insurance company for R LSV EVLT + possible phlebectomy   Elevate legs when resting               Regarding PAF              She is in NSR today              She will follow up with Dr. Loco              She will stay off amiodarone for now as recommended                         Continue with current medical plan and lifestyle changes.  Return sooner for concerns or questions. If symptoms persist go to the ED  I have reviewed all pertinent data on this patient         I have reviewed the patient's medical history in detail and updated the  computerized patient record.           Orders Placed This Encounter   Procedures    IN OFFICE EKG 12-LEAD (to Muse)       Order Specific Question:   Diagnosis       Answer:   PAF (paroxysmal atrial fibrillation) [[id number]]         Follow up as scheduled. Return sooner for concerns or questions  Follow up PRN             Continue general cardiology and EP care with Dr. Duran + Dr. Loco                 She expressed verbal understanding and agreed with the plan                      Patient's Medications   New Prescriptions     FUROSEMIDE (LASIX) 40 MG TABLET    TAKE 1 TABLET BY MOUTH ONCE DAILY   Previous Medications     APIXABAN (ELIQUIS) 5 MG TAB    TAKE 1 TABLET BY MOUTH TWO TIMES A DAY     APIXABAN 5 MG TAB    Take 1 tablet (5 mg total) by mouth 2 (two) times daily.     FLUTICASONE (FLONASE) 50 MCG/ACTUATION NASAL SPRAY    2 sprays (100 mcg total) by Each Nare route once daily.     GABAPENTIN (NEURONTIN) 300 MG CAPSULE    TK 1 C PO TID     LEVOTHYROXINE (SYNTHROID) 75 MCG TABLET    Take 1 tablet by mouth once daily except on Sundays take 1.5 tabs     LOSARTAN (COZAAR) 100 MG TABLET    Take 1 tablet (100 mg total) by mouth once daily.     LOSARTAN (COZAAR) 100 MG TABLET    TAKE 1 TABLET BY MOUTH ONCE DAILY     METOPROLOL SUCCINATE (TOPROL-XL) 100 MG 24 HR TABLET    Take 1 tablet (100 mg total) by mouth once daily.     TRAMADOL (ULTRAM) 50 MG TABLET    TK 1 T PO Q 6 H PRN   Modified Medications     Modified Medication Previous Medication     FUROSEMIDE (LASIX) 40 MG TABLET furosemide (LASIX) 40 MG tablet        Take 1 tablet (40 mg total) by mouth 2 (two) times daily.    Take 1 tablet (40 mg total) by mouth 2 (two) times daily.   Discontinued Medications

## 2018-10-24 NOTE — NURSING
Pt discharged per MD order. Vitals stable. Pt ambulated in unit with no problems and dressed self. Left lower leg site WNL, no swelling, bleeding, oozing, tenderness, or hematoma present. All discharge instructions given and pt verbalizes full understanding to all instructions and all questions answered. Pt was taken down to private car via wheelchair.

## 2018-10-31 ENCOUNTER — TELEPHONE (OUTPATIENT)
Dept: ELECTROPHYSIOLOGY | Facility: CLINIC | Age: 68
End: 2018-10-31

## 2018-10-31 ENCOUNTER — PATIENT MESSAGE (OUTPATIENT)
Dept: CARDIOLOGY | Facility: CLINIC | Age: 68
End: 2018-10-31

## 2018-11-02 ENCOUNTER — TELEPHONE (OUTPATIENT)
Dept: ELECTROPHYSIOLOGY | Facility: CLINIC | Age: 68
End: 2018-11-02

## 2018-11-02 NOTE — TELEPHONE ENCOUNTER
----- Message from Krupa Estevez RN sent at 10/31/2018  3:35 PM CDT -----  Make sure pt gave answer on Xarelto.  Was denied from patient assistance.

## 2018-11-02 NOTE — TELEPHONE ENCOUNTER
Spoke to MsSteve Mirza who states she is going to stick with Xarelto rx.  She reports since she is in the donut hole she is going to stay with it and call our office with any additional questions/concerns.      Note: She reports she has not missed any doses.

## 2018-11-15 ENCOUNTER — OFFICE VISIT (OUTPATIENT)
Dept: OTOLARYNGOLOGY | Facility: CLINIC | Age: 68
End: 2018-11-15
Payer: MEDICARE

## 2018-11-15 VITALS
HEART RATE: 62 BPM | HEIGHT: 68 IN | DIASTOLIC BLOOD PRESSURE: 73 MMHG | SYSTOLIC BLOOD PRESSURE: 118 MMHG | WEIGHT: 239.06 LBS | BODY MASS INDEX: 36.23 KG/M2

## 2018-11-15 DIAGNOSIS — J31.0 CHRONIC RHINITIS: Primary | ICD-10-CM

## 2018-11-15 DIAGNOSIS — H61.23 BILATERAL IMPACTED CERUMEN: ICD-10-CM

## 2018-11-15 PROCEDURE — 69210 REMOVE IMPACTED EAR WAX UNI: CPT | Mod: S$GLB,,, | Performed by: OTOLARYNGOLOGY

## 2018-11-15 PROCEDURE — 3078F DIAST BP <80 MM HG: CPT | Mod: CPTII,S$GLB,, | Performed by: OTOLARYNGOLOGY

## 2018-11-15 PROCEDURE — 1101F PT FALLS ASSESS-DOCD LE1/YR: CPT | Mod: CPTII,S$GLB,, | Performed by: OTOLARYNGOLOGY

## 2018-11-15 PROCEDURE — 99999 PR PBB SHADOW E&M-EST. PATIENT-LVL III: CPT | Mod: PBBFAC,,, | Performed by: OTOLARYNGOLOGY

## 2018-11-15 PROCEDURE — 3074F SYST BP LT 130 MM HG: CPT | Mod: CPTII,S$GLB,, | Performed by: OTOLARYNGOLOGY

## 2018-11-15 PROCEDURE — 99213 OFFICE O/P EST LOW 20 MIN: CPT | Mod: 25,S$GLB,, | Performed by: OTOLARYNGOLOGY

## 2018-11-15 NOTE — PROGRESS NOTES
Chief Complaint   Patient presents with    Follow-up    Ear Fullness     right ear   .     HPI:  Mrs. Mirza is a 67 year old female presents for evaluation of aural fullness in bilateral ears.  She has complaints of hearing loss in the affected ears, but denies pain or drainage.  This has been an issue in the past.  The patient has not been using any sort of ear drop to soften the wax.      She also reports post-nasal drip and nasal congestion which has been present and worsening over the few weeks.  She notes nasal congestion, sneezing, and watery eyes  as well. She denies facial pain or pressure, hyposmia, or maxillary tooth pain or pressure. She has been on cetirizine without relief of the post nasal drip.     Past Medical History:   Diagnosis Date    Chondromalacia of right patella     MRI 2016 at [facility]    Diverticulitis     GERD (gastroesophageal reflux disease)     HTN (hypertension) 2013    Hypoglycemia 2014    OA (osteoarthritis) of knee     bilateraly, followed by Dr. Callejas, [facility], s/p synvisc injections    Osteopenia 2013    Personal history of kidney stones 2013    Rosacea 2013    Tear of medial meniscus of right knee     MRI 2016     Social History     Socioeconomic History    Marital status:      Spouse name: Not on file    Number of children: y    Years of education: Not on file    Highest education level: Not on file   Social Needs    Financial resource strain: Not on file    Food insecurity - worry: Not on file    Food insecurity - inability: Not on file    Transportation needs - medical: Not on file    Transportation needs - non-medical: Not on file   Occupational History    Occupation:      Comment: Works at [employer] firm   Tobacco Use    Smoking status: Former Smoker     Packs/day: 0.25     Years: 30.00     Pack years: 7.50     Last attempt to quit: 2005     Years since quittin.7     Smokeless tobacco: Former User   Substance and Sexual Activity    Alcohol use: No    Drug use: No    Sexual activity: Not on file   Other Topics Concern    Not on file   Social History Narrative    Originally from [place], living in [place] with son     Past Surgical History:   Procedure Laterality Date    ADENOIDECTOMY      FOOT SURGERY Bilateral     KNEE ARTHROSCOPY W/ MENISCAL REPAIR Right 5/14    TONSILLECTOMY      TUBAL LIGATION       Family History   Problem Relation Age of Onset    Hypertension Mother     Diabetes Mother     Glaucoma Mother     Cataracts Mother     Hypertension Father     Stroke Father     No Known Problems Sister     No Known Problems Brother     No Known Problems Daughter     No Known Problems Son     No Known Problems Sister     No Known Problems Daughter            Review of Systems  General: negative for chills, fever or weight loss  Psychological: negative for mood changes or depression  Ophthalmic: negative for blurry vision, photophobia or eye pain  ENT: see HPI  Respiratory ROS: no cough, shortness of breath, or wheezing  Cardiovascular: no chest pain or dyspnea on exertion  Gastrointestinal ROS: no abdominal pain, change in bowel habits, or black/ bloody stools  Musculoskeletal ROS: negative for gait disturbance or muscular weakness  Neurological: no syncope or seizures; no ataxia  Dermatological: negative for puritis,  rash and jaundice  Hematologic/lymphatic: no easy bruising, no new lumps or bumps      Physical Exam:    Vitals:    11/15/18 0900   BP: 118/73   Pulse: 62       Constitutional: Well appearing / communicating without difficutly.  NAD.  Eyes: EOM I Bilaterally  Head/Face: Normocephalic.  Negative paranasal sinus pressure/tenderness.  Salivary glands WNL.  House Brackmann I Bilaterally.    Right Ear: External ear normal and ear canal occluded. Decreased hearing is noted.   Left Ear: External ear normal and ear canal normal occluded. Decreased hearing is  noted.   Bilateral complete cerumen impactions, removal described in a separate procedure note    Nose: Nasal septal deviation. Inferior Turbinates 3+ bilaterally. No septal perforation. No masses/lesions. External nasal skin appears normal without masses/lesions.  Oral Cavity: Gingiva/lips within normal limits.  Dentition/gingiva healthy appearing. Mucus membranes moist. Floor of mouth soft, no masses palpated. Oral Tongue mobile. Hard Palate appears normal.    Oropharynx: Base of tongue appears normal. No masses/lesions noted. Tonsillar fossa/pharyngeal wall without lesions. Posterior oropharynx WNL.  Soft palate without masses. Midline uvula.   Neck/Lymphatic: No LAD I-VI bilaterally.  No thyromegaly.  No masses noted on exam.    Mirror laryngoscopy/nasopharyngoscopy: Active gag reflex.  Unable to perform.    Neuro/Psychiatric: AOx3.  Normal mood and affect.   Cardiovascular: Normal carotid pulses bilaterally, no increasing jugular venous distention noted at cervical region bilaterally.    Respiratory: Normal respiratory effort, no stridor, no retractions noted.            Assessment:    ICD-10-CM ICD-9-CM    1. Chronic rhinitis J31.0 472.0    2. Bilateral impacted cerumen H61.23 380.4      The primary encounter diagnosis was Chronic rhinitis. A diagnosis of Bilateral impacted cerumen was also pertinent to this visit.      Plan:  No orders of the defined types were placed in this encounter.    Chronic allergic rhinitis:  Continue Zyrtec. Flonase and Nasal saline irrigations BID.      Cerumen impaction:  Removed today without difficulty.  I would recommend the use of a wax softening drop, either over the counter Debrox or mineral oil, on a weekly basis.  I also instructed the patient to avoid Qtips.      Sofia Yoder MD

## 2018-11-15 NOTE — PROCEDURES
Procedures     Procedure Note    CHIEF COMPLAINT:  Bilateral Cerumen Impaction    Description:  The patient was seated in an exam chair.  An ear speculum was placed in the right EAC and was examined under the microscope.  Suction and/or loop curettes were used to remove a large cerumen impaction.  The tympanic membrane was visualized and was normal in appearance.  The procedure was repeated on the left side in a similar fashion.  The TM was intact and normal on this side as well.  The patient tolerated the procedure well.

## 2018-11-19 NOTE — DISCHARGE SUMMARY
[facility]  Cardiology  Discharge Summary      Patient Name: Violet Mirza  MRN: [id number]  Admission Date: 10/24/2018  Hospital Length of Stay: 0 days  Discharge Date and Time: 10/24/2018  Attending Physician: No att. providers found  Discharging Provider: Josiah Kelly MD  Primary Care Physician: Phyllis Gunter MD    HPI:     Narrative     Date of Procedure: 10/24/2018        TEST DESCRIPTION   The patient was prepped and draped in the usual sterile fashion. Using a 25 gauge needle, the entry site was anesthetized with 1% Lidocaine. Under ultrasound guidance, access to the right lesser saphenous vein was obtained using a Microset 21 gauge   needle.      A microwire was subsequently inserted and the Microset needle was removed. A Microset dilator was inserted over the microwire and was removed. A 0.035 J type guide wire was inserted through the Microset dilator and threaded through the saphenous vein to   the saphenopopliteal junction (SPJ) and a 5 Fr. Introducer sheath was inserted over the J wire until the end of the sheath entered the SPJ.     The dilator and J wire were removed and a 600 micron laser fiber was introduced and placed 2.00cm distal to the saphenopopliteal junction.    The final position of the fiber was determined by ultrasound guidance and duplex imaging. 20cc Tumescence anesthetic (2% Lidocaine in 250cc normal saline) was delivered by ultrasound guidance throughout the course of the lesser saphenous vein without   difficulty. A final position check was made by ultrasound.    Total laser length was 10cm. The laser was activated by foot pedal control and the fiber and sheath were withdrawn at a rate of 1 cm/sec over the entire length of the treated vein.     The total time of energy delivery was 61 seconds.    The total energy delivered was 855 joules.    Repeat duplex imaging showed no flow over the entire length of the treated vein, and the SPJ was patent. After  assuring hemostasis, the skin incision over the lesser saphenous vein was covered with a bandage.             Procedure(s) (LRB):  SCLEROTHERAPY, WITH US GUIDANCE (N/A)  ABLATION, VEIN, PERIPHERAL, PERCUTANEOUS, ENDOVENOUS, USING LASER (N/A)     Indwelling Lines/Drains at time of discharge:  Lines/Drains/Airways          None          Hospital Course       See HPI    Consults:       None   Significant Diagnostic Studies:       Ultrasound     Pending Diagnostic Studies:     None          Final Active Diagnoses:    Diagnosis Date Noted POA    PRINCIPAL PROBLEM:  Venous insufficiency of lower extremity [I87.2] 10/24/2018 Yes    Current use of long term anticoagulation [Z79.01] 09/11/2018 Not Applicable    Venous insufficiency of both lower extremities [I87.2] 04/02/2018 Yes    Mitral regurgitation [I34.0] 02/08/2018 Yes    HFrEF (heart failure with reduced ejection fraction) [I50.20] 01/22/2018 Yes    Paroxysmal atrial fibrillation [I48.0] 01/22/2018 Yes    Shortness of breath [R06.02] 11/03/2017 Yes    GERD (gastroesophageal reflux disease) [K21.9] 10/13/2015 Yes    HTN (hypertension) [I10] 07/26/2013 Yes    History of peptic ulcer [Z87.11] 07/26/2013 Not Applicable      Problems Resolved During this Admission:       Discharged Condition: stable     Follow Up:  Follow-up Information     Josiah Kelly MD In 5 weeks.    Specialties:  Cardiology, INTERVENTIONAL CARDIOLOGY  Contact information:  [address]  [place] [address]  [place] [address]  [phone]                 Patient Instructions:       DC home      Follow up with PCP      Follow up with Dr. Kelly or primary cardiologist as scheduled      Cardiac diet      Resume normal activities in 3 days\  Medications:         Medication List      CHANGE how you take these medications    furosemide 40 MG tablet  Commonly known as:  LASIX  TAKE ONE TABLET BY MOUTH TWO TIMES A DAY  What changed:  Another medication with the same name was removed. Continue taking  this medication, and follow the directions you see here.        CONTINUE taking these medications    diclofenac sodium 1 % Gel  Commonly known as:  VOLTAREN     levothyroxine 75 MCG tablet  Commonly known as:  SYNTHROID  Take 1 tablet by mouth once daily except on Sundays take 1.5 tabs     losartan 100 MG tablet  Commonly known as:  COZAAR  TAKE 1 TABLET BY MOUTH ONCE DAILY     metoprolol succinate 200 MG 24 hr tablet  Commonly known as:  TOPROL-XL  Take 1 tablet (200 mg total) by mouth once daily.     traMADol 50 mg tablet  Commonly known as:  ULTRAM     XARELTO 20 mg Tab  Generic drug:  rivaroxaban  Take 1 tablet (20 mg total) by mouth daily with dinner or evening meal.        STOP taking these medications    FLUZONE HIGH-DOSE 2018-19 (PF) 180 mcg/0.5 mL vaccine  Generic drug:  influenza               Time spent on the discharge of patient:  35 minutes    Josiah Kelly MD  Cardiology  [facility]

## 2018-12-10 ENCOUNTER — OFFICE VISIT (OUTPATIENT)
Dept: CARDIOLOGY | Facility: CLINIC | Age: 68
End: 2018-12-10
Payer: MEDICARE

## 2018-12-10 VITALS
SYSTOLIC BLOOD PRESSURE: 118 MMHG | HEART RATE: 78 BPM | HEIGHT: 68 IN | DIASTOLIC BLOOD PRESSURE: 70 MMHG | BODY MASS INDEX: 36.07 KG/M2 | OXYGEN SATURATION: 100 % | WEIGHT: 238 LBS

## 2018-12-10 DIAGNOSIS — E66.01 CLASS 2 SEVERE OBESITY DUE TO EXCESS CALORIES WITH SERIOUS COMORBIDITY AND BODY MASS INDEX (BMI) OF 35.0 TO 35.9 IN ADULT: ICD-10-CM

## 2018-12-10 DIAGNOSIS — I10 ESSENTIAL HYPERTENSION: ICD-10-CM

## 2018-12-10 DIAGNOSIS — I87.2 VENOUS INSUFFICIENCY OF BOTH LOWER EXTREMITIES: Primary | ICD-10-CM

## 2018-12-10 PROCEDURE — 99999 PR PBB SHADOW E&M-EST. PATIENT-LVL III: CPT | Mod: PBBFAC,,, | Performed by: INTERNAL MEDICINE

## 2018-12-10 PROCEDURE — 3074F SYST BP LT 130 MM HG: CPT | Mod: CPTII,S$GLB,, | Performed by: INTERNAL MEDICINE

## 2018-12-10 PROCEDURE — 3078F DIAST BP <80 MM HG: CPT | Mod: CPTII,S$GLB,, | Performed by: INTERNAL MEDICINE

## 2018-12-10 PROCEDURE — 1101F PT FALLS ASSESS-DOCD LE1/YR: CPT | Mod: CPTII,S$GLB,, | Performed by: INTERNAL MEDICINE

## 2018-12-10 PROCEDURE — 99213 OFFICE O/P EST LOW 20 MIN: CPT | Mod: S$GLB,,, | Performed by: INTERNAL MEDICINE

## 2018-12-10 NOTE — PATIENT INSTRUCTIONS
Understanding Chronic Venous Insufficiency  Problems with the veins in the legs may lead to chronic venous insufficiency (CVI). CVI means that there is a long-term problem with the veins not being able to pump blood back to your heart. When this happens, blood stays in the legs and causes swelling and aching.   Two problems that may lead to chronic venous insufficiency are:  · Damaged valves. Valves keep blood flowing from the legs through the blood vessels and back to the heart. When the valves are damaged, blood does not flow as well.   · Deep vein thrombosis (DVT). Blood clots may form in the deep veins of the legs. This may cause pain, redness, and swelling in the legs. It may also block the flow of blood back to the heart. Seek immediate medical care if you have these symptoms.  · A blood clot in the leg can also break off and travel to the lungs. This is called pulmonary embolism (PE). In the lungs, the clot can cut off the flow of blood. This may cause chest pain, trouble breathing, sweating, a fast heartbeat, coughing (may cough up blood), and fainting. It is a medical emergency and may cause death. Call 911 if you have these symptoms.  · Healthcare providers call the two conditions, DVT and PE, venous thromboembolism (VTE).  CVI cant be cured, but you can control leg swelling to reduce the likelihood of ulcers (sores).  Recognizing the symptoms  Be aware of the following:  · If you stand or sit with your feet down for long periods, your legs may ache or feel heavy.  · Swollen ankles are possibly the most common symptom of CVI.  · As swelling increases, the skin over your ankles may show red spots or a brownish tinge. The skin may feel leathery or scaly, and may start to itch.  · If swelling is not controlled, an ulcer (open wound) may form.  What you can do  Reduce your risk of developing ulcers by doing the following:  · Increase blood flow back to your heart by elevating your legs, exercising daily,  and wearing elastic stockings.  · Boost blood flow in your legs by losing excess weight.  · If you must stand or sit in one place for a period of time, keep your blood moving by wiggling your toes, shifting your body position, and rising up on the balls of your feet.    Date Last Reviewed: 5/1/2016  © 8214-9403 [employer]. [address] [place] [address]. All rights reserved. This information is not intended as a substitute for professional medical care. Always follow your healthcare professional's instructions.        Surgery for Varicose Veins  If you have large varicose veins, surgery may be the best choice. However, it will not prevent new varicose veins from forming. Surgery is most often performed in a hospital or surgery center on an outpatient basis.  Varicose vein surgery    Your surgery will be tailored to your needs. Varicose veins may be tied off (ligation), destroyed, or removed. Blood will then flow through the healthy veins. One or more of the following techniques may be used:  Vein stripping and ligation  In more severe cases, the surgeon may tie off and remove veins by making smaller cuts in the skin. Smaller branching veins may also be tied off or removed.  Microphlebectomy or ambulatory phlebectomy  A special hook is used to gently take out a varicose vein through tiny incisions. Microphlebectomy may be done in your healthcare providers office.  Sclerotherapy  Your healthcare provider will inject the varicose vein with a special chemical that will quickly close the vein from the inside. This is particularly useful for smaller veins.  PIN stripping  All or part of the vein may be removed with a stripping instrument.  Ablation (laser or radiofrequency)  A tiny cut in the skin is made near the varicose vein. A small tube called a catheter is inserted into the vein. Energy or heat released from the catheter tip will make the vein walls collapse and stick together, stopping  all blood flow through the vein.   Know about the risks  Your healthcare provider will talk with you about the risks of surgery. These include:  · Bleeding or swelling  · A sense of numbness, burning, or tingling in areas near the procedure  · Edema or swelling in the legs  · Clots in the deep veins that may travel to the lungs  · Infection  · Scarring   Date Last Reviewed: 5/1/2016  © 2721-4203 [employer]. [address] [place] [address], [place] [address]. All rights reserved. This information is not intended as a substitute for professional medical care. Always follow your healthcare professional's instructions.

## 2018-12-11 NOTE — PROGRESS NOTES
Subjective:   Patient ID:  Violet Mirza is a 68 y.o. female who presents for evaluation and treatment of Varicose Veins; s/p R LSV EVLT; and Chronic Venous Insufficiency      HPI:     She is here for treatment of venous reflux disease complicated with indurated varicose veins, spider veins, no ulcers, and no venous claudication. No DVT. She is s/p R calf phlebectomy twice and multiple spider veins saline injections. She has HTN, obesity with BMI 35, HLP, PAF, and CHF with EF 30-35% + normal coronaries. She worked as  for years. Currently retired. She started to wear compression stockings.       ECG today: NSR with PVCs in a bigeminy pattern      Venous ultrasound 4/2018     R GSV 3.2 mm without reflux    R LSV 3.6 mm with reflux 1160 msec     L GSV 5.7 mm without reflux   L LSV 3.2 mm without reflux           She is s/p EVLT of R LSV 10/24/2018. The engorged varicose veins in the R medial calf area are still. She declined phlebectomy stating she had it twice before and it came back.                     Patient Active Problem List    Diagnosis Date Noted    Venous insufficiency of lower extremity 10/24/2018     Priority: High    Current use of long term anticoagulation 09/11/2018    Obesity, Class II, BMI 35-39.9, with comorbidity 09/11/2018    Lipoma of anterior chest wall 09/11/2018    Obesity due to excess calories 08/06/2018    Venous insufficiency of both lower extremities 04/02/2018         S/p R calf phlebectomy    2014 and 2011       Spider veins injections-saline   Several times   Dermatologist-Heather       Venous ultrasound 4/2018     R GSV 3.2 mm without reflux    R LSV 3.6 mm with reflux 1160 msec     L GSV 5.7 mm without reflux   L LSV 3.2 mm without reflux        S/p EVLT of R LSV  10/24/2018                            Mitral regurgitation 02/08/2018    HFrEF (heart failure with reduced ejection fraction) 01/22/2018    Paroxysmal atrial fibrillation 01/22/2018    Shortness of  breath 2017    Asthma exacerbation 10/30/2017    Anxiety 10/30/2017    GERD (gastroesophageal reflux disease) 10/13/2015    Left knee DJD 2015     S/p Synvisc   Dr. Callejas      Hypothyroidism 2014     TPO neg       HTN (hypertension) 2013    Osteopenia 2013    History of peptic ulcer 2013     EGD   Dr. Gomez      Personal history of kidney stones 2013    Rosacea 2013           Right Arm BP - Sittin/70  Left Arm BP - Sittin/70        LABS    LAST HbA1c  Lab Results   Component Value Date    HGBA1C 5.8 (H) 2018       Lipid panel  Lab Results   Component Value Date    CHOL 137 2018    CHOL 125 2018    CHOL 222 (H) 2017     Lab Results   Component Value Date    HDL 47 2018    HDL 51 2018    HDL 74 2017     Lab Results   Component Value Date    LDLCALC 72.4 2018    LDLCALC 56.8 (L) 2018    LDLCALC 126.4 2017     Lab Results   Component Value Date    TRIG 88 2018    TRIG 86 2018    TRIG 108 2017     Lab Results   Component Value Date    CHOLHDL 34.3 2018    CHOLHDL 40.8 2018    CHOLHDL 33.3 2017            Review of Systems   Constitution: Negative for diaphoresis, weakness, night sweats, weight gain and weight loss.   HENT: Negative for congestion.    Eyes: Negative for blurred vision, discharge and double vision.   Cardiovascular: Negative for chest pain, claudication, cyanosis, dyspnea on exertion, irregular heartbeat, leg swelling, near-syncope, orthopnea, palpitations, paroxysmal nocturnal dyspnea and syncope.   Respiratory: Negative for cough, shortness of breath and wheezing.    Endocrine: Negative for cold intolerance, heat intolerance and polyphagia.   Hematologic/Lymphatic: Negative for adenopathy and bleeding problem. Does not bruise/bleed easily.   Skin: Positive for color change (varicose veins ). Negative for dry skin and nail  changes.   Musculoskeletal: Negative for arthritis, back pain, falls, joint pain, myalgias and neck pain.   Gastrointestinal: Negative for bloating, abdominal pain, change in bowel habit and constipation.   Genitourinary: Negative for bladder incontinence, dysuria, flank pain, genital sores and missed menses.   Neurological: Negative for aphonia, brief paralysis, difficulty with concentration and dizziness.   Psychiatric/Behavioral: Negative for altered mental status and memory loss. The patient does not have insomnia.    Allergic/Immunologic: Negative for environmental allergies.       Objective:   Physical Exam   Constitutional: She is oriented to person, place, and time. She appears well-developed and well-nourished. She is not intubated.   HENT:   Head: Normocephalic and atraumatic.   Right Ear: External ear normal.   Left Ear: External ear normal.   Mouth/Throat: Oropharynx is clear and moist.   Eyes: Conjunctivae and EOM are normal. Pupils are equal, round, and reactive to light. Right eye exhibits no discharge. Left eye exhibits no discharge. No scleral icterus.   Neck: Normal range of motion. Neck supple. Normal carotid pulses, no hepatojugular reflux and no JVD present. Carotid bruit is not present. No tracheal deviation present. No thyromegaly present.   Cardiovascular: Normal rate, regular rhythm, S1 normal and S2 normal.  Occasional extrasystoles are present. PMI is not displaced. Exam reveals no gallop, no S3, no distant heart sounds, no friction rub and no midsystolic click.   No murmur heard.  Pulses:       Carotid pulses are 2+ on the right side, and 2+ on the left side.       Radial pulses are 2+ on the right side, and 2+ on the left side.        Femoral pulses are 2+ on the right side, and 2+ on the left side.       Popliteal pulses are 2+ on the right side, and 2+ on the left side.        Dorsalis pedis pulses are 2+ on the right side, and 2+ on the left side.        Posterior tibial pulses are 2+  on the right side, and 2+ on the left side.   Pulmonary/Chest: Effort normal and breath sounds normal. No accessory muscle usage or stridor. No apnea, no tachypnea and no bradypnea. She is not intubated. No respiratory distress. She has no decreased breath sounds. She has no wheezes. She has no rales. She exhibits no tenderness and no bony tenderness.   Abdominal: She exhibits no distension, no pulsatile liver, no abdominal bruit, no ascites, no pulsatile midline mass and no mass. There is no tenderness. There is no rebound and no guarding.   Musculoskeletal: Normal range of motion. She exhibits no edema or tenderness.   Lymphadenopathy:     She has no cervical adenopathy.   Neurological: She is alert and oriented to person, place, and time. She has normal reflexes. No cranial nerve deficit. Coordination normal.   Skin: Skin is warm. No rash noted. No erythema. No pallor.     No ulcers      Indurated varicose veins-R calf and L popliteal area      Multiple spider veins       No hyperpigmentation               Psychiatric: She has a normal mood and affect. Her behavior is normal. Judgment and thought content normal.       Assessment:     1. Venous insufficiency of both lower extremities    2. Essential hypertension    3. Class 2 severe obesity due to excess calories with serious comorbidity and body mass index (BMI) of 35.0 to 35.9 in adult        Plan:         Weigh loss  Exercise  Low salt diet            She is s/p EVLT of R LSV 10/24/2018. The engorged varicose veins in the R medial calf area are still. She declined phlebectomy stating she had it twice before and it came back. Continue with compression stockings 20-30 mmHg.           Continue with current medical plan and lifestyle changes.  Return sooner for concerns or questions. If symptoms persist go to the ED  I have reviewed all pertinent data on this patient             Follow up as scheduled. Return sooner for concerns or questions  Follow up PRN               Continue general cardiology and EP care with Dr. Duran + Dr. Loco            She expressed verbal understanding and agreed with the plan  .         Incident: I apologized the short notice prior to her procedure. After seeing her in 8/2018 we tried to expedite her procedure unfortunately she was not notify until a few days before after dealing with her insurance for a few week. Then she was rescheduled in 10/2018. This time she did not the know about the co-pay and expected the office to tell her how much the co-pay was going to be. I expressed my sincere apologies and told her a manager will call her to clarify the process. Appreciated her feedback.         (nda)                   Medication List           Accurate as of 12/10/18  9:33 PM. If you have any questions, ask your nurse or doctor.               CONTINUE taking these medications    diclofenac sodium 1 % Gel  Commonly known as:  VOLTAREN     furosemide 40 MG tablet  Commonly known as:  LASIX  TAKE ONE TABLET BY MOUTH TWO TIMES A DAY     levothyroxine 75 MCG tablet  Commonly known as:  SYNTHROID  Take 1 tablet by mouth once daily except on Sundays take 1.5 tabs     losartan 100 MG tablet  Commonly known as:  COZAAR  TAKE 1 TABLET BY MOUTH ONCE DAILY     metoprolol succinate 200 MG 24 hr tablet  Commonly known as:  TOPROL-XL  Take 1 tablet (200 mg total) by mouth once daily.     traMADol 50 mg tablet  Commonly known as:  ULTRAM     XARELTO 20 mg Tab  Generic drug:  rivaroxaban  Take 1 tablet (20 mg total) by mouth daily with dinner or evening meal.

## 2018-12-14 ENCOUNTER — PATIENT MESSAGE (OUTPATIENT)
Dept: CARDIOLOGY | Facility: CLINIC | Age: 68
End: 2018-12-14

## 2019-01-02 ENCOUNTER — PATIENT MESSAGE (OUTPATIENT)
Dept: OTOLARYNGOLOGY | Facility: CLINIC | Age: 69
End: 2019-01-02

## 2019-01-03 ENCOUNTER — TELEPHONE (OUTPATIENT)
Dept: OTOLARYNGOLOGY | Facility: CLINIC | Age: 69
End: 2019-01-03

## 2019-01-03 ENCOUNTER — OFFICE VISIT (OUTPATIENT)
Dept: OTOLARYNGOLOGY | Facility: CLINIC | Age: 69
End: 2019-01-03
Payer: MEDICARE

## 2019-01-03 VITALS
HEIGHT: 68 IN | BODY MASS INDEX: 35.63 KG/M2 | TEMPERATURE: 97 F | WEIGHT: 235.13 LBS | DIASTOLIC BLOOD PRESSURE: 75 MMHG | SYSTOLIC BLOOD PRESSURE: 141 MMHG | HEART RATE: 99 BPM

## 2019-01-03 DIAGNOSIS — J34.2 NASAL SEPTAL DEVIATION: ICD-10-CM

## 2019-01-03 DIAGNOSIS — B96.89 ACUTE BACTERIAL SINUSITIS: Primary | ICD-10-CM

## 2019-01-03 DIAGNOSIS — J30.89 NON-SEASONAL ALLERGIC RHINITIS, UNSPECIFIED TRIGGER: ICD-10-CM

## 2019-01-03 DIAGNOSIS — J01.90 ACUTE BACTERIAL SINUSITIS: Primary | ICD-10-CM

## 2019-01-03 DIAGNOSIS — J34.3 HYPERTROPHY OF INFERIOR NASAL TURBINATE: ICD-10-CM

## 2019-01-03 PROCEDURE — 99999 PR PBB SHADOW E&M-EST. PATIENT-LVL III: CPT | Mod: PBBFAC,,, | Performed by: OTOLARYNGOLOGY

## 2019-01-03 PROCEDURE — 99999 PR PBB SHADOW E&M-EST. PATIENT-LVL III: ICD-10-PCS | Mod: PBBFAC,,, | Performed by: OTOLARYNGOLOGY

## 2019-01-03 PROCEDURE — 1101F PT FALLS ASSESS-DOCD LE1/YR: CPT | Mod: CPTII,S$GLB,, | Performed by: OTOLARYNGOLOGY

## 2019-01-03 PROCEDURE — 3078F PR MOST RECENT DIASTOLIC BLOOD PRESSURE < 80 MM HG: ICD-10-PCS | Mod: CPTII,S$GLB,, | Performed by: OTOLARYNGOLOGY

## 2019-01-03 PROCEDURE — 3078F DIAST BP <80 MM HG: CPT | Mod: CPTII,S$GLB,, | Performed by: OTOLARYNGOLOGY

## 2019-01-03 PROCEDURE — 1101F PR PT FALLS ASSESS DOC 0-1 FALLS W/OUT INJ PAST YR: ICD-10-PCS | Mod: CPTII,S$GLB,, | Performed by: OTOLARYNGOLOGY

## 2019-01-03 PROCEDURE — 99213 PR OFFICE/OUTPT VISIT, EST, LEVL III, 20-29 MIN: ICD-10-PCS | Mod: 25,S$GLB,, | Performed by: OTOLARYNGOLOGY

## 2019-01-03 PROCEDURE — 96372 THER/PROPH/DIAG INJ SC/IM: CPT | Mod: S$GLB,,, | Performed by: OTOLARYNGOLOGY

## 2019-01-03 PROCEDURE — 3077F PR MOST RECENT SYSTOLIC BLOOD PRESSURE >= 140 MM HG: ICD-10-PCS | Mod: CPTII,S$GLB,, | Performed by: OTOLARYNGOLOGY

## 2019-01-03 PROCEDURE — 96372 PR INJECTION,THERAP/PROPH/DIAG2ST, IM OR SUBCUT: ICD-10-PCS | Mod: S$GLB,,, | Performed by: OTOLARYNGOLOGY

## 2019-01-03 PROCEDURE — 3077F SYST BP >= 140 MM HG: CPT | Mod: CPTII,S$GLB,, | Performed by: OTOLARYNGOLOGY

## 2019-01-03 PROCEDURE — 99213 OFFICE O/P EST LOW 20 MIN: CPT | Mod: 25,S$GLB,, | Performed by: OTOLARYNGOLOGY

## 2019-01-03 RX ORDER — AZITHROMYCIN 250 MG/1
TABLET, FILM COATED ORAL
Qty: 6 TABLET | Refills: 0 | Status: SHIPPED | OUTPATIENT
Start: 2019-01-03 | End: 2019-02-21

## 2019-01-03 RX ORDER — GUAIFENESIN 600 MG/1
1200 TABLET, EXTENDED RELEASE ORAL 2 TIMES DAILY
Qty: 40 TABLET | Refills: 0 | Status: SHIPPED | OUTPATIENT
Start: 2019-01-03 | End: 2019-01-13

## 2019-01-03 RX ORDER — METHYLPREDNISOLONE ACETATE 40 MG/ML
40 INJECTION, SUSPENSION INTRA-ARTICULAR; INTRALESIONAL; INTRAMUSCULAR; SOFT TISSUE
Status: COMPLETED | OUTPATIENT
Start: 2019-01-03 | End: 2019-01-03

## 2019-01-03 RX ADMIN — METHYLPREDNISOLONE ACETATE 40 MG: 40 INJECTION, SUSPENSION INTRA-ARTICULAR; INTRALESIONAL; INTRAMUSCULAR; SOFT TISSUE at 10:01

## 2019-01-03 NOTE — TELEPHONE ENCOUNTER
Patient has a cold and is requesting that you call in medication to her [facility].

## 2019-01-03 NOTE — PROGRESS NOTES
Chief Complaint   Patient presents with    Follow-up     symptoms started two weeks ago    Nasal Congestion    Sore Throat    Cough   .     HPI:  Mrs. Mirza is a 67 year old female presents for evaluation of  2 week history of post-nasal drip and nasal congestion which has been present and worsening since onset. She notes nasal congestion, sneezing, and watery eyes  as well. She denies facial pain or pressure, hyposmia, or maxillary tooth pain or pressure. She has been on cetirizine without relief of the post nasal drip. She has tried over the counter cold and sinus preparations without relief.      Past Medical History:   Diagnosis Date    Chondromalacia of right patella     MRI 2016 at [facility]    Diverticulitis     GERD (gastroesophageal reflux disease)     HTN (hypertension) 2013    Hypoglycemia 2014    OA (osteoarthritis) of knee     bilateraly, followed by Dr. Callejas, [facility], s/p synvisc injections    Osteopenia 2013    Personal history of kidney stones 2013    Rosacea 2013    Tear of medial meniscus of right knee     MRI 2016     Social History     Socioeconomic History    Marital status:      Spouse name: Not on file    Number of children: y    Years of education: Not on file    Highest education level: Not on file   Social Needs    Financial resource strain: Not on file    Food insecurity - worry: Not on file    Food insecurity - inability: Not on file    Transportation needs - medical: Not on file    Transportation needs - non-medical: Not on file   Occupational History    Occupation:      Comment: Works at Law firm   Tobacco Use    Smoking status: Former Smoker     Packs/day: 0.25     Years: 30.00     Pack years: 7.50     Last attempt to quit: 2005     Years since quittin.9    Smokeless tobacco: Former User   Substance and Sexual Activity    Alcohol use: No    Drug use: No    Sexual  activity: Not on file   Other Topics Concern    Not on file   Social History Narrative    Originally from [place], living in [place] with son     Past Surgical History:   Procedure Laterality Date    ADENOIDECTOMY      FOOT SURGERY Bilateral     KNEE ARTHROSCOPY W/ MENISCAL REPAIR Right 5/14    TONSILLECTOMY      TUBAL LIGATION       Family History   Problem Relation Age of Onset    Hypertension Mother     Diabetes Mother     Glaucoma Mother     Cataracts Mother     Hypertension Father     Stroke Father     No Known Problems Sister     No Known Problems Brother     No Known Problems Daughter     No Known Problems Son     No Known Problems Sister     No Known Problems Daughter            Review of Systems  General: negative for chills, fever or weight loss  Psychological: negative for mood changes or depression  Ophthalmic: negative for blurry vision, photophobia or eye pain  ENT: see HPI  Respiratory ROS: no cough, shortness of breath, or wheezing  Cardiovascular: no chest pain or dyspnea on exertion  Gastrointestinal ROS: no abdominal pain, change in bowel habits, or black/ bloody stools  Musculoskeletal ROS: negative for gait disturbance or muscular weakness  Neurological: no syncope or seizures; no ataxia  Dermatological: negative for puritis,  rash and jaundice  Hematologic/lymphatic: no easy bruising, no new lumps or bumps      Physical Exam     Vitals:    01/03/19 0950   BP: (!) 141/75   Pulse: 99   Temp: 97.3 °F (36.3 °C)       Constitutional: Well appearing / communicating.  NAD.  Eyes: EOM I Bilaterally  Head/Face: Normocephalic.  Negative paranasal sinus pressure/tenderness.  Salivary glands WNL.  House Brackmann I Bilaterally.    Right Ear: External Auditory Canal WNL,TM w/o masses/lesions/perforations.  Auricle WNL.  Left Ear: External Auditory Canal WNL,TM w/o masses/lesions/perforations. Auricle WNL.  Nose: No gross nasal septal deviation. Inferior Turbinates 3+ bilaterally. No septal  perforation. No masses/lesions. External nasal skin without masses/lesions.  Oral Cavity: Gingiva/lips WNL.  FOM Soft, no masses palpated. Oral Tongue mobile. Hard Palate WNL.   Oropharynx: BOT WNL. No masses/lesions noted. Tonsillar fossa/pharyngeal wall without lesions. Posterior oropharynx WNL.  Soft palate without masses. Midline uvula.   Neck/Lymphatic: No LAD I-VI bilaterally.  No thyromegaly.  No masses noted on exam.    Mirror laryngoscopy/nasopharyngoscopy: Active gag reflex.  Unable to perform.    Neuro/Psychiatric: AOx3.  Normal mood and affect.   Cardiovascular: Normal carotid pulses bilaterally, no increasing jugular venous distention noted at cervical region bilaterally.    Respiratory: Normal respiratory effort, no stridor, no retractions noted.            Assessment:    ICD-10-CM ICD-9-CM    1. Acute bacterial sinusitis J01.90 461.9     B96.89     2. Nasal septal deviation J34.2 470    3. Hypertrophy of inferior nasal turbinate J34.3 478.0    4. Non-seasonal allergic rhinitis, unspecified trigger J30.89 477.8      The primary encounter diagnosis was Acute bacterial sinusitis. Diagnoses of Nasal septal deviation, Hypertrophy of inferior nasal turbinate, and Non-seasonal allergic rhinitis, unspecified trigger were also pertinent to this visit.      Plan:  No orders of the defined types were placed in this encounter.    Zpak as directed  Depo IM injection  Coricidin HBP  Mucinex   Follow up symptoms worsen or fail to improve.     Sofia Yoder MD

## 2019-01-03 NOTE — TELEPHONE ENCOUNTER
----- Message from Montserrat Krause sent at 1/3/2019  8:06 AM CST -----  Contact: self/[phone]  Patient called to speak with your office about getting some medication.  She stated she sent a message on her [employer] yesterday to your office.    Please call and advise.

## 2019-01-07 ENCOUNTER — TELEPHONE (OUTPATIENT)
Dept: ELECTROPHYSIOLOGY | Facility: CLINIC | Age: 69
End: 2019-01-07

## 2019-01-07 NOTE — TELEPHONE ENCOUNTER
Attempted to call pt again. TERRY with the phone number to [facility] scheduling to schedule her echo needed prior to appt in feb  ----- Message from Libby Brenner sent at 1/7/2019  2:42 PM CST -----  Contact: pt  Pt is returning your call and can be reached at [phone].    Thank you

## 2019-01-11 ENCOUNTER — HOSPITAL ENCOUNTER (OUTPATIENT)
Dept: CARDIOLOGY | Facility: HOSPITAL | Age: 69
Discharge: HOME | End: 2019-01-11
Attending: INTERNAL MEDICINE
Payer: MEDICARE

## 2019-01-11 DIAGNOSIS — I48.0 PAROXYSMAL ATRIAL FIBRILLATION: ICD-10-CM

## 2019-01-11 DIAGNOSIS — I50.22 CHRONIC SYSTOLIC HEART FAILURE: ICD-10-CM

## 2019-01-11 LAB
AORTIC ROOT ANNULUS: 2.47 CM
AORTIC VALVE CUSP SEPERATION: 1.77 CM
AV INDEX (PROSTH): 0.6
AV MEAN GRADIENT: 7.68 MMHG
AV PEAK GRADIENT: 12.82 MMHG
AV VALVE AREA: 1.99 CM2
CV ECHO LV RWT: 0.23 CM
DOP CALC AO PEAK VEL: 1.79 M/S
DOP CALC AO VTI: 37.45 CM
DOP CALC LVOT AREA: 3.3 CM2
DOP CALC LVOT DIAMETER: 2.05 CM
DOP CALC LVOT STROKE VOLUME: 74.46 CM3
DOP CALCLVOT PEAK VEL VTI: 22.57 CM
E WAVE DECELERATION TIME: 289.14 MSEC
E/A RATIO: 0.84
ECHO LV POSTERIOR WALL: 0.64 CM (ref 0.6–1.1)
FRACTIONAL SHORTENING: 22 % (ref 28–44)
INTERVENTRICULAR SEPTUM: 0.55 CM (ref 0.6–1.1)
LA MAJOR: 5.29 CM
LA MINOR: 5.81 CM
LA WIDTH: 4.35 CM
LEFT ATRIUM SIZE: 4.61 CM
LEFT ATRIUM VOLUME: 94.39 CM3
LEFT INTERNAL DIMENSION IN SYSTOLE: 4.41 CM (ref 2.1–4)
LEFT VENTRICLE DIASTOLIC VOLUME: 156.6 ML
LEFT VENTRICLE SYSTOLIC VOLUME: 87.93 ML
LEFT VENTRICULAR INTERNAL DIMENSION IN DIASTOLE: 5.65 CM (ref 3.5–6)
LEFT VENTRICULAR MASS: 116.79 G
MV PEAK A VEL: 0.86 M/S
MV PEAK E VEL: 0.72 M/S
PISA TR MAX VEL: 1.96 M/S
PULM VEIN S/D RATIO: 1.26
PV PEAK D VEL: 0.34 M/S
PV PEAK S VEL: 0.43 M/S
PV PEAK VELOCITY: 1.06 CM/S
RA MAJOR: 5.86 CM
RA PRESSURE: 3 MMHG
RIGHT VENTRICULAR END-DIASTOLIC DIMENSION: 3.62 CM
TR MAX PG: 15.37 MMHG
TV REST PULMONARY ARTERY PRESSURE: 18 MMHG

## 2019-01-11 PROCEDURE — 93306 TTE W/DOPPLER COMPLETE: CPT

## 2019-01-11 PROCEDURE — 93306 TRANSTHORACIC ECHO (TTE) COMPLETE (CUPID ONLY): ICD-10-PCS | Mod: 26,,, | Performed by: INTERNAL MEDICINE

## 2019-01-11 PROCEDURE — 93306 TTE W/DOPPLER COMPLETE: CPT | Mod: 26,,, | Performed by: INTERNAL MEDICINE

## 2019-01-15 ENCOUNTER — PATIENT MESSAGE (OUTPATIENT)
Dept: CARDIOLOGY | Facility: CLINIC | Age: 69
End: 2019-01-15

## 2019-01-15 DIAGNOSIS — I48.0 PAROXYSMAL ATRIAL FIBRILLATION: ICD-10-CM

## 2019-01-15 DIAGNOSIS — E03.9 HYPOTHYROIDISM, UNSPECIFIED TYPE: Primary | ICD-10-CM

## 2019-01-15 NOTE — TELEPHONE ENCOUNTER
Spoke to MsSteve Mirza.  Apologized for delay in results.  Results given.  TSH scheduled for tomorrow in [place] for 11 am.  She does not wish for another referral for endocrine at this time.  She reports if her TSH comes back abnormal, she will then go back to them.      MsSteve Hermes verbalizes understanding and appreciates call.

## 2019-01-16 ENCOUNTER — TELEPHONE (OUTPATIENT)
Dept: ELECTROPHYSIOLOGY | Facility: CLINIC | Age: 69
End: 2019-01-16

## 2019-01-16 ENCOUNTER — LAB VISIT (OUTPATIENT)
Dept: LAB | Facility: HOSPITAL | Age: 69
End: 2019-01-16
Attending: INTERNAL MEDICINE
Payer: MEDICARE

## 2019-01-16 DIAGNOSIS — E03.9 HYPOTHYROIDISM, UNSPECIFIED TYPE: ICD-10-CM

## 2019-01-16 DIAGNOSIS — I48.0 PAROXYSMAL ATRIAL FIBRILLATION: ICD-10-CM

## 2019-01-16 LAB — TSH SERPL DL<=0.005 MIU/L-ACNC: 3 UIU/ML

## 2019-01-16 PROCEDURE — 36415 COLL VENOUS BLD VENIPUNCTURE: CPT

## 2019-01-16 PROCEDURE — 84443 ASSAY THYROID STIM HORMONE: CPT

## 2019-01-16 NOTE — TELEPHONE ENCOUNTER
----- Message from Chriss Loco MD sent at 1/16/2019 12:58 PM CST -----  Please notify the patient that TSH is normal

## 2019-02-04 DIAGNOSIS — E03.9 HYPOTHYROIDISM, UNSPECIFIED TYPE: ICD-10-CM

## 2019-02-04 RX ORDER — LEVOTHYROXINE SODIUM 75 UG/1
TABLET ORAL
Qty: 95 TABLET | Refills: 3
Start: 2019-02-04 | End: 2019-02-04 | Stop reason: SDUPTHER

## 2019-02-04 RX ORDER — LEVOTHYROXINE SODIUM 75 UG/1
TABLET ORAL
Qty: 95 TABLET | Refills: 3
Start: 2019-02-04 | End: 2019-04-07 | Stop reason: DRUGHIGH

## 2019-02-04 NOTE — TELEPHONE ENCOUNTER
----- Message from Linda Colon sent at 2/4/2019 10:31 AM CST -----  Contact: Bhaskar [phone]  Good morning! This patient needs a refill on the levothyroxine 75mcg sent to [facility] please and thank you!

## 2019-02-07 PROBLEM — I49.3 PVC'S (PREMATURE VENTRICULAR CONTRACTIONS): Status: ACTIVE | Noted: 2019-02-07

## 2019-02-08 ENCOUNTER — TELEPHONE (OUTPATIENT)
Dept: ELECTROPHYSIOLOGY | Facility: CLINIC | Age: 69
End: 2019-02-08

## 2019-02-08 ENCOUNTER — TELEPHONE (OUTPATIENT)
Dept: FAMILY MEDICINE | Facility: CLINIC | Age: 69
End: 2019-02-08

## 2019-02-08 ENCOUNTER — PATIENT MESSAGE (OUTPATIENT)
Dept: CARDIOLOGY | Facility: CLINIC | Age: 69
End: 2019-02-08

## 2019-02-08 RX ORDER — LOSARTAN POTASSIUM 100 MG/1
100 TABLET ORAL DAILY
Qty: 90 TABLET | Refills: 3 | Status: SHIPPED | OUTPATIENT
Start: 2019-02-08 | End: 2020-01-13 | Stop reason: SDUPTHER

## 2019-02-11 ENCOUNTER — TELEPHONE (OUTPATIENT)
Dept: ELECTROPHYSIOLOGY | Facility: CLINIC | Age: 69
End: 2019-02-11

## 2019-02-11 ENCOUNTER — PATIENT MESSAGE (OUTPATIENT)
Dept: FAMILY MEDICINE | Facility: CLINIC | Age: 69
End: 2019-02-11

## 2019-02-11 RX ORDER — ALBUTEROL SULFATE 90 UG/1
AEROSOL, METERED RESPIRATORY (INHALATION)
COMMUNITY
End: 2019-02-21

## 2019-02-12 ENCOUNTER — TELEPHONE (OUTPATIENT)
Dept: ELECTROPHYSIOLOGY | Facility: CLINIC | Age: 69
End: 2019-02-12

## 2019-02-12 NOTE — TELEPHONE ENCOUNTER
Attempted to return call. Lm to call to schedule or use the portal if that is easier  ----- Message from Lisseth Morgan sent at 2/12/2019  8:49 AM CST -----  Contact: pt  Pt returning a missed call from the office.    Thanks

## 2019-02-21 ENCOUNTER — OFFICE VISIT (OUTPATIENT)
Dept: CARDIOLOGY | Facility: CLINIC | Age: 69
End: 2019-02-21
Payer: MEDICARE

## 2019-02-21 VITALS
DIASTOLIC BLOOD PRESSURE: 85 MMHG | SYSTOLIC BLOOD PRESSURE: 148 MMHG | HEART RATE: 78 BPM | WEIGHT: 231 LBS | HEIGHT: 68 IN | BODY MASS INDEX: 35.01 KG/M2

## 2019-02-21 DIAGNOSIS — E03.9 HYPOTHYROIDISM, UNSPECIFIED TYPE: ICD-10-CM

## 2019-02-21 DIAGNOSIS — I49.3 PVC'S (PREMATURE VENTRICULAR CONTRACTIONS): ICD-10-CM

## 2019-02-21 DIAGNOSIS — I50.22 CHRONIC SYSTOLIC HEART FAILURE: Primary | ICD-10-CM

## 2019-02-21 DIAGNOSIS — I48.0 PAF (PAROXYSMAL ATRIAL FIBRILLATION): ICD-10-CM

## 2019-02-21 DIAGNOSIS — I48.0 PAROXYSMAL ATRIAL FIBRILLATION: ICD-10-CM

## 2019-02-21 PROCEDURE — 3079F DIAST BP 80-89 MM HG: CPT | Mod: CPTII,S$GLB,, | Performed by: INTERNAL MEDICINE

## 2019-02-21 PROCEDURE — 3079F PR MOST RECENT DIASTOLIC BLOOD PRESSURE 80-89 MM HG: ICD-10-PCS | Mod: CPTII,S$GLB,, | Performed by: INTERNAL MEDICINE

## 2019-02-21 PROCEDURE — 99499 RISK ADDL DX/OHS AUDIT: ICD-10-PCS | Mod: S$GLB,,, | Performed by: INTERNAL MEDICINE

## 2019-02-21 PROCEDURE — 99213 OFFICE O/P EST LOW 20 MIN: CPT | Mod: S$GLB,,, | Performed by: INTERNAL MEDICINE

## 2019-02-21 PROCEDURE — 3077F SYST BP >= 140 MM HG: CPT | Mod: CPTII,S$GLB,, | Performed by: INTERNAL MEDICINE

## 2019-02-21 PROCEDURE — 3077F PR MOST RECENT SYSTOLIC BLOOD PRESSURE >= 140 MM HG: ICD-10-PCS | Mod: CPTII,S$GLB,, | Performed by: INTERNAL MEDICINE

## 2019-02-21 PROCEDURE — 99213 PR OFFICE/OUTPT VISIT, EST, LEVL III, 20-29 MIN: ICD-10-PCS | Mod: S$GLB,,, | Performed by: INTERNAL MEDICINE

## 2019-02-21 PROCEDURE — 99999 PR PBB SHADOW E&M-EST. PATIENT-LVL III: ICD-10-PCS | Mod: PBBFAC,,, | Performed by: INTERNAL MEDICINE

## 2019-02-21 PROCEDURE — 1101F PT FALLS ASSESS-DOCD LE1/YR: CPT | Mod: CPTII,S$GLB,, | Performed by: INTERNAL MEDICINE

## 2019-02-21 PROCEDURE — 99499 UNLISTED E&M SERVICE: CPT | Mod: S$GLB,,, | Performed by: INTERNAL MEDICINE

## 2019-02-21 PROCEDURE — 93000 EKG 12-LEAD: ICD-10-PCS | Mod: S$GLB,,, | Performed by: STUDENT IN AN ORGANIZED HEALTH CARE EDUCATION/TRAINING PROGRAM

## 2019-02-21 PROCEDURE — 93000 ELECTROCARDIOGRAM COMPLETE: CPT | Mod: S$GLB,,, | Performed by: STUDENT IN AN ORGANIZED HEALTH CARE EDUCATION/TRAINING PROGRAM

## 2019-02-21 PROCEDURE — 1101F PR PT FALLS ASSESS DOC 0-1 FALLS W/OUT INJ PAST YR: ICD-10-PCS | Mod: CPTII,S$GLB,, | Performed by: INTERNAL MEDICINE

## 2019-02-21 PROCEDURE — 99999 PR PBB SHADOW E&M-EST. PATIENT-LVL III: CPT | Mod: PBBFAC,,, | Performed by: INTERNAL MEDICINE

## 2019-02-21 NOTE — PROGRESS NOTES
Subjective:    Patient ID:  Violet Mirza is a 68 y.o. female who presents for follow-up of Atrial Fibrillation       Referring Practitioner and Cardiologist: Jaren Medel NP and Go Duran MD  Primary Care Physician: Phyllis Gunter MD    HPI    Prior Hx:  I had the pleasure of seeing Mrs. Mirza today in our [facility] electrophysiology clinic in follow-up for her atrial fibrillation with associated AF mediated cardiomyopathy. As you are aware she is a pleasant 68 year-old woman with hypertension and hypothyroidism. Beginning in September/2017 she began to develop a cough and shortness of breath. She was seen for this and was thought to have anxiety and asthma. She was given an inhaler which did not help much. She then began to have orthopnea. A 2d echocardiogram was ordered by Dr. Henning which was performed on 2018. She was in atrial fibrillation with RVR during the echocardiogram and her left ventricular ejection fraction was severely depressed at 10-15% with mildly enlarged LVEDD and moderately enlarged left atrium with severely depressed right ventricular function and pulmonary hypertension. She was admitted to [facility] for treatment and initiation of goal directed medical therapy for heart failure. In the [facility] ER she was given diltiazem. She converted spontaneously to sinus rhythm. An angiogram was performed by Dr. Duran on 2018 noting normal coronary arteries. She was started on amiodarone and eliquis. She followed up in cardiology clinic on 2018 and expressed concern about amiodarone and anticoagulation. Amiodarone was stopped (reports 3 family members that have  from amiodarone induced lung disease) and she was referred here for further evaluation. Of note her TSH and FT4 in the past on prior dosages of levothyroxine were consistent with hyperthyroidism. Recent lab values on 2017 and 2018 show suppressed TSH and normal T4. Her  synthroid dosage was recently reduced. She has no history of GI bleeding. Colonoscopy in 2015 showed diverticulosis.     I reviewed all available electrocardiograms in [facility] (starting 1/16/2018) which show sinus rhythm/tachycardia with left axis shift and incomplete RBBB with frequent PVCs except for 1/22/2018 which showed AF with RVR. An ECG from her clinic visit after starting amiodarone on 1/30/2018 shows sinus rhythm with a rate of 68 bpm. PVC foci appear to be RBBB, concordant precordial, R in limb lead 1, superiorly directed, likely basal septal focus. All of her QTc on ECGs are over 500msec.    At our initial visit we discussed continuing a short course of amiodarone to allow her EF to recover with repeat echo in 3 months. Repeat ECHOs noted EF remains in the 30-35% range. She desired to stop amiodarone and after numerous discussions elected to not proceed with PVI. A holter monitor dated 2/2018 noted ~6000 PVCs in 48 hours.    Interim Hx:  Ms. Mirza returns for follow-up discussion. Repeat ECHO noted LVEF of 35%. She has had no symptomatic recurrence of AF. She has no heart failure symptoms. She goes to the gym 3 times a week.     My interpretation of today's in clinic electrocardiogram is normal sinus rhythm with a rate of 79 bpm with occasional PVCs.    Review of Systems   Constitution: Negative for weakness and malaise/fatigue.   HENT: Negative for congestion and sore throat.    Eyes: Negative for blurred vision and visual disturbance.   Cardiovascular: Negative for chest pain, dyspnea on exertion, irregular heartbeat, leg swelling, near-syncope, orthopnea, palpitations, paroxysmal nocturnal dyspnea and syncope.   Respiratory: Negative.  Negative for cough and shortness of breath.    Hematologic/Lymphatic: Negative for bleeding problem. Does not bruise/bleed easily.   Skin: Negative.    Musculoskeletal: Positive for arthritis and joint pain.   Gastrointestinal: Negative for hematochezia and melena.    Neurological: Negative for dizziness and focal weakness.        Objective:    Physical Exam   Constitutional: She is oriented to person, place, and time. She appears well-developed and well-nourished. No distress.   HENT:   Head: Normocephalic and atraumatic.   Eyes: Conjunctivae are normal. Right eye exhibits no discharge. Left eye exhibits no discharge.   Neck: Neck supple. No JVD present.   Cardiovascular: Normal rate, regular rhythm and normal heart sounds. Exam reveals no gallop and no friction rub.   No murmur heard.  Pulmonary/Chest: Effort normal and breath sounds normal. No respiratory distress. She has no wheezes. She has no rales.   Abdominal: Soft. Bowel sounds are normal. She exhibits no distension. There is no tenderness. There is no rebound.   Musculoskeletal: She exhibits no edema.   Neurological: She is alert and oriented to person, place, and time.   Skin: Skin is warm and dry. She is not diaphoretic.   Psychiatric: She has a normal mood and affect. Her behavior is normal. Judgment and thought content normal.   Vitals reviewed.        Assessment:       1. Chronic systolic heart failure    2. Paroxysmal atrial fibrillation    3. PVC's (premature ventricular contractions)    4. Hypothyroidism, unspecified type         Plan:       In summary, Mrs. Mirza is a pleasant 68 year-old woman with hypertension and hypothyroidism with currently suppressed TSH and recent synthroid dose adjustment who was recently diagnosed with paroxysmal atrial fibrillation, frequent PVCs and severe biventricular nonischemic systolic heart failure. Despite sinus rhythm she has had persistent EF of 30-35%. She likely has NICMP unrelated to AF and AF is secondary. Her prior observed PVC burden also was not enough to explain her cardiomyopathy. She would like to remain off anti-arrhythmic therapy. Tikosyn/sotalol are contraindicated. She does not desire amiodarone in the future leaving multaq as the only alternative which she  may not be able to afford. If AF puts her back into symptomatic heart failure then multaq would be contraindicated at that time. Should her PVC burden appear to increase would first evaluate with a repeat 24 hour holter, which she would like to avoid if possible.    She is need of a lipoma removal from her right chest. If anticoagulation is needed to be held then recommend holding for 3 days prior and resume post-op when safe from a bleeding risk perspective.    Continue metoprolol and xarelto    RTC in 6 months, sooner if needed.    Thank you for allowing me to participate in the care of this patient. Please do not hesitate to call me with any questions or concerns.    Chriss Loco MD, PhD  Cardiac Electrophysiology

## 2019-02-21 NOTE — PROGRESS NOTES
Patient, Violet Mirza (MRN #[id number]), presented with a recorded BMI of 35.12 kg/m^2 and a documented comorbidity(s):  - Atrial Fibrillation  - Atrial Fibrillation  to which the severe obesity is a contributing factor. This is consistent with the definition of severe obesity (BMI 35.0-39.9) with comorbidity (ICD-10 E66.01, Z68.35). The patient's severe obesity was monitored, evaluated, addressed and/or treated. This addendum to the medical record is made on 02/21/2019.

## 2019-02-22 ENCOUNTER — TELEPHONE (OUTPATIENT)
Dept: NEUROLOGY | Facility: HOSPITAL | Age: 69
End: 2019-02-22

## 2019-02-22 NOTE — TELEPHONE ENCOUNTER
Received call back from pt RE: lipoma removal. Pt saw Dr. Rausch yesterday and was approved to stop taking blood thinners 3 days prior to procedure. Clinic appt scheduled.

## 2019-02-22 NOTE — TELEPHONE ENCOUNTER
----- Message from Sonja Estevez sent at 2/22/2019 12:06 PM CST -----  JPB- Patient needs to speak with you about her blood thinners and there surgery. Please call back to assist at [phone].

## 2019-02-26 ENCOUNTER — PATIENT MESSAGE (OUTPATIENT)
Dept: CARDIOLOGY | Facility: CLINIC | Age: 69
End: 2019-02-26

## 2019-03-29 ENCOUNTER — OFFICE VISIT (OUTPATIENT)
Dept: NEUROLOGY | Facility: HOSPITAL | Age: 69
End: 2019-03-29
Attending: SURGERY
Payer: MEDICARE

## 2019-03-29 VITALS
BODY MASS INDEX: 36 KG/M2 | WEIGHT: 236.75 LBS | TEMPERATURE: 97 F | DIASTOLIC BLOOD PRESSURE: 81 MMHG | HEART RATE: 64 BPM | SYSTOLIC BLOOD PRESSURE: 161 MMHG

## 2019-03-29 DIAGNOSIS — D17.23 LIPOMA OF RIGHT THIGH: ICD-10-CM

## 2019-03-29 DIAGNOSIS — I48.0 PAROXYSMAL ATRIAL FIBRILLATION: ICD-10-CM

## 2019-03-29 DIAGNOSIS — E66.1 CLASS 2 DRUG-INDUCED OBESITY WITHOUT SERIOUS COMORBIDITY WITH BODY MASS INDEX (BMI) OF 36.0 TO 36.9 IN ADULT: ICD-10-CM

## 2019-03-29 DIAGNOSIS — D48.19 ABDOMINAL FIBROMATOSIS: ICD-10-CM

## 2019-03-29 DIAGNOSIS — D17.1 LIPOMA OF ANTERIOR CHEST WALL: Primary | ICD-10-CM

## 2019-03-29 DIAGNOSIS — Z79.01 CURRENT USE OF LONG TERM ANTICOAGULATION: ICD-10-CM

## 2019-03-29 DIAGNOSIS — D17.24 LIPOMA OF LEFT THIGH: ICD-10-CM

## 2019-03-29 PROBLEM — E66.812 CLASS 2 OBESITY WITHOUT SERIOUS COMORBIDITY WITH BODY MASS INDEX (BMI) OF 36.0 TO 36.9 IN ADULT: Status: ACTIVE | Noted: 2018-09-11

## 2019-03-29 PROBLEM — E66.9 CLASS 2 OBESITY WITHOUT SERIOUS COMORBIDITY WITH BODY MASS INDEX (BMI) OF 36.0 TO 36.9 IN ADULT: Status: ACTIVE | Noted: 2018-09-11

## 2019-03-29 PROCEDURE — 99215 OFFICE O/P EST HI 40 MIN: CPT | Performed by: SURGERY

## 2019-03-29 RX ORDER — SODIUM CHLORIDE 9 MG/ML
INJECTION, SOLUTION INTRAVENOUS CONTINUOUS
Status: CANCELLED | OUTPATIENT
Start: 2019-03-29

## 2019-03-29 RX ORDER — LIDOCAINE HYDROCHLORIDE 10 MG/ML
1 INJECTION, SOLUTION EPIDURAL; INFILTRATION; INTRACAUDAL; PERINEURAL ONCE
Status: CANCELLED | OUTPATIENT
Start: 2019-03-29 | End: 2019-03-29

## 2019-03-29 RX ORDER — CEFAZOLIN SODIUM 2 G/50ML
2 SOLUTION INTRAVENOUS
Status: CANCELLED | OUTPATIENT
Start: 2019-03-29

## 2019-03-29 RX ORDER — ENOXAPARIN SODIUM 100 MG/ML
40 INJECTION SUBCUTANEOUS
Status: CANCELLED | OUTPATIENT
Start: 2019-03-29

## 2019-03-29 NOTE — H&P (VIEW-ONLY)
[facility]OLAN[facility]:  [facility]  A collaboration between [facility] and [facility]      PATIENT: Violet Mirza  MRN: [id number]  DATE: 3/29/2019    Subjective:      Chief Complaint: Follow-up (lipoma removal)      Vitals:   Vitals:    03/29/19 1025   BP: (!) 161/81   BP Location: Left arm   Patient Position: Sitting   BP Method: Medium (Automatic)   Pulse: 64   Temp: 96.8 °F (36 °C)   TempSrc: Oral   Weight: 107.4 kg (236 lb 12.4 oz)        Karnofsky Score:   Karnofsky Score    Karnofsky Score:  100% - Normal, No Complaints, No Evidence of Disease         Diagnosis:   1. Current use of long term anticoagulation    2. Class 2 drug-induced obesity without serious comorbidity with body mass index (BMI) of 36.0 to 36.9 in adult    3. Lipoma of anterior chest wall         Oncologic History: na    Interval History: 67 y/o female self referred fo rchest lipoma wanting excision. History of life threatenting MI Jan 2018, CHF, 10% EF, history o fA fib, venous insufficiency. and now on eliquis.  Some shortness of breath with exertion. Morbid obesity.    Cardiology consult:  In summary, Mrs. Mirza is a pleasant 68 year-old woman with hypertension and hypothyroidism with currently suppressed TSH and recent synthroid dose adjustment who was recently diagnosed with paroxysmal atrial fibrillation, frequent PVCs and severe biventricular nonischemic systolic heart failure. Despite sinus rhythm she has had persistent EF of 30-35%. She likely has NICMP unrelated to AF and AF is secondary. Her prior observed PVC burden also was not enough to explain her cardiomyopathy. She would like to remain off anti-arrhythmic therapy. Tikosyn/sotalol are contraindicated. She does not desire amiodarone in the future leaving multaq as the only alternative which she may not be able to afford. If AF puts her back into symptomatic heart failure then multaq would be  contraindicated at that time. Should her PVC burden appear to increase would first evaluate with a repeat 24 hour holter, which she would like to avoid if possible.     She is need of a lipoma removal from her right chest. If anticoagulation is needed to be held then recommend holding for 3 days prior and resume post-op when safe from a bleeding risk perspective.     Continue metoprolol and xarelto     RTC in 6 months, sooner if needed.     Thank you for allowing me to participate in the care of this patient. Please do not hesitate to call me with any questions or concerns.     Chriss Loco MD, PhD  Cardiac Electrophysiology          Past Medical History:  Past Medical History:   Diagnosis Date    Chondromalacia of right patella     MRI 6/2016 at [facility]    Diverticulitis     GERD (gastroesophageal reflux disease)     HTN (hypertension) 7/26/2013    Hypoglycemia 11/22/2014    OA (osteoarthritis) of knee     bilateraly, followed by Dr. Callejas, [facility], s/p synvisc injections    Osteopenia 7/26/2013    Personal history of kidney stones 7/26/2013    Rosacea 7/26/2013    Tear of medial meniscus of right knee     MRI 6/2016       Past Surgical History:  Past Surgical History:   Procedure Laterality Date    ADENOIDECTOMY      FOOT SURGERY Bilateral     KNEE ARTHROSCOPY W/ MENISCAL REPAIR Right 5/14    TONSILLECTOMY      TUBAL LIGATION         Family History:  Family History   Problem Relation Age of Onset    Hypertension Mother     Diabetes Mother     Glaucoma Mother     Cataracts Mother     Hypertension Father     Stroke Father     No Known Problems Sister     No Known Problems Brother     No Known Problems Daughter     No Known Problems Son     No Known Problems Sister     No Known Problems Daughter        Allergies:  Review of patient's allergies indicates:   Allergen Reactions    Aldactone [spironolactone] Other (See Comments)     Hyperkalemia when initiated on  01/23/2018        Medications:  Current Outpatient Medications   Medication Sig Dispense Refill    diclofenac sodium 1 % Gel LILIANA 4 GRAMS TOPICALLY AA QID PRN  1    furosemide (LASIX) 40 MG tablet TAKE ONE TABLET BY MOUTH TWO TIMES A DAY 60 tablet 11    levothyroxine (SYNTHROID) 75 MCG tablet Take 1 tablet by mouth once daily except on Sundays take 1.5 tabs 95 tablet 3    losartan (COZAAR) 100 MG tablet TAKE 1 TABLET BY MOUTH ONCE DAILY 90 tablet 3    metoprolol succinate (TOPROL-XL) 200 MG 24 hr tablet Take 1 tablet (200 mg total) by mouth once daily. 90 tablet 3    rivaroxaban (XARELTO) 20 mg Tab Take 1 tablet (20 mg total) by mouth daily with dinner or evening meal. 30 tablet 11    traMADol (ULTRAM) 50 mg tablet TK 1 T PO Q 6 H PRN  0     No current facility-administered medications for this visit.        Review of Systems   Constitutional: Positive for activity change (decreased) and fatigue. Negative for appetite change, chills, fever and unexpected weight change.   HENT: Negative.  Negative for ear pain, rhinorrhea and sinus pressure.    Eyes: Negative.    Respiratory: Positive for shortness of breath. Negative for cough, chest tightness and wheezing.    Cardiovascular: Positive for leg swelling. Negative for chest pain and palpitations.   Gastrointestinal: Negative.  Negative for abdominal distention, abdominal pain, anal bleeding, blood in stool, constipation, diarrhea, nausea, rectal pain and vomiting.        GERD   Genitourinary: Negative.  Negative for difficulty urinating, dysuria and frequency.   Musculoskeletal: Positive for arthralgias. Negative for back pain and gait problem.   Skin: Negative.  Negative for color change, pallor and rash.   Allergic/Immunologic: Negative.  Negative for environmental allergies, food allergies and immunocompromised state.   Neurological: Negative.  Negative for dizziness, seizures, syncope, weakness and light-headedness.   Hematological: Bruises/bleeds easily.    Psychiatric/Behavioral: Negative for dysphoric mood. The patient is nervous/anxious.       Objective:      Physical Exam   Constitutional: She is oriented to person, place, and time. She appears well-developed and well-nourished. No distress.   HENT:   Head: Normocephalic.   Eyes: No scleral icterus.   Neck: No JVD present. No tracheal deviation present.   Cardiovascular: Normal rate, regular rhythm and intact distal pulses.   Pulmonary/Chest: Effort normal. No respiratory distress. She has no wheezes.   Abdominal: Soft. There is no tenderness.   obese   Musculoskeletal: Normal range of motion. She exhibits no edema.   Neurological: She is alert and oriented to person, place, and time.   Skin: Skin is warm and dry. She is not diaphoretic.   3 x 3 cm lipoma R anterior superior chest wall.mobile.    Spider veins both LE's  Bilateral 2 cm lipomas L and r hips below groins.   Nursing note and vitals reviewed.     Assessment:       1. Current use of long term anticoagulation    2. Class 2 drug-induced obesity without serious comorbidity with body mass index (BMI) of 36.0 to 36.9 in adult    3. Lipoma of anterior chest wall        Laboratory Data:    Neuroendocrine Labs Latest Ref Rng & Units 3/29/2019 2/21/2019 1/16/2019 1/3/2019 12/10/2018 11/15/2018 10/24/2018   VITAMIN B12 200 - 1,100 pg/mL - - - - - - -   TSH 0.400 - 4.000 uIU/mL - - 3.001 - - - -   25 HYDROXY VIT D2 See Note: ng/mL - - - - - - -   25 HYDROXY VIT D3 See Note: ng/mL - - - - - - -   WBC 3.90 - 12.70 K/uL - - - - - - -   HGB 12.0 - 16.0 g/dL - - - - - - -   HCT 37.0 - 48.5 % - - - - - - -   PLATLETS 150 - 350 K/uL - - - - - - -   PT 9.0 - 12.5 sec - - - - - - -   INR 0.8 - 1.2 - - - - - - -   GLUCOSE 70 - 110 mg/dL - - - - - - -   BUN 8 - 23 mg/dL - - - - - - -   CREATININE 0.5 - 1.4 mg/dL - - - - - - -    - 145 mmol/L - - - - - - -   K 3.5 - 5.1 mmol/L - - - - - - -   CHLORIDE 95 - 110 mmol/L - - - - - - -   CO2 23 - 29 mmol/L - - - - - - -    CALCIUM 8.7 - 10.5 mg/dL - - - - - - -   PROTEIN, TOTAL 6.0 - 8.4 g/dL - - - - - - -   ALBUMIN 3.5 - 5.2 g/dL - - - - - - -   TOTAL BILIRUBIN 0.1 - 1.0 mg/dL - - - - - - -   ALK PHOSPHATASE 55 - 135 U/L - - - - - - -   SGOT (AST) 10 - 40 U/L - - - - - - -   SGPT (ALT) 10 - 44 U/L - - - - - - -   TRIGLYCERIDES 30 - 150 mg/dL - - - - - - -   CHOLERSTEROL 120 - 199 mg/dL - - - - - - -   HDL 40 - 75 mg/dL - - - - - - -   LDL 63.0 - 159.0 mg/dL - - - - - - -   HEMOGLOBIN A1C 4.0 - 5.6 % - - - - - - -   Weight - 236 lbs 12 oz 231 lbs - 235 lbs 2 oz 238 lbs 239 lbs 1 oz 200 lbs         Impression:  Lipoma right anterior chest wall.  Both groins. History of myocardial infarction with congestive heart failure and decreased ejection fraction within the last 12 months.  Chronic anticoagulation with Eliquis.  Desires excision of lipoma.  Will need clearance from Cardiology regarding discontinuation of Xarelto perioperatively    patient appears to be in sinus rhythm at present. Compensated heart failure. Patient wants excision of all three lipomas.  Plan:       Increased risk of bleeding into cavity with postop hematoma after lipoma excision due to Xarelto.     okay to stop Xarelto X 3D preop , no ASA, motrin, ibuprophen, advil, aleve, etc. for 3 days perioperatively.  Risks/benefits explained and accepted.  All questions answered.      OR  4/8/19          NOLA Werner MD, FACS  Professor of Surgery, [facility]  Neuroendocrine Surgery, Hepatic/Pancreatic & General Surgery  [address], [address]  [place]  [address]  ph. [phone]; [phone]  fax. [phone]

## 2019-03-29 NOTE — PROGRESS NOTES
NOLANETS:  [facility]  A collaboration between [facility] and [facility]      PATIENT: Violet Mirza  MRN: [id number]  DATE: 3/29/2019    Subjective:      Chief Complaint: Follow-up (lipoma removal)      Vitals:   Vitals:    03/29/19 1025   BP: (!) 161/81   BP Location: Left arm   Patient Position: Sitting   BP Method: Medium (Automatic)   Pulse: 64   Temp: 96.8 °F (36 °C)   TempSrc: Oral   Weight: 107.4 kg (236 lb 12.4 oz)        Karnofsky Score:   Karnofsky Score    Karnofsky Score:  100% - Normal, No Complaints, No Evidence of Disease         Diagnosis:   1. Current use of long term anticoagulation    2. Class 2 drug-induced obesity without serious comorbidity with body mass index (BMI) of 36.0 to 36.9 in adult    3. Lipoma of anterior chest wall         Oncologic History: na    Interval History: 67 y/o female self referred fo rchest lipoma wanting excision. History of life threatenting MI Jan 2018, CHF, 10% EF, history o fA fib, venous insufficiency. and now on eliquis.  Some shortness of breath with exertion. Morbid obesity.    Cardiology consult:  In summary, Mrs. Mirza is a pleasant 68 year-old woman with hypertension and hypothyroidism with currently suppressed TSH and recent synthroid dose adjustment who was recently diagnosed with paroxysmal atrial fibrillation, frequent PVCs and severe biventricular nonischemic systolic heart failure. Despite sinus rhythm she has had persistent EF of 30-35%. She likely has NICMP unrelated to AF and AF is secondary. Her prior observed PVC burden also was not enough to explain her cardiomyopathy. She would like to remain off anti-arrhythmic therapy. Tikosyn/sotalol are contraindicated. She does not desire amiodarone in the future leaving multaq as the only alternative which she may not be able to afford. If AF puts her back into symptomatic heart failure then multaq would be  contraindicated at that time. Should her PVC burden appear to increase would first evaluate with a repeat 24 hour holter, which she would like to avoid if possible.     She is need of a lipoma removal from her right chest. If anticoagulation is needed to be held then recommend holding for 3 days prior and resume post-op when safe from a bleeding risk perspective.     Continue metoprolol and xarelto     RTC in 6 months, sooner if needed.     Thank you for allowing me to participate in the care of this patient. Please do not hesitate to call me with any questions or concerns.     Chriss Loco MD, PhD  Cardiac Electrophysiology          Past Medical History:  Past Medical History:   Diagnosis Date    Chondromalacia of right patella     MRI 6/2016 at [facility]    Diverticulitis     GERD (gastroesophageal reflux disease)     HTN (hypertension) 7/26/2013    Hypoglycemia 11/22/2014    OA (osteoarthritis) of knee     bilateraly, followed by Dr. Callejas, [facility], s/p synvisc injections    Osteopenia 7/26/2013    Personal history of kidney stones 7/26/2013    Rosacea 7/26/2013    Tear of medial meniscus of right knee     MRI 6/2016       Past Surgical History:  Past Surgical History:   Procedure Laterality Date    ADENOIDECTOMY      FOOT SURGERY Bilateral     KNEE ARTHROSCOPY W/ MENISCAL REPAIR Right 5/14    TONSILLECTOMY      TUBAL LIGATION         Family History:  Family History   Problem Relation Age of Onset    Hypertension Mother     Diabetes Mother     Glaucoma Mother     Cataracts Mother     Hypertension Father     Stroke Father     No Known Problems Sister     No Known Problems Brother     No Known Problems Daughter     No Known Problems Son     No Known Problems Sister     No Known Problems Daughter        Allergies:  Review of patient's allergies indicates:   Allergen Reactions    Aldactone [spironolactone] Other (See Comments)     Hyperkalemia when initiated on  01/23/2018        Medications:  Current Outpatient Medications   Medication Sig Dispense Refill    diclofenac sodium 1 % Gel LILIANA 4 GRAMS TOPICALLY AA QID PRN  1    furosemide (LASIX) 40 MG tablet TAKE ONE TABLET BY MOUTH TWO TIMES A DAY 60 tablet 11    levothyroxine (SYNTHROID) 75 MCG tablet Take 1 tablet by mouth once daily except on Sundays take 1.5 tabs 95 tablet 3    losartan (COZAAR) 100 MG tablet TAKE 1 TABLET BY MOUTH ONCE DAILY 90 tablet 3    metoprolol succinate (TOPROL-XL) 200 MG 24 hr tablet Take 1 tablet (200 mg total) by mouth once daily. 90 tablet 3    rivaroxaban (XARELTO) 20 mg Tab Take 1 tablet (20 mg total) by mouth daily with dinner or evening meal. 30 tablet 11    traMADol (ULTRAM) 50 mg tablet TK 1 T PO Q 6 H PRN  0     No current facility-administered medications for this visit.        Review of Systems   Constitutional: Positive for activity change (decreased) and fatigue. Negative for appetite change, chills, fever and unexpected weight change.   HENT: Negative.  Negative for ear pain, rhinorrhea and sinus pressure.    Eyes: Negative.    Respiratory: Positive for shortness of breath. Negative for cough, chest tightness and wheezing.    Cardiovascular: Positive for leg swelling. Negative for chest pain and palpitations.   Gastrointestinal: Negative.  Negative for abdominal distention, abdominal pain, anal bleeding, blood in stool, constipation, diarrhea, nausea, rectal pain and vomiting.        GERD   Genitourinary: Negative.  Negative for difficulty urinating, dysuria and frequency.   Musculoskeletal: Positive for arthralgias. Negative for back pain and gait problem.   Skin: Negative.  Negative for color change, pallor and rash.   Allergic/Immunologic: Negative.  Negative for environmental allergies, food allergies and immunocompromised state.   Neurological: Negative.  Negative for dizziness, seizures, syncope, weakness and light-headedness.   Hematological: Bruises/bleeds easily.    Psychiatric/Behavioral: Negative for dysphoric mood. The patient is nervous/anxious.       Objective:      Physical Exam   Constitutional: She is oriented to person, place, and time. She appears well-developed and well-nourished. No distress.   HENT:   Head: Normocephalic.   Eyes: No scleral icterus.   Neck: No JVD present. No tracheal deviation present.   Cardiovascular: Normal rate, regular rhythm and intact distal pulses.   Pulmonary/Chest: Effort normal. No respiratory distress. She has no wheezes.   Abdominal: Soft. There is no tenderness.   obese   Musculoskeletal: Normal range of motion. She exhibits no edema.   Neurological: She is alert and oriented to person, place, and time.   Skin: Skin is warm and dry. She is not diaphoretic.   3 x 3 cm lipoma R anterior superior chest wall.mobile.    Spider veins both LE's  Bilateral 2 cm lipomas L and r hips below groins.   Nursing note and vitals reviewed.     Assessment:       1. Current use of long term anticoagulation    2. Class 2 drug-induced obesity without serious comorbidity with body mass index (BMI) of 36.0 to 36.9 in adult    3. Lipoma of anterior chest wall        Laboratory Data:    Neuroendocrine Labs Latest Ref Rng & Units 3/29/2019 2/21/2019 1/16/2019 1/3/2019 12/10/2018 11/15/2018 10/24/2018   VITAMIN B12 200 - 1,100 pg/mL - - - - - - -   TSH 0.400 - 4.000 uIU/mL - - 3.001 - - - -   25 HYDROXY VIT D2 See Note: ng/mL - - - - - - -   25 HYDROXY VIT D3 See Note: ng/mL - - - - - - -   WBC 3.90 - 12.70 K/uL - - - - - - -   HGB 12.0 - 16.0 g/dL - - - - - - -   HCT 37.0 - 48.5 % - - - - - - -   PLATLETS 150 - 350 K/uL - - - - - - -   PT 9.0 - 12.5 sec - - - - - - -   INR 0.8 - 1.2 - - - - - - -   GLUCOSE 70 - 110 mg/dL - - - - - - -   BUN 8 - 23 mg/dL - - - - - - -   CREATININE 0.5 - 1.4 mg/dL - - - - - - -    - 145 mmol/L - - - - - - -   K 3.5 - 5.1 mmol/L - - - - - - -   CHLORIDE 95 - 110 mmol/L - - - - - - -   CO2 23 - 29 mmol/L - - - - - - -    CALCIUM 8.7 - 10.5 mg/dL - - - - - - -   PROTEIN, TOTAL 6.0 - 8.4 g/dL - - - - - - -   ALBUMIN 3.5 - 5.2 g/dL - - - - - - -   TOTAL BILIRUBIN 0.1 - 1.0 mg/dL - - - - - - -   ALK PHOSPHATASE 55 - 135 U/L - - - - - - -   SGOT (AST) 10 - 40 U/L - - - - - - -   SGPT (ALT) 10 - 44 U/L - - - - - - -   TRIGLYCERIDES 30 - 150 mg/dL - - - - - - -   CHOLERSTEROL 120 - 199 mg/dL - - - - - - -   HDL 40 - 75 mg/dL - - - - - - -   LDL 63.0 - 159.0 mg/dL - - - - - - -   HEMOGLOBIN A1C 4.0 - 5.6 % - - - - - - -   Weight - 236 lbs 12 oz 231 lbs - 235 lbs 2 oz 238 lbs 239 lbs 1 oz 200 lbs         Impression:  Lipoma right anterior chest wall.  Both groins. History of myocardial infarction with congestive heart failure and decreased ejection fraction within the last 12 months.  Chronic anticoagulation with Eliquis.  Desires excision of lipoma.  Will need clearance from Cardiology regarding discontinuation of Xarelto perioperatively    patient appears to be in sinus rhythm at present. Compensated heart failure. Patient wants excision of all three lipomas.  Plan:       Increased risk of bleeding into cavity with postop hematoma after lipoma excision due to Xarelto.     okay to stop Xarelto X 3D preop , no ASA, motrin, ibuprophen, advil, aleve, etc. for 3 days perioperatively.  Risks/benefits explained and accepted.  All questions answered.      OR  4/8/19          NOLA Werner MD, FACS  Professor of Surgery, [facility]  Neuroendocrine Surgery, Hepatic/Pancreatic & General Surgery  [address], [address]  [place]  [address]  ph. [phone]; [phone]  fax. [phone]

## 2019-03-29 NOTE — PATIENT INSTRUCTIONS
Patient Instructions    Name: Violet Mirza          Take a Hibiclens shower twice a day for 3 days prior to surgery, including the morning of surgery.   Gargle with Listerine twice a day for 2 days prior to surgery, including the morning of surgery.      _________________     Pre [place] for Hospital Admission - Go to 1st floor of the hospital-admitting desk. You will do paper work, get blood drawn,  get x-rays and see Anesthesia at this time.    April 21st, 2019   Do not eat or drink anything after midnight.                April 22nd, 2019   Hospital Admission for surgery.  Report to 2nd floor, Same Day Surgery desk at 7am   Surgery is scheduled for 9am        [facility] [address]  [place]  [address]  [phone]      INFORMATION REGARDING YOUR PROCEDURE      We look forward to serving you and your family and appreciate that you have chosen [facility] for your healthcare needs. Before, during, and after your surgery, you will be cared for by skilled medical professionals. Our surgeons, anesthesiologists, nurses,  and other healthcare professionals will work with you and your family to ensure a safe, smooth and comfortable surgery and recovery.    In order to best meet your pre-admission needs, your surgeon or ordering physicians office will contact our Scheduling Office at [phone] and schedule a Pre-Procedure Appointment.  This should preferably be done 72 hours or greater before your scheduled procedure date.     During your pre-procedure visit your insurance will be verified for your procedure. You will meet with a Registered Nurse and an Anesthesiologist or Nurse Anesthetist. If tests are required, they will be performed during your visit. Please allow about one and a half hours for this visit.      You will need to bring the following information or items with you to your Pre-Procedure Appointment:    1.  Picture Identification  2.  Insurance  Card  3.  Current list of medications to include name and dosage  4.  List of allergies  5.  Orders and any other forms your doctor has given to you  6.  Copies of lab results performed at other facilities. This may include blood work, EKGs or chest xrays.   These results can be faxed to [phone].    The Pre-Op Center Registration Desk is located on the first floor of the hospital ([address] [place] [address]) in the [facility].  The Pre-Operative Center is located on the second floor of the hospital. Someone will walk you from the registration area to the Pre-Operative Center.    Free parking is located in the front of the hospital and medical office building and is easily accessed from [address] and [address]. When you arrive, please check in at the main information desk of the hospital.    Please call Outpatient Surgery at [phone] after 12:00pm on the day before your procedure for your arrival time and updated procedure time.      Pre-Op Bathing Instructions    Before surgery, you can play an important role in your own health.    Because skin is not sterile, we need to be sure that your skin is as free of germs as possible. By following the instructions below, you can reduce the number of germs on your skin before surgery.    IMPORTANT: You will need to shower with a special soap called Hibiclens*, available at any pharmacy.  If you are allergic to Chlorhexidine (the antiseptic in Hibiclens), use an antibacterial soap such as Dial Soap for your preoperative shower.  You will shower with Hibiclens both the night before your surgery and the morning of your surgery.  Do not use Hibiclens on the head, face or genitals to avoid injury to those areas.    STEP #1: THE NIGHT BEFORE YOUR SURGERY     1. Do not shave the area of your body where your surgery will be performed.  2. Shower and wash your hair and body as usual with your normal soap and shampoo.  3. Rinse your hair and body thoroughly  after you shower to remove all soap residue.  4. With your hand, apply one packet of Hibiclens soap to the surgical site.   5. Wash the site gently for five (5) minutes. Do not scrub your skin too hard.   6. Do not wash with your regular soap after Hibiclens is used.  7. Rinse your body thoroughly.  8. Pat yourself dry with a clean, soft towel.  9. Do not use lotion, cream, or powder.  10. Wear clean clothes.    STEP #2: THE MORNING OF YOUR SURGERY     1. Repeat Step #1.    * Not to be used by people allergic to Chlorhexidine.

## 2019-04-03 ENCOUNTER — OFFICE VISIT (OUTPATIENT)
Dept: FAMILY MEDICINE | Facility: CLINIC | Age: 69
End: 2019-04-03
Payer: MEDICARE

## 2019-04-03 VITALS
HEIGHT: 68 IN | BODY MASS INDEX: 35.61 KG/M2 | OXYGEN SATURATION: 98 % | DIASTOLIC BLOOD PRESSURE: 80 MMHG | WEIGHT: 235 LBS | HEART RATE: 60 BPM | SYSTOLIC BLOOD PRESSURE: 126 MMHG

## 2019-04-03 DIAGNOSIS — Z87.11 HISTORY OF PEPTIC ULCER: ICD-10-CM

## 2019-04-03 DIAGNOSIS — L71.9 ROSACEA: ICD-10-CM

## 2019-04-03 DIAGNOSIS — I49.3 PVC'S (PREMATURE VENTRICULAR CONTRACTIONS): ICD-10-CM

## 2019-04-03 DIAGNOSIS — I87.2 VENOUS INSUFFICIENCY OF BOTH LOWER EXTREMITIES: ICD-10-CM

## 2019-04-03 DIAGNOSIS — Z79.01 CURRENT USE OF LONG TERM ANTICOAGULATION: ICD-10-CM

## 2019-04-03 DIAGNOSIS — M85.80 OSTEOPENIA, UNSPECIFIED LOCATION: ICD-10-CM

## 2019-04-03 DIAGNOSIS — K21.9 GASTROESOPHAGEAL REFLUX DISEASE WITHOUT ESOPHAGITIS: ICD-10-CM

## 2019-04-03 DIAGNOSIS — Z78.0 POSTMENOPAUSAL: ICD-10-CM

## 2019-04-03 DIAGNOSIS — Z79.899 MEDICATION MANAGEMENT: ICD-10-CM

## 2019-04-03 DIAGNOSIS — E66.09 CLASS 2 OBESITY DUE TO EXCESS CALORIES WITHOUT SERIOUS COMORBIDITY WITH BODY MASS INDEX (BMI) OF 35.0 TO 35.9 IN ADULT: ICD-10-CM

## 2019-04-03 DIAGNOSIS — I48.0 PAROXYSMAL ATRIAL FIBRILLATION: ICD-10-CM

## 2019-04-03 DIAGNOSIS — I50.22 CHRONIC SYSTOLIC HEART FAILURE: ICD-10-CM

## 2019-04-03 DIAGNOSIS — I10 BENIGN ESSENTIAL HYPERTENSION: ICD-10-CM

## 2019-04-03 DIAGNOSIS — E03.9 HYPOTHYROIDISM, UNSPECIFIED TYPE: ICD-10-CM

## 2019-04-03 DIAGNOSIS — M17.12 OSTEOARTHRITIS OF LEFT KNEE, UNSPECIFIED OSTEOARTHRITIS TYPE: ICD-10-CM

## 2019-04-03 DIAGNOSIS — Z00.00 ROUTINE GENERAL MEDICAL EXAMINATION AT A HEALTH CARE FACILITY: Primary | ICD-10-CM

## 2019-04-03 DIAGNOSIS — Z87.442 PERSONAL HISTORY OF KIDNEY STONES: ICD-10-CM

## 2019-04-03 DIAGNOSIS — Z23 NEED FOR TETANUS, DIPHTHERIA, AND ACELLULAR PERTUSSIS (TDAP) VACCINE: ICD-10-CM

## 2019-04-03 PROBLEM — D17.1 LIPOMA OF ANTERIOR CHEST WALL: Status: RESOLVED | Noted: 2018-09-11 | Resolved: 2019-04-03

## 2019-04-03 PROCEDURE — 99214 PR OFFICE/OUTPT VISIT, EST, LEVL IV, 30-39 MIN: ICD-10-PCS | Mod: S$GLB,,, | Performed by: FAMILY MEDICINE

## 2019-04-03 PROCEDURE — 99499 RISK ADDL DX/OHS AUDIT: ICD-10-PCS | Mod: S$GLB,,, | Performed by: FAMILY MEDICINE

## 2019-04-03 PROCEDURE — 99999 PR PBB SHADOW E&M-EST. PATIENT-LVL III: CPT | Mod: PBBFAC,,, | Performed by: FAMILY MEDICINE

## 2019-04-03 PROCEDURE — 3074F SYST BP LT 130 MM HG: CPT | Mod: CPTII,S$GLB,, | Performed by: FAMILY MEDICINE

## 2019-04-03 PROCEDURE — 3079F PR MOST RECENT DIASTOLIC BLOOD PRESSURE 80-89 MM HG: ICD-10-PCS | Mod: CPTII,S$GLB,, | Performed by: FAMILY MEDICINE

## 2019-04-03 PROCEDURE — 99499 UNLISTED E&M SERVICE: CPT | Mod: S$GLB,,, | Performed by: FAMILY MEDICINE

## 2019-04-03 PROCEDURE — 99999 PR PBB SHADOW E&M-EST. PATIENT-LVL III: ICD-10-PCS | Mod: PBBFAC,,, | Performed by: FAMILY MEDICINE

## 2019-04-03 PROCEDURE — 3074F PR MOST RECENT SYSTOLIC BLOOD PRESSURE < 130 MM HG: ICD-10-PCS | Mod: CPTII,S$GLB,, | Performed by: FAMILY MEDICINE

## 2019-04-03 PROCEDURE — 3079F DIAST BP 80-89 MM HG: CPT | Mod: CPTII,S$GLB,, | Performed by: FAMILY MEDICINE

## 2019-04-03 PROCEDURE — 99214 OFFICE O/P EST MOD 30 MIN: CPT | Mod: S$GLB,,, | Performed by: FAMILY MEDICINE

## 2019-04-03 NOTE — PROGRESS NOTES
Office Visit    Patient Name: Violet Mirza    : 1950  MRN: [id number]    Subjective:  Violet is a 68 y.o. female who presents today for:    Annual Exam    Violet Mirza presents today for annual wellness exam and to establish care--- most recently saw Dr Horvath at [place] as of 2018.  Her chronic conditions include paroxysmal AFib(2018 in [facility] ER was given diltiazem & converted spontaneously to NSR), hypothyroidism, hypertension, obesity, osteopenia, rosacea, remote prior history of kidney stones.      She is postmenopausal.  She does have a gynecologist-- Renny Deshpande.     They have been feeling overall well. No acute concerns. Knee arthritis managed by outside ortho.     General lifestyle habits are as follows:  Diet is described as healthy and watches salt and carbs, exercise is described as good- She goes to the gym 3 times a week, sleep is described as fair-- interrupted to use the bathroom. Weight is overall stable at about 235    Immunizations: TDaP , yearly FLU 2018 , SHINGLES 2011, Pneumovax 23 11/3/2014, PREVNAR 13 10/18/2015    Screening Tests: DEXA 10/29/2015-- repeat 2 years and ordered, mammogram-- yearly per ob gyn (2018), PAP no longer indicated-- >65 w/o h/o abnormal, colonoscopy through [facility] 2015-- repeat 10 years, Hep C screening 10/14/2015    Eye/Dental: UTD with Both- yearly      TTE 2019:  · Moderately decreased left ventricular systolic function. The estimated ejection fraction is 35%  · Grade I (mild) left ventricular diastolic dysfunction consistent with impaired relaxation.  · Eccentric left ventricular hypertrophy.  · Normal right ventricular systolic function.  · Mild left atrial enlargement.  · The estimated PA systolic pressure is 18 mm Hg         Past Medical History  Past Medical History:   Diagnosis Date    Chondromalacia of right patella     [facility] 2016 at [facility]    Diverticulitis     GERD (gastroesophageal  reflux disease)     Hypoglycemia 2014    OA (osteoarthritis) of knee     bilateraly, followed by Dr. Callejas, [facility], s/p synvisc injections    Osteopenia 2013    Personal history of kidney stones 2013    Rosacea 2013    Tear of medial meniscus of right knee     MRI 2016       Past Surgical History  Past Surgical History:   Procedure Laterality Date    ADENOIDECTOMY      FOOT SURGERY Bilateral     KNEE ARTHROSCOPY W/ MENISCAL REPAIR Right     KNEE SURGERY Left     partial knee replacement    TONSILLECTOMY      TUBAL LIGATION         Family History  Family History   Problem Relation Age of Onset    Hypertension Mother     Diabetes Mother     Glaucoma Mother     Cataracts Mother     Hypertension Father     Stroke Father     No Known Problems Sister     No Known Problems Brother     No Known Problems Daughter     No Known Problems Son     No Known Problems Sister     No Known Problems Daughter        Social History  Social History     Socioeconomic History    Marital status:      Spouse name: Not on file    Number of children: y    Years of education: Not on file    Highest education level: Not on file   Occupational History    Occupation:      Comment: Works at [employer] firm   Social Needs    Financial resource strain: Not on file    Food insecurity:     Worry: Not on file     Inability: Not on file    Transportation needs:     Medical: Not on file     Non-medical: Not on file   Tobacco Use    Smoking status: Former Smoker     Packs/day: 0.25     Years: 30.00     Pack years: 7.50     Last attempt to quit: 2005     Years since quittin.1    Smokeless tobacco: Former User   Substance and Sexual Activity    Alcohol use: No    Drug use: No    Sexual activity: Not on file   Lifestyle    Physical activity:     Days per week: Not on file     Minutes per session: Not on file    Stress: Not on file   Relationships    Social  "connections:     Talks on phone: Not on file     Gets together: Not on file     Attends Sabianism service: Not on file     Active member of club or organization: Not on file     Attends meetings of clubs or organizations: Not on file     Relationship status: Not on file   Other Topics Concern    Not on file   Social History Narrative    Originally from [place], living in [place] with son       Current Medications  Medications reviewed and updated.     Allergies   Review of patient's allergies indicates:   Allergen Reactions    Aldactone [spironolactone] Other (See Comments)     Hyperkalemia when initiated on 01/23/2018        Review of Systems (Pertinent positives)  Review of Systems   Constitutional: Negative for unexpected weight change.   HENT: Negative for dental problem.    Eyes: Negative for visual disturbance.   Respiratory: Negative for chest tightness and shortness of breath.    Cardiovascular: Negative for chest pain and leg swelling.   Gastrointestinal: Negative for abdominal distention, abdominal pain, blood in stool, constipation and diarrhea.   Genitourinary: Negative for dysuria, hematuria and urgency.   Musculoskeletal: Positive for arthralgias (knees overall stable).   Neurological: Negative for dizziness and light-headedness.   Psychiatric/Behavioral: The patient is not nervous/anxious.        /80   Pulse 60   Ht 5' 8" (1.727 m)   Wt 106.6 kg (235 lb 0.2 oz)   SpO2 98%   BMI 35.73 kg/m²     Physical Exam   Constitutional: She is oriented to person, place, and time. She appears well-developed and well-nourished. No distress.   HENT:   Head: Normocephalic and atraumatic.   Right Ear: Ear canal normal. Tympanic membrane is not erythematous and not bulging.   Left Ear: Ear canal normal. Tympanic membrane is not erythematous and not bulging.   Mouth/Throat: No oropharyngeal exudate.   Eyes: Conjunctivae are normal.   Neck: Carotid bruit is not present. No thyroid mass and no thyromegaly " present.   Cardiovascular: Normal rate, regular rhythm and normal heart sounds.   No murmur heard.  Pulses:       Dorsalis pedis pulses are 2+ on the right side, and 2+ on the left side.   Pulmonary/Chest: Effort normal and breath sounds normal. No respiratory distress.   Abdominal: Soft. Bowel sounds are normal. She exhibits no distension and no mass. There is no hepatosplenomegaly. There is no tenderness.   Musculoskeletal: Normal range of motion.   Lymphadenopathy:     She has no cervical adenopathy.   Neurological: She is alert and oriented to person, place, and time.   Skin: Skin is warm and dry. No rash noted.   Psychiatric: She has a normal mood and affect.   Vitals reviewed.        Assessment/Plan:  Violet Mirza is a 68 y.o. female who presents today for :    Violet was seen today for annual exam.    Diagnoses and all orders for this visit:    Routine general medical examination at a health care facility  Comments:  health maintenance reviewed: TDaP advised, labs ordered & OTW UTD-- C-Scope & Mammo per outside physicians. Advised diet/exercise, eye/dental  Orders:  -     Hemoglobin A1c; Future  -     Comprehensive metabolic panel; Future  -     Lipid panel; Future  -     CBC auto differential; Future  -     TSH; Future  -     Vitamin D; Future  -     Magnesium; Future  -     T4, free; Future  -     DXA Bone Density Spine And Hip; Future    Class 2 obesity due to excess calories without serious comorbidity with body mass index (BMI) of 35.0 to 35.9 in adult    Benign essential hypertension  Comments:  Blood pressure is controlled on metoprolol succinate 200 mg daily losartan 100 mg daily.  Takes Lasix 40 mg twice daily for regulation of BP/ volume status   Orders:  -     Comprehensive metabolic panel; Future  -     Lipid panel; Future  -     CBC auto differential; Future  -     TSH; Future    Hypothyroidism, unspecified type  Comments:  Has been stable on levothyroxine 75 mcg daily, recheck TSH ordered  with labs.  Orders:  -     TSH; Future  -     T4, free; Future    Osteopenia, unspecified location  -     DXA Bone Density Spine And Hip; Future    History of peptic ulcer    Gastroesophageal reflux disease without esophagitis  Comments:  no active symptoms and on no meds-- will monitor    Chronic systolic heart failure  Comments:  Ejection fraction of about 35% on most recent echo, however, patient is without any significant heart failure symptoms.  Continue current regimen    Paroxysmal atrial fibrillation  Comments:  Follows with Cardiology and EP. No symptomatic recurrence s/p diltazem in ER.  Continues metoprolol and Xarelto for stroke prevention. f/u Cardiology 8/2019    Current use of long term anticoagulation    Venous insufficiency of both lower extremities    PVC's (premature ventricular contractions)  -     Comprehensive metabolic panel; Future  -     TSH; Future  -     Magnesium; Future    Personal history of kidney stones    Rosacea    Osteoarthritis of left knee, unspecified osteoarthritis type    Medication management  -     Hemoglobin A1c; Future  -     Comprehensive metabolic panel; Future  -     Lipid panel; Future  -     CBC auto differential; Future  -     TSH; Future  -     Vitamin D; Future  -     Magnesium; Future  -     T4, free; Future    Postmenopausal  -     DXA Bone Density Spine And Hip; Future    Need for tetanus, diphtheria, and acellular pertussis (Tdap) vaccine  Comments:  advised downstairs at pharmacy            ICD-10-CM ICD-9-CM    1. Routine general medical examination at a health care facility Z00.00 V70.0 Hemoglobin A1c      Comprehensive metabolic panel      Lipid panel      CBC auto differential      TSH      Vitamin D      Magnesium      T4, free      DXA Bone Density Spine And Hip    health maintenance reviewed: TDaP advised, labs ordered & OTW UTD-- C-Scope & Mammo per outside physicians. Advised diet/exercise, eye/dental   2. Class 2 obesity due to excess calories  without serious comorbidity with body mass index (BMI) of 35.0 to 35.9 in adult E66.09 278.00     Z68.35 V85.35    3. Benign essential hypertension I10 401.1 Comprehensive metabolic panel      Lipid panel      CBC auto differential      TSH    Blood pressure is controlled on metoprolol succinate 200 mg daily losartan 100 mg daily.  Takes Lasix 40 mg twice daily for regulation of BP/ volume status    4. Hypothyroidism, unspecified type E03.9 244.9 TSH      T4, free    Has been stable on levothyroxine 75 mcg daily, recheck TSH ordered with labs.   5. Osteopenia, unspecified location M85.80 733.90 DXA Bone Density Spine And Hip   6. History of peptic ulcer Z87.11 V12.71    7. Gastroesophageal reflux disease without esophagitis K21.9 530.81     no active symptoms and on no meds-- will monitor   8. Chronic systolic heart failure I50.22 428.22     Ejection fraction of about 35% on most recent echo, however, patient is without any significant heart failure symptoms.  Continue current regimen   9. Paroxysmal atrial fibrillation I48.0 427.31     Follows with Cardiology and EP. No symptomatic recurrence s/p diltazem in ER.  Continues metoprolol and Xarelto for stroke prevention. f/u Cardiology 8/2019   10. Current use of long term anticoagulation Z79.01 V58.61    11. Venous insufficiency of both lower extremities I87.2 459.81    12. PVC's (premature ventricular contractions) I49.3 427.69 Comprehensive metabolic panel      TSH      Magnesium   13. Personal history of kidney stones Z87.442 V13.01    14. Rosacea L71.9 695.3    15. Osteoarthritis of left knee, unspecified osteoarthritis type M17.12 715.96    16. Medication management Z79.899 V58.69 Hemoglobin A1c      Comprehensive metabolic panel      Lipid panel      CBC auto differential      TSH      Vitamin D      Magnesium      T4, free   17. Postmenopausal Z78.0 V49.81 DXA Bone Density Spine And Hip   18. Need for tetanus, diphtheria, and acellular pertussis (Tdap) vaccine  Z23 V06.1     advised downstairs at pharmacy       There are no Patient Instructions on file for this visit.      Follow up in about 1 year (around 4/3/2020) for to follow up on lab results, return as needed for new concerns.

## 2019-04-05 ENCOUNTER — HOSPITAL ENCOUNTER (OUTPATIENT)
Dept: RADIOLOGY | Facility: HOSPITAL | Age: 69
Discharge: HOME | End: 2019-04-05
Attending: FAMILY MEDICINE
Payer: MEDICARE

## 2019-04-05 ENCOUNTER — IMMUNIZATION (OUTPATIENT)
Dept: PHARMACY | Facility: CLINIC | Age: 69
End: 2019-04-05
Payer: MEDICARE

## 2019-04-05 DIAGNOSIS — M85.80 OSTEOPENIA, UNSPECIFIED LOCATION: ICD-10-CM

## 2019-04-05 DIAGNOSIS — Z78.0 POSTMENOPAUSAL: ICD-10-CM

## 2019-04-05 DIAGNOSIS — Z00.00 ROUTINE GENERAL MEDICAL EXAMINATION AT A HEALTH CARE FACILITY: ICD-10-CM

## 2019-04-05 PROCEDURE — 77080 DEXA BONE DENSITY SPINE HIP: ICD-10-PCS | Mod: 26,,, | Performed by: RADIOLOGY

## 2019-04-05 PROCEDURE — 77080 DXA BONE DENSITY AXIAL: CPT | Mod: TC

## 2019-04-05 PROCEDURE — 77080 DXA BONE DENSITY AXIAL: CPT | Mod: 26,,, | Performed by: RADIOLOGY

## 2019-04-07 ENCOUNTER — TELEPHONE (OUTPATIENT)
Dept: FAMILY MEDICINE | Facility: CLINIC | Age: 69
End: 2019-04-07

## 2019-04-07 DIAGNOSIS — E55.9 VITAMIN D DEFICIENCY: Primary | ICD-10-CM

## 2019-04-07 DIAGNOSIS — Z79.899 MEDICATION MANAGEMENT: ICD-10-CM

## 2019-04-07 DIAGNOSIS — E03.9 HYPOTHYROIDISM, UNSPECIFIED TYPE: ICD-10-CM

## 2019-04-07 DIAGNOSIS — E78.1 HYPERTRIGLYCERIDEMIA: ICD-10-CM

## 2019-04-07 DIAGNOSIS — I50.22 CHRONIC SYSTOLIC HEART FAILURE: ICD-10-CM

## 2019-04-07 RX ORDER — LEVOTHYROXINE SODIUM 88 UG/1
88 TABLET ORAL DAILY
Qty: 90 TABLET | Refills: 3 | Status: SHIPPED | OUTPATIENT
Start: 2019-04-07 | End: 2019-07-26 | Stop reason: DRUGHIGH

## 2019-04-08 ENCOUNTER — PATIENT MESSAGE (OUTPATIENT)
Dept: FAMILY MEDICINE | Facility: CLINIC | Age: 69
End: 2019-04-08

## 2019-04-10 ENCOUNTER — TELEPHONE (OUTPATIENT)
Dept: FAMILY MEDICINE | Facility: CLINIC | Age: 69
End: 2019-04-10

## 2019-04-11 ENCOUNTER — PATIENT MESSAGE (OUTPATIENT)
Dept: FAMILY MEDICINE | Facility: CLINIC | Age: 69
End: 2019-04-11

## 2019-04-12 ENCOUNTER — PATIENT MESSAGE (OUTPATIENT)
Dept: PULMONOLOGY | Facility: CLINIC | Age: 69
End: 2019-04-12

## 2019-04-12 ENCOUNTER — PATIENT MESSAGE (OUTPATIENT)
Dept: FAMILY MEDICINE | Facility: CLINIC | Age: 69
End: 2019-04-12

## 2019-04-12 ENCOUNTER — PATIENT MESSAGE (OUTPATIENT)
Dept: INTERNAL MEDICINE | Facility: CLINIC | Age: 69
End: 2019-04-12

## 2019-04-18 ENCOUNTER — ANESTHESIA EVENT (OUTPATIENT)
Dept: SURGERY | Facility: HOSPITAL | Age: 69
End: 2019-04-18
Payer: MEDICARE

## 2019-04-18 ENCOUNTER — HOSPITAL ENCOUNTER (OUTPATIENT)
Dept: PREADMISSION TESTING | Facility: HOSPITAL | Age: 69
Discharge: HOME | End: 2019-04-18
Attending: SURGERY
Payer: MEDICARE

## 2019-04-18 ENCOUNTER — TELEPHONE (OUTPATIENT)
Dept: NEUROLOGY | Facility: HOSPITAL | Age: 69
End: 2019-04-18

## 2019-04-18 VITALS
HEIGHT: 68 IN | SYSTOLIC BLOOD PRESSURE: 147 MMHG | RESPIRATION RATE: 20 BRPM | HEART RATE: 61 BPM | BODY MASS INDEX: 36.05 KG/M2 | OXYGEN SATURATION: 96 % | TEMPERATURE: 97 F | DIASTOLIC BLOOD PRESSURE: 77 MMHG | WEIGHT: 237.88 LBS

## 2019-04-18 DIAGNOSIS — D17.23 LIPOMA OF RIGHT THIGH: ICD-10-CM

## 2019-04-18 DIAGNOSIS — D17.24 LIPOMA OF LEFT THIGH: ICD-10-CM

## 2019-04-18 DIAGNOSIS — I10 BENIGN ESSENTIAL HYPERTENSION: Primary | ICD-10-CM

## 2019-04-18 DIAGNOSIS — D17.1 LIPOMA OF ANTERIOR CHEST WALL: ICD-10-CM

## 2019-04-18 DIAGNOSIS — I49.9 CARDIAC ARRHYTHMIA, UNSPECIFIED CARDIAC ARRHYTHMIA TYPE: ICD-10-CM

## 2019-04-18 DIAGNOSIS — Z79.01 CURRENT USE OF LONG TERM ANTICOAGULATION: ICD-10-CM

## 2019-04-18 DIAGNOSIS — I48.0 PAROXYSMAL ATRIAL FIBRILLATION: ICD-10-CM

## 2019-04-18 RX ORDER — SODIUM CHLORIDE, SODIUM LACTATE, POTASSIUM CHLORIDE, CALCIUM CHLORIDE 600; 310; 30; 20 MG/100ML; MG/100ML; MG/100ML; MG/100ML
INJECTION, SOLUTION INTRAVENOUS CONTINUOUS
Status: CANCELLED | OUTPATIENT
Start: 2019-04-18

## 2019-04-18 RX ORDER — LIDOCAINE HYDROCHLORIDE 10 MG/ML
1 INJECTION, SOLUTION EPIDURAL; INFILTRATION; INTRACAUDAL; PERINEURAL ONCE
Status: CANCELLED | OUTPATIENT
Start: 2019-04-18 | End: 2019-04-18

## 2019-04-18 NOTE — DISCHARGE INSTRUCTIONS
Your surgery is scheduled for Monday 4-22-19.    Please report to Outpatient Surgery Intake Office on the 2nd FLOOR at 7 a.m.          INSTRUCTIONS IMPORTANT!!!  ¨ Do not eat or drink after 12 midnight-including water. OK to brush teeth, no   gum, candy or mints!    ¨ Take only these medicines with a small swallow of water-morning of surgery.  SYNTHROID, LOSARTAN,METOPROLOL      ____  Proceed to [facility] on *** for additional blood test.        ____  Do not wear makeup, including mascara.  ____  No powder, lotions or creams to surgical area.  ____  Please remove all jewelry, including piercings and leave at home.  ____  No money or valuables needed. Please leave at home.  ____  Please bring any documents given by your doctor.  ____  If going home the same day, arrange for a ride home. You will not be able to             drive if Anesthesia was used.  ____  Children under 18 years require a parent / guardian present the entire time             they are in surgery / recovery.  ____  Wear loose fitting clothing. Allow for dressings, bandages.  ____  Stop Aspirin, Ibuprofen, Motrin and Aleve at least 3-5 days before surgery, unless otherwise instructed by your doctor, or the nurse.   You MAY use Tylenol/acetaminophen until day of surgery.  ____  Wash the surgical area with Hibiclens the night before surgery, and again the             morning of surgery.  Be sure to rinse hibiclens off completely (if instructed by   nurse).  ____  If you take diabetic medication, do not take am of surgery unless instructed by Doctor.  ____  Call MD for temperature above 101 degrees or any other signs of infection such as Urinary (bladder) infection, Upper respiratory infection, skin boils, etc.  ____ Stop taking any Fish Oil supplement or any Vitamins that contain Vitamin E at least 5 days prior to surgery.  ____ Do Not wear your contact lenses the day of your procedure.  You may wear your glasses.      ____Do not shave  surgical site for 3 days prior to surgery.  ____ Practice Good hand washing before, during, and after procedure.      I have read or had read and explained to me, and understand the above information.  Additional comments or instructions:  For additional questions call [phone]      ANESTHESIA SIDE EFFECTS  -For the first 24 hours after surgery:  Do not drive, use heavy equipment, make important decisions, or drink alcohol  -It is normal to feel sleepy for several hours.  Rest until you are more awake.  -Have someone stay with you, if needed.  They can watch for problems and help keep you safe.  -Some possible post anesthesia side effects include: nausea and vomiting, sore throat and hoarseness, sleepiness, and dizziness.        Pre-Op Bathing Instructions    Before surgery, you can play an important role in your own health.    Because skin is not sterile, we need to be sure that your skin is as free of germs as possible. By following the instructions below, you can reduce the number of germs on your skin before surgery.    IMPORTANT: You will need to shower with a special soap called Hibiclens*, available at any pharmacy.  If you are allergic to Chlorhexidine (the antiseptic in Hibiclens), use an antibacterial soap such as Dial Soap for your preoperative shower.  You will shower with Hibiclens both the night before your surgery and the morning of your surgery.  Do not use Hibiclens on the head, face or genitals to avoid injury to those areas.    STEP #1: THE NIGHT BEFORE YOUR SURGERY     1. Do not shave the area of your body where your surgery will be performed.  2. Shower and wash your hair and body as usual with your normal soap and shampoo.  3. Rinse your hair and body thoroughly after you shower to remove all soap residue.  4. With your hand, apply one packet of Hibiclens soap to the surgical site.   5. Wash the site gently for five (5) minutes. Do not scrub your skin too hard.   6. Do not wash with your  regular soap after Hibiclens is used.  7. Rinse your body thoroughly.  8. Pat yourself dry with a clean, soft towel.  9. Do not use lotion, cream, or powder.  10. Wear clean clothes.    STEP #2: THE MORNING OF YOUR SURGERY     1. Repeat Step #1.    * Not to be used by people allergic to Chlorhexidine.      Anesthesia: Monitored Anesthesia Care (MAC)    Youre due to have surgery. During surgery, youll be given medicine called anesthesia. This will keep you comfortable and pain-free. Your surgeon will use monitored anesthesia care (MAC). This sheet tells you more about this type of anesthesia.  What is monitored anesthesia care?  MAC keeps you very drowsy during surgery. You may be awake, but you will likely not remember much. And you wont feel pain. With MAC, medicines are given through an IV line into a vein in your arm or hand. A local anesthetic will usually be injected into the skin and muscle around the surgical site to numb it. The anesthesia provider monitors you during the procedure. He or she checks your heart rate and rhythm, blood pressure, and blood oxygen level.  Anesthesia tools and medicines that may be near you during your procedure  You will likely have:  · A pulse oximeter on the end of your finger. This measures your blood oxygen level.  · Electrocardiography leads (electrodes) on your chest. These record your heart rate and rhythm.  · Medicines given through an IV. These relax you and prevent pain. You may be awake or sleep lightly. If you have local anesthetic, it is injected directly into your skin.  · A facemask to give you oxygen, if needed.  Risks and possible complications  MAC has some risks. These include:  · Breathing problems  · Nausea and vomiting  · Allergic reaction to the anesthetic    Anesthesia safety  Tips for anesthesia safety include the following:   · Follow all instructions you are given for how long not to eat or drink before your procedure.  · Be sure your healthcare  provider knows what medicines you take, especially any anti-inflammatory medicine or blood thinners. This includes aspirin and any other over-the-counter medicines, herbs, and supplements.  · Have an adult family member or friend drive you home after the procedure.  · For the first 24 hours after your surgery:  ¨ Do not drive or use heavy equipment.  ¨ Do not make important decisions or sign documents.  ¨ Avoid alcohol.  ¨ Have someone stay with you, if possible. They can watch for problems and help keep you safe.  Date Last Reviewed: 12/1/2016  © 3731-2787 [employer]. [address] [place] [address]. All rights reserved. This information is not intended as a substitute for professional medical care. Always follow your healthcare professional's instructions.

## 2019-04-18 NOTE — PRE-PROCEDURE INSTRUCTIONS
Allergies, medical, surgical, family and psychosocial histories reviewed with patient. Periop plan of care reviewed. Preop instructions given, including medications to take and to hold. Hibiclens soap and instructions on use given. Time allotted for questions to be addressed.  Patient verbalized understanding.  [phone]

## 2019-04-18 NOTE — ANESTHESIA PREPROCEDURE EVALUATION
04/18/2019  Violet Mirza is a 68 y.o., female scheduled for excision of lipomas under local/MAC on 4/22/19.    Past Medical History:   Diagnosis Date    Chondromalacia of right patella     MRI 6/2016 at [facility]    Diverticulitis     GERD (gastroesophageal reflux disease)     Hypoglycemia 11/22/2014    OA (osteoarthritis) of knee     bilateraly, followed by Dr. Callejas, [facility], s/p synvisc injections    Osteopenia 7/26/2013    Personal history of kidney stones 7/26/2013    Rosacea 7/26/2013    Tear of medial meniscus of right knee     MRI 6/2016     Past Surgical History:   Procedure Laterality Date    ADENOIDECTOMY      FOOT SURGERY Bilateral     KNEE ARTHROSCOPY W/ MENISCAL REPAIR Right 5/14    KNEE SURGERY Left     partial knee replacement    TONSILLECTOMY      TUBAL LIGATION         Anesthesia Evaluation    I have reviewed the Patient Summary Reports.     I have reviewed the Medications.     Review of Systems  Anesthesia Hx:  No problems with previous Anesthesia  Denies Family Hx of Anesthesia complications.    Social:  Tobacco Use: , quit smoking >10 years ago   Hematology/Oncology:  Hematology Normal        Cardiovascular:   Hypertension  Denies Angina.     ECG has been reviewed. On Xarelto, may stop 3 days prior to procedure per cardiology Congestive Heart Failure (CHF) , Chronic Congestive Heart Failure (LVEF 35%)  Disorder of Cardiac Rhythm, Atrial Fibrillation, Paroxysmal Atrial Fibrillation (s/p successful cardioversion 1/2018)    Pulmonary:  Pulmonary Normal  Denies Shortness of breath.    Renal/:  Renal/ Normal     Hepatic/GI:  Hepatic/GI Normal  Denies GERD. Denies Liver Disease.    Musculoskeletal:   Arthritis     Neurological:  Neurology Normal Denies TIA.  Denies CVA. Denies Seizures.    Endocrine:   Denies Diabetes. Hypothyroidism        Physical  Exam  General:  Obesity    Airway/Jaw/Neck:  Airway Findings: Mouth Opening: Normal Tongue: Normal  General Airway Assessment: Adult, Pediatric  Mallampati: III  Improves to II with phonation.      Dental:  Dental Findings: Upper partial dentures   Chest/Lungs:  Chest/Lungs Findings: Clear to auscultation, Normal Respiratory Rate     Heart/Vascular:  Heart Findings: Rate: Normal  Heart murmur: negative       Mental Status:  Mental Status Findings:  Cooperative, Alert and Oriented       EKG 2/21/19:  Sinus rhythm with occasional Premature ventricular complexes  Left axis deviation  Abnormal ECG  When compared with ECG of 04-OCT-2018 13:26, Premature ventricular complexes are now Present  Confirmed by Basil Linares MD    TTE 1/11/2019:  · Moderately decreased left ventricular systolic function. The estimated ejection fraction is 35%  · Grade I (mild) left ventricular diastolic dysfunction consistent with impaired relaxation.  · Eccentric left ventricular hypertrophy.  · Normal right ventricular systolic function.  · Mild left atrial enlargement.  · The estimated PA systolic pressure is 18 mm Hg        Current Facility-Administered Medications:     0.9%  NaCl infusion, , Intravenous, Continuous, NOLA Werner MD    cefazolin (ANCEF) 2 gram in dextrose 5% 50 mL IVPB (premix), 2 g, Intravenous, On Call Procedure, NOLA Werner MD    enoxaparin injection 40 mg, 40 mg, Subcutaneous, 30 Min Pre-Op, NOLA Werner MD    lactated ringers infusion, , Intravenous, Continuous, Colette Buchanan NP    lidocaine (PF) 10 mg/ml (1%) injection 10 mg, 1 mL, Intradermal, Once, Colette Buchanan NP        Anesthesia Plan  Type of Anesthesia, risks & benefits discussed:  Anesthesia Type:  MAC  Patient's Preference:   Intra-op Monitoring Plan: standard ASA monitors  Intra-op Monitoring Plan Comments:   Post Op Pain Control Plan: multimodal analgesia and per primary service following discharge from PACU  Post Op Pain  Control Plan Comments:   Induction:   IV  Beta Blocker:  Patient is not currently on a Beta-Blocker (No further documentation required).       Informed Consent: Patient understands risks and agrees with Anesthesia plan.  Questions answered.   ASA Score: 3     Day of Surgery Review of History & Physical:        Anesthesia Plan Notes: Anesthesia consent signed prior to procedure on 4/22/19          Ready For Surgery From Anesthesia Perspective.

## 2019-04-18 NOTE — TELEPHONE ENCOUNTER
Spoke with patient about authorization still pending with  insurance company. Patient stated she would still like to come on Monday morning just in case it is approved and if it is not she will waif until it does. Dr. Werner is aware of the situation as well and will proceed when the authorization is obtained.

## 2019-04-22 ENCOUNTER — HOSPITAL ENCOUNTER (OUTPATIENT)
Facility: HOSPITAL | Age: 69
Discharge: HOME | End: 2019-04-22
Attending: SURGERY | Admitting: SURGERY
Payer: MEDICARE

## 2019-04-22 ENCOUNTER — ANESTHESIA (OUTPATIENT)
Dept: SURGERY | Facility: HOSPITAL | Age: 69
End: 2019-04-22
Payer: MEDICARE

## 2019-04-22 VITALS
WEIGHT: 237 LBS | RESPIRATION RATE: 20 BRPM | SYSTOLIC BLOOD PRESSURE: 154 MMHG | HEART RATE: 46 BPM | DIASTOLIC BLOOD PRESSURE: 67 MMHG | TEMPERATURE: 98 F | OXYGEN SATURATION: 97 % | HEIGHT: 68 IN | BODY MASS INDEX: 35.92 KG/M2

## 2019-04-22 DIAGNOSIS — Z79.01 CURRENT USE OF LONG TERM ANTICOAGULATION: ICD-10-CM

## 2019-04-22 DIAGNOSIS — D17.24 LIPOMA OF LEFT THIGH: ICD-10-CM

## 2019-04-22 DIAGNOSIS — D17.23 LIPOMA OF RIGHT THIGH: ICD-10-CM

## 2019-04-22 DIAGNOSIS — D17.21 BENIGN LIPOMATOUS NEOPLASM OF SKIN AND SUBCUTANEOUS TISSUE OF RIGHT ARM: ICD-10-CM

## 2019-04-22 DIAGNOSIS — I48.0 PAROXYSMAL ATRIAL FIBRILLATION: ICD-10-CM

## 2019-04-22 DIAGNOSIS — D17.1 LIPOMA OF ANTERIOR CHEST WALL: ICD-10-CM

## 2019-04-22 PROCEDURE — 88304 TISSUE EXAM BY PATHOLOGIST: CPT | Mod: 26,,, | Performed by: PATHOLOGY

## 2019-04-22 PROCEDURE — 25000003 PHARM REV CODE 250: Performed by: NURSE PRACTITIONER

## 2019-04-22 PROCEDURE — 71000016 HC POSTOP RECOV ADDL HR: Performed by: SURGERY

## 2019-04-22 PROCEDURE — 63600175 PHARM REV CODE 636 W HCPCS: Performed by: SURGERY

## 2019-04-22 PROCEDURE — 36000706: Performed by: SURGERY

## 2019-04-22 PROCEDURE — 88304 TISSUE SPECIMEN TO PATHOLOGY - SURGERY: ICD-10-PCS | Mod: 26,,, | Performed by: PATHOLOGY

## 2019-04-22 PROCEDURE — 63600175 PHARM REV CODE 636 W HCPCS

## 2019-04-22 PROCEDURE — 37000009 HC ANESTHESIA EA ADD 15 MINS: Performed by: SURGERY

## 2019-04-22 PROCEDURE — 37000008 HC ANESTHESIA 1ST 15 MINUTES: Performed by: SURGERY

## 2019-04-22 PROCEDURE — C9290 INJ, BUPIVACAINE LIPOSOME: HCPCS | Performed by: SURGERY

## 2019-04-22 PROCEDURE — 63600175 PHARM REV CODE 636 W HCPCS: Performed by: NURSE ANESTHETIST, CERTIFIED REGISTERED

## 2019-04-22 PROCEDURE — 88304 TISSUE EXAM BY PATHOLOGIST: CPT | Performed by: PATHOLOGY

## 2019-04-22 PROCEDURE — 25000003 PHARM REV CODE 250: Performed by: SURGERY

## 2019-04-22 PROCEDURE — 36000707: Performed by: SURGERY

## 2019-04-22 PROCEDURE — 71000015 HC POSTOP RECOV 1ST HR: Performed by: SURGERY

## 2019-04-22 RX ORDER — BUPIVACAINE HYDROCHLORIDE 2.5 MG/ML
INJECTION, SOLUTION EPIDURAL; INFILTRATION; INTRACAUDAL
Status: DISCONTINUED | OUTPATIENT
Start: 2019-04-22 | End: 2019-04-22 | Stop reason: HOSPADM

## 2019-04-22 RX ORDER — MIDAZOLAM HYDROCHLORIDE 1 MG/ML
INJECTION, SOLUTION INTRAMUSCULAR; INTRAVENOUS
Status: DISCONTINUED | OUTPATIENT
Start: 2019-04-22 | End: 2019-04-22

## 2019-04-22 RX ORDER — PROPOFOL 10 MG/ML
VIAL (ML) INTRAVENOUS
Status: DISCONTINUED | OUTPATIENT
Start: 2019-04-22 | End: 2019-04-22

## 2019-04-22 RX ORDER — MIDAZOLAM HYDROCHLORIDE 1 MG/ML
INJECTION INTRAMUSCULAR; INTRAVENOUS
Status: COMPLETED
Start: 2019-04-22 | End: 2019-04-22

## 2019-04-22 RX ORDER — LIDOCAINE HYDROCHLORIDE 10 MG/ML
1 INJECTION, SOLUTION EPIDURAL; INFILTRATION; INTRACAUDAL; PERINEURAL ONCE
Status: DISCONTINUED | OUTPATIENT
Start: 2019-04-22 | End: 2019-04-22 | Stop reason: HOSPADM

## 2019-04-22 RX ORDER — FENTANYL CITRATE 50 UG/ML
INJECTION, SOLUTION INTRAMUSCULAR; INTRAVENOUS
Status: DISCONTINUED | OUTPATIENT
Start: 2019-04-22 | End: 2019-04-22

## 2019-04-22 RX ORDER — CEFAZOLIN SODIUM 2 G/50ML
2 SOLUTION INTRAVENOUS
Status: COMPLETED | OUTPATIENT
Start: 2019-04-22 | End: 2019-04-22

## 2019-04-22 RX ORDER — HYDROMORPHONE HYDROCHLORIDE 2 MG/ML
0.5 INJECTION, SOLUTION INTRAMUSCULAR; INTRAVENOUS; SUBCUTANEOUS EVERY 5 MIN PRN
Status: DISCONTINUED | OUTPATIENT
Start: 2019-04-22 | End: 2019-04-22 | Stop reason: HOSPADM

## 2019-04-22 RX ORDER — SODIUM CHLORIDE, SODIUM LACTATE, POTASSIUM CHLORIDE, CALCIUM CHLORIDE 600; 310; 30; 20 MG/100ML; MG/100ML; MG/100ML; MG/100ML
INJECTION, SOLUTION INTRAVENOUS CONTINUOUS
Status: DISCONTINUED | OUTPATIENT
Start: 2019-04-22 | End: 2019-04-22 | Stop reason: HOSPADM

## 2019-04-22 RX ORDER — BACITRACIN 50000 [IU]/1
INJECTION, POWDER, FOR SOLUTION INTRAMUSCULAR
Status: DISCONTINUED | OUTPATIENT
Start: 2019-04-22 | End: 2019-04-22 | Stop reason: HOSPADM

## 2019-04-22 RX ORDER — ONDANSETRON 2 MG/ML
4 INJECTION INTRAMUSCULAR; INTRAVENOUS DAILY PRN
Status: DISCONTINUED | OUTPATIENT
Start: 2019-04-22 | End: 2019-04-22 | Stop reason: HOSPADM

## 2019-04-22 RX ORDER — ENOXAPARIN SODIUM 100 MG/ML
40 INJECTION SUBCUTANEOUS
Status: DISCONTINUED | OUTPATIENT
Start: 2019-04-22 | End: 2019-04-22 | Stop reason: HOSPADM

## 2019-04-22 RX ORDER — TRAMADOL HYDROCHLORIDE 50 MG/1
50 TABLET ORAL ONCE
Status: COMPLETED | OUTPATIENT
Start: 2019-04-22 | End: 2019-04-22

## 2019-04-22 RX ORDER — PROPOFOL 10 MG/ML
VIAL (ML) INTRAVENOUS CONTINUOUS PRN
Status: DISCONTINUED | OUTPATIENT
Start: 2019-04-22 | End: 2019-04-22

## 2019-04-22 RX ORDER — LIDOCAINE HYDROCHLORIDE 10 MG/ML
1 INJECTION, SOLUTION EPIDURAL; INFILTRATION; INTRACAUDAL; PERINEURAL ONCE
Status: DISCONTINUED | OUTPATIENT
Start: 2019-04-22 | End: 2019-04-22

## 2019-04-22 RX ORDER — HYDROMORPHONE HYDROCHLORIDE 2 MG/ML
INJECTION, SOLUTION INTRAMUSCULAR; INTRAVENOUS; SUBCUTANEOUS
Status: COMPLETED
Start: 2019-04-22 | End: 2019-04-22

## 2019-04-22 RX ORDER — SODIUM CHLORIDE 9 MG/ML
INJECTION, SOLUTION INTRAVENOUS CONTINUOUS
Status: DISCONTINUED | OUTPATIENT
Start: 2019-04-22 | End: 2019-04-22 | Stop reason: HOSPADM

## 2019-04-22 RX ORDER — TRAMADOL HYDROCHLORIDE 50 MG/1
50 TABLET ORAL EVERY 6 HOURS PRN
Qty: 15 TABLET | Refills: 0 | Status: SHIPPED | OUTPATIENT
Start: 2019-04-22 | End: 2019-06-07

## 2019-04-22 RX ORDER — SODIUM CHLORIDE 0.9 % (FLUSH) 0.9 %
3 SYRINGE (ML) INJECTION
Status: DISCONTINUED | OUTPATIENT
Start: 2019-04-22 | End: 2019-04-22 | Stop reason: HOSPADM

## 2019-04-22 RX ORDER — MEPERIDINE HYDROCHLORIDE 50 MG/ML
12.5 INJECTION INTRAMUSCULAR; INTRAVENOUS; SUBCUTANEOUS ONCE AS NEEDED
Status: DISCONTINUED | OUTPATIENT
Start: 2019-04-22 | End: 2019-04-22 | Stop reason: HOSPADM

## 2019-04-22 RX ORDER — PHENYLEPHRINE HYDROCHLORIDE 10 MG/ML
INJECTION INTRAVENOUS
Status: DISCONTINUED | OUTPATIENT
Start: 2019-04-22 | End: 2019-04-22

## 2019-04-22 RX ADMIN — CEFAZOLIN SODIUM 2 G: 2 SOLUTION INTRAVENOUS at 09:04

## 2019-04-22 RX ADMIN — ENOXAPARIN SODIUM 40 MG: 100 INJECTION SUBCUTANEOUS at 08:04

## 2019-04-22 RX ADMIN — SODIUM CHLORIDE, SODIUM LACTATE, POTASSIUM CHLORIDE, AND CALCIUM CHLORIDE: .6; .31; .03; .02 INJECTION, SOLUTION INTRAVENOUS at 09:04

## 2019-04-22 RX ADMIN — FENTANYL CITRATE 25 MCG: 50 INJECTION, SOLUTION INTRAMUSCULAR; INTRAVENOUS at 09:04

## 2019-04-22 RX ADMIN — PROPOFOL 50 MG: 10 INJECTION, EMULSION INTRAVENOUS at 09:04

## 2019-04-22 RX ADMIN — PHENYLEPHRINE HYDROCHLORIDE 100 MCG: 10 INJECTION INTRAVENOUS at 10:04

## 2019-04-22 RX ADMIN — PHENYLEPHRINE HYDROCHLORIDE 100 MCG: 10 INJECTION INTRAVENOUS at 09:04

## 2019-04-22 RX ADMIN — FENTANYL CITRATE 25 MCG: 50 INJECTION, SOLUTION INTRAMUSCULAR; INTRAVENOUS at 10:04

## 2019-04-22 RX ADMIN — PHENYLEPHRINE HYDROCHLORIDE 50 MCG: 10 INJECTION INTRAVENOUS at 09:04

## 2019-04-22 RX ADMIN — HYDROMORPHONE HYDROCHLORIDE 0.5 MG: 2 INJECTION, SOLUTION INTRAMUSCULAR; INTRAVENOUS; SUBCUTANEOUS at 10:04

## 2019-04-22 RX ADMIN — TRAMADOL HYDROCHLORIDE 50 MG: 50 TABLET, COATED ORAL at 11:04

## 2019-04-22 RX ADMIN — PROPOFOL 150 MCG/KG/MIN: 10 INJECTION, EMULSION INTRAVENOUS at 09:04

## 2019-04-22 RX ADMIN — SODIUM CHLORIDE, SODIUM LACTATE, POTASSIUM CHLORIDE, AND CALCIUM CHLORIDE: .6; .31; .03; .02 INJECTION, SOLUTION INTRAVENOUS at 07:04

## 2019-04-22 RX ADMIN — MIDAZOLAM 2 MG: 1 INJECTION INTRAMUSCULAR; INTRAVENOUS at 08:04

## 2019-04-22 NOTE — DISCHARGE INSTRUCTIONS
ANESTHESIA  -For the first 24 hours after surgery:  Do not drive, use heavy equipment, make important decisions, or drink alcohol  -It is normal to feel sleepy for several hours.  Rest until you are more awake.  -Have someone stay with you, if needed.  They can watch for problems and help keep you safe.  -Some possible post anesthesia side effects include: nausea and vomiting, sore throat and hoarseness, sleepiness, and dizziness.    PAIN  -If you have pain after surgery, pain medicine will help you feel better.  Take it as directed, before pain becomes severe.  Most pain relievers taken by mouth need at least 20-30 minutes to start working.  -Do not drive or drink alcohol while taking pain medicine.  -Pain medication can upset your stomach.  Taking them with a little food may help.  -Other ways to help control pain: elevation, ice, and relaxation  -Call your surgeon if still having unmanageable pain an hour after taking pain medicine.  -Pain medicine can cause constipation.  Taking an over-the counter stool softener while on prescription pain medicine and drinking plenty of fluids can prevent this side effect.  -Call your surgeon if you have severe side effects like: breathing problems, trouble waking up, dizziness, confusion, or severe constipation.    NAUSEA  -Some people have nausea after surgery.  This is often because of anesthesia, pain, pain medicine, or the stress of surgery.  -Do not push yourself to eat.  Start off with clear liquids and soup.  Slowly move to solid foods.  Don't eat fatty, rich, spicy foods at first.  Eat smaller amounts.  -If you develop persistent nausea and vomiting please notify your surgeon immediately.    BLEEDING  -Different types of surgery require different types of care and dressing changes.  It is important to follow all instructions and advice from your surgeon.  Change dressing as directed.  Call your surgeon for any concerns regarding postop bleeding.    SIGNS OF  INFECTION  -Signs of infection include: fever, swelling, drainage, and redness  -Notify your surgeon if you have a fever of 100.4 F (38.0 C) or higher.  -Notify your surgeon if you notice redness, swelling, increased pain, pus, or a foul smell at the incision site.      Incision Care: Chest  Dressing your incision helps keep it clean, dry, and free of infection. That way it will heal faster. Your incision may be open to the air by the time you go home. But if you need to change your dressing, follow the steps below.           1 2 3   Step 1. Wash your hands and set up  · Use liquid soap. Lather for 1 or 2 minutes. Scrub between your fingers and under your nails.  · Rinse with warm water, keeping fingers pointing down. Use a clean towel to dry your hands and turn off the faucet.  · Put all your supplies on a clean cloth or paper towel. Open a plastic trash bag.  · Peel back the edges of the dressing packages.   · Clean the scissors with soap and water. Cut each piece of tape 4 inches longer than the dressing.  Step 2. Remove the old dressing  · Put on disposable gloves.  · Loosen the tape by pulling gently toward the incision. Remove the dressing one layer at a time.  · Look at the dressing to see if there is any drainage. If you see drainage, call your healthcare provider if:  ¨ You see more or less drainage than before  ¨ The color of the drainage has changed  ¨ The drainage has an odor or smells bad  · Put the dressing into a zip-top plastic bag.  · Remove your gloves, put them in the zip-top plastic bag, and seal the bag.  · Wash your hands. Then put on new gloves.  Step 3. Clean and dress the incision  · Gently clean the incision with soap and water. Or use another solution if your doctor tells you to.  ¨ Don't scrub the incision. Wipe it gently.  ¨ Pat the incision dry.  ¨ Don't use any creams, lotions, or antibacterial ointments unless your doctor tells you to.  · Look at the incision to see if it is still  closed.  · Look for any redness or swelling around the incision.  · Put all used supplies in a zip-top plastic bag.  · Remove your gloves and put them in the zip-top plastic bag. Seal the bag and put it in the trash.  · Wash your hands again.  When to call your healthcare provider  Call your healthcare provider right away if you have any of the following:  · Bleeding from the incision, or an opening of the incision where it had been closed  · Increased redness, swelling, or pain in or around the incision  · Increased drainage or foul-smelling drainage. It doesn't matter what the color is.  · Change in the color of the incision  · Fever of 100.4°F (38°C) or higher, or as directed by your healthcare provider   Date Last Reviewed: 10/1/2016  © 4511-5289 [employer]. [address] [place] [address]. All rights reserved. This information is not intended as a substitute for professional medical care. Always follow your healthcare professional's instructions.      Tramadol tablets  What is this medicine?  TRAMADOL (TRA ma dole) is a pain reliever. It is used to treat moderate to severe pain in adults.  How should I use this medicine?  Take this medicine by mouth with a full glass of water. Follow the directions on the prescription label. You can take it with or without food. If it upsets your stomach, take it with food. Do not take your medicine more often than directed.  A special MedGuide will be given to you by the pharmacist with each prescription and refill. Be sure to read this information carefully each time.  Talk to your pediatrician regarding the use of this medicine in children. Special care may be needed.  What side effects may I notice from receiving this medicine?  Side effects that you should report to your doctor or health care professional as soon as possible:  · allergic reactions like skin rash, itching or hives, swelling of the face, lips, or tongue  · breathing  problems  · confusion  · seizures  · signs and symptoms of low blood pressure like dizziness; feeling faint or lightheaded, falls; unusually weak or tired  · trouble passing urine or change in the amount of urine  Side effects that usually do not require medical attention (report to your doctor or health care professional if they continue or are bothersome):  · constipation  · dry mouth  · nausea, vomiting  · tiredness  What may interact with this medicine?  Do not take this medication with any of the following medicines:  · MAOIs like Carbex, Eldepryl, Marplan, Nardil, and Parnate  This medicine may also interact with the following medications:  · alcohol  · antihistamines for allergy, cough and cold  · certain medicines for anxiety or sleep  · certain medicines for depression like amitriptyline, fluoxetine, sertraline  · certain medicines for migraine headache like almotriptan, eletriptan, frovatriptan, naratriptan, rizatriptan, sumatriptan, zolmitriptan  · certain medicines for seizures like carbamazepine, oxcarbazepine, phenobarbital, primidone  · certain medicines that treat or prevent blood clots like warfarin  · digoxin  · furazolidone  · general anesthetics like halothane, isoflurane, methoxyflurane, propofol  · linezolid  · local anesthetics like lidocaine, pramoxine, tetracaine  · medicines that relax muscles for surgery  · other narcotic medicines for pain or cough  · phenothiazines like chlorpromazine, mesoridazine, prochlorperazine, thioridazine  · procarbazine  What if I miss a dose?  If you miss a dose, take it as soon as you can. If it is almost time for your next dose, take only that dose. Do not take double or extra doses.  Where should I keep my medicine?  Keep out of the reach of children.  This medicine may cause accidental overdose and death if it taken by other adults, children, or pets. Mix any unused medicine with a substance like cat litter or coffee grounds. Then throw the medicine away in  a sealed container like a sealed bag or a coffee can with a lid. Do not use the medicine after the expiration date.  Store at room temperature between 15 and 30 degrees C (59 and 86 degrees F).  What should I tell my health care provider before I take this medicine?  They need to know if you have any of these conditions:  · brain tumor  · depression  · drug abuse or addiction  · head injury  · if you frequently drink alcohol containing drinks  · kidney disease or trouble passing urine  · liver disease  · lung disease, asthma, or breathing problems  · seizures or epilepsy  · suicidal thoughts, plans, or attempt; a previous suicide attempt by you or a family member  · an unusual or allergic reaction to tramadol, codeine, other medicines, foods, dyes, or preservatives  · pregnant or trying to get pregnant  · breast-feeding  What should I watch for while using this medicine?  Tell your doctor or health care professional if your pain does not go away, if it gets worse, or if you have new or a different type of pain. You may develop tolerance to the medicine. Tolerance means that you will need a higher dose of the medicine for pain relief. Tolerance is normal and is expected if you take this medicine for a long time.  Do not suddenly stop taking your medicine because you may develop a severe reaction. Your body becomes used to the medicine. This does NOT mean you are addicted. Addiction is a behavior related to getting and using a drug for a non-medical reason. If you have pain, you have a medical reason to take pain medicine. Your doctor will tell you how much medicine to take. If your doctor wants you to stop the medicine, the dose will be slowly lowered over time to avoid any side effects.  There are different types of narcotic medicines (opiates). If you take more than one type at the same time or if you are taking another medicine that also causes drowsiness, you may have more side effects. Give your health care  provider a list of all medicines you use. Your doctor will tell you how much medicine to take. Do not take more medicine than directed. Call emergency for help if you have problems breathing or unusual sleepiness.  You may get drowsy or dizzy. Do not drive, use machinery, or do anything that needs mental alertness until you know how this medicine affects you. Do not stand or sit up quickly, especially if you are an older patient. This reduces the risk of dizzy or fainting spells. Alcohol can increase or decrease the effects of this medicine. Avoid alcoholic drinks.  You may have constipation. Try to have a bowel movement at least every 2 to 3 days. If you do not have a bowel movement for 3 days, call your doctor or health care professional.  Your mouth may get dry. Chewing sugarless gum or sucking hard candy, and drinking plenty of water may help. Contact your doctor if the problem does not go away or is severe.  NOTE:This sheet is a summary. It may not cover all possible information. If you have questions about this medicine, talk to your doctor, pharmacist, or health care provider. Copyright© 2017 [employer]

## 2019-04-22 NOTE — ANESTHESIA POSTPROCEDURE EVALUATION
Anesthesia Post Evaluation    Patient: Violet Mirza    Procedure(s) Performed: Procedure(s) (LRB):  EXCISION, LIPOMAS  chestwall , both groins/legs (Bilateral)    Final Anesthesia Type: MAC  Patient location during evaluation: GI PACU  Patient participation: Yes- Able to Participate  Level of consciousness: awake and alert  Post-procedure vital signs: reviewed and stable  Pain management: adequate  Airway patency: patent  PONV status at discharge: No PONV  Anesthetic complications: no      Cardiovascular status: blood pressure returned to baseline and hemodynamically stable  Respiratory status: unassisted, spontaneous ventilation and room air  Hydration status: euvolemic  Follow-up not needed.          Vitals Value Taken Time   /80 4/22/2019  7:37 AM   Temp 36.6 °C (97.8 °F) 4/22/2019  7:15 AM   Pulse 69 4/22/2019  7:15 AM   Resp 18 4/22/2019  7:15 AM   SpO2 97 % 4/22/2019  7:15 AM         No case tracking events are documented in the log.      Pain/Ulices Score: No data recorded

## 2019-04-22 NOTE — INTERVAL H&P NOTE
The patient has been examined and the H&P has been reviewed:    I concur with the findings and no changes have occurred since H&P was written.    Anesthesia/Surgery risks, benefits and alternative options discussed and understood by patient/family.    Micah Ferrara MD  PGY II          Active Hospital Problems    Diagnosis  POA    Lipoma of anterior chest wall [D17.1]  Yes      Resolved Hospital Problems   No resolved problems to display.

## 2019-04-22 NOTE — TRANSFER OF CARE
"Anesthesia Transfer of Care Note    Patient: Violet Mirza    Procedure(s) Performed: Procedure(s) (LRB):  EXCISION, LIPOMAS  chestwall , both groins/legs (Bilateral)    Patient location: OPS    Anesthesia Type: MAC    Transport from OR: Transported from OR on room air with adequate spontaneous ventilation    Post pain: pain needs to be addressed    Post assessment: no apparent anesthetic complications    Post vital signs: stable    Level of consciousness: awake    Nausea/Vomiting: no nausea/vomiting    Complications: none    Transfer of care protocol was followedComments: Pt woke up with severe back pain; pain meds given in OPS      Last vitals:   Visit Vitals  BP (!) 160/80   Pulse 69   Temp 36.6 °C (97.8 °F) (Skin)   Resp 18   Ht 5' 8" (1.727 m)   Wt 107.5 kg (237 lb)   SpO2 97%   Breastfeeding? No   BMI 36.04 kg/m²     "

## 2019-04-22 NOTE — DISCHARGE SUMMARY
[facility]  General Surgery  Discharge Summary      Patient Name: Violet Mirza  MRN: [id number]  Admission Date: 4/22/2019  Hospital Length of Stay: 0 days  Discharge Date and Time:  04/22/2019 10:49 AM  Attending Physician: NOLA Werner MD   Discharging Provider: Micah Ferrara MD  Primary Care Provider: Phyllis Gunter MD     HPI: Patient with a history of lipomas    Procedure(s) (LRB):  EXCISION, LIPOMAS  chestwall , both groins/legs (Bilateral)     Hospital Course: Patient with a previous history of lipomas presents with multiple symptomatic lipomas.  She specified one on the R arm, R anterior chest wall, and 4 on bilateral thighs. Patient was instructed to stop taking her Eliquis prior to the procedure.  The procedure went well, there were no complications. She was discharged after appropriate recovery.    Consults: None    Significant Diagnostic Studies: None    Pending Diagnostic Studies:     None        Final Active Diagnoses:    Diagnosis Date Noted POA    Lipoma of anterior chest wall [D17.1] 04/22/2019 Yes      Problems Resolved During this Admission:      Discharged Condition: good    Disposition: Home or Self Care    Follow Up:  Follow-up Information     NAWAF Werner MD In 3 weeks.    Specialty:  General Surgery  Contact information:  Trae Argueta  [address] [id number] [phone]                 Patient Instructions:      Diet Adult Regular     Notify your health care provider if you experience any of the following:  temperature >100.4     Notify your health care provider if you experience any of the following:  persistent nausea and vomiting or diarrhea     Notify your health care provider if you experience any of the following:  severe uncontrolled pain     Activity as tolerated     Medications:  Reconciled Home Medications:      Medication List      CHANGE how you take these medications    * traMADol 50 mg tablet  Commonly known as:   ULTRAM  TK 1 T PO Q 6 H PRN  What changed:  Another medication with the same name was added. Make sure you understand how and when to take each.     * traMADol 50 mg tablet  Commonly known as:  ULTRAM  Take 1 tablet (50 mg total) by mouth every 6 (six) hours as needed for Pain.  What changed:  You were already taking a medication with the same name, and this prescription was added. Make sure you understand how and when to take each.         * This list has 2 medication(s) that are the same as other medications prescribed for you. Read the directions carefully, and ask your doctor or other care provider to review them with you.            CONTINUE taking these medications    BOOSTRIX TDAP 2.5-8-5 Lf-mcg-Lf/0.5mL Syrg injection  Generic drug:  diphth,pertus(acell),tetanus  Inject into the muscle by [facility]     diclofenac sodium 1 % Gel  Commonly known as:  VOLTAREN  LILIANA 4 GRAMS TOPICALLY AA QID PRN     furosemide 40 MG tablet  Commonly known as:  LASIX  TAKE ONE TABLET BY MOUTH TWO TIMES A DAY     levothyroxine 88 MCG tablet  Commonly known as:  SYNTHROID  Take 1 tablet (88 mcg total) by mouth once daily.     losartan 100 MG tablet  Commonly known as:  COZAAR  TAKE 1 TABLET BY MOUTH ONCE DAILY     metoprolol succinate 200 MG 24 hr tablet  Commonly known as:  TOPROL-XL  Take 1 tablet (200 mg total) by mouth once daily.     XARELTO 20 mg Tab  Generic drug:  rivaroxaban  Take 1 tablet (20 mg total) by mouth daily with dinner or evening meal.            Micah Ferrara MD  General Surgery  [facility]

## 2019-04-22 NOTE — OP NOTE
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 [E66.1, Z68.36]  Lipoma of anterior chest wall [D17.1]  Lipomas R thigh  Lipomas L thigh  Lipoma R arm  Paroxysmal atrial fibrillation [I48.0]  Lipomas of left thigh [D17.24]  Lipomas of right thigh [D17.23]    Post-op Diagnosis:  Post-Op Diagnosis Codes:     * Current use of long term anticoagulation [Z79.01]     * 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  An incision along skin full lines was made directly over each lipoma.  Each was dissected from the subcutaneous tissue with sharp dissection and electrocautery. Meticulous attention was turned to hemostasis. Each lipoma was excised individually sent as a separate specimen, 1 from the right anterior chest subclavicular approximately 3 cm, a 1-2 cm lipoma of the left forearm near the elbow crease anteriorly, four lipomas from the right anterior thigh via separate incisions, and for lipoma was from the left thigh anteriorly  were similarly excised, each ranging from 1.5-2.5 cm.  The deep spaces were closed with 3 0 Vicryl  subcutaneous sutures and the skin incisions were closed with 4 0 Monocryl intracuticular.  Dermabond was then applied. Estimated blood loss was less than 10 cc.  Sponge needle counts were correct and the patient was taken to the recovery room in stable condition.    Description of the findings of the procedure:  Large lipoma right anterior chest subclavicular 3 cm,  1 smaller 1-2 cm lipoma right forearm, four lipomas right thigh and for lipoma is on the left thigh 1.5-2.5 cm in diameter each.    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 Right arm lipoma -perm2. Left thigh lipoma #1 - perm3. Chest lipoma-perm4. Left thigh lipoma #2 perm5. Left thigh lipoma #3 perm6. Left thigh lipoma #4 perm7. Right thigh lipoma #1 perm8. Right thigh lipoma #2 perm9. Right thigh lipoma #3 perm     Start Status     04/22/19 1002 Collected (04/22/19 1015) Order ID: [id number]       04/22/19 1006

## 2019-04-22 NOTE — ANESTHESIA POSTPROCEDURE EVALUATION
Anesthesia Post Evaluation    Patient: Violet Mirza    Procedure(s) Performed: Procedure(s) (LRB):  EXCISION, LIPOMAS  chestwall , both groins/legs (Bilateral)    Final Anesthesia Type: general  Patient location during evaluation: PACU  Patient participation: Yes- Able to Participate  Level of consciousness: awake and alert  Post-procedure vital signs: reviewed and stable  Pain management: adequate  Airway patency: patent  PONV status at discharge: No PONV  Anesthetic complications: no      Cardiovascular status: blood pressure returned to baseline  Respiratory status: unassisted  Hydration status: euvolemic  Follow-up not needed.          Vitals Value Taken Time   /80 4/22/2019  7:37 AM   Temp 36.6 °C (97.8 °F) 4/22/2019  7:15 AM   Pulse 69 4/22/2019  7:15 AM   Resp 18 4/22/2019  7:15 AM   SpO2 97 % 4/22/2019  7:15 AM         No case tracking events are documented in the log.      Pain/Ulices Score: No data recorded

## 2019-04-22 NOTE — PLAN OF CARE
Discharge criteria met,voicing desire to go home. Discharge instructions given to patient; verbalized understanding. Discharge home via wheelchair in care of son.

## 2019-04-28 NOTE — PHYSICIAN QUERY
PT Name: Violet Mirza  MR #: [id number]     Physician Query Form - Documentation Clarification      CDS/: Joi Kohler               Contact information: [email or web address]    This form is a permanent document in the medical record.     Query Date: April 28, 2019    By submitting this query, we are merely seeking further clarification of documentation. Please utilize your independent clinical judgment when addressing the question(s) below.    The Medical record reflects the following:    Supporting Clinical Findings Location in Medical Record   Description of the findings of the procedure:  Large lipoma right anterior chest subclavicular 3 cm,  1 smaller 1-2 cm lipoma right forearm, four lipomas right thigh and for lipoma is on the left thigh 1.5-2.5 cm in diameter each.                                                                                           Dear Doctor, Please report the specific size for each thigh lipomas below.    Provider Use Only    1. Right arm lipoma: 2 fragments of tan yellow adipose tissue measuring 2.5 x 1.6 x 0.7 cm, submitted entirely  single cassette labeled 1596-1  2. Left thigh lipoma #1: Multiple fragments of tan-yellow fibroadipose tissue measuring 3.3 x 2.6 x 1.5 cm in  aggregate. Representative sections are submitted in a single cassette labeled 1596-2  3. Chest lipoma: Multiple fragments tan-yellow fibroadipose tissue measuring 5.7 x 4.2 x 4.1 cm in aggregate.  Representative sections are submitted in a single cassette labeled 1596-3  4. Left thigh lipoma #2: 2 fragments of tan-yellow tissue measuring 2.5 x 1.8 x 0.7 cm. Representative sections  are submitted in cassette labeled 1596-4  5. Left thigh lipoma #3: Multiple fragments of tan-yellow fibroadipose tissue measuring 2.5 x 2.2 x 0.9 cm in  aggregate. Representative sections are submitted in a single cassette labeled 1596-5  6. Left thigh lipoma #4: Single fragment of tan-yellow homogenous fibroadipose tissue measuring 1.9  x 1.9 x 1.7  cm. A representative section is submitted in a single cassette labeled 1596-6  7. Right thigh lipoma #1: Multiple fragments of tan-yellow homogenous fibroadipose tissue measuring 3.6 x 2.9 x  1.4 cm in aggregate. Representative sections are submitted in a single cassette labeled 1596  8. Right thigh lipoma #2: Multiple tan-yellow homogenous fibroadipose tissue fragments measuring 3.2 x 3.3 x  2.2 cm in aggregate. Representative sections are submitted in a single cassette labeled 1596-8  9. Right thigh lipoma #3: 5 fragments of tan-yellow homogenous fibroadipose tissue measuring 4.2 x 3.3 x 1.7 cm  in aggregate. Representative sections are submitted in a single cassette labeled 1596-9        I believe the size is listed in each path report as clearly stated above , and that is where you should look in the future.    ____ right thigh lipoma  ____ right thigh lipoma  ____ right thigh lipoma  ____ right thigh lipoma  ____ left thigh lipoma                                                                                                                           [  ] Clinically Undetermined

## 2019-05-03 ENCOUNTER — OFFICE VISIT (OUTPATIENT)
Dept: NEUROLOGY | Facility: HOSPITAL | Age: 69
End: 2019-05-03
Attending: SURGERY
Payer: MEDICARE

## 2019-05-03 VITALS
HEIGHT: 68 IN | WEIGHT: 237 LBS | DIASTOLIC BLOOD PRESSURE: 81 MMHG | BODY MASS INDEX: 35.92 KG/M2 | HEART RATE: 76 BPM | SYSTOLIC BLOOD PRESSURE: 141 MMHG | TEMPERATURE: 98 F

## 2019-05-03 DIAGNOSIS — L23.1 CONTACT DERMATITIS DUE TO ADHESIVES, UNSPECIFIED CONTACT DERMATITIS TYPE: Primary | ICD-10-CM

## 2019-05-03 PROBLEM — L25.9 CONTACT DERMATITIS: Status: ACTIVE | Noted: 2019-05-03

## 2019-05-03 PROCEDURE — 99211 OFF/OP EST MAY X REQ PHY/QHP: CPT | Performed by: SURGERY

## 2019-05-03 NOTE — PROGRESS NOTES
NOLANETS:  [facility]  A collaboration between [facility] and [facility]      PATIENT: Violet Mirza  MRN: [id number]  DATE: 5/3/2019    Subjective:      Chief Complaint:  Skin rash shoulder and arm, itching.      Vitals:   There were no vitals filed for this visit.     Karnofsky Score:       Diagnosis:   1. Contact dermatitis due to adhesives, unspecified contact dermatitis type         Oncologic History: na    Interval History :  Complains of new onset of pruritic rash.   Right chest and right elbow, tried some topical OTC creams.  No improvement.  No fever no drainage no pus     Prior history: 67 y/o female self referred for chest lipoma wanting excision. History of life threatenting MI Jan 2018, CHF, 10% EF, history o fA fib, venous insufficiency. and now on eliquis.  Some shortness of breath with exertion. Morbid obesity.  Had excisions done without complication    Cardiology consult:  In summary, Mrs. Mirza is a pleasant 68 year-old woman with hypertension and hypothyroidism with currently suppressed TSH and recent synthroid dose adjustment who was recently diagnosed with paroxysmal atrial fibrillation, frequent PVCs and severe biventricular nonischemic systolic heart failure. Despite sinus rhythm she has had persistent EF of 30-35%. She likely has NICMP unrelated to AF and AF is secondary. Her prior observed PVC burden also was not enough to explain her cardiomyopathy. She would like to remain off anti-arrhythmic therapy. Tikosyn/sotalol are contraindicated. She does not desire amiodarone in the future leaving multaq as the only alternative which she may not be able to afford. If AF puts her back into symptomatic heart failure then multaq would be contraindicated at that time. Should her PVC burden appear to increase would first evaluate with a repeat 24 hour holter, which she would like to avoid if  possible.     She is need of a lipoma removal from her right chest. If anticoagulation is needed to be held then recommend holding for 3 days prior and resume post-op when safe from a bleeding risk perspective.     Continue metoprolol and xarelto     RTC in 6 months, sooner if needed.     Thank you for allowing me to participate in the care of this patient. Please do not hesitate to call me with any questions or concerns.     Chriss Loco MD, PhD  Cardiac Electrophysiology          Past Medical History:  Past Medical History:   Diagnosis Date    Chondromalacia of right patella     MRI 6/2016 at [facility]    Diverticulitis     GERD (gastroesophageal reflux disease)     Hypoglycemia 11/22/2014    OA (osteoarthritis) of knee     bilateraly, followed by Dr. Callejas, [facility], s/p synvisc injections    Osteopenia 7/26/2013    Personal history of kidney stones 7/26/2013    Rosacea 7/26/2013    Tear of medial meniscus of right knee     MRI 6/2016       Past Surgical History:  Past Surgical History:   Procedure Laterality Date    ADENOIDECTOMY      EXCISION, LIPOMAS  chestwall , both groins/legs Bilateral 4/22/2019    [facility]    FOOT SURGERY Bilateral     KNEE ARTHROSCOPY W/ MENISCAL REPAIR Right 5/14    KNEE SURGERY Left     partial knee replacement    TONSILLECTOMY      TUBAL LIGATION         Family History:  Family History   Problem Relation Age of Onset    Hypertension Mother     Diabetes Mother     Glaucoma Mother     Cataracts Mother     Hypertension Father     Stroke Father     No Known Problems Sister     No Known Problems Brother     No Known Problems Daughter     No Known Problems Son     No Known Problems Sister     No Known Problems Daughter        Allergies:  Review of patient's allergies indicates:   Allergen Reactions    Aldactone [spironolactone] Other (See Comments)     Hyperkalemia when initiated on 01/23/2018         Medications:  Current Outpatient Medications   Medication Sig Dispense Refill    diclofenac sodium 1 % Gel LILIANA 4 GRAMS TOPICALLY AA QID PRN  1    diphth,pertus,acell,,tetanus (BOOSTRIX) 2.5-8-5 Lf-mcg-Lf/0.5mL Syrg injection Inject into the muscle by [facility] 0.5 mL 0    furosemide (LASIX) 40 MG tablet TAKE ONE TABLET BY MOUTH TWO TIMES A DAY 60 tablet 11    levothyroxine (SYNTHROID) 88 MCG tablet Take 1 tablet (88 mcg total) by mouth once daily. 90 tablet 3    losartan (COZAAR) 100 MG tablet TAKE 1 TABLET BY MOUTH ONCE DAILY 90 tablet 3    metoprolol succinate (TOPROL-XL) 200 MG 24 hr tablet Take 1 tablet (200 mg total) by mouth once daily. 90 tablet 3    rivaroxaban (XARELTO) 20 mg Tab Take 1 tablet (20 mg total) by mouth daily with dinner or evening meal. 30 tablet 11    traMADol (ULTRAM) 50 mg tablet TK 1 T PO Q 6 H PRN  0    traMADol (ULTRAM) 50 mg tablet Take 1 tablet (50 mg total) by mouth every 6 (six) hours as needed for Pain. 15 tablet 0    traMADol (ULTRAM) 50 mg tablet Take 1 tablet by mouth every 6hours as needed for pain 15 tablet 0     No current facility-administered medications for this visit.        Review of Systems   Constitutional: Positive for activity change (decreased) and fatigue. Negative for appetite change, chills, fever and unexpected weight change.   HENT: Negative.  Negative for ear pain, rhinorrhea and sinus pressure.    Eyes: Negative.    Respiratory: Positive for shortness of breath. Negative for cough, chest tightness and wheezing.    Cardiovascular: Positive for leg swelling. Negative for chest pain and palpitations.   Gastrointestinal: Negative.  Negative for abdominal distention, abdominal pain, anal bleeding, blood in stool, constipation, diarrhea, nausea, rectal pain and vomiting.        GERD   Genitourinary: Negative.  Negative for difficulty urinating, dysuria and frequency.   Musculoskeletal: Positive for arthralgias. Negative for back pain and gait problem.    Skin: Negative.  Negative for color change, pallor and rash.   Allergic/Immunologic: Negative.  Negative for environmental allergies, food allergies and immunocompromised state.   Neurological: Negative.  Negative for dizziness, seizures, syncope, weakness and light-headedness.   Hematological: Bruises/bleeds easily.   Psychiatric/Behavioral: Negative for dysphoric mood. The patient is nervous/anxious.       Objective:      Physical Exam   Constitutional: She is oriented to person, place, and time. She appears well-developed and well-nourished. No distress.   HENT:   Head: Normocephalic.   Eyes: No scleral icterus.   Neck: No JVD present. No tracheal deviation present.   Cardiovascular: Normal rate, regular rhythm and intact distal pulses.   Pulmonary/Chest: Effort normal. No respiratory distress. She has no wheezes.   Abdominal: Soft. There is no tenderness.   obese   Musculoskeletal: Normal range of motion. She exhibits no edema.   Neurological: She is alert and oriented to person, place, and time.   Skin: Skin is warm and dry. Rash (A maculopapular red rash right chest and right elbow consistent with topical dermatitis) noted. She is not diaphoretic.   Incisions healing well without signs of infection.  No drainage, no induration, no tenderness   Nursing note and vitals reviewed.     Assessment:       1. Contact dermatitis due to adhesives, unspecified contact dermatitis type        Laboratory Data:    Neuroendocrine Labs Latest Ref Rng & Units 4/22/2019 4/18/2019 4/18/2019 4/5/2019 4/3/2019 3/29/2019 2/21/2019   VITAMIN B12 200 - 1,100 pg/mL - - - - - - -   TSH 0.400 - 4.000 uIU/mL - - - 6.414(H) - - -   25 HYDROXY VIT D2 See Note: ng/mL - - - - - - -   25 HYDROXY VIT D3 See Note: ng/mL - - - - - - -   WBC 3.90 - 12.70 K/uL - 7.36 - 8.12 - - -   HGB 12.0 - 16.0 g/dL - 12.6 - 13.3 - - -   HCT 37.0 - 48.5 % - 40.9 - 42.8 - - -   PLATLETS 150 - 350 K/uL - 269 - 290 - - -   PT 9.0 - 12.5 sec - 11.2 - - - - -   PTT  21.0 - 32.0 sec - 32.2(H) - - - - -   INR 0.8 - 1.2 - 1.1 - - - - -   GLUCOSE 70 - 110 mg/dL - 92 - 109 - - -   BUN 8 - 23 mg/dL - 14 - 14 - - -   CREATININE 0.5 - 1.4 mg/dL - 0.8 - 0.8 - - -    - 145 mmol/L - 141 - 141 - - -   K 3.5 - 5.1 mmol/L - 3.7 - 3.4(L) - - -   CHLORIDE 95 - 110 mmol/L - 102 - 101 - - -   CO2 23 - 29 mmol/L - 29 - 30(H) - - -   CALCIUM 8.7 - 10.5 mg/dL - 9.4 - 10.0 - - -   PROTEIN, TOTAL 6.0 - 8.4 g/dL - - - 7.4 - - -   ALBUMIN 3.5 - 5.2 g/dL - - - 3.8 - - -   TOTAL BILIRUBIN 0.1 - 1.0 mg/dL - - - 0.4 - - -   ALK PHOSPHATASE 55 - 135 U/L - - - 138(H) - - -   SGOT (AST) 10 - 40 U/L - - - 23 - - -   SGPT (ALT) 10 - 44 U/L - - - 21 - - -   TRIGLYCERIDES 30 - 150 mg/dL - - - 175(H) - - -   CHOLERSTEROL 120 - 199 mg/dL - - - 194 - - -   HDL 40 - 75 mg/dL - - - 58 - - -   LDL 63.0 - 159.0 mg/dL - - - 101.0 - - -   MG 1.6 - 2.6 mg/dL - - - 2.3 - - -   HEMOGLOBIN A1C 4.0 - 5.6 % - - - 5.6 - - -   Weight - 237 lbs - 237 lbs 14 oz - 235 lbs 236 lbs 12 oz 231 lbs     Path report: all benign lipomas.    Impression:  Contact dermatitis  Plan:       Hydrocortisone cream 2.5% t.i.d.  RTC p.r.n. if not improved        NOLA Werner MD, FACS  Professor of Surgery, [facility]  Neuroendocrine Surgery, Hepatic/Pancreatic & General Surgery  [address], [address]  [place]  [address]  ph. [phone]; [phone]  fax. [phone]

## 2019-05-07 ENCOUNTER — TELEPHONE (OUTPATIENT)
Dept: FAMILY MEDICINE | Facility: CLINIC | Age: 69
End: 2019-05-07

## 2019-05-07 NOTE — TELEPHONE ENCOUNTER
Returned patient's call She would like to be seen today for an urgent visit for a rash. She is scheduled for today.

## 2019-05-07 NOTE — TELEPHONE ENCOUNTER
----- Message from Nya Sorto sent at 5/7/2019  8:55 AM CDT -----  Contact: [phone]/self  Patient is requesting to be seen today by any doctor. She has a rash over whole body for a week. She has surgery and was given a rx for cortisone cream. It did not work. She also got a steroid shot from dermatologist but still not better.  Thanks

## 2019-05-13 ENCOUNTER — PATIENT MESSAGE (OUTPATIENT)
Dept: NEUROLOGY | Facility: HOSPITAL | Age: 69
End: 2019-05-13

## 2019-05-17 ENCOUNTER — PATIENT MESSAGE (OUTPATIENT)
Dept: NEUROLOGY | Facility: HOSPITAL | Age: 69
End: 2019-05-17

## 2019-05-24 ENCOUNTER — PATIENT MESSAGE (OUTPATIENT)
Dept: FAMILY MEDICINE | Facility: CLINIC | Age: 69
End: 2019-05-24

## 2019-05-24 DIAGNOSIS — R21 RASH: Primary | ICD-10-CM

## 2019-05-27 ENCOUNTER — TELEPHONE (OUTPATIENT)
Dept: FAMILY MEDICINE | Facility: CLINIC | Age: 69
End: 2019-05-27

## 2019-05-27 NOTE — TELEPHONE ENCOUNTER
----- Message from Rebeka Herzog sent at 5/27/2019  8:57 AM CDT -----  Contact: Self/ [phone]  Type:  Sooner Apoointment Request    Caller is requesting a sooner appointment.  Caller declined first available appointment listed below.  Caller will not accept being placed on the waitlist and is requesting a message be sent to doctor.  Name of Caller:PT  When is the first available appointment? June 24  Symptoms:Rash  Would the patient rather a call back or a response via [facility]? callback  Best Call Back Number: [phone]  Additional Information:

## 2019-05-27 NOTE — TELEPHONE ENCOUNTER
Returned patient's call. She said she has been having a rash since the beginning of the month. She has seen derm and blood work was done. They are sure what is causing the rash. They recommended that she see and allergist. She said she would rather see her PCP. She is now scheduled.

## 2019-05-27 NOTE — TELEPHONE ENCOUNTER
Called patient to schedule with an Allergist. Patient states the message she sent was a request to see Dr. Gunter for her rash and that it is up to Dr. Gunter to decide whether she should see an allergist or not. Patient requesting an appointment this week.

## 2019-05-30 ENCOUNTER — TELEPHONE (OUTPATIENT)
Dept: OTOLARYNGOLOGY | Facility: CLINIC | Age: 69
End: 2019-05-30

## 2019-05-30 NOTE — TELEPHONE ENCOUNTER
----- Message from Larissa Dumas sent at 5/30/2019  2:37 PM CDT -----  Contact: self, [phone]  Patient requests to be seen next week if possible, states she thinks she has an upper respiratory infection. Please advise.

## 2019-06-07 ENCOUNTER — OFFICE VISIT (OUTPATIENT)
Dept: FAMILY MEDICINE | Facility: CLINIC | Age: 69
End: 2019-06-07
Payer: MEDICARE

## 2019-06-07 VITALS
DIASTOLIC BLOOD PRESSURE: 70 MMHG | SYSTOLIC BLOOD PRESSURE: 126 MMHG | OXYGEN SATURATION: 100 % | HEIGHT: 68 IN | HEART RATE: 73 BPM | TEMPERATURE: 99 F | BODY MASS INDEX: 35.31 KG/M2 | WEIGHT: 233 LBS

## 2019-06-07 DIAGNOSIS — R21 RASH: Primary | ICD-10-CM

## 2019-06-07 DIAGNOSIS — Z98.890 S/P EXCISION OF LIPOMA: ICD-10-CM

## 2019-06-07 DIAGNOSIS — E78.1 HYPERTRIGLYCERIDEMIA: ICD-10-CM

## 2019-06-07 DIAGNOSIS — I10 BENIGN ESSENTIAL HYPERTENSION: ICD-10-CM

## 2019-06-07 DIAGNOSIS — I50.22 CHRONIC SYSTOLIC HEART FAILURE: ICD-10-CM

## 2019-06-07 DIAGNOSIS — I48.0 PAROXYSMAL ATRIAL FIBRILLATION: ICD-10-CM

## 2019-06-07 DIAGNOSIS — Z86.018 S/P EXCISION OF LIPOMA: ICD-10-CM

## 2019-06-07 DIAGNOSIS — E03.9 HYPOTHYROIDISM, UNSPECIFIED TYPE: ICD-10-CM

## 2019-06-07 DIAGNOSIS — E55.9 VITAMIN D DEFICIENCY: ICD-10-CM

## 2019-06-07 DIAGNOSIS — Z79.01 CURRENT USE OF LONG TERM ANTICOAGULATION: ICD-10-CM

## 2019-06-07 PROCEDURE — 3074F SYST BP LT 130 MM HG: CPT | Mod: CPTII,S$GLB,, | Performed by: FAMILY MEDICINE

## 2019-06-07 PROCEDURE — 99999 PR PBB SHADOW E&M-EST. PATIENT-LVL III: ICD-10-PCS | Mod: PBBFAC,,, | Performed by: FAMILY MEDICINE

## 2019-06-07 PROCEDURE — 1101F PT FALLS ASSESS-DOCD LE1/YR: CPT | Mod: CPTII,S$GLB,, | Performed by: FAMILY MEDICINE

## 2019-06-07 PROCEDURE — 3078F PR MOST RECENT DIASTOLIC BLOOD PRESSURE < 80 MM HG: ICD-10-PCS | Mod: CPTII,S$GLB,, | Performed by: FAMILY MEDICINE

## 2019-06-07 PROCEDURE — 99999 PR PBB SHADOW E&M-EST. PATIENT-LVL III: CPT | Mod: PBBFAC,,, | Performed by: FAMILY MEDICINE

## 2019-06-07 PROCEDURE — 3074F PR MOST RECENT SYSTOLIC BLOOD PRESSURE < 130 MM HG: ICD-10-PCS | Mod: CPTII,S$GLB,, | Performed by: FAMILY MEDICINE

## 2019-06-07 PROCEDURE — 99214 PR OFFICE/OUTPT VISIT, EST, LEVL IV, 30-39 MIN: ICD-10-PCS | Mod: S$GLB,,, | Performed by: FAMILY MEDICINE

## 2019-06-07 PROCEDURE — 1101F PR PT FALLS ASSESS DOC 0-1 FALLS W/OUT INJ PAST YR: ICD-10-PCS | Mod: CPTII,S$GLB,, | Performed by: FAMILY MEDICINE

## 2019-06-07 PROCEDURE — 3078F DIAST BP <80 MM HG: CPT | Mod: CPTII,S$GLB,, | Performed by: FAMILY MEDICINE

## 2019-06-07 PROCEDURE — 99214 OFFICE O/P EST MOD 30 MIN: CPT | Mod: S$GLB,,, | Performed by: FAMILY MEDICINE

## 2019-06-07 RX ORDER — PREDNISONE 20 MG/1
TABLET ORAL
Refills: 0 | COMMUNITY
Start: 2019-05-24 | End: 2019-06-07

## 2019-06-07 RX ORDER — TRIAMCINOLONE ACETONIDE 1 MG/G
CREAM TOPICAL
Refills: 0 | COMMUNITY
Start: 2019-05-07 | End: 2019-06-11 | Stop reason: ALTCHOICE

## 2019-06-07 NOTE — PROGRESS NOTES
" Office Visit    Patient Name: Violet Mirza    : 1950  MRN: [id number]    Subjective:  Violet is a 68 y.o. female who presents today for:    Rash    69 yo patient of mine who recent established care with me/had annual 4/3/19 and with chronic medical significant for paroxysmal AFib, Chronic CHF ([facility] Class 1), hypothyroidism, hypertension here today to discuss rash that occurred following her surgery for lipoma excision. Had excisions of lipomas of anterior chest wall and thighs per Dr Werner 19. She reports erythematous rash of chest and elbow. Dr Werner advised likely etiology of contact dermatitis, potentially to adhesives. He prescribed hydrocortisone topical 2.5%, which didn't really help but OTC gold bond did help some. Saw Dermatology for evaluation and Derm was unsure of the cause and decided to give patient a steroid shot. Steroid shot did not help and then tried a prednisone course and allegra/xyzal/pepcid and rash did resolve but then it re-occurred when the meds were completed.  Took another round of the above and rash finally cleared up completely. She had some mild upper respiratory symptoms but they did resolved.  Allergy testing not totally conclusive-- some general allergens that patient had been routinely exposed too-- have requested copies of the tests.     Most recent labs 19 with comments as follows: 1) Low vitamin D, so as per bone density message I would advise 5,000 IU OTC vit D daily 2)mildly elevated triglycerides-- would advise continued attention to diet/exercise 3)need to increase thyroid medication a little bit up to 88 mcg daily and new prescription sent because level is showing need for mild increase in medication. 4) potassium level is borderline low so I would advise eating 1 banana daily to raise this"    She had been taking the vitamin D and higher dose thyroid medication as advised. Eating a banana daily for low potassium.  She has an appointment for lab " recheck 7/12/2109 with office visit to follow.       Past Medical History  Past Medical History:   Diagnosis Date    Chondromalacia of right patella     MRI 6/2016 at [facility]    Diverticulitis     GERD (gastroesophageal reflux disease)     Hypoglycemia 11/22/2014    OA (osteoarthritis) of knee     bilateraly, followed by Dr. Callejas, [facility], s/p synvisc injections    Osteopenia 7/26/2013    Personal history of kidney stones 7/26/2013    Rosacea 7/26/2013    Tear of medial meniscus of right knee     MRI 6/2016       Past Surgical History  Past Surgical History:   Procedure Laterality Date    ADENOIDECTOMY      EXCISION, LIPOMAS  chestwall , both groins/legs Bilateral 4/22/2019    [facility]    FOOT SURGERY Bilateral     KNEE ARTHROSCOPY W/ MENISCAL REPAIR Right 5/14    KNEE SURGERY Left     partial knee replacement    TONSILLECTOMY      TUBAL LIGATION         Family History  Family History   Problem Relation Age of Onset    Hypertension Mother     Diabetes Mother     Glaucoma Mother     Cataracts Mother     Hypertension Father     Stroke Father     No Known Problems Sister     No Known Problems Brother     No Known Problems Daughter     No Known Problems Son     No Known Problems Sister     No Known Problems Daughter        Social History  Social History     Socioeconomic History    Marital status:      Spouse name: Not on file    Number of children: y    Years of education: Not on file    Highest education level: Not on file   Occupational History    Occupation:      Comment: Works at Law firm   Social Needs    Financial resource strain: Not on file    Food insecurity:     Worry: Not on file     Inability: Not on file    Transportation needs:     Medical: Not on file     Non-medical: Not on file   Tobacco Use    Smoking status: Former Smoker     Packs/day: 0.25     Years: 30.00     Pack years:  "7.50     Last attempt to quit: 2005     Years since quittin.3    Smokeless tobacco: Former User   Substance and Sexual Activity    Alcohol use: No    Drug use: No    Sexual activity: Not on file   Lifestyle    Physical activity:     Days per week: Not on file     Minutes per session: Not on file    Stress: Not on file   Relationships    Social connections:     Talks on phone: Not on file     Gets together: Not on file     Attends Church service: Not on file     Active member of club or organization: Not on file     Attends meetings of clubs or organizations: Not on file     Relationship status: Not on file   Other Topics Concern    Not on file   Social History Narrative    Originally from [place], living in [place] with son       Current Medications  Medications reviewed and updated.     Allergies   Review of patient's allergies indicates:   Allergen Reactions    Aldactone [spironolactone] Other (See Comments)     Hyperkalemia when initiated on 2018        Review of Systems (Pertinent positives)  Review of Systems   Constitutional: Negative for fever.   HENT: Negative for congestion, sinus pressure and sinus pain.    Respiratory: Negative for shortness of breath.    Cardiovascular: Positive for leg swelling (mild, recently has spider vein procedure). Negative for palpitations.   Skin: Positive for rash (now resolved). Negative for color change and wound.   Allergic/Immunologic: Positive for environmental allergies.       /70   Pulse 73   Temp 98.5 °F (36.9 °C)   Ht 5' 8" (1.727 m)   Wt 105.7 kg (233 lb 0.4 oz)   SpO2 100%   BMI 35.43 kg/m²     Physical Exam   Constitutional: She is oriented to person, place, and time. She appears well-developed and well-nourished. No distress.   HENT:   Head: Normocephalic and atraumatic.   Right Ear: Tympanic membrane is not erythematous and not bulging.   Left Ear: Tympanic membrane is not erythematous and not bulging.   Nose: No mucosal edema or " rhinorrhea. Right sinus exhibits no maxillary sinus tenderness and no frontal sinus tenderness. Left sinus exhibits no maxillary sinus tenderness and no frontal sinus tenderness.   Mouth/Throat: Oropharynx is clear and moist. No oropharyngeal exudate.   Eyes: Conjunctivae are normal.   Cardiovascular: Normal rate and regular rhythm.   Pulmonary/Chest: Effort normal and breath sounds normal.   Musculoskeletal: She exhibits edema (trace BLE edema).   Neurological: She is alert and oriented to person, place, and time.   Skin: Skin is warm and dry.   Psychiatric: She has a normal mood and affect.   Vitals reviewed.        Assessment/Plan:  Violet Mirza is a 68 y.o. female who presents today for :    Violet was seen today for rash.    Diagnoses and all orders for this visit:    Rash  Comments:  unclear cause--ultimately did resolve with 2 rounds of steroids/antihistamines, suspect potential effect of anesthesia, need to request records of allergy tests    S/P excision of lipoma  Comments:  4/22/19 Dr Werner-- excised with out complications (ant chest wall and bilat thighs)    Paroxysmal atrial fibrillation  Comments:  no palpitations, stableon metoprolol     Current use of long term anticoagulation    Chronic systolic heart failure  Comments:  exercise tolerance stable-- continues [facility] Class 1     Hypothyroidism, unspecified type  Comments:  taking increased dose of thyroid medication 88 mcg daily and has appointment for recheck    Vitamin D deficiency  Comments:  now on 5,000 IU daily supplement and recheck in July    Benign essential hypertension  Comments:  controlled on cozaar 100 and Lasix 40 BID, has started eating bananas secondary to mildly low potassium and has lab appt to recheck this     Hypertriglyceridemia  Comments:  sticking to improved diet and exercise routine, recheck in July             ICD-10-CM ICD-9-CM    1. Rash R21 782.1     unclear cause--ultimately did resolve with 2 rounds of  steroids/antihistamines, suspect potential effect of anesthesia, need to request records of allergy tests   2. S/P excision of lipoma Z98.890 V45.89     Z86.018      4/22/19 Dr Werner-- excised with out complications (ant chest wall and bilat thighs)   3. Paroxysmal atrial fibrillation I48.0 427.31     no palpitations, stableon metoprolol    4. Current use of long term anticoagulation Z79.01 V58.61    5. Chronic systolic heart failure I50.22 428.22     exercise tolerance stable-- continues [facility] Class 1    6. Hypothyroidism, unspecified type E03.9 244.9     taking increased dose of thyroid medication 88 mcg daily and has appointment for recheck   7. Vitamin D deficiency E55.9 268.9     now on 5,000 IU daily supplement and recheck in July   8. Benign essential hypertension I10 401.1     controlled on cozaar 100 and Lasix 40 BID, has started eating bananas secondary to mildly low potassium and has lab appt to recheck this    9. Hypertriglyceridemia E78.1 272.1     sticking to improved diet and exercise routine, recheck in July        There are no Patient Instructions on file for this visit.      Follow up for Keep scheduled appointments for labs/office visit in July, follow-up sooner if concerns.

## 2019-06-10 ENCOUNTER — TELEPHONE (OUTPATIENT)
Dept: FAMILY MEDICINE | Facility: CLINIC | Age: 69
End: 2019-06-10

## 2019-06-10 NOTE — TELEPHONE ENCOUNTER
Returned pt phone call, pt just wanted to let us know that the brand muffin had an indigent that makes her itch, she stated that you guys discussed possibilities of what caused the rash, and she ate that muffin again and it caused the rash. She stated the rash is not as bad and she had it under control with the cream the dermatologist gave her, she stated that if you would like for her to do another round of steroids just to let her know. Please Advise.

## 2019-06-10 NOTE — TELEPHONE ENCOUNTER
----- Message from Nya Sorto sent at 6/10/2019  8:17 AM CDT -----  Contact: [phone]/self  Type:  Needs Medical Advice    Who Called: self  Symptoms (please be specific): Rash   How long has patient had these symptoms:    Pharmacy name and phone #:    Would the patient rather a call back or a response via [facility]?   Best Call Back Number:   Additional Information: Patient would like to discuss the rash.

## 2019-06-11 ENCOUNTER — OFFICE VISIT (OUTPATIENT)
Dept: FAMILY MEDICINE | Facility: CLINIC | Age: 69
End: 2019-06-11
Payer: MEDICARE

## 2019-06-11 VITALS
OXYGEN SATURATION: 98 % | WEIGHT: 233.25 LBS | SYSTOLIC BLOOD PRESSURE: 146 MMHG | HEIGHT: 68 IN | DIASTOLIC BLOOD PRESSURE: 78 MMHG | HEART RATE: 60 BPM | BODY MASS INDEX: 35.35 KG/M2 | TEMPERATURE: 98 F

## 2019-06-11 DIAGNOSIS — L50.9 HIVES: Primary | ICD-10-CM

## 2019-06-11 DIAGNOSIS — E03.9 HYPOTHYROIDISM, UNSPECIFIED TYPE: ICD-10-CM

## 2019-06-11 DIAGNOSIS — I48.0 PAROXYSMAL ATRIAL FIBRILLATION: ICD-10-CM

## 2019-06-11 DIAGNOSIS — I10 BENIGN ESSENTIAL HYPERTENSION: ICD-10-CM

## 2019-06-11 PROCEDURE — 1101F PR PT FALLS ASSESS DOC 0-1 FALLS W/OUT INJ PAST YR: ICD-10-PCS | Mod: CPTII,S$GLB,, | Performed by: FAMILY MEDICINE

## 2019-06-11 PROCEDURE — 3078F PR MOST RECENT DIASTOLIC BLOOD PRESSURE < 80 MM HG: ICD-10-PCS | Mod: CPTII,S$GLB,, | Performed by: FAMILY MEDICINE

## 2019-06-11 PROCEDURE — 99214 OFFICE O/P EST MOD 30 MIN: CPT | Mod: S$GLB,,, | Performed by: FAMILY MEDICINE

## 2019-06-11 PROCEDURE — 3078F DIAST BP <80 MM HG: CPT | Mod: CPTII,S$GLB,, | Performed by: FAMILY MEDICINE

## 2019-06-11 PROCEDURE — 3077F SYST BP >= 140 MM HG: CPT | Mod: CPTII,S$GLB,, | Performed by: FAMILY MEDICINE

## 2019-06-11 PROCEDURE — 99499 UNLISTED E&M SERVICE: CPT | Mod: S$GLB,,, | Performed by: FAMILY MEDICINE

## 2019-06-11 PROCEDURE — 99499 RISK ADDL DX/OHS AUDIT: ICD-10-PCS | Mod: S$GLB,,, | Performed by: FAMILY MEDICINE

## 2019-06-11 PROCEDURE — 99999 PR PBB SHADOW E&M-EST. PATIENT-LVL IV: ICD-10-PCS | Mod: PBBFAC,,, | Performed by: FAMILY MEDICINE

## 2019-06-11 PROCEDURE — 99999 PR PBB SHADOW E&M-EST. PATIENT-LVL IV: CPT | Mod: PBBFAC,,, | Performed by: FAMILY MEDICINE

## 2019-06-11 PROCEDURE — 1101F PT FALLS ASSESS-DOCD LE1/YR: CPT | Mod: CPTII,S$GLB,, | Performed by: FAMILY MEDICINE

## 2019-06-11 PROCEDURE — 3077F PR MOST RECENT SYSTOLIC BLOOD PRESSURE >= 140 MM HG: ICD-10-PCS | Mod: CPTII,S$GLB,, | Performed by: FAMILY MEDICINE

## 2019-06-11 PROCEDURE — 99214 PR OFFICE/OUTPT VISIT, EST, LEVL IV, 30-39 MIN: ICD-10-PCS | Mod: S$GLB,,, | Performed by: FAMILY MEDICINE

## 2019-06-11 RX ORDER — METHYLPREDNISOLONE 4 MG/1
TABLET ORAL
Qty: 1 PACKAGE | Refills: 0 | Status: SHIPPED | OUTPATIENT
Start: 2019-06-11 | End: 2019-07-26

## 2019-06-11 RX ORDER — LEVOCETIRIZINE DIHYDROCHLORIDE 5 MG/1
5 TABLET, FILM COATED ORAL NIGHTLY
Qty: 30 TABLET | Refills: 1 | Status: SHIPPED | OUTPATIENT
Start: 2019-06-11 | End: 2022-08-01 | Stop reason: SDUPTHER

## 2019-06-11 RX ORDER — CLOBETASOL PROPIONATE 0.5 MG/G
CREAM TOPICAL 2 TIMES DAILY
Qty: 60 G | Refills: 1 | Status: SHIPPED | OUTPATIENT
Start: 2019-06-11 | End: 2019-06-15

## 2019-06-11 NOTE — PROGRESS NOTES
Office Visit    Patient Name: Violet Mirza    : 1950  MRN: [id number]    Subjective:  Violet is a 68 y.o. female who presents today for:    Rash    68-year-old patient of mine recently seen 2019 for new onset of rash following a surgery for lipoma excisions on 2019, here for re-evaluation as the rash has started to reoccur.  Please see prior note for full history of her rash.    Had excisions of lipomas of anterior chest wall and thighs per Dr Werner 19. Then started to experience erythematous rash of chest and elbow. Dr Werner advised likely etiology of contact dermatitis, potentially to adhesives. He prescribed hydrocortisone topical 2.5%, which didn't really help but OTC gold bond did help some.     Saw Dermatology for evaluation and Derm was unsure of the cause and decided to give patient a steroid shot. Steroid shot did not help and then tried a prednisone course and allegra/xyzal/pepcid and rash did resolve but then it re-occurred when the meds were completed.     Took another round of the above and rash finally cleared up completely. She had some mild upper respiratory symptoms but they did resolved.  Allergy testing not totally conclusive-- some general allergens that patient had been routinely exposed too-- have requested copies of the tests.    Thinking her rash was most likely the result of anesthesia reaction she again ate a bran muffin from [employer] on Saturday (previously thought to be a potential cause for her rash) and within 1 hour developed itchy red bumps on her body. Since then despite continuing daily allegra she has continued to experience roving red itchy spots-- some are raised but all are itchy.     Dr Anita Diaz [phone]-- [facility] dermatology-- where she was previously evaluated.        Past Medical History  Past Medical History:   Diagnosis Date    Chondromalacia of right patella     [facility] 2016 [facility]     Diverticulitis     GERD (gastroesophageal reflux disease)     Hypoglycemia 2014    OA (osteoarthritis) of knee     bilateraly, followed by Dr. Callejas, [facility], s/p synvisc injections    Osteopenia 2013    Personal history of kidney stones 2013    Rosacea 2013    Tear of medial meniscus of right knee     MRI 2016       Past Surgical History  Past Surgical History:   Procedure Laterality Date    ADENOIDECTOMY      EXCISION, LIPOMAS  chestwall , both groins/legs Bilateral 2019    Performed by [facility] at [facility]    FOOT SURGERY Bilateral     KNEE ARTHROSCOPY W/ MENISCAL REPAIR Right     KNEE SURGERY Left     partial knee replacement    TONSILLECTOMY      TUBAL LIGATION         Family History  Family History   Problem Relation Age of Onset    Hypertension Mother     Diabetes Mother     Glaucoma Mother     Cataracts Mother     Hypertension Father     Stroke Father     No Known Problems Sister     No Known Problems Brother     No Known Problems Daughter     No Known Problems Son     No Known Problems Sister     No Known Problems Daughter        Social History  Social History     Socioeconomic History    Marital status:      Spouse name: Not on file    Number of children: y    Years of education: Not on file    Highest education level: Not on file   Occupational History    Occupation:      Comment: Works at Law firm   Social Needs    Financial resource strain: Not on file    Food insecurity:     Worry: Not on file     Inability: Not on file    Transportation needs:     Medical: Not on file     Non-medical: Not on file   Tobacco Use    Smoking status: Former Smoker     Packs/day: 0.25     Years: 30.00     Pack years: 7.50     Last attempt to quit: 2005     Years since quittin.3    Smokeless tobacco: Former User   Substance and Sexual Activity    Alcohol use: No    Drug use: No    Sexual activity:  "Not on file   Lifestyle    Physical activity:     Days per week: Not on file     Minutes per session: Not on file    Stress: Not on file   Relationships    Social connections:     Talks on phone: Not on file     Gets together: Not on file     Attends Cheondoism service: Not on file     Active member of club or organization: Not on file     Attends meetings of clubs or organizations: Not on file     Relationship status: Not on file   Other Topics Concern    Not on file   Social History Narrative    Originally from [place], living in [place] with son       Current Medications  Medications reviewed and updated.     Allergies   Review of patient's allergies indicates:   Allergen Reactions    Aldactone [spironolactone] Other (See Comments)     Hyperkalemia when initiated on 01/23/2018        Review of Systems (Pertinent positives)  Review of Systems   HENT: Negative for congestion, sinus pressure and sinus pain.    Respiratory: Negative for shortness of breath.    Cardiovascular: Negative for chest pain and palpitations.   Skin: Positive for rash.   Allergic/Immunologic: Negative for environmental allergies.   Neurological: Negative for dizziness.       BP (!) 146/78   Pulse 60   Temp 97.8 °F (36.6 °C)   Ht 5' 8" (1.727 m)   Wt 105.8 kg (233 lb 4 oz)   SpO2 98%   BMI 35.47 kg/m²     Physical Exam   Constitutional: She is oriented to person, place, and time. She appears well-developed and well-nourished. No distress.   HENT:   Head: Normocephalic and atraumatic.   Mouth/Throat: Oropharynx is clear and moist. No oropharyngeal exudate.   Eyes: Conjunctivae are normal.   Cardiovascular: Normal rate and regular rhythm.   Pulmonary/Chest: Effort normal and breath sounds normal.   Musculoskeletal: She exhibits no edema.   Neurological: She is alert and oriented to person, place, and time.   Skin: Skin is warm and dry. Rash noted. Rash is macular (itchy, overall non-raised and ocasionally blanching (some migue and some " don't but all are itchy)) and urticarial.   Psychiatric: She has a normal mood and affect.   Vitals reviewed.        Assessment/Plan:  Violet Mirza is a 68 y.o. female who presents today for :    Violet was seen today for rash.    Diagnoses and all orders for this visit:    Hives  Comments:  Try adding back night time antihistamine & only take steroid dose pack if needed after 48-72 hrs. no h/o anaphylaxis. touch base with dermatology re: testing  Orders:  -     levocetirizine (XYZAL) 5 MG tablet; Take 1 tablet (5 mg total) by mouth every evening.  -     methylPREDNISolone (MEDROL DOSEPACK) 4 mg tablet; Take as directed  -     clobetasol (TEMOVATE) 0.05 % cream; Apply topically 2 (two) times daily.    Benign essential hypertension  Comments:  adequate control, monitoring    Paroxysmal atrial fibrillation  Comments:  controlled    Hypothyroidism, unspecified type  Comments:  TSH stable, labs pending for july            ICD-10-CM ICD-9-CM    1. Hives L50.9 708.9 levocetirizine (XYZAL) 5 MG tablet      methylPREDNISolone (MEDROL DOSEPACK) 4 mg tablet      clobetasol (TEMOVATE) 0.05 % cream    Try adding back night time antihistamine & only take steroid dose pack if needed after 48-72 hrs. no h/o anaphylaxis. touch base with dermatology re: testing   2. Benign essential hypertension I10 401.1     adequate control, monitoring   3. Paroxysmal atrial fibrillation I48.0 427.31     controlled   4. Hypothyroidism, unspecified type E03.9 244.9     TSH stable, labs pending for july       Patient Instructions   TRIAL OF ADDING BACK NIGHTLY XYXAL TO DAILY ALLEGRA. TRY CLOBETASOL  STEROID CREAM . AVOID BRAN MUFFINS AS THIS MAY BE A TRIGGER AND I WILL TOUCH BASE WITH DERMATOLOGY REGARDING THESE UPDATES/ PRESCRIPTIONS-- ONLY TAKE MEDROL STEROID DOSEPACK IF NO IMPROVEMENT IN 72 HOURS ON THE HIGHER ANTIHISTAMINE REGIMEN.         Follow up for to follow up on lab results, return as needed for new concerns.

## 2019-06-11 NOTE — PATIENT INSTRUCTIONS
TRIAL OF ADDING BACK NIGHTLY XYXAL TO DAILY ALLEGRA. TRY CLOBETASOL  STEROID CREAM . AVOID BRAN MUFFINS AS THIS MAY BE A TRIGGER AND I WILL TOUCH BASE WITH DERMATOLOGY REGARDING THESE UPDATES/ PRESCRIPTIONS-- ONLY TAKE MEDROL STEROID DOSEPACK IF NO IMPROVEMENT IN 72 HOURS ON THE HIGHER ANTIHISTAMINE REGIMEN.

## 2019-06-12 ENCOUNTER — PATIENT MESSAGE (OUTPATIENT)
Dept: FAMILY MEDICINE | Facility: CLINIC | Age: 69
End: 2019-06-12

## 2019-06-14 ENCOUNTER — TELEPHONE (OUTPATIENT)
Dept: FAMILY MEDICINE | Facility: CLINIC | Age: 69
End: 2019-06-14

## 2019-06-15 NOTE — TELEPHONE ENCOUNTER
Please notify that it seems like her insurance will only cover the triamcinolone cream that she has already been prescribed

## 2019-06-17 ENCOUNTER — TELEPHONE (OUTPATIENT)
Dept: FAMILY MEDICINE | Facility: CLINIC | Age: 69
End: 2019-06-17

## 2019-06-17 DIAGNOSIS — J30.89 ENVIRONMENTAL AND SEASONAL ALLERGIES: ICD-10-CM

## 2019-06-17 NOTE — TELEPHONE ENCOUNTER
I called pt and let her know that the cream prescribed was the only cream that was covered bu insurance. Pt verbalized understanding and asked if you received the blood test from Dr. Diaz. Please Advise.

## 2019-06-18 PROBLEM — J30.89 ENVIRONMENTAL AND SEASONAL ALLERGIES: Status: ACTIVE | Noted: 2019-06-18

## 2019-06-27 ENCOUNTER — PATIENT MESSAGE (OUTPATIENT)
Dept: FAMILY MEDICINE | Facility: CLINIC | Age: 69
End: 2019-06-27

## 2019-07-12 ENCOUNTER — LAB VISIT (OUTPATIENT)
Dept: LAB | Facility: HOSPITAL | Age: 69
End: 2019-07-12
Attending: FAMILY MEDICINE
Payer: MEDICARE

## 2019-07-12 DIAGNOSIS — Z79.899 MEDICATION MANAGEMENT: ICD-10-CM

## 2019-07-12 DIAGNOSIS — E78.1 HYPERTRIGLYCERIDEMIA: ICD-10-CM

## 2019-07-12 DIAGNOSIS — E03.9 HYPOTHYROIDISM, UNSPECIFIED TYPE: ICD-10-CM

## 2019-07-12 DIAGNOSIS — E55.9 VITAMIN D DEFICIENCY: ICD-10-CM

## 2019-07-12 LAB
25(OH)D3+25(OH)D2 SERPL-MCNC: 42 NG/ML (ref 30–96)
ALBUMIN SERPL BCP-MCNC: 3.6 G/DL (ref 3.5–5.2)
ALP SERPL-CCNC: 137 U/L (ref 55–135)
ALT SERPL W/O P-5'-P-CCNC: 19 U/L (ref 10–44)
ANION GAP SERPL CALC-SCNC: 11 MMOL/L (ref 8–16)
AST SERPL-CCNC: 17 U/L (ref 10–40)
BILIRUB SERPL-MCNC: 0.5 MG/DL (ref 0.1–1)
BUN SERPL-MCNC: 16 MG/DL (ref 8–23)
CALCIUM SERPL-MCNC: 9.8 MG/DL (ref 8.7–10.5)
CHLORIDE SERPL-SCNC: 98 MMOL/L (ref 95–110)
CHOLEST SERPL-MCNC: 205 MG/DL (ref 120–199)
CHOLEST/HDLC SERPL: 3 {RATIO} (ref 2–5)
CO2 SERPL-SCNC: 32 MMOL/L (ref 23–29)
CREAT SERPL-MCNC: 0.8 MG/DL (ref 0.5–1.4)
EST. GFR  (AFRICAN AMERICAN): >60 ML/MIN/1.73 M^2
EST. GFR  (NON AFRICAN AMERICAN): >60 ML/MIN/1.73 M^2
GLUCOSE SERPL-MCNC: 120 MG/DL (ref 70–110)
HDLC SERPL-MCNC: 69 MG/DL (ref 40–75)
HDLC SERPL: 33.7 % (ref 20–50)
LDLC SERPL CALC-MCNC: 114 MG/DL (ref 63–159)
NONHDLC SERPL-MCNC: 136 MG/DL
POTASSIUM SERPL-SCNC: 3.5 MMOL/L (ref 3.5–5.1)
PROT SERPL-MCNC: 7.4 G/DL (ref 6–8.4)
SODIUM SERPL-SCNC: 141 MMOL/L (ref 136–145)
T4 FREE SERPL-MCNC: 1.01 NG/DL (ref 0.71–1.51)
TRIGL SERPL-MCNC: 110 MG/DL (ref 30–150)
TSH SERPL DL<=0.005 MIU/L-ACNC: 5.86 UIU/ML (ref 0.4–4)

## 2019-07-12 PROCEDURE — 84439 ASSAY OF FREE THYROXINE: CPT

## 2019-07-12 PROCEDURE — 84443 ASSAY THYROID STIM HORMONE: CPT

## 2019-07-12 PROCEDURE — 80053 COMPREHEN METABOLIC PANEL: CPT

## 2019-07-12 PROCEDURE — 82306 VITAMIN D 25 HYDROXY: CPT

## 2019-07-12 PROCEDURE — 80061 LIPID PANEL: CPT

## 2019-07-12 PROCEDURE — 36415 COLL VENOUS BLD VENIPUNCTURE: CPT

## 2019-07-23 ENCOUNTER — PATIENT MESSAGE (OUTPATIENT)
Dept: CARDIOLOGY | Facility: CLINIC | Age: 69
End: 2019-07-23

## 2019-07-24 ENCOUNTER — PATIENT MESSAGE (OUTPATIENT)
Dept: CARDIOLOGY | Facility: CLINIC | Age: 69
End: 2019-07-24

## 2019-07-25 ENCOUNTER — PATIENT MESSAGE (OUTPATIENT)
Dept: CARDIOLOGY | Facility: CLINIC | Age: 69
End: 2019-07-25

## 2019-07-26 ENCOUNTER — OFFICE VISIT (OUTPATIENT)
Dept: FAMILY MEDICINE | Facility: CLINIC | Age: 69
End: 2019-07-26
Payer: MEDICARE

## 2019-07-26 VITALS
WEIGHT: 234.81 LBS | BODY MASS INDEX: 35.59 KG/M2 | SYSTOLIC BLOOD PRESSURE: 134 MMHG | OXYGEN SATURATION: 95 % | HEIGHT: 68 IN | HEART RATE: 63 BPM | TEMPERATURE: 98 F | DIASTOLIC BLOOD PRESSURE: 66 MMHG

## 2019-07-26 DIAGNOSIS — E66.09 CLASS 2 OBESITY DUE TO EXCESS CALORIES WITHOUT SERIOUS COMORBIDITY WITH BODY MASS INDEX (BMI) OF 35.0 TO 35.9 IN ADULT: ICD-10-CM

## 2019-07-26 DIAGNOSIS — E78.1 HYPERTRIGLYCERIDEMIA: ICD-10-CM

## 2019-07-26 DIAGNOSIS — I87.2 VENOUS INSUFFICIENCY OF BOTH LOWER EXTREMITIES: ICD-10-CM

## 2019-07-26 DIAGNOSIS — I10 BENIGN ESSENTIAL HYPERTENSION: ICD-10-CM

## 2019-07-26 DIAGNOSIS — I50.22 CHRONIC SYSTOLIC HEART FAILURE: ICD-10-CM

## 2019-07-26 DIAGNOSIS — J30.89 ENVIRONMENTAL AND SEASONAL ALLERGIES: ICD-10-CM

## 2019-07-26 DIAGNOSIS — I48.0 PAROXYSMAL ATRIAL FIBRILLATION: ICD-10-CM

## 2019-07-26 DIAGNOSIS — Z79.01 CURRENT USE OF LONG TERM ANTICOAGULATION: ICD-10-CM

## 2019-07-26 DIAGNOSIS — E55.9 VITAMIN D DEFICIENCY: ICD-10-CM

## 2019-07-26 DIAGNOSIS — E03.9 ACQUIRED HYPOTHYROIDISM: Primary | ICD-10-CM

## 2019-07-26 PROCEDURE — 99499 RISK ADDL DX/OHS AUDIT: ICD-10-PCS | Mod: S$GLB,,, | Performed by: FAMILY MEDICINE

## 2019-07-26 PROCEDURE — 1101F PR PT FALLS ASSESS DOC 0-1 FALLS W/OUT INJ PAST YR: ICD-10-PCS | Mod: CPTII,S$GLB,, | Performed by: FAMILY MEDICINE

## 2019-07-26 PROCEDURE — 3078F PR MOST RECENT DIASTOLIC BLOOD PRESSURE < 80 MM HG: ICD-10-PCS | Mod: CPTII,S$GLB,, | Performed by: FAMILY MEDICINE

## 2019-07-26 PROCEDURE — 3075F PR MOST RECENT SYSTOLIC BLOOD PRESS GE 130-139MM HG: ICD-10-PCS | Mod: CPTII,S$GLB,, | Performed by: FAMILY MEDICINE

## 2019-07-26 PROCEDURE — 99214 PR OFFICE/OUTPT VISIT, EST, LEVL IV, 30-39 MIN: ICD-10-PCS | Mod: S$GLB,,, | Performed by: FAMILY MEDICINE

## 2019-07-26 PROCEDURE — 99999 PR PBB SHADOW E&M-EST. PATIENT-LVL III: ICD-10-PCS | Mod: PBBFAC,,, | Performed by: FAMILY MEDICINE

## 2019-07-26 PROCEDURE — 99214 OFFICE O/P EST MOD 30 MIN: CPT | Mod: S$GLB,,, | Performed by: FAMILY MEDICINE

## 2019-07-26 PROCEDURE — 1101F PT FALLS ASSESS-DOCD LE1/YR: CPT | Mod: CPTII,S$GLB,, | Performed by: FAMILY MEDICINE

## 2019-07-26 PROCEDURE — 99999 PR PBB SHADOW E&M-EST. PATIENT-LVL III: CPT | Mod: PBBFAC,,, | Performed by: FAMILY MEDICINE

## 2019-07-26 PROCEDURE — 3075F SYST BP GE 130 - 139MM HG: CPT | Mod: CPTII,S$GLB,, | Performed by: FAMILY MEDICINE

## 2019-07-26 PROCEDURE — 99499 UNLISTED E&M SERVICE: CPT | Mod: S$GLB,,, | Performed by: FAMILY MEDICINE

## 2019-07-26 PROCEDURE — 3078F DIAST BP <80 MM HG: CPT | Mod: CPTII,S$GLB,, | Performed by: FAMILY MEDICINE

## 2019-07-26 RX ORDER — LEVOTHYROXINE SODIUM 100 UG/1
100 TABLET ORAL DAILY
Qty: 90 TABLET | Refills: 4 | Status: SHIPPED | OUTPATIENT
Start: 2019-07-26 | End: 2019-07-26 | Stop reason: SDUPTHER

## 2019-07-26 RX ORDER — LEVOTHYROXINE SODIUM 100 UG/1
100 TABLET ORAL DAILY
Qty: 90 TABLET | Refills: 4 | Status: SHIPPED | OUTPATIENT
Start: 2019-07-26 | End: 2019-11-01 | Stop reason: DRUGHIGH

## 2019-07-26 NOTE — PROGRESS NOTES
Office Visit    Patient Name: Violet Mirza    : 1950  MRN: [id number]    Subjective:  Violet is a 69 y.o. female who presents today for:    Follow-up    69-year-old patient of mine last seen by me 3 months ago for annual exam on 4/3/19, here for 3 month follow-up of chronic conditions that include chronic conditions include paroxysmal AFib(2018 in [facility] ER was given diltiazem & converted spontaneously to NSR), Systolic Heart Failure, hypothyroidism, hypertension, obesity, osteopenia, rosacea, remote prior history of kidney stones.    Labs drawn prior to office visit on 2019 remarkable for improvement in TSH from 6+ range to 5+ range with increase in levothyroxine from 75-88 mcg daily.  Her weight is overall unchanged.  Additional labs remarkable for normalization of triglycerides, normalization of vitamin D on OTC supplement of 5000 international units daily.  Her glucose was elevated to 120 but a previous A1c at the time of her annual exam was normal at 5.6.  Her liver and kidney function were normal on labs.    She does not endorse overt symptoms of hypothyroidism such as significant fatigue or constipation, sluggishness, however, she has had significant difficulty losing weight.  She tries to eat a healthy diet and exercise regularly.  Her exercise tolerance is overall excellent-- she is able to do cardio machines and exercise classes without excessive shortness of breath.  She has no shortness of breath with regular day-to-day activities including climbing the stairs and walking.  She has been a little bit out of her exercise routine due to issues with a skin ulcer complicating a sclerotherapy injection given by her dermatologist.  This is fortunately healing.  She is not having any chest pain, shortness of breath, leg swelling.  Her weight is stable on 40 mg of Lasix twice daily.  Her blood pressures have been stable on metoprolol and Cozaar.  She is due for a follow-up with  Cardiology next month.        Past Medical History  Past Medical History:   Diagnosis Date    Chondromalacia of right patella     [facility]    Diverticulitis     GERD (gastroesophageal reflux disease)     Hypoglycemia 11/22/2014    OA (osteoarthritis) of knee     bilateraly, followed by Dr. Callejas, [facility], s/p synvisc injections    Osteopenia 7/26/2013    Personal history of kidney stones 7/26/2013    Rosacea 7/26/2013    Tear of medial meniscus of right knee     MRI 6/2016       Past Surgical History  Past Surgical History:   Procedure Laterality Date    ADENOIDECTOMY      EXCISION, LIPOMAS  chestwall , both groins/legs Bilateral 4/22/2019    [facility]    FOOT SURGERY Bilateral     KNEE ARTHROSCOPY W/ MENISCAL REPAIR Right 5/14    KNEE SURGERY Left     partial knee replacement    TONSILLECTOMY      TUBAL LIGATION         Family History  Family History   Problem Relation Age of Onset    Hypertension Mother     Diabetes Mother     Glaucoma Mother     Cataracts Mother     Hypertension Father     Stroke Father     No Known Problems Sister     No Known Problems Brother     No Known Problems Daughter     No Known Problems Son     No Known Problems Sister     No Known Problems Daughter        Social History  Social History     Socioeconomic History    Marital status:      Spouse name: Not on file    Number of children: y    Years of education: Not on file    Highest education level: Not on file   Occupational History    Occupation:      Comment: Works at Law firm   Social Needs    Financial resource strain: Not on file    Food insecurity:     Worry: Not on file     Inability: Not on file    Transportation needs:     Medical: Not on file     Non-medical: Not on file   Tobacco Use    Smoking status: Former Smoker     Packs/day: 0.25     Years: 30.00     Pack years: 7.50     Last attempt to  "quit: 2005     Years since quittin.4    Smokeless tobacco: Former User   Substance and Sexual Activity    Alcohol use: No    Drug use: No    Sexual activity: Not on file   Lifestyle    Physical activity:     Days per week: Not on file     Minutes per session: Not on file    Stress: Not on file   Relationships    Social connections:     Talks on phone: Not on file     Gets together: Not on file     Attends Confucianist service: Not on file     Active member of club or organization: Not on file     Attends meetings of clubs or organizations: Not on file     Relationship status: Not on file   Other Topics Concern    Not on file   Social History Narrative    Originally from [place], living in [place] with son       Current Medications  Medications reviewed and updated.     Allergies   Review of patient's allergies indicates:   Allergen Reactions    Aldactone [spironolactone] Other (See Comments)     Hyperkalemia when initiated on 2018        Review of Systems (Pertinent positives)  Review of Systems   Constitutional: Negative for unexpected weight change.   Respiratory: Negative for shortness of breath.    Cardiovascular: Negative for chest pain, palpitations and leg swelling.   Gastrointestinal: Negative for constipation and diarrhea.   Skin: Positive for wound (RIGHT LOWER EXTREMITY ULCER FOLLOWING VEIN INJECTION BY DERMATOLOGY).   Neurological: Negative for dizziness, light-headedness and headaches.       /66   Pulse 63   Temp 98.3 °F (36.8 °C)   Ht 5' 8" (1.727 m)   Wt 106.5 kg (234 lb 12.6 oz)   SpO2 95%   BMI 35.70 kg/m²     Physical Exam   Constitutional: She is oriented to person, place, and time. She appears well-developed and well-nourished. No distress.   HENT:   Head: Normocephalic and atraumatic.   Eyes: Conjunctivae are normal.   Cardiovascular: Normal rate and regular rhythm.   Pulmonary/Chest: Effort normal and breath sounds normal.   Musculoskeletal: She exhibits no edema. "   Neurological: She is alert and oriented to person, place, and time.   Skin: Skin is warm and dry.        Psychiatric: She has a normal mood and affect.   Vitals reviewed.        Assessment/Plan:  Violet Mirza is a 69 y.o. female who presents today for :    Violet was seen today for follow-up.    Diagnoses and all orders for this visit:    Acquired hypothyroidism  Comments:  TSH 5.8 on labs 07/12/2019, increase levothyroxine to 100 mcg daily and recheck level in 3 months.  Orders:  -     Discontinue: levothyroxine (SYNTHROID) 100 MCG tablet; Take 1 tablet (100 mcg total) by mouth once daily.  -     TSH; Future  -     Comprehensive metabolic panel; Future  -     levothyroxine (SYNTHROID) 100 MCG tablet; Take 1 tablet (100 mcg total) by mouth once daily.    Class 2 obesity due to excess calories without serious comorbidity with body mass index (BMI) of 35.0 to 35.9 in adult  Comments:  ADVISED ON RESUMING EXERCISE ROUTINE and hopefully increasing thyroid medication dose will help with weight loss.    Paroxysmal atrial fibrillation  Comments:  Following w/ Cardiology, stable on Toprol  mg QD w/ Xarelto for stroke prevention-in [facility] &having difficulties w/ affordability of anticoagulants  Orders:  -     Discontinue: levothyroxine (SYNTHROID) 100 MCG tablet; Take 1 tablet (100 mcg total) by mouth once daily.  -     levothyroxine (SYNTHROID) 100 MCG tablet; Take 1 tablet (100 mcg total) by mouth once daily.    Current use of long term anticoagulation    Benign essential hypertension  Comments:  Controlled on Toprol to 100 mg extended release daily, Cozaar 100 mg daily.  Takes Lasix 40 mg b.i.d. for volume status regulation  Orders:  -     TSH; Future  -     Comprehensive metabolic panel; Future    Chronic systolic heart failure  Comments:  New York Heart Class 1-asymptomatic, most recent echo w/ EF 35%, continue beta-blocker, Arb, Lasix 40 BID due for follow-up w/ cardiology Dr. Loco next  month    Venous insufficiency of both lower extremities  Comments:  s/p injection per dermatology for spider veins and leg swelling is stable on LASIX 40 BID     Vitamin D deficiency  Comments:  IMPROVED ON 5,000 IU DAILY AND WILL CONTINUE WITH ONGOING MONITORING    Hypertriglyceridemia  Comments:  Triglycerides improved on recent labs, advised ongoing attention to diet/exercise, weight loss    Environmental and seasonal allergies  Comments:  CLARITIN AND XYZAL PRN            ICD-10-CM ICD-9-CM    1. Acquired hypothyroidism E03.9 244.9 TSH      Comprehensive metabolic panel      levothyroxine (SYNTHROID) 100 MCG tablet      DISCONTINUED: levothyroxine (SYNTHROID) 100 MCG tablet    TSH 5.8 on labs 07/12/2019, increase levothyroxine to 100 mcg daily and recheck level in 3 months.   2. Class 2 obesity due to excess calories without serious comorbidity with body mass index (BMI) of 35.0 to 35.9 in adult E66.09 278.00     Z68.35 V85.35     ADVISED ON RESUMING EXERCISE ROUTINE and hopefully increasing thyroid medication dose will help with weight loss.   3. Paroxysmal atrial fibrillation I48.0 427.31 levothyroxine (SYNTHROID) 100 MCG tablet      DISCONTINUED: levothyroxine (SYNTHROID) 100 MCG tablet    Following w/ Cardiology, stable on Toprol  mg QD w/ Xarelto for stroke prevention-in [facility] &having difficulties w/ affordability of anticoagulants   4. Current use of long term anticoagulation Z79.01 V58.61    5. Benign essential hypertension I10 401.1 TSH      Comprehensive metabolic panel    Controlled on Toprol to 100 mg extended release daily, Cozaar 100 mg daily.  Takes Lasix 40 mg b.i.d. for volume status regulation   6. Chronic systolic heart failure I50.22 428.22     New York Heart Class 1-asymptomatic, most recent echo w/ EF 35%, continue beta-blocker, Arb, Lasix 40 BID due for follow-up w/ cardiology Dr. Loco next month   7. Venous insufficiency of both lower extremities I87.2 459.81     s/p  injection per dermatology for spider veins and leg swelling is stable on LASIX 40 BID    8. Vitamin D deficiency E55.9 268.9     IMPROVED ON 5,000 IU DAILY AND WILL CONTINUE WITH ONGOING MONITORING   9. Hypertriglyceridemia E78.1 272.1     Triglycerides improved on recent labs, advised ongoing attention to diet/exercise, weight loss   10. Environmental and seasonal allergies J30.89 477.8     CLARITIN AND XYZAL PRN       There are no Patient Instructions on file for this visit.      Follow up in about 3 months (around 10/26/2019) for to follow up on lab results, return as needed for new concerns.

## 2019-07-30 ENCOUNTER — PATIENT MESSAGE (OUTPATIENT)
Dept: FAMILY MEDICINE | Facility: CLINIC | Age: 69
End: 2019-07-30

## 2019-07-30 ENCOUNTER — PATIENT MESSAGE (OUTPATIENT)
Dept: CARDIOLOGY | Facility: CLINIC | Age: 69
End: 2019-07-30

## 2019-08-02 ENCOUNTER — TELEPHONE (OUTPATIENT)
Dept: PHARMACY | Facility: CLINIC | Age: 69
End: 2019-08-02

## 2019-09-03 ENCOUNTER — PATIENT MESSAGE (OUTPATIENT)
Dept: CARDIOLOGY | Facility: CLINIC | Age: 69
End: 2019-09-03

## 2019-09-04 ENCOUNTER — PATIENT MESSAGE (OUTPATIENT)
Dept: CARDIOLOGY | Facility: CLINIC | Age: 69
End: 2019-09-04

## 2019-10-16 ENCOUNTER — PATIENT OUTREACH (OUTPATIENT)
Dept: ADMINISTRATIVE | Facility: OTHER | Age: 69
End: 2019-10-16

## 2019-10-18 ENCOUNTER — OFFICE VISIT (OUTPATIENT)
Dept: CARDIOLOGY | Facility: CLINIC | Age: 69
End: 2019-10-18
Payer: MEDICARE

## 2019-10-18 ENCOUNTER — IMMUNIZATION (OUTPATIENT)
Dept: PHARMACY | Facility: CLINIC | Age: 69
End: 2019-10-18
Payer: MEDICARE

## 2019-10-18 VITALS
SYSTOLIC BLOOD PRESSURE: 143 MMHG | WEIGHT: 240 LBS | HEART RATE: 66 BPM | BODY MASS INDEX: 36.37 KG/M2 | HEIGHT: 68 IN | DIASTOLIC BLOOD PRESSURE: 75 MMHG

## 2019-10-18 DIAGNOSIS — I49.3 PVC'S (PREMATURE VENTRICULAR CONTRACTIONS): ICD-10-CM

## 2019-10-18 DIAGNOSIS — I10 BENIGN ESSENTIAL HYPERTENSION: ICD-10-CM

## 2019-10-18 DIAGNOSIS — I48.0 PAROXYSMAL ATRIAL FIBRILLATION: Primary | ICD-10-CM

## 2019-10-18 DIAGNOSIS — Z79.01 CURRENT USE OF LONG TERM ANTICOAGULATION: ICD-10-CM

## 2019-10-18 DIAGNOSIS — I50.22 CHRONIC SYSTOLIC HEART FAILURE: ICD-10-CM

## 2019-10-18 PROCEDURE — 99214 PR OFFICE/OUTPT VISIT, EST, LEVL IV, 30-39 MIN: ICD-10-PCS | Mod: S$GLB,,, | Performed by: INTERNAL MEDICINE

## 2019-10-18 PROCEDURE — 1101F PR PT FALLS ASSESS DOC 0-1 FALLS W/OUT INJ PAST YR: ICD-10-PCS | Mod: CPTII,S$GLB,, | Performed by: INTERNAL MEDICINE

## 2019-10-18 PROCEDURE — 99499 RISK ADDL DX/OHS AUDIT: ICD-10-PCS | Mod: S$GLB,,, | Performed by: INTERNAL MEDICINE

## 2019-10-18 PROCEDURE — 93005 RHYTHM STRIP: ICD-10-PCS | Mod: S$GLB,,, | Performed by: INTERNAL MEDICINE

## 2019-10-18 PROCEDURE — 99499 UNLISTED E&M SERVICE: CPT | Mod: S$GLB,,, | Performed by: INTERNAL MEDICINE

## 2019-10-18 PROCEDURE — 93005 ELECTROCARDIOGRAM TRACING: CPT | Mod: S$GLB,,, | Performed by: INTERNAL MEDICINE

## 2019-10-18 PROCEDURE — 3078F PR MOST RECENT DIASTOLIC BLOOD PRESSURE < 80 MM HG: ICD-10-PCS | Mod: CPTII,S$GLB,, | Performed by: INTERNAL MEDICINE

## 2019-10-18 PROCEDURE — 3078F DIAST BP <80 MM HG: CPT | Mod: CPTII,S$GLB,, | Performed by: INTERNAL MEDICINE

## 2019-10-18 PROCEDURE — 99214 OFFICE O/P EST MOD 30 MIN: CPT | Mod: S$GLB,,, | Performed by: INTERNAL MEDICINE

## 2019-10-18 PROCEDURE — 3077F PR MOST RECENT SYSTOLIC BLOOD PRESSURE >= 140 MM HG: ICD-10-PCS | Mod: CPTII,S$GLB,, | Performed by: INTERNAL MEDICINE

## 2019-10-18 PROCEDURE — 1101F PT FALLS ASSESS-DOCD LE1/YR: CPT | Mod: CPTII,S$GLB,, | Performed by: INTERNAL MEDICINE

## 2019-10-18 PROCEDURE — 3077F SYST BP >= 140 MM HG: CPT | Mod: CPTII,S$GLB,, | Performed by: INTERNAL MEDICINE

## 2019-10-18 PROCEDURE — 93010 ELECTROCARDIOGRAM REPORT: CPT | Mod: S$GLB,,, | Performed by: STUDENT IN AN ORGANIZED HEALTH CARE EDUCATION/TRAINING PROGRAM

## 2019-10-18 PROCEDURE — 99999 PR PBB SHADOW E&M-EST. PATIENT-LVL III: CPT | Mod: PBBFAC,,, | Performed by: INTERNAL MEDICINE

## 2019-10-18 PROCEDURE — 99999 PR PBB SHADOW E&M-EST. PATIENT-LVL III: ICD-10-PCS | Mod: PBBFAC,,, | Performed by: INTERNAL MEDICINE

## 2019-10-18 PROCEDURE — 93010 RHYTHM STRIP: ICD-10-PCS | Mod: S$GLB,,, | Performed by: STUDENT IN AN ORGANIZED HEALTH CARE EDUCATION/TRAINING PROGRAM

## 2019-10-18 NOTE — PROGRESS NOTES
Subjective:    Patient ID:  Violet Mirza is a 69 y.o. female who presents for follow-up of Atrial Fibrillation and Chronic Systolic Heart Failure       Referring Practitioner and Cardiologist: Jaren Medel NP and Go Duran MD  Primary Care Physician: Phyllis Gunter MD    HPI  Prior Hx:  I had the pleasure of seeing Mrs. Mirza today in our [facility] electrophysiology clinic in follow-up for her atrial fibrillation with associated AF mediated cardiomyopathy. As you are aware she is a pleasant 69 year-old woman with hypertension and hypothyroidism. Beginning in 2017 she began to develop a cough and shortness of breath. She was seen for this and was thought to have anxiety and asthma. She was given an inhaler which did not help much. She then began to have orthopnea. A 2d echocardiogram was ordered by Dr. Henning which was performed on 2018. She was in atrial fibrillation with RVR during the echocardiogram and her left ventricular ejection fraction was severely depressed at 10-15% with mildly enlarged LVEDD and moderately enlarged left atrium with severely depressed right ventricular function and pulmonary hypertension. She was admitted to [facility] for treatment and initiation of goal directed medical therapy for heart failure. In the [facility] ER she was given diltiazem. She converted spontaneously to sinus rhythm. An angiogram was performed by Dr. Duran on 2018 noting normal coronary arteries. She was started on amiodarone and eliquis. She followed up in cardiology clinic on 2018 and expressed concern about amiodarone and anticoagulation. Amiodarone was stopped (reports 3 family members that have  from amiodarone induced lung disease) and she was referred here for further evaluation. Of note her TSH and FT4 in the past on prior dosages of levothyroxine were consistent with hyperthyroidism. Recent lab values on 2017 and 2018 show  suppressed TSH and normal T4. Her synthroid dosage was recently reduced. She has no history of GI bleeding. Colonoscopy in 2015 showed diverticulosis.     I reviewed all available electrocardiograms in [facility] (starting 1/16/2018) which show sinus rhythm/tachycardia with left axis shift and incomplete RBBB with frequent PVCs except for 1/22/2018 which showed AF with RVR. An ECG from her clinic visit after starting amiodarone on 1/30/2018 shows sinus rhythm with a rate of 68 bpm. PVC foci appear to be RBBB, concordant precordial, R in limb lead 1, superiorly directed, likely basal septal focus. All of her QTc on ECGs are over 500msec.    At our initial visit we discussed continuing a short course of amiodarone to allow her EF to recover with repeat echo in 3 months. Repeat ECHOs noted EF remains in the 30-35% range. She desired to stop amiodarone and after numerous discussions elected to not proceed with PVI. A holter monitor dated 2/2018 noted ~6000 PVCs in 48 hours.    Ms. Mirza returned for follow-up discussion 2/2019. Repeat ECHO noted LVEF of 35%. She had no symptomatic recurrence of AF. She had no heart failure symptoms. She reported going to the gym 3 times a week. After discussion she elected to remain off anti-arrhythmic therapy.    Interim Hx:  Ms. Mirza returns for follow-up. No complaints regarding AF/heart failure. Main complaint is arthritis. She is taking ibuprofen for this. H/H 4/2019 and BMP 7/2019 normal. She denies any bleeding issues.      My interpretation of today's in clinic electrocardiogram is normal sinus rhythm with occasional PVCs (LBBB, V5 transition, R limb lead 1, isoelectric in II/-III and aVF, -aVR)    Review of Systems   Constitution: Negative for malaise/fatigue.   HENT: Negative for congestion and sore throat.    Eyes: Negative for blurred vision and visual disturbance.   Cardiovascular: Negative for chest pain, dyspnea on exertion, irregular heartbeat, leg swelling, near-syncope,  orthopnea, palpitations, paroxysmal nocturnal dyspnea and syncope.   Respiratory: Negative.  Negative for cough and shortness of breath.    Hematologic/Lymphatic: Negative for bleeding problem. Does not bruise/bleed easily.   Skin: Negative.    Musculoskeletal: Positive for arthritis and joint pain.   Gastrointestinal: Negative for hematochezia and melena.   Neurological: Negative for dizziness, focal weakness and weakness.        Objective:    Physical Exam   Constitutional: She is oriented to person, place, and time. She appears well-developed and well-nourished. No distress.   HENT:   Head: Normocephalic and atraumatic.   Eyes: Conjunctivae are normal. Right eye exhibits no discharge. Left eye exhibits no discharge.   Neck: Neck supple. No JVD present.   Cardiovascular: Normal rate, regular rhythm and normal heart sounds.  Occasional extrasystoles are present. Exam reveals no gallop and no friction rub.   No murmur heard.  Pulmonary/Chest: Effort normal and breath sounds normal. No respiratory distress. She has no wheezes. She has no rales.   Abdominal: Soft. Bowel sounds are normal. She exhibits no distension. There is no tenderness. There is no rebound.   Musculoskeletal: She exhibits no edema.   Neurological: She is alert and oriented to person, place, and time.   Skin: Skin is warm and dry. She is not diaphoretic.   Psychiatric: She has a normal mood and affect. Her behavior is normal. Judgment and thought content normal.   Vitals reviewed.        Assessment:       1. Paroxysmal atrial fibrillation    2. Chronic systolic heart failure    3. Benign essential hypertension    4. PVC's (premature ventricular contractions)    5. Current use of long term anticoagulation         Plan:       In summary, Mrs. Mirza is a pleasant 69 year-old woman with hypertension and hypothyroidism with currently suppressed TSH and recent synthroid dose adjustment who was recently diagnosed with paroxysmal atrial fibrillation,  frequent PVCs and severe biventricular nonischemic systolic heart failure. Despite sinus rhythm she has persistent EF of 30-35%. She likely has NICMP unrelated to AF and AF is secondary. Her prior observed PVC burden also was not enough to explain her cardiomyopathy. She would like to remain off anti-arrhythmic therapy. Tikosyn/sotalol are contraindicated. She does not desire amiodarone in the future leaving multaq as the only alternative which she may not be able to afford. If AF puts her back into symptomatic heart failure then multaq would be contraindicated at that time. Should her PVC burden appear to increase would first evaluate with a repeat 24 hour holter, which she would like to avoid if possible as well. Cautioned against use of ibuprofen and xarelto.     Offered to re-evaluate her PVC burden with a holter monitor since we are seeing more of them on her ECG however she declines. Will update EF with ECHO and if function is worsening would strongly recommend holter to evaluate PVC burden.    Continue metoprolol and xarelto  Overdue for general cardiology follow-up for her cardiomyopathy (Dr. Duran)    RTC in 6 months, sooner if needed.    Thank you for allowing me to participate in the care of this patient. Please do not hesitate to call me with any questions or concerns.    Chriss Loco MD, PhD  Cardiac Electrophysiology

## 2019-10-25 ENCOUNTER — TELEPHONE (OUTPATIENT)
Dept: ELECTROPHYSIOLOGY | Facility: CLINIC | Age: 69
End: 2019-10-25

## 2019-10-25 ENCOUNTER — HOSPITAL ENCOUNTER (OUTPATIENT)
Dept: CARDIOLOGY | Facility: HOSPITAL | Age: 69
Discharge: HOME | End: 2019-10-25
Attending: INTERNAL MEDICINE
Payer: MEDICARE

## 2019-10-25 DIAGNOSIS — I50.22 CHRONIC SYSTOLIC HEART FAILURE: ICD-10-CM

## 2019-10-25 LAB
AORTIC ROOT ANNULUS: 2.05 CM
ASCENDING AORTA: 2.8 CM
AV INDEX (PROSTH): 0.86
AV MEAN GRADIENT: 7 MMHG
AV PEAK GRADIENT: 13 MMHG
AV VALVE AREA: 2.68 CM2
AV VELOCITY RATIO: 0.7
CV ECHO LV RWT: 0.37 CM
DOP CALC AO PEAK VEL: 1.8 M/S
DOP CALC AO VTI: 34.79 CM
DOP CALC LVOT AREA: 3.1 CM2
DOP CALC LVOT DIAMETER: 1.99 CM
DOP CALC LVOT PEAK VEL: 1.26 M/S
DOP CALC LVOT STROKE VOLUME: 93.14 CM3
DOP CALCLVOT PEAK VEL VTI: 29.96 CM
E WAVE DECELERATION TIME: 167.55 MSEC
E/A RATIO: 1.2
ECHO LV POSTERIOR WALL: 1.02 CM (ref 0.6–1.1)
FRACTIONAL SHORTENING: 28 % (ref 28–44)
INTERVENTRICULAR SEPTUM: 0.78 CM (ref 0.6–1.1)
IVRT: 0.08 MSEC
LA MAJOR: 5.04 CM
LA MINOR: 5.11 CM
LA WIDTH: 4.43 CM
LEFT ATRIUM SIZE: 4.14 CM
LEFT ATRIUM VOLUME: 79.11 CM3
LEFT INTERNAL DIMENSION IN SYSTOLE: 4 CM (ref 2.1–4)
LEFT VENTRICLE DIASTOLIC VOLUME: 148.79 ML
LEFT VENTRICLE SYSTOLIC VOLUME: 77.45 ML
LEFT VENTRICULAR INTERNAL DIMENSION IN DIASTOLE: 5.52 CM (ref 3.5–6)
LEFT VENTRICULAR MASS: 186.99 G
MV PEAK A VEL: 0.89 M/S
MV PEAK E VEL: 1.07 M/S
PISA TR MAX VEL: 2.22 M/S
PULM VEIN S/D RATIO: 0.68
PV PEAK D VEL: 0.6 M/S
PV PEAK S VEL: 0.41 M/S
PV PEAK VELOCITY: 1.26 CM/S
RA MAJOR: 4.66 CM
RA PRESSURE: 3 MMHG
RA WIDTH: 3.67 CM
RIGHT VENTRICULAR END-DIASTOLIC DIMENSION: 3.21 CM
STJ: 2.56 CM
TR MAX PG: 20 MMHG
TV REST PULMONARY ARTERY PRESSURE: 23 MMHG

## 2019-10-25 PROCEDURE — 93306 ECHO (CUPID ONLY): ICD-10-PCS | Mod: 26,,, | Performed by: INTERNAL MEDICINE

## 2019-10-25 PROCEDURE — 93306 TTE W/DOPPLER COMPLETE: CPT | Mod: 26,,, | Performed by: INTERNAL MEDICINE

## 2019-10-25 PROCEDURE — 93306 TTE W/DOPPLER COMPLETE: CPT

## 2019-10-25 NOTE — PROGRESS NOTES
Please notify the patient that her ejection fraction remains stable at 35%. Continue current therapy

## 2019-10-25 NOTE — TELEPHONE ENCOUNTER
----- Message from Chriss Loco MD sent at 10/25/2019  1:01 PM CDT -----  Please notify the patient that her ejection fraction remains stable at 35%. Continue current therapy

## 2019-11-01 ENCOUNTER — OFFICE VISIT (OUTPATIENT)
Dept: FAMILY MEDICINE | Facility: CLINIC | Age: 69
End: 2019-11-01
Payer: MEDICARE

## 2019-11-01 VITALS
DIASTOLIC BLOOD PRESSURE: 74 MMHG | TEMPERATURE: 99 F | HEART RATE: 65 BPM | HEIGHT: 68 IN | BODY MASS INDEX: 35.85 KG/M2 | WEIGHT: 236.56 LBS | SYSTOLIC BLOOD PRESSURE: 120 MMHG | OXYGEN SATURATION: 100 %

## 2019-11-01 DIAGNOSIS — E78.1 HYPERTRIGLYCERIDEMIA: ICD-10-CM

## 2019-11-01 DIAGNOSIS — I87.2 VENOUS INSUFFICIENCY OF BOTH LOWER EXTREMITIES: ICD-10-CM

## 2019-11-01 DIAGNOSIS — E03.9 ACQUIRED HYPOTHYROIDISM: Primary | ICD-10-CM

## 2019-11-01 DIAGNOSIS — E66.09 CLASS 2 OBESITY DUE TO EXCESS CALORIES WITHOUT SERIOUS COMORBIDITY WITH BODY MASS INDEX (BMI) OF 35.0 TO 35.9 IN ADULT: ICD-10-CM

## 2019-11-01 DIAGNOSIS — M81.0 POSTMENOPAUSAL OSTEOPOROSIS: ICD-10-CM

## 2019-11-01 DIAGNOSIS — E55.9 VITAMIN D DEFICIENCY: ICD-10-CM

## 2019-11-01 DIAGNOSIS — Z79.899 MEDICATION MANAGEMENT: ICD-10-CM

## 2019-11-01 DIAGNOSIS — I10 BENIGN ESSENTIAL HYPERTENSION: ICD-10-CM

## 2019-11-01 DIAGNOSIS — I48.0 PAROXYSMAL ATRIAL FIBRILLATION: ICD-10-CM

## 2019-11-01 DIAGNOSIS — K21.9 GASTROESOPHAGEAL REFLUX DISEASE WITHOUT ESOPHAGITIS: ICD-10-CM

## 2019-11-01 DIAGNOSIS — I50.22 CHRONIC SYSTOLIC HEART FAILURE: ICD-10-CM

## 2019-11-01 DIAGNOSIS — I49.3 PVC'S (PREMATURE VENTRICULAR CONTRACTIONS): ICD-10-CM

## 2019-11-01 DIAGNOSIS — Z79.01 CURRENT USE OF LONG TERM ANTICOAGULATION: ICD-10-CM

## 2019-11-01 DIAGNOSIS — I34.0 MITRAL VALVE INSUFFICIENCY, UNSPECIFIED ETIOLOGY: ICD-10-CM

## 2019-11-01 PROCEDURE — 99214 OFFICE O/P EST MOD 30 MIN: CPT | Mod: S$GLB,,, | Performed by: FAMILY MEDICINE

## 2019-11-01 PROCEDURE — 3078F DIAST BP <80 MM HG: CPT | Mod: CPTII,S$GLB,, | Performed by: FAMILY MEDICINE

## 2019-11-01 PROCEDURE — 1101F PR PT FALLS ASSESS DOC 0-1 FALLS W/OUT INJ PAST YR: ICD-10-PCS | Mod: CPTII,S$GLB,, | Performed by: FAMILY MEDICINE

## 2019-11-01 PROCEDURE — 99214 PR OFFICE/OUTPT VISIT, EST, LEVL IV, 30-39 MIN: ICD-10-PCS | Mod: S$GLB,,, | Performed by: FAMILY MEDICINE

## 2019-11-01 PROCEDURE — 99999 PR PBB SHADOW E&M-EST. PATIENT-LVL IV: ICD-10-PCS | Mod: PBBFAC,,, | Performed by: FAMILY MEDICINE

## 2019-11-01 PROCEDURE — 3078F PR MOST RECENT DIASTOLIC BLOOD PRESSURE < 80 MM HG: ICD-10-PCS | Mod: CPTII,S$GLB,, | Performed by: FAMILY MEDICINE

## 2019-11-01 PROCEDURE — 99999 PR PBB SHADOW E&M-EST. PATIENT-LVL IV: CPT | Mod: PBBFAC,,, | Performed by: FAMILY MEDICINE

## 2019-11-01 PROCEDURE — 3074F SYST BP LT 130 MM HG: CPT | Mod: CPTII,S$GLB,, | Performed by: FAMILY MEDICINE

## 2019-11-01 PROCEDURE — 3074F PR MOST RECENT SYSTOLIC BLOOD PRESSURE < 130 MM HG: ICD-10-PCS | Mod: CPTII,S$GLB,, | Performed by: FAMILY MEDICINE

## 2019-11-01 PROCEDURE — 1101F PT FALLS ASSESS-DOCD LE1/YR: CPT | Mod: CPTII,S$GLB,, | Performed by: FAMILY MEDICINE

## 2019-11-01 RX ORDER — ATENOLOL 50 MG/1
TABLET ORAL
COMMUNITY
End: 2019-11-01

## 2019-11-01 RX ORDER — THYROID 60 MG/1
TABLET ORAL
COMMUNITY
End: 2019-11-01

## 2019-11-01 RX ORDER — AMOXICILLIN AND CLAVULANATE POTASSIUM 500; 125 MG/1; MG/1
TABLET, FILM COATED ORAL
COMMUNITY
End: 2019-11-01

## 2019-11-01 RX ORDER — NAPROXEN SODIUM 550 MG/1
TABLET ORAL
COMMUNITY
End: 2019-11-01

## 2019-11-01 RX ORDER — ZOLPIDEM TARTRATE 10 MG/1
TABLET ORAL
COMMUNITY
End: 2019-11-01

## 2019-11-01 RX ORDER — TEMAZEPAM 15 MG/1
CAPSULE ORAL
COMMUNITY
End: 2019-11-01

## 2019-11-01 RX ORDER — TOBRAMYCIN AND DEXAMETHASONE 3; 1 MG/ML; MG/ML
SUSPENSION/ DROPS OPHTHALMIC
COMMUNITY
End: 2019-11-01

## 2019-11-01 RX ORDER — IBUPROFEN 800 MG/1
800 TABLET ORAL EVERY 6 HOURS PRN
COMMUNITY
End: 2020-01-13

## 2019-11-01 RX ORDER — LEVOTHYROXINE SODIUM 112 UG/1
112 TABLET ORAL DAILY
Qty: 90 TABLET | Refills: 4 | Status: SHIPPED | OUTPATIENT
Start: 2019-11-01 | End: 2020-07-13 | Stop reason: SDUPTHER

## 2019-11-01 RX ORDER — ERYTHROMYCIN 5 MG/G
OINTMENT OPHTHALMIC
COMMUNITY
End: 2019-11-01

## 2019-11-01 RX ORDER — TRAMADOL HYDROCHLORIDE 50 MG/1
50 TABLET ORAL EVERY 6 HOURS PRN
COMMUNITY

## 2019-11-01 RX ORDER — LEVOTHYROXINE SODIUM 112 UG/1
112 TABLET ORAL DAILY
Qty: 90 TABLET | Refills: 4 | Status: SHIPPED | OUTPATIENT
Start: 2019-11-01 | End: 2019-11-01 | Stop reason: SDUPTHER

## 2019-11-01 RX ORDER — ZOLPIDEM TARTRATE 5 MG/1
TABLET ORAL
COMMUNITY
End: 2019-11-01

## 2019-11-01 RX ORDER — PREDNISONE 10 MG/1
TABLET ORAL
COMMUNITY
End: 2019-11-01

## 2019-11-01 RX ORDER — PANTOPRAZOLE SODIUM 40 MG/1
TABLET, DELAYED RELEASE ORAL
COMMUNITY
End: 2019-11-01

## 2019-11-01 RX ORDER — IBUPROFEN 800 MG/1
TABLET ORAL
Refills: 1 | COMMUNITY
Start: 2019-10-10 | End: 2019-11-01

## 2019-11-01 RX ORDER — PREDNISONE 20 MG/1
TABLET ORAL
COMMUNITY
End: 2019-11-01

## 2019-11-01 RX ORDER — MELOXICAM 7.5 MG/1
TABLET ORAL
COMMUNITY
End: 2019-11-01

## 2019-11-01 RX ORDER — DICLOFENAC EPOLAMINE 0.01 G/1
SYSTEM TOPICAL
COMMUNITY
End: 2019-11-01

## 2019-11-01 RX ORDER — VALACYCLOVIR HYDROCHLORIDE 1 G/1
TABLET, FILM COATED ORAL
COMMUNITY
End: 2019-11-01

## 2019-11-01 RX ORDER — OLOPATADINE HYDROCHLORIDE 2 MG/ML
SOLUTION/ DROPS OPHTHALMIC
COMMUNITY
End: 2019-11-01

## 2019-11-01 RX ORDER — AZELAIC ACID 0.15 G/G
GEL TOPICAL
COMMUNITY
End: 2019-11-01

## 2019-11-01 NOTE — PROGRESS NOTES
Office Visit    Patient Name: Violet Mirza    : 1950  MRN: [id number]    Subjective:  Violet is a 69 y.o. female who presents today for:    Follow-up    69-year-old patient of mine last seen by me 2019 and for annual exam on 4/3/19, here for 3 month follow-up of chronic conditions that include chronic conditions include paroxysmal AFib(2018 in [facility] ER was given diltiazem & converted spontaneously to NSR), Systolic Heart Failure, hypothyroidism, hypertension, obesity, osteopenia/borderline osteoporosis, rosacea, remote prior history of kidney stones.     Labs drawn prior to office visit 10/25/19 remarkable for stable TSH of 5.1 (slight improvement from 5.8) w/ normal FT4 and CMP.  Her weight is fairly stable in the last 3 months-had gain some but is now starting to lose it on a new diet.   Current diet is low carb and consists of-- grazing on nuts and raisins, yogurt, light cheese, fruit, lot of veggies and salads and eggs but no meat    She does not endorse overt symptoms of hypothyroidism such as significant fatigue or constipation, sluggishness, however, she continues to have significant difficulty losing weight.  Her exercise tolerance is overall excellent-- she is able to do cardio machines(elliptical) and exercise classes without excessive shortness of breath.  She has no shortness of breath with regular day-to-day activities including climbing the stairs and walking.  She has been a little bit out of her exercise routine due to issues with worsening arthritis.  She is particularly affected by pain of the left-- Idalmis's deformity requiring repeat surgical correction, and osteoarthritis of her right knee and hip.   She is not having any chest pain, shortness of breath, leg swelling.  Her weight is stable on 40 mg of Lasix twice daily and she has no increased edema.  Her blood pressures have been stable on metoprolol and Cozaar.   She is trying to restrict the amount of ibuprofen she  takes due to caution advised by her cardiologist, and is trying a natural supplement with compounds such as Omega-3s at this time.    She saw Cardiology Dr. Rausch electrophysiology 10/18/2019 and there was a concern for greater numbers of PVCs noted on EKG.  They advised a Holter monitor, which she declined, but since her ejection fraction is overall stable on echo they are okay with monitoring for now.      ECHO 10/25/2019  · Moderately decreased left ventricular systolic function. The estimated ejection fraction is 35%  · Grade I (mild) left ventricular diastolic dysfunction consistent with impaired relaxation.  · Eccentric left ventricular hypertrophy.  · Normal right ventricular systolic function.  · Normal central venous pressure (3 mm Hg).  · The estimated PA systolic pressure is 23 mm Hg         Past Medical History  Past Medical History:   Diagnosis Date    Chondromalacia of right patella     MRI 6/2016 at [facility]    Diverticulitis     GERD (gastroesophageal reflux disease)     Hypoglycemia 11/22/2014    OA (osteoarthritis) of knee     bilateraly, followed by Dr. Callejas, [facility], s/p synvisc injections    Osteopenia 7/26/2013    Personal history of kidney stones 7/26/2013    Rosacea 7/26/2013    Tear of medial meniscus of right knee     MRI 6/2016       Past Surgical History  Past Surgical History:   Procedure Laterality Date    ADENOIDECTOMY      FOOT SURGERY Bilateral     KNEE ARTHROSCOPY W/ MENISCAL REPAIR Right 5/14    KNEE SURGERY Left     partial knee replacement    LIPOMA RESECTION Bilateral 4/22/2019    Procedure: EXCISION, LIPOMAS  chestwall , both groins/legs;  Surgeon: NOLA Werner MD;  Location: [facility] OR;  Service: General;  Laterality: Bilateral;  evicel, surgiflo    TONSILLECTOMY      TUBAL LIGATION         Family History  Family History   Problem Relation Age of Onset    Hypertension Mother     Diabetes Mother     Glaucoma Mother      Cataracts Mother     Hypertension Father     Stroke Father     No Known Problems Sister     No Known Problems Brother     No Known Problems Daughter     No Known Problems Son     No Known Problems Sister     No Known Problems Daughter        Social History  Social History     Socioeconomic History    Marital status:      Spouse name: Not on file    Number of children: y    Years of education: Not on file    Highest education level: Not on file   Occupational History    Occupation:      Comment: Works at [employer] firm   Social Needs    Financial resource strain: Not on file    Food insecurity:     Worry: Not on file     Inability: Not on file    Transportation needs:     Medical: Not on file     Non-medical: Not on file   Tobacco Use    Smoking status: Former Smoker     Packs/day: 0.25     Years: 30.00     Pack years: 7.50     Last attempt to quit: 2005     Years since quittin.7    Smokeless tobacco: Former User   Substance and Sexual Activity    Alcohol use: No    Drug use: No    Sexual activity: Not on file   Lifestyle    Physical activity:     Days per week: Not on file     Minutes per session: Not on file    Stress: Not on file   Relationships    Social connections:     Talks on phone: Not on file     Gets together: Not on file     Attends [employer]ism service: Not on file     Active member of club or organization: Not on file     Attends meetings of clubs or organizations: Not on file     Relationship status: Not on file   Other Topics Concern    Not on file   Social History Narrative    Originally from [place], living in [place] with son       Current Medications  Medications reviewed and updated.     Allergies   Review of patient's allergies indicates:   Allergen Reactions    Aldactone [spironolactone] Other (See Comments)     Hyperkalemia when initiated on 2018        Review of Systems (Pertinent positives)  Review of Systems   Constitutional: Negative for  "unexpected weight change.   Respiratory: Negative for cough and shortness of breath.    Cardiovascular: Negative for chest pain and leg swelling.   Musculoskeletal: Positive for arthralgias.   Neurological: Negative for dizziness and numbness.       /74   Pulse 65   Temp 98.8 °F (37.1 °C)   Ht 5' 8" (1.727 m)   Wt 107.3 kg (236 lb 8.9 oz)   SpO2 100%   BMI 35.97 kg/m²     Physical Exam   Constitutional: She is oriented to person, place, and time. She appears well-developed and well-nourished. No distress.   HENT:   Head: Normocephalic and atraumatic.   Eyes: Conjunctivae are normal.   Cardiovascular: Normal rate and regular rhythm.   Pulmonary/Chest: Effort normal and breath sounds normal.   Musculoskeletal: She exhibits no edema.   Neurological: She is alert and oriented to person, place, and time.   Skin: Skin is warm and dry.   Psychiatric: She has a normal mood and affect.   Vitals reviewed.        Assessment/Plan:  Violet Mirza is a 69 y.o. female who presents today for :    Violet was seen today for follow-up.    Diagnoses and all orders for this visit:    Acquired hypothyroidism  Comments:  Still with mildly elevated TSH, further increase in Synthroid to 112 mcg daily and recheck TSH/free T4 in 8 weeks  Orders:  -     Discontinue: levothyroxine (SYNTHROID) 112 MCG tablet; Take 1 tablet (112 mcg total) by mouth once daily.  -     T4, free; Future  -     TSH; Future  -     levothyroxine (SYNTHROID) 112 MCG tablet; Take 1 tablet (112 mcg total) by mouth once daily.    Postmenopausal osteoporosis  Comments:  DEXA 04/05/2019 showed borderline osteoporosis.  Her vitamin-D is now normal on 5000 IU daily, continue weight-bearing exercise, recheck DEXA 2 years  Orders:  -     Comprehensive metabolic panel; Future  -     TSH; Future  -     Vitamin D; Future    Benign essential hypertension  Comments:  Controlled on Toprol  mg daily with Cozaar 100 mg daily     Gastroesophageal reflux disease " without esophagitis    Chronic systolic heart failure  Comments:  Ejection fraction on transthoracic echo last month (10/2019) stable at 35% with grade 1 diastolic dysfunction.  Weight is stable on Lasix 40 mg daily    Paroxysmal atrial fibrillation  Comments:  Stable on extended-release metoprolol and takes Xarelto for stroke prevention in paroxysmal AFib.  Due for general cardiology follow-up.  Saw EP last month    PVC's (premature ventricular contractions)  Comments:  Check electrolytes including magnesium, continue consideration for Holter monitor if her symptoms worsen or CHF symptoms develop  Orders:  -     T4, free; Future  -     Comprehensive metabolic panel; Future  -     CBC auto differential; Future  -     TSH; Future  -     Magnesium; Future    Mitral valve insufficiency, unspecified etiology    Hypertriglyceridemia  Comments:  Continue attention to low carb diet, weight loss, recheck lipid panel in 8 weeks  Orders:  -     Comprehensive metabolic panel; Future  -     Lipid panel; Future  -     Hemoglobin A1c; Future    Class 2 obesity due to excess calories without serious comorbidity with body mass index (BMI) of 35.0 to 35.9 in adult    Current use of long term anticoagulation    Venous insufficiency of both lower extremities  Comments:  No significant pain or edema, ongoing monitoring.  Continue Lasix 40 mg daily    Vitamin D deficiency  Comments:  Level normalized on 5000 IU daily, advised to continue this with ongoing monitoring  Orders:  -     Vitamin D; Future    Medication management  -     T4, free; Future  -     Comprehensive metabolic panel; Future  -     Lipid panel; Future  -     Hemoglobin A1c; Future  -     CBC auto differential; Future  -     TSH; Future  -     Vitamin D; Future  -     Magnesium; Future            ICD-10-CM ICD-9-CM    1. Acquired hypothyroidism E03.9 244.9 T4, free      TSH      levothyroxine (SYNTHROID) 112 MCG tablet      DISCONTINUED: levothyroxine (SYNTHROID) 112 MCG  tablet    Still with mildly elevated TSH, further increase in Synthroid to 112 mcg daily and recheck TSH/free T4 in 8 weeks   2. Postmenopausal osteoporosis M81.0 733.01 Comprehensive metabolic panel      TSH      Vitamin D    DEXA 04/05/2019 showed borderline osteoporosis.  Her vitamin-D is now normal on 5000 IU daily, continue weight-bearing exercise, recheck DEXA 2 years   3. Benign essential hypertension I10 401.1     Controlled on Toprol  mg daily with Cozaar 100 mg daily    4. Gastroesophageal reflux disease without esophagitis K21.9 530.81    5. Chronic systolic heart failure I50.22 428.22     Ejection fraction on transthoracic echo last month (10/2019) stable at 35% with grade 1 diastolic dysfunction.  Weight is stable on Lasix 40 mg daily   6. Paroxysmal atrial fibrillation I48.0 427.31     Stable on extended-release metoprolol and takes Xarelto for stroke prevention in paroxysmal AFib.  Due for general cardiology follow-up.  Saw EP last month   7. PVC's (premature ventricular contractions) I49.3 427.69 T4, free      Comprehensive metabolic panel      CBC auto differential      TSH      Magnesium    Check electrolytes including magnesium, continue consideration for Holter monitor if her symptoms worsen or CHF symptoms develop   8. Mitral valve insufficiency, unspecified etiology I34.0 424.0    9. Hypertriglyceridemia E78.1 272.1 Comprehensive metabolic panel      Lipid panel      Hemoglobin A1c    Continue attention to low carb diet, weight loss, recheck lipid panel in 8 weeks   10. Class 2 obesity due to excess calories without serious comorbidity with body mass index (BMI) of 35.0 to 35.9 in adult E66.09 278.00     Z68.35 V85.35    11. Current use of long term anticoagulation Z79.01 V58.61    12. Venous insufficiency of both lower extremities I87.2 459.81     No significant pain or edema, ongoing monitoring.  Continue Lasix 40 mg daily   13. Vitamin D deficiency E55.9 268.9 Vitamin D    Level  normalized on 5000 IU daily, advised to continue this with ongoing monitoring   14. Medication management Z79.899 V58.69 T4, free      Comprehensive metabolic panel      Lipid panel      Hemoglobin A1c      CBC auto differential      TSH      Vitamin D      Magnesium       There are no Patient Instructions on file for this visit.      Follow up in about 2 months (around 1/1/2020) for to follow up on lab results, return as needed for new concerns.

## 2019-11-04 ENCOUNTER — PATIENT MESSAGE (OUTPATIENT)
Dept: ELECTROPHYSIOLOGY | Facility: CLINIC | Age: 69
End: 2019-11-04

## 2019-11-05 ENCOUNTER — TELEPHONE (OUTPATIENT)
Dept: CARDIOLOGY | Facility: CLINIC | Age: 69
End: 2019-11-05

## 2019-11-05 ENCOUNTER — PATIENT MESSAGE (OUTPATIENT)
Dept: FAMILY MEDICINE | Facility: CLINIC | Age: 69
End: 2019-11-05

## 2019-11-05 ENCOUNTER — TELEPHONE (OUTPATIENT)
Dept: ELECTROPHYSIOLOGY | Facility: CLINIC | Age: 69
End: 2019-11-05

## 2019-11-05 NOTE — TELEPHONE ENCOUNTER
----- Message from Gailanastasiya Woods sent at 11/5/2019 10:36 AM CST -----  No. [phone]    Patient is having surgery on 11/18/19.  Her pre op is on 11/14/19.  She needs an appointment with Dr. Duran before 11/14/19.

## 2019-11-05 NOTE — TELEPHONE ENCOUNTER
Follow up appointments to see  .have already been scheduled 11-.    Patient pleased with those appointments.      Renee WEISS                  ----- Message from Jada Ferguson sent at 11/5/2019  2:38 PM CST -----  Pt requesting a phone call from the nurse she having  foot surgery and she need to be released from Dr Duran  Can you please call pt asap.

## 2019-11-05 NOTE — TELEPHONE ENCOUNTER
Please put the following on letter head:    2019      To Whom It May Concern:       My patient Violet Mirza ( 1950) was most recently seen by me on 2019.      She is a 69-year-old patient of mine with chronic conditions that include AFib(2018 in [facility] ER was given diltiazem & converted spontaneously to NSR), Systolic Heart Failure, hypothyroidism, hypertension, obesity, osteopenia/borderline osteoporosis, rosacea, remote prior history of kidney stones.    Labs drawn prior to office visit 10/25/19 remarkable for stable TSH of 5.1 (slight improvement from 5.8) w/ normal FT4 and normal CMP-- please see copies of attached recent labs.    She does have some increased PVCs, but her most recent echocardiogram is stable, and her exercise tolerance is excellent.  She is under the care of a cardiologist.  Her most recent echocardiogram on 10/25/2019 shows moderately decreased LV EF of 35% that is stable from her previous study.  She does have grade 1 diastolic dysfunction.    She is clear to stop her Xarelto 3-5 days prior to her surgery.    She should also stop any NSAIDs such as Advil 7-10 days prior as well as not take her Lasix on the morning of surgery when she is NPO, but otherwise she can continue her medications as prescribed through the morning of surgery.       She is medically cleared from my standpoint as her primary care provider to have the Idalmis's deformity of her left heel surgically corrected on 2019.      Please contact our office if you have additional questions.        Sincerely,            Phyllis Gunter MD, Board Certified Family Physician, and PCP for Violet Mirza

## 2019-11-06 DIAGNOSIS — I50.22 CHRONIC SYSTOLIC HEART FAILURE: ICD-10-CM

## 2019-11-06 DIAGNOSIS — I48.0 PAROXYSMAL ATRIAL FIBRILLATION: ICD-10-CM

## 2019-11-06 NOTE — TELEPHONE ENCOUNTER
----- Message from Alice Ortiz sent at 11/6/2019  4:04 PM CST -----  Patient would like a refill on her metoprolol succinate 200mg sent to [facility] please

## 2019-11-06 NOTE — TELEPHONE ENCOUNTER
----- Message from Marlo Gutierrez sent at 11/6/2019  2:25 PM CST -----  I have patient Violet Mirza mrn [id number] here in the pharmacy and she is requesting a refill on metoprolol succ 200mg. If approve please sent new rx to [facility]...Thanks

## 2019-11-07 RX ORDER — METOPROLOL SUCCINATE 200 MG/1
200 TABLET, EXTENDED RELEASE ORAL DAILY
Qty: 90 TABLET | Refills: 3 | Status: SHIPPED | OUTPATIENT
Start: 2019-11-07 | End: 2020-10-27 | Stop reason: SDUPTHER

## 2019-11-11 ENCOUNTER — PATIENT MESSAGE (OUTPATIENT)
Dept: NEUROSURGERY | Facility: CLINIC | Age: 69
End: 2019-11-11

## 2019-11-12 ENCOUNTER — OFFICE VISIT (OUTPATIENT)
Dept: CARDIOLOGY | Facility: CLINIC | Age: 69
End: 2019-11-12
Payer: MEDICARE

## 2019-11-12 VITALS
HEIGHT: 68 IN | HEART RATE: 67 BPM | WEIGHT: 236.88 LBS | DIASTOLIC BLOOD PRESSURE: 78 MMHG | OXYGEN SATURATION: 96 % | SYSTOLIC BLOOD PRESSURE: 138 MMHG | BODY MASS INDEX: 35.9 KG/M2

## 2019-11-12 DIAGNOSIS — L71.9 ROSACEA: ICD-10-CM

## 2019-11-12 DIAGNOSIS — I10 BENIGN ESSENTIAL HYPERTENSION: ICD-10-CM

## 2019-11-12 DIAGNOSIS — I48.0 PAROXYSMAL ATRIAL FIBRILLATION: ICD-10-CM

## 2019-11-12 DIAGNOSIS — I50.22 CHRONIC SYSTOLIC HEART FAILURE: Primary | ICD-10-CM

## 2019-11-12 DIAGNOSIS — K21.9 GASTROESOPHAGEAL REFLUX DISEASE WITHOUT ESOPHAGITIS: ICD-10-CM

## 2019-11-12 DIAGNOSIS — E78.1 HYPERTRIGLYCERIDEMIA: ICD-10-CM

## 2019-11-12 DIAGNOSIS — I87.2 VENOUS INSUFFICIENCY OF BOTH LOWER EXTREMITIES: ICD-10-CM

## 2019-11-12 DIAGNOSIS — Z79.01 CURRENT USE OF LONG TERM ANTICOAGULATION: ICD-10-CM

## 2019-11-12 DIAGNOSIS — Z87.11 HISTORY OF PEPTIC ULCER: ICD-10-CM

## 2019-11-12 DIAGNOSIS — I49.3 PVC'S (PREMATURE VENTRICULAR CONTRACTIONS): ICD-10-CM

## 2019-11-12 DIAGNOSIS — I34.0 MITRAL VALVE INSUFFICIENCY, UNSPECIFIED ETIOLOGY: ICD-10-CM

## 2019-11-12 PROCEDURE — 3075F PR MOST RECENT SYSTOLIC BLOOD PRESS GE 130-139MM HG: ICD-10-PCS | Mod: CPTII,S$GLB,, | Performed by: INTERNAL MEDICINE

## 2019-11-12 PROCEDURE — 99999 PR PBB SHADOW E&M-EST. PATIENT-LVL III: ICD-10-PCS | Mod: PBBFAC,,, | Performed by: INTERNAL MEDICINE

## 2019-11-12 PROCEDURE — 1101F PT FALLS ASSESS-DOCD LE1/YR: CPT | Mod: CPTII,S$GLB,, | Performed by: INTERNAL MEDICINE

## 2019-11-12 PROCEDURE — 3078F PR MOST RECENT DIASTOLIC BLOOD PRESSURE < 80 MM HG: ICD-10-PCS | Mod: CPTII,S$GLB,, | Performed by: INTERNAL MEDICINE

## 2019-11-12 PROCEDURE — 3078F DIAST BP <80 MM HG: CPT | Mod: CPTII,S$GLB,, | Performed by: INTERNAL MEDICINE

## 2019-11-12 PROCEDURE — 99214 PR OFFICE/OUTPT VISIT, EST, LEVL IV, 30-39 MIN: ICD-10-PCS | Mod: S$GLB,,, | Performed by: INTERNAL MEDICINE

## 2019-11-12 PROCEDURE — 1101F PR PT FALLS ASSESS DOC 0-1 FALLS W/OUT INJ PAST YR: ICD-10-PCS | Mod: CPTII,S$GLB,, | Performed by: INTERNAL MEDICINE

## 2019-11-12 PROCEDURE — 99999 PR PBB SHADOW E&M-EST. PATIENT-LVL III: CPT | Mod: PBBFAC,,, | Performed by: INTERNAL MEDICINE

## 2019-11-12 PROCEDURE — 3075F SYST BP GE 130 - 139MM HG: CPT | Mod: CPTII,S$GLB,, | Performed by: INTERNAL MEDICINE

## 2019-11-12 PROCEDURE — 99214 OFFICE O/P EST MOD 30 MIN: CPT | Mod: S$GLB,,, | Performed by: INTERNAL MEDICINE

## 2019-11-12 NOTE — LETTER
2019    Violet Mirza  [address] [place] [id number]             [facility]  [address] [place] [address] [address]  [facility] [address]  Phone: [phone] Patient: Violet Mirza  : 1950  Referring Doctor: Dr Biggs      Current Outpatient Medications   Medication Sig    ibuprofen (ADVIL,MOTRIN) 800 MG tablet Take 800 mg by mouth every 6 (six) hours as needed for Pain.    levocetirizine (XYZAL) 5 MG tablet Take 1 tablet (5 mg total) by mouth every evening.    levothyroxine (SYNTHROID) 112 MCG tablet Take 1 tablet (112 mcg total) by mouth once daily.    losartan (COZAAR) 100 MG tablet TAKE 1 TABLET BY MOUTH ONCE DAILY    metoprolol succinate (TOPROL-XL) 200 MG 24 hr tablet Take 1 tablet (200 mg total) by mouth once daily.    rivaroxaban (XARELTO) 20 mg Tab Take 1 tablet (20 mg total) by mouth daily with dinner or evening meal.    traMADol (ULTRAM) 50 mg tablet Take 50 mg by mouth every 6 (six) hours as needed for Pain.    furosemide (LASIX) 40 MG tablet TAKE ONE TABLET BY MOUTH TWO TIMES A DAY     No current facility-administered medications for this visit.        This patient has been assessed for risk factors for clearance of surgery with the following stipulations:    _x__ No contraindications  ___ Recommendations for antiplatelet/anticoagulant medications:  ___ Cleared for surgery with the following contraindications/precautions:  ___ Not cleared for surgery due to the following reasons:      If you have any questions regarding the above, please contact my office at [phone].    Sincerely,  oG Duran MD

## 2019-11-12 NOTE — PROGRESS NOTES
Subjective:    Patient ID:  Violet Mirza is a 69 y.o. female who presents for follow-up of Congestive Heart Failure      HPI     68 y/o female with hx of HFrEF (originally with EF 10% with CHRISTINA 5.6 cm, last 2DE with EF 35% with CHRISTINA 5.52 cm), Pafib (CHADSVasc 4 for CHF, HTN, Age, Female) on chronic anticoagulation with Eliquis, mild MR on 2DE (initially mod-severe), HTN, hypothyroidism, GERD who presents for f/u. Initially presented to ED with worsening SOB/LE edema, found to be in ADHF and Pafib, had spontaneous conversion to SR, EF 10%, LHC with normal coronaries, diuresed appropriately and D/C'd home in improved condition. Was seen by Abbe Medel. Was also seen by Dr Loco, restarted on Amio, Holter placed, did not want antiarrhythmics or invasive procedures. Has been in SR. Had repeat 2DE with improvement in EF to 35% and improvement in MR to mild degree.   She is doing well from a cardiac perspective and currently with NYHA FC I symptoms. Has very mild SANTANA with moderate activity and able to accomplish ADL's (>4 METs). Denies CP, orthopnea, PND, palps, syncope, LE edema. Compliant with meds and following low salt diet. Goes to the gym regularly. Has upcoming heel surgery and here for justo-operative cardiac evaluation.     Review of Systems   Constitution: Negative for malaise/fatigue.   HENT: Negative for congestion.    Eyes: Negative for blurred vision.   Cardiovascular: Negative for chest pain, claudication, cyanosis, dyspnea on exertion, irregular heartbeat, leg swelling, near-syncope, orthopnea, palpitations, paroxysmal nocturnal dyspnea and syncope.   Respiratory: Negative for shortness of breath.    Endocrine: Negative for polyuria.   Hematologic/Lymphatic: Negative for bleeding problem.   Skin: Negative for itching and rash.   Musculoskeletal: Negative for joint swelling, muscle cramps and muscle weakness.   Gastrointestinal: Negative for abdominal pain, hematemesis, hematochezia, melena, nausea  and vomiting.   Genitourinary: Negative for dysuria and hematuria.   Neurological: Negative for dizziness, focal weakness, headaches, light-headedness, loss of balance and weakness.   Psychiatric/Behavioral: Negative for depression. The patient is not nervous/anxious.         Objective:    Physical Exam   Constitutional: She is oriented to person, place, and time. She appears well-developed and well-nourished.   HENT:   Head: Normocephalic and atraumatic.   Neck: Neck supple. No JVD present.   Cardiovascular: Normal rate, regular rhythm and normal heart sounds.   Pulses:       Carotid pulses are 2+ on the right side, and 2+ on the left side.       Radial pulses are 2+ on the right side, and 2+ on the left side.        Femoral pulses are 2+ on the right side, and 2+ on the left side.       Dorsalis pedis pulses are 2+ on the right side, and 2+ on the left side.        Posterior tibial pulses are 2+ on the right side, and 2+ on the left side.   Pulmonary/Chest: Effort normal and breath sounds normal.   Abdominal: Soft. Bowel sounds are normal.   Musculoskeletal: She exhibits no edema.   Neurological: She is alert and oriented to person, place, and time.   Skin: Skin is warm and dry.   Psychiatric: She has a normal mood and affect. Her behavior is normal. Thought content normal.         Assessment:       1. Chronic systolic heart failure    2. Benign essential hypertension    3. Hypertriglyceridemia    4. Mitral valve insufficiency, unspecified etiology    5. Paroxysmal atrial fibrillation    6. PVC's (premature ventricular contractions)    7. Venous insufficiency of both lower extremities    8. Rosacea    9. Current use of long term anticoagulation    10. Gastroesophageal reflux disease without esophagitis    11. History of peptic ulcer      68 y/o pt with hx and presentation as above. Doing well from a cardiac perspective and compensated from a HF perspective, currently with NYHA FC I symptoms. No further cardiac  evaluation recommended and no absolute contraindication for procedure. Discussed the etiology, evaluation, and management of NICM, HFrEF, HTN, HLD, PAfib, prem insuff, thyroid issues. Discussed the importance of med compliance, heart healthy diet, and regular exercise.        Plan:       -The pt is at an acceptable risk from a cardiac perspective for upcoming non cardiac procedure  -f/u in 1 year

## 2019-11-14 ENCOUNTER — TELEPHONE (OUTPATIENT)
Dept: FAMILY MEDICINE | Facility: CLINIC | Age: 69
End: 2019-11-14

## 2019-11-14 NOTE — TELEPHONE ENCOUNTER
----- Message from Montserrat Krause sent at 11/14/2019 12:43 PM CST -----  Contact: Lyric from [facility]admit [facility]/[phone]  Lyric called to speak with your office about the medical clearance that they received.  The patient is advising that she is allergic to a certain medication but cannot remember the name.    Also, she needs a copy of the lab work, ECHO and EKG.    Please call [phone] to discuss today.

## 2019-11-14 NOTE — TELEPHONE ENCOUNTER
----- Message from Montserrat Krause sent at 11/14/2019 12:43 PM CST -----  Contact: Lyric from [facility]admit testing/[phone]  Lyric called to speak with your office about the medical clearance that they received.  The patient is advising that she is allergic to a certain medication but cannot remember the name.    Also, she needs a copy of the lab work, ECHO and EKG.    Please call [phone] to discuss today.

## 2019-11-20 ENCOUNTER — PATIENT MESSAGE (OUTPATIENT)
Dept: CARDIOLOGY | Facility: CLINIC | Age: 69
End: 2019-11-20

## 2019-11-22 ENCOUNTER — PATIENT MESSAGE (OUTPATIENT)
Dept: CARDIOLOGY | Facility: CLINIC | Age: 69
End: 2019-11-22

## 2019-11-22 NOTE — TELEPHONE ENCOUNTER
Dr. Duran is out of the office today     Can you help advise in regards to this pt's message     Thank you

## 2019-12-10 ENCOUNTER — TELEPHONE (OUTPATIENT)
Dept: FAMILY MEDICINE | Facility: CLINIC | Age: 69
End: 2019-12-10

## 2019-12-26 ENCOUNTER — LAB VISIT (OUTPATIENT)
Dept: LAB | Facility: HOSPITAL | Age: 69
End: 2019-12-26
Attending: FAMILY MEDICINE
Payer: MEDICARE

## 2019-12-26 DIAGNOSIS — I49.3 PVC'S (PREMATURE VENTRICULAR CONTRACTIONS): ICD-10-CM

## 2019-12-26 DIAGNOSIS — M81.0 POSTMENOPAUSAL OSTEOPOROSIS: ICD-10-CM

## 2019-12-26 DIAGNOSIS — E03.9 ACQUIRED HYPOTHYROIDISM: ICD-10-CM

## 2019-12-26 DIAGNOSIS — Z79.899 MEDICATION MANAGEMENT: ICD-10-CM

## 2019-12-26 DIAGNOSIS — E55.9 VITAMIN D DEFICIENCY: ICD-10-CM

## 2019-12-26 DIAGNOSIS — E78.1 HYPERTRIGLYCERIDEMIA: ICD-10-CM

## 2019-12-26 LAB
25(OH)D3+25(OH)D2 SERPL-MCNC: 46 NG/ML (ref 30–96)
ALBUMIN SERPL BCP-MCNC: 4 G/DL (ref 3.5–5.2)
ALP SERPL-CCNC: 133 U/L (ref 55–135)
ALT SERPL W/O P-5'-P-CCNC: 12 U/L (ref 10–44)
ANION GAP SERPL CALC-SCNC: 14 MMOL/L (ref 8–16)
AST SERPL-CCNC: 17 U/L (ref 10–40)
BASOPHILS # BLD AUTO: 0.03 K/UL (ref 0–0.2)
BASOPHILS NFR BLD: 0.3 % (ref 0–1.9)
BILIRUB SERPL-MCNC: 0.4 MG/DL (ref 0.1–1)
BUN SERPL-MCNC: 12 MG/DL (ref 8–23)
CALCIUM SERPL-MCNC: 9.6 MG/DL (ref 8.7–10.5)
CHLORIDE SERPL-SCNC: 105 MMOL/L (ref 95–110)
CHOLEST SERPL-MCNC: 177 MG/DL (ref 120–199)
CHOLEST/HDLC SERPL: 3.3 {RATIO} (ref 2–5)
CO2 SERPL-SCNC: 24 MMOL/L (ref 23–29)
CREAT SERPL-MCNC: 0.8 MG/DL (ref 0.5–1.4)
DIFFERENTIAL METHOD: ABNORMAL
EOSINOPHIL # BLD AUTO: 0.2 K/UL (ref 0–0.5)
EOSINOPHIL NFR BLD: 2.6 % (ref 0–8)
ERYTHROCYTE [DISTWIDTH] IN BLOOD BY AUTOMATED COUNT: 14.8 % (ref 11.5–14.5)
EST. GFR  (AFRICAN AMERICAN): >60 ML/MIN/1.73 M^2
EST. GFR  (NON AFRICAN AMERICAN): >60 ML/MIN/1.73 M^2
ESTIMATED AVG GLUCOSE: 111 MG/DL (ref 68–131)
GLUCOSE SERPL-MCNC: 109 MG/DL (ref 70–110)
HBA1C MFR BLD HPLC: 5.5 % (ref 4–5.6)
HCT VFR BLD AUTO: 41 % (ref 37–48.5)
HDLC SERPL-MCNC: 53 MG/DL (ref 40–75)
HDLC SERPL: 29.9 % (ref 20–50)
HGB BLD-MCNC: 12.6 G/DL (ref 12–16)
LDLC SERPL CALC-MCNC: 100.8 MG/DL (ref 63–159)
LYMPHOCYTES # BLD AUTO: 2 K/UL (ref 1–4.8)
LYMPHOCYTES NFR BLD: 22.9 % (ref 18–48)
MAGNESIUM SERPL-MCNC: 2 MG/DL (ref 1.6–2.6)
MCH RBC QN AUTO: 26.9 PG (ref 27–31)
MCHC RBC AUTO-ENTMCNC: 30.7 G/DL (ref 32–36)
MCV RBC AUTO: 88 FL (ref 82–98)
MONOCYTES # BLD AUTO: 0.5 K/UL (ref 0.3–1)
MONOCYTES NFR BLD: 6 % (ref 4–15)
NEUTROPHILS # BLD AUTO: 5.9 K/UL (ref 1.8–7.7)
NEUTROPHILS NFR BLD: 68.2 % (ref 38–73)
NONHDLC SERPL-MCNC: 124 MG/DL
PLATELET # BLD AUTO: 310 K/UL (ref 150–350)
PMV BLD AUTO: 11.7 FL (ref 9.2–12.9)
POTASSIUM SERPL-SCNC: 3.7 MMOL/L (ref 3.5–5.1)
PROT SERPL-MCNC: 7.3 G/DL (ref 6–8.4)
RBC # BLD AUTO: 4.68 M/UL (ref 4–5.4)
SODIUM SERPL-SCNC: 143 MMOL/L (ref 136–145)
T4 FREE SERPL-MCNC: 0.98 NG/DL (ref 0.71–1.51)
TRIGL SERPL-MCNC: 116 MG/DL (ref 30–150)
TSH SERPL DL<=0.005 MIU/L-ACNC: 2.49 UIU/ML (ref 0.4–4)
WBC # BLD AUTO: 8.73 K/UL (ref 3.9–12.7)

## 2019-12-26 PROCEDURE — 83036 HEMOGLOBIN GLYCOSYLATED A1C: CPT

## 2019-12-26 PROCEDURE — 80061 LIPID PANEL: CPT

## 2019-12-26 PROCEDURE — 84439 ASSAY OF FREE THYROXINE: CPT

## 2019-12-26 PROCEDURE — 36415 COLL VENOUS BLD VENIPUNCTURE: CPT

## 2019-12-26 PROCEDURE — 84443 ASSAY THYROID STIM HORMONE: CPT

## 2019-12-26 PROCEDURE — 85025 COMPLETE CBC W/AUTO DIFF WBC: CPT

## 2019-12-26 PROCEDURE — 80053 COMPREHEN METABOLIC PANEL: CPT

## 2019-12-26 PROCEDURE — 82306 VITAMIN D 25 HYDROXY: CPT

## 2019-12-26 PROCEDURE — 83735 ASSAY OF MAGNESIUM: CPT

## 2019-12-30 ENCOUNTER — PATIENT MESSAGE (OUTPATIENT)
Dept: ADMINISTRATIVE | Facility: HOSPITAL | Age: 69
End: 2019-12-30

## 2019-12-30 ENCOUNTER — PATIENT OUTREACH (OUTPATIENT)
Dept: ADMINISTRATIVE | Facility: HOSPITAL | Age: 69
End: 2019-12-30

## 2019-12-30 DIAGNOSIS — Z12.31 ENCOUNTER FOR SCREENING MAMMOGRAM FOR BREAST CANCER: Primary | ICD-10-CM

## 2019-12-31 ENCOUNTER — TELEPHONE (OUTPATIENT)
Dept: ADMINISTRATIVE | Facility: HOSPITAL | Age: 69
End: 2019-12-31

## 2019-12-31 ENCOUNTER — PATIENT MESSAGE (OUTPATIENT)
Dept: FAMILY MEDICINE | Facility: CLINIC | Age: 69
End: 2019-12-31

## 2020-01-02 ENCOUNTER — PATIENT OUTREACH (OUTPATIENT)
Dept: ADMINISTRATIVE | Facility: HOSPITAL | Age: 70
End: 2020-01-02

## 2020-01-13 ENCOUNTER — OFFICE VISIT (OUTPATIENT)
Dept: FAMILY MEDICINE | Facility: CLINIC | Age: 70
End: 2020-01-13
Payer: MEDICARE

## 2020-01-13 VITALS
SYSTOLIC BLOOD PRESSURE: 118 MMHG | BODY MASS INDEX: 34.95 KG/M2 | OXYGEN SATURATION: 98 % | HEART RATE: 72 BPM | HEIGHT: 67 IN | WEIGHT: 222.69 LBS | DIASTOLIC BLOOD PRESSURE: 60 MMHG

## 2020-01-13 DIAGNOSIS — E66.09 CLASS 1 OBESITY DUE TO EXCESS CALORIES WITHOUT SERIOUS COMORBIDITY WITH BODY MASS INDEX (BMI) OF 34.0 TO 34.9 IN ADULT: ICD-10-CM

## 2020-01-13 DIAGNOSIS — E78.1 HYPERTRIGLYCERIDEMIA: ICD-10-CM

## 2020-01-13 DIAGNOSIS — I10 BENIGN ESSENTIAL HYPERTENSION: Primary | ICD-10-CM

## 2020-01-13 DIAGNOSIS — I49.3 PVC'S (PREMATURE VENTRICULAR CONTRACTIONS): ICD-10-CM

## 2020-01-13 DIAGNOSIS — Z79.01 CURRENT USE OF LONG TERM ANTICOAGULATION: ICD-10-CM

## 2020-01-13 DIAGNOSIS — E55.9 VITAMIN D DEFICIENCY: ICD-10-CM

## 2020-01-13 DIAGNOSIS — I50.22 CHRONIC SYSTOLIC HEART FAILURE: ICD-10-CM

## 2020-01-13 DIAGNOSIS — I87.2 VENOUS INSUFFICIENCY OF BOTH LOWER EXTREMITIES: ICD-10-CM

## 2020-01-13 DIAGNOSIS — Z79.899 MEDICATION MANAGEMENT: ICD-10-CM

## 2020-01-13 DIAGNOSIS — I48.0 PAROXYSMAL ATRIAL FIBRILLATION: ICD-10-CM

## 2020-01-13 DIAGNOSIS — E03.9 ACQUIRED HYPOTHYROIDISM: ICD-10-CM

## 2020-01-13 PROBLEM — E66.811 CLASS 1 OBESITY DUE TO EXCESS CALORIES WITHOUT SERIOUS COMORBIDITY WITH BODY MASS INDEX (BMI) OF 34.0 TO 34.9 IN ADULT: Status: ACTIVE | Noted: 2018-09-11

## 2020-01-13 PROCEDURE — 99214 PR OFFICE/OUTPT VISIT, EST, LEVL IV, 30-39 MIN: ICD-10-PCS | Mod: S$GLB,,, | Performed by: FAMILY MEDICINE

## 2020-01-13 PROCEDURE — 3078F PR MOST RECENT DIASTOLIC BLOOD PRESSURE < 80 MM HG: ICD-10-PCS | Mod: CPTII,S$GLB,, | Performed by: FAMILY MEDICINE

## 2020-01-13 PROCEDURE — 99999 PR PBB SHADOW E&M-EST. PATIENT-LVL III: ICD-10-PCS | Mod: PBBFAC,,, | Performed by: FAMILY MEDICINE

## 2020-01-13 PROCEDURE — 1159F MED LIST DOCD IN RCRD: CPT | Mod: S$GLB,,, | Performed by: FAMILY MEDICINE

## 2020-01-13 PROCEDURE — 3074F PR MOST RECENT SYSTOLIC BLOOD PRESSURE < 130 MM HG: ICD-10-PCS | Mod: CPTII,S$GLB,, | Performed by: FAMILY MEDICINE

## 2020-01-13 PROCEDURE — 3078F DIAST BP <80 MM HG: CPT | Mod: CPTII,S$GLB,, | Performed by: FAMILY MEDICINE

## 2020-01-13 PROCEDURE — 3074F SYST BP LT 130 MM HG: CPT | Mod: CPTII,S$GLB,, | Performed by: FAMILY MEDICINE

## 2020-01-13 PROCEDURE — 1101F PR PT FALLS ASSESS DOC 0-1 FALLS W/OUT INJ PAST YR: ICD-10-PCS | Mod: CPTII,S$GLB,, | Performed by: FAMILY MEDICINE

## 2020-01-13 PROCEDURE — 1125F PR PAIN SEVERITY QUANTIFIED, PAIN PRESENT: ICD-10-PCS | Mod: S$GLB,,, | Performed by: FAMILY MEDICINE

## 2020-01-13 PROCEDURE — 1159F PR MEDICATION LIST DOCUMENTED IN MEDICAL RECORD: ICD-10-PCS | Mod: S$GLB,,, | Performed by: FAMILY MEDICINE

## 2020-01-13 PROCEDURE — 1125F AMNT PAIN NOTED PAIN PRSNT: CPT | Mod: S$GLB,,, | Performed by: FAMILY MEDICINE

## 2020-01-13 PROCEDURE — 99214 OFFICE O/P EST MOD 30 MIN: CPT | Mod: S$GLB,,, | Performed by: FAMILY MEDICINE

## 2020-01-13 PROCEDURE — 1101F PT FALLS ASSESS-DOCD LE1/YR: CPT | Mod: CPTII,S$GLB,, | Performed by: FAMILY MEDICINE

## 2020-01-13 PROCEDURE — 99999 PR PBB SHADOW E&M-EST. PATIENT-LVL III: CPT | Mod: PBBFAC,,, | Performed by: FAMILY MEDICINE

## 2020-01-13 PROCEDURE — 99499 UNLISTED E&M SERVICE: CPT | Mod: S$GLB,,, | Performed by: FAMILY MEDICINE

## 2020-01-13 PROCEDURE — 99499 RISK ADDL DX/OHS AUDIT: ICD-10-PCS | Mod: S$GLB,,, | Performed by: FAMILY MEDICINE

## 2020-01-13 RX ORDER — LOSARTAN POTASSIUM 100 MG/1
100 TABLET ORAL DAILY
Qty: 90 TABLET | Refills: 4 | Status: SHIPPED | OUTPATIENT
Start: 2020-01-13 | End: 2021-03-18 | Stop reason: SDUPTHER

## 2020-01-13 RX ORDER — LIDOCAINE 50 MG/G
OINTMENT TOPICAL
COMMUNITY
Start: 2019-12-27 | End: 2021-07-16 | Stop reason: ALTCHOICE

## 2020-01-13 RX ORDER — FUROSEMIDE 40 MG/1
40 TABLET ORAL DAILY
Qty: 90 TABLET | Refills: 4 | Status: SHIPPED | OUTPATIENT
Start: 2020-01-13 | End: 2021-04-15 | Stop reason: SDUPTHER

## 2020-01-13 RX ORDER — MUPIROCIN 20 MG/G
OINTMENT TOPICAL
Refills: 0 | COMMUNITY
Start: 2019-11-05 | End: 2021-07-16 | Stop reason: ALTCHOICE

## 2020-01-13 RX ORDER — GEL BASE NO.184
GEL (GRAM) TOPICAL
Refills: 3 | COMMUNITY
Start: 2019-11-05 | End: 2020-01-13

## 2020-01-13 NOTE — PROGRESS NOTES
Office Visit    Patient Name: Violet Mirza    : 1950  MRN: [id number]    Subjective:  Violet is a 69 y.o. female who presents today for:    Follow-up    69-year-old patient of mine last seen by me 2019 and for annual exam on 4/3/19, here for 3 month follow-up of chronic conditions that include chronic conditions include paroxysmal AFib(2018 in [facility] ER was given diltiazem & converted spontaneously to NSR), Systolic Heart Failure, hypothyroidism, hypertension, obesity, osteopenia/borderline osteoporosis, rosacea, remote prior history of kidney stones.     Labs drawn prior to office visit 19 remarkable for improvement in TSH to 2.4 on higher dose of levothyroxine 112 mcg daily.  This is associated with a 14 lb weight loss over the last 3 months.  Current diet is low carb and consists of-- grazing on nuts and raisins, yogurt, light cheese, fruit, lot of veggies and salads and eggs but no meat.  A1c is 5.5, LDL is 100, CMP is normal with normal electrolytes.    Her exercise tolerance is overall excellent-- she is able to do cardio machines(elliptical) and exercise classes without excessive shortness of breath.  She has no shortness of breath with regular day-to-day activities including climbing the stairs and walking.     She has been out of her exercise routine due to issues with worsening arthritis.  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   She is progressing well with home therapy for her left heel and will discuss return to exercise at upcoming post-op follow-up.    She is not having any chest pain, shortness of breath, leg swelling.  She has decreased her Lasix from 40 b.i.d. to 40 daily and is doing well with no increased edema.  Her blood pressures have been stable on metoprolol 200 XL and Cozaar 100.     She is trying to restrict the amount of  ibuprofen she takes due to caution advised by her cardiologist, and is trying a natural supplement with compounds such as Omega-3s at this time.     She saw Cardiology Dr. Rausch electrophysiology 10/18/2019 and there was a concern for greater numbers of PVCs noted on EKG.  They advised a Holter monitor, which she declined, but since her ejection fraction is overall stable on echo they are okay with monitoring for now.      ECHO 10/25/2019  · Moderately decreased left ventricular systolic function. The estimated ejection fraction is 35%  · Grade I (mild) left ventricular diastolic dysfunction consistent with impaired relaxation.  · Eccentric left ventricular hypertrophy.  · Normal right ventricular systolic function.  · Normal central venous pressure (3 mm Hg).  · The estimated PA systolic pressure is 23 mm Hg       Past Medical History  Past Medical History:   Diagnosis Date    Chondromalacia of right patella     MRI 6/2016 at [facility]    Diverticulitis     GERD (gastroesophageal reflux disease)     Hypoglycemia 11/22/2014    OA (osteoarthritis) of knee     bilateraly, followed by Dr. Callejas, [facility], s/p synvisc injections    Osteopenia 7/26/2013    Personal history of kidney stones 7/26/2013    Rosacea 7/26/2013    Tear of medial meniscus of right knee     MRI 6/2016       Past Surgical History  Past Surgical History:   Procedure Laterality Date    ADENOIDECTOMY      FOOT SURGERY Bilateral     KNEE ARTHROSCOPY W/ MENISCAL REPAIR Right 5/14    KNEE SURGERY Left     partial knee replacement    LIPOMA RESECTION Bilateral 4/22/2019    Procedure: EXCISION, LIPOMAS  chestwall , both groins/legs;  Surgeon: NOLA Werner MD;  Location: [facility] OR;  Service: General;  Laterality: Bilateral;  evicel, surgiflo    TONSILLECTOMY      TUBAL LIGATION         Family History  Family History   Problem Relation Age of Onset    Hypertension Mother     Diabetes Mother     Glaucoma Mother      Cataracts Mother     Hypertension Father     Stroke Father     No Known Problems Sister     No Known Problems Brother     No Known Problems Daughter     No Known Problems Son     No Known Problems Sister     No Known Problems Daughter        Social History  Social History     Socioeconomic History    Marital status:      Spouse name: Not on file    Number of children: y    Years of education: Not on file    Highest education level: Not on file   Occupational History    Occupation:      Comment: Works at [employer]   Social Needs    Financial resource strain: Not on file    Food insecurity:     Worry: Not on file     Inability: Not on file    Transportation needs:     Medical: Not on file     Non-medical: Not on file   Tobacco Use    Smoking status: Former Smoker     Packs/day: 0.25     Years: 30.00     Pack years: 7.50     Last attempt to quit: 2005     Years since quittin.9    Smokeless tobacco: Former User   Substance and Sexual Activity    Alcohol use: No    Drug use: No    Sexual activity: Not on file   Lifestyle    Physical activity:     Days per week: Not on file     Minutes per session: Not on file    Stress: Not on file   Relationships    Social connections:     Talks on phone: Not on file     Gets together: Not on file     Attends Muslim service: Not on file     Active member of club or organization: Not on file     Attends meetings of clubs or organizations: Not on file     Relationship status: Not on file   Other Topics Concern    Not on file   Social History Narrative    Originally from [place], living in [place] with son       Current Medications  Medications reviewed and updated.     Allergies   Review of patient's allergies indicates:   Allergen Reactions    Aldactone [spironolactone] Other (See Comments)     Hyperkalemia when initiated on 2018        Review of Systems (Pertinent positives)  Review of Systems   Constitutional: Negative  "for unexpected weight change (intentional).   Gastrointestinal: Negative for constipation and diarrhea.   Genitourinary: Negative for dysuria and hematuria.   Musculoskeletal: Positive for arthralgias and gait problem.   Neurological: Negative for dizziness and light-headedness.       /60   Pulse 72   Ht 5' 7" (1.702 m)   Wt 101 kg (222 lb 10.6 oz)   SpO2 98%   BMI 34.87 kg/m²     Physical Exam   Constitutional: She is oriented to person, place, and time. She appears well-developed and well-nourished. No distress.   HENT:   Head: Normocephalic and atraumatic.   Eyes: Conjunctivae are normal.   Cardiovascular: Normal rate and regular rhythm.   Pulmonary/Chest: Effort normal and breath sounds normal.   Musculoskeletal: She exhibits edema (mild non-pitting LLE edema near heel secondary to recent surgery).   Neurological: She is alert and oriented to person, place, and time.   Skin: Skin is warm and dry.   Psychiatric: She has a normal mood and affect.   Vitals reviewed.        Assessment/Plan:  Violet Mirza is a 69 y.o. female who presents today for :    Violet was seen today for follow-up.    Diagnoses and all orders for this visit:    Benign essential hypertension  Comments:  Controlled on daily Cozaar 100 and Toprol XL to 100.  Weight/swelling stable on decreased dose of the Lasix to 40 daily  Orders:  -     losartan (COZAAR) 100 MG tablet; TAKE 1 TABLET BY MOUTH ONCE DAILY  -     TSH; Future  -     Comprehensive metabolic panel; Future  -     Lipid panel; Future  -     Magnesium; Future    Acquired hypothyroidism  Comments:  TSH now in 2+ range and at goal on levothyroxine 112 mcg daily.  Continue this higher dose and recheck in 6 months.  Orders:  -     TSH; Future    Class 1 obesity due to excess calories without serious comorbidity with body mass index (BMI) of 34.0 to 34.9 in adult  Comments:  Has lost about 14 lb in last 3 months through dietary improvements and optimization of thyroid " level    Chronic systolic heart failure  Comments:  Weight, swelling, exercise tolerance stable, continue current medications-beta-blocker, Arb, Lasix.  Doing well on Lasix 40 only once daily  Orders:  -     rivaroxaban (XARELTO) 20 mg Tab; Take 1 tablet (20 mg total) by mouth daily with dinner or evening meal.  -     furosemide (LASIX) 40 MG tablet; Take 1 tablet (40 mg total) by mouth once daily.    Paroxysmal atrial fibrillation  Comments:  Stable on Toprol XL to 100, on daily Xarelto for stroke prevention in AFib.  Continue Cardiology and EP follow-up  Orders:  -     rivaroxaban (XARELTO) 20 mg Tab; Take 1 tablet (20 mg total) by mouth daily with dinner or evening meal.    PVC's (premature ventricular contractions)    Current use of long term anticoagulation    Venous insufficiency of both lower extremities  Comments:  Stable, some increased swelling of left lower extremity secondary to recent heel surgery for Idalmis's deformity.    Hypertriglyceridemia  -     Comprehensive metabolic panel; Future  -     Lipid panel; Future    Vitamin D deficiency  Comments:  Normal range on current supplement.  Continue daily chewable    Medication management  -     TSH; Future  -     Comprehensive metabolic panel; Future  -     Lipid panel; Future  -     Magnesium; Future            ICD-10-CM ICD-9-CM    1. Benign essential hypertension I10 401.1 losartan (COZAAR) 100 MG tablet      TSH      Comprehensive metabolic panel      Lipid panel      Magnesium    Controlled on daily Cozaar 100 and Toprol XL to 100.  Weight/swelling stable on decreased dose of the Lasix to 40 daily   2. Acquired hypothyroidism E03.9 244.9 TSH    TSH now in 2+ range and at goal on levothyroxine 112 mcg daily.  Continue this higher dose and recheck in 6 months.   3. Class 1 obesity due to excess calories without serious comorbidity with body mass index (BMI) of 34.0 to 34.9 in adult E66.09 278.00     Z68.34 V85.34     Has lost about 14 lb in last 3  months through dietary improvements and optimization of thyroid level   4. Chronic systolic heart failure I50.22 428.22 rivaroxaban (XARELTO) 20 mg Tab      furosemide (LASIX) 40 MG tablet    Weight, swelling, exercise tolerance stable, continue current medications-beta-blocker, Arb, Lasix.  Doing well on Lasix 40 only once daily   5. Paroxysmal atrial fibrillation I48.0 427.31 rivaroxaban (XARELTO) 20 mg Tab    Stable on Toprol XL to 100, on daily Xarelto for stroke prevention in AFib.  Continue Cardiology and EP follow-up   6. PVC's (premature ventricular contractions) I49.3 427.69    7. Current use of long term anticoagulation Z79.01 V58.61    8. Venous insufficiency of both lower extremities I87.2 459.81     Stable, some increased swelling of left lower extremity secondary to recent heel surgery for Idalmis's deformity.   9. Hypertriglyceridemia E78.1 272.1 Comprehensive metabolic panel      Lipid panel   10. Vitamin D deficiency E55.9 268.9     Normal range on current supplement.  Continue daily chewable   11. Medication management Z79.899 V58.69 TSH      Comprehensive metabolic panel      Lipid panel      Magnesium       Patient Instructions   ADVISE LOOKING INTO NEW 2-PART, NON-LIVE SHINGRIX SHINGLES VACCINE THROUGH YOUR LOCAL PHARMACY.           Follow up in about 6 months (around 7/13/2020) for to follow up on lab results, return as needed for new concerns.

## 2020-01-14 ENCOUNTER — IMMUNIZATION (OUTPATIENT)
Dept: PHARMACY | Facility: CLINIC | Age: 70
End: 2020-01-14
Payer: MEDICARE

## 2020-01-16 ENCOUNTER — PATIENT MESSAGE (OUTPATIENT)
Dept: FAMILY MEDICINE | Facility: CLINIC | Age: 70
End: 2020-01-16

## 2020-02-12 ENCOUNTER — PATIENT MESSAGE (OUTPATIENT)
Dept: FAMILY MEDICINE | Facility: CLINIC | Age: 70
End: 2020-02-12

## 2020-02-13 ENCOUNTER — OFFICE VISIT (OUTPATIENT)
Dept: URGENT CARE | Facility: CLINIC | Age: 70
End: 2020-02-13
Payer: MEDICARE

## 2020-02-13 VITALS
DIASTOLIC BLOOD PRESSURE: 77 MMHG | RESPIRATION RATE: 16 BRPM | TEMPERATURE: 98 F | SYSTOLIC BLOOD PRESSURE: 148 MMHG | OXYGEN SATURATION: 97 % | WEIGHT: 222 LBS | BODY MASS INDEX: 34.84 KG/M2 | HEIGHT: 67 IN | HEART RATE: 70 BPM

## 2020-02-13 DIAGNOSIS — J30.1 ALLERGIC RHINITIS DUE TO POLLEN, UNSPECIFIED SEASONALITY: Primary | ICD-10-CM

## 2020-02-13 DIAGNOSIS — J06.9 URI, ACUTE: ICD-10-CM

## 2020-02-13 PROCEDURE — 99213 PR OFFICE/OUTPT VISIT, EST, LEVL III, 20-29 MIN: ICD-10-PCS | Mod: S$GLB,,, | Performed by: FAMILY MEDICINE

## 2020-02-13 PROCEDURE — 99213 OFFICE O/P EST LOW 20 MIN: CPT | Mod: S$GLB,,, | Performed by: FAMILY MEDICINE

## 2020-02-13 RX ORDER — LORATADINE 10 MG/1
10 TABLET ORAL DAILY
Qty: 30 TABLET | Refills: 2 | Status: SHIPPED | OUTPATIENT
Start: 2020-02-13 | End: 2021-07-16

## 2020-02-13 RX ORDER — GUAIFENESIN 600 MG/1
600 TABLET, EXTENDED RELEASE ORAL 2 TIMES DAILY
Qty: 14 TABLET | Refills: 2 | Status: SHIPPED | OUTPATIENT
Start: 2020-02-13 | End: 2020-02-20

## 2020-02-13 NOTE — PROGRESS NOTES
"Subjective:       Patient ID: Violet Mirza is a 69 y.o. female.    Vitals:  height is 5' 7" (1.702 m) and weight is 100.7 kg (222 lb). Her oral temperature is 98 °F (36.7 °C). Her blood pressure is 148/77 (abnormal) and her pulse is 70. Her respiration is 16 and oxygen saturation is 97%.     Chief Complaint: Cough    69-year-old female with complaint of sore throat cough and congestion times 0 almost a week no fever no chills no achiness    Cough   This is a new problem. Episode onset: 1 week. The problem has been gradually worsening. The problem occurs every few minutes. The cough is non-productive. Associated symptoms include headaches, nasal congestion, postnasal drip and a sore throat. Pertinent negatives include no chills, ear pain, eye redness, fever, hemoptysis, myalgias, rash, shortness of breath or wheezing. Nothing aggravates the symptoms. Treatments tried: Mucinex, Zycam. The treatment provided mild relief.       Constitution: Negative for chills, sweating, fatigue and fever.   HENT: Positive for congestion, postnasal drip, sinus pressure and sore throat. Negative for ear pain, sinus pain and voice change.    Neck: Negative for painful lymph nodes.   Eyes: Negative for eye redness.   Respiratory: Positive for cough. Negative for chest tightness, sputum production, bloody sputum, COPD, shortness of breath, stridor, wheezing and asthma.    Gastrointestinal: Negative for nausea and vomiting.   Musculoskeletal: Negative for muscle ache.   Skin: Negative for rash.   Allergic/Immunologic: Positive for sneezing. Negative for seasonal allergies and asthma.   Neurological: Positive for headaches.   Hematologic/Lymphatic: Negative for swollen lymph nodes.       Objective:      Physical Exam   Constitutional: She is oriented to person, place, and time. She appears well-developed and well-nourished. She is cooperative.  Non-toxic appearance. She does not appear ill.   HENT:   Head: Normocephalic and atraumatic. "   Right Ear: Hearing, tympanic membrane, external ear and ear canal normal.   Left Ear: Hearing, tympanic membrane, external ear and ear canal normal.   Nose: Nose normal. No mucosal edema, rhinorrhea or nasal deformity. No epistaxis. Right sinus exhibits no maxillary sinus tenderness and no frontal sinus tenderness. Left sinus exhibits no maxillary sinus tenderness and no frontal sinus tenderness.   Mouth/Throat: Uvula is midline, oropharynx is clear and moist and mucous membranes are normal. No trismus in the jaw. Normal dentition. No uvula swelling. No oropharyngeal exudate or posterior oropharyngeal erythema.   Eyes: Conjunctivae and lids are normal. Right eye exhibits no discharge. Left eye exhibits no discharge. No scleral icterus.   Neck: Trachea normal, normal range of motion, full passive range of motion without pain and phonation normal. Neck supple. No JVD present. No tracheal deviation present.   Cardiovascular: Normal rate, regular rhythm, normal heart sounds, intact distal pulses and normal pulses. Exam reveals no friction rub.   No murmur heard.  Pulmonary/Chest: Effort normal and breath sounds normal. No stridor. No respiratory distress. She has no wheezes.   Abdominal: Soft. Normal appearance and bowel sounds are normal. She exhibits no distension, no pulsatile midline mass and no mass. There is no tenderness.   Musculoskeletal: Normal range of motion. She exhibits no edema or deformity.   Lymphadenopathy:     She has no cervical adenopathy.   Neurological: She is alert and oriented to person, place, and time. She exhibits normal muscle tone. Coordination normal.   Skin: Skin is warm, dry, intact, not diaphoretic and not pale.   Psychiatric: She has a normal mood and affect. Her speech is normal and behavior is normal. Judgment and thought content normal. Cognition and memory are normal.   Nursing note and vitals reviewed.        Assessment:       1. Allergic rhinitis due to pollen, unspecified  seasonality    2. URI, acute        Plan:         Allergic rhinitis due to pollen, unspecified seasonality    URI, acute    Other orders  -     loratadine (CLARITIN) 10 mg tablet; Take 1 tablet (10 mg total) by mouth once daily.  Dispense: 30 tablet; Refill: 2  -     guaiFENesin (MUCINEX) 600 mg 12 hr tablet; Take 1 tablet (600 mg total) by mouth 2 (two) times daily. for 7 days  Dispense: 14 tablet; Refill: 2        patient advised to take Cepacol lozenges as needed for cough if symptoms worsens to call or return to clinic

## 2020-02-15 ENCOUNTER — CLINICAL SUPPORT (OUTPATIENT)
Dept: URGENT CARE | Facility: CLINIC | Age: 70
End: 2020-02-15
Payer: MEDICARE

## 2020-02-15 VITALS
BODY MASS INDEX: 34.84 KG/M2 | SYSTOLIC BLOOD PRESSURE: 132 MMHG | DIASTOLIC BLOOD PRESSURE: 74 MMHG | HEIGHT: 67 IN | TEMPERATURE: 98 F | HEART RATE: 85 BPM | WEIGHT: 222 LBS | RESPIRATION RATE: 16 BRPM | OXYGEN SATURATION: 98 %

## 2020-02-15 DIAGNOSIS — J06.9 URI, ACUTE: ICD-10-CM

## 2020-02-15 DIAGNOSIS — J30.1 SEASONAL ALLERGIC RHINITIS DUE TO POLLEN: Primary | ICD-10-CM

## 2020-02-15 PROCEDURE — 96372 THER/PROPH/DIAG INJ SC/IM: CPT | Mod: S$GLB,,, | Performed by: FAMILY MEDICINE

## 2020-02-15 PROCEDURE — 99213 OFFICE O/P EST LOW 20 MIN: CPT | Mod: 25,S$GLB,, | Performed by: FAMILY MEDICINE

## 2020-02-15 PROCEDURE — 99213 PR OFFICE/OUTPT VISIT, EST, LEVL III, 20-29 MIN: ICD-10-PCS | Mod: 25,S$GLB,, | Performed by: FAMILY MEDICINE

## 2020-02-15 PROCEDURE — 96372 PR INJECTION,THERAP/PROPH/DIAG2ST, IM OR SUBCUT: ICD-10-PCS | Mod: S$GLB,,, | Performed by: FAMILY MEDICINE

## 2020-02-15 RX ORDER — DEXAMETHASONE SODIUM PHOSPHATE 100 MG/10ML
10 INJECTION INTRAMUSCULAR; INTRAVENOUS
Status: COMPLETED | OUTPATIENT
Start: 2020-02-15 | End: 2020-02-15

## 2020-02-15 RX ORDER — AZITHROMYCIN 250 MG/1
TABLET, FILM COATED ORAL
Qty: 6 TABLET | Refills: 0 | Status: SHIPPED | OUTPATIENT
Start: 2020-02-15 | End: 2020-07-13 | Stop reason: ALTCHOICE

## 2020-02-15 RX ADMIN — DEXAMETHASONE SODIUM PHOSPHATE 10 MG: 100 INJECTION INTRAMUSCULAR; INTRAVENOUS at 10:02

## 2020-02-15 NOTE — PROGRESS NOTES
"Subjective:       Patient ID: Violet Mirza is a 69 y.o. female.    Vitals:  height is 5' 7" (1.702 m) and weight is 100.7 kg (222 lb). Her temperature is 98.2 °F (36.8 °C). Her blood pressure is 132/74 and her pulse is 85. Her respiration is 16 and oxygen saturation is 98%.     Chief Complaint: Sore Throat    Seen here on thursday, is not any better. Pt is here for the steroid that was offered on last visit patient consists complains of persistent sore throat postnasal drip and cough no fever      Constitution: Negative for chills, sweating, fatigue and fever.   HENT: Positive for sinus pain. Negative for sinus pressure and voice change.    Neck: Negative for painful lymph nodes.   Eyes: Negative for eye redness.   Respiratory: Positive for sputum production. Negative for chest tightness, bloody sputum, COPD, wheezing and asthma.    Gastrointestinal: Negative for nausea.   Musculoskeletal: Negative for muscle ache.   Skin: Negative for rash.   Allergic/Immunologic: Negative for seasonal allergies and asthma.   Hematologic/Lymphatic: Negative for swollen lymph nodes.       Objective:      Physical Exam   Constitutional: She is oriented to person, place, and time. She appears well-developed and well-nourished. She is cooperative.  Non-toxic appearance. She does not appear ill.   HENT:   Head: Normocephalic and atraumatic.   Right Ear: Hearing, tympanic membrane, external ear and ear canal normal.   Left Ear: Hearing, tympanic membrane, external ear and ear canal normal.   Nose: Nose normal. No mucosal edema, rhinorrhea or nasal deformity. No epistaxis. Right sinus exhibits no maxillary sinus tenderness and no frontal sinus tenderness. Left sinus exhibits no maxillary sinus tenderness and no frontal sinus tenderness.   Mouth/Throat: Uvula is midline, oropharynx is clear and moist and mucous membranes are normal. No trismus in the jaw. Normal dentition. No uvula swelling. No oropharyngeal exudate or posterior " oropharyngeal erythema.   Eyes: Conjunctivae and lids are normal. Right eye exhibits no discharge. Left eye exhibits no discharge. No scleral icterus.   Neck: Trachea normal, normal range of motion, full passive range of motion without pain and phonation normal. Neck supple. No JVD present. No tracheal deviation present.   Cardiovascular: Normal rate, regular rhythm, normal heart sounds, intact distal pulses and normal pulses. Exam reveals no gallop and no friction rub.   No murmur heard.  Pulmonary/Chest: Effort normal and breath sounds normal. No stridor. No respiratory distress. She has no wheezes. She has no rales. She exhibits no tenderness.   Abdominal: Soft. Normal appearance and bowel sounds are normal. She exhibits no distension, no pulsatile midline mass and no mass. There is no tenderness.   Musculoskeletal: Normal range of motion. She exhibits no edema or deformity.   Lymphadenopathy:     She has no cervical adenopathy.   Neurological: She is alert and oriented to person, place, and time. She exhibits normal muscle tone. Coordination normal.   Skin: Skin is warm, dry, intact, not diaphoretic and not pale.   Psychiatric: She has a normal mood and affect. Her speech is normal and behavior is normal. Judgment and thought content normal. Cognition and memory are normal.   Nursing note and vitals reviewed.        Assessment:       1. Seasonal allergic rhinitis due to pollen    2. URI, acute        Plan:         Seasonal allergic rhinitis due to pollen    URI, acute    Other orders  -     dexamethasone injection 10 mg  -     azithromycin (ZITHROMAX Z-GURVINDER) 250 MG tablet; Take 2 tablets (500 mg) on  Day 1,  followed by 1 tablet (250 mg) once daily on Days 2 through 5.  Dispense: 6 tablet; Refill: 0

## 2020-02-17 ENCOUNTER — PATIENT MESSAGE (OUTPATIENT)
Dept: FAMILY MEDICINE | Facility: CLINIC | Age: 70
End: 2020-02-17

## 2020-02-20 ENCOUNTER — PATIENT MESSAGE (OUTPATIENT)
Dept: FAMILY MEDICINE | Facility: CLINIC | Age: 70
End: 2020-02-20

## 2020-03-11 ENCOUNTER — PATIENT MESSAGE (OUTPATIENT)
Dept: FAMILY MEDICINE | Facility: CLINIC | Age: 70
End: 2020-03-11

## 2020-03-11 DIAGNOSIS — R05.3 PERSISTENT COUGH FOR 3 WEEKS OR LONGER: Primary | ICD-10-CM

## 2020-03-12 ENCOUNTER — HOSPITAL ENCOUNTER (OUTPATIENT)
Dept: RADIOLOGY | Facility: HOSPITAL | Age: 70
Discharge: HOME | End: 2020-03-12
Attending: FAMILY MEDICINE
Payer: MEDICARE

## 2020-03-12 DIAGNOSIS — R05.3 PERSISTENT COUGH FOR 3 WEEKS OR LONGER: ICD-10-CM

## 2020-03-12 PROCEDURE — 71046 XR CHEST PA AND LATERAL: ICD-10-PCS | Mod: 26,,, | Performed by: RADIOLOGY

## 2020-03-12 PROCEDURE — 71046 X-RAY EXAM CHEST 2 VIEWS: CPT | Mod: 26,,, | Performed by: RADIOLOGY

## 2020-03-12 PROCEDURE — 71046 X-RAY EXAM CHEST 2 VIEWS: CPT | Mod: TC,FY

## 2020-05-05 ENCOUNTER — PATIENT MESSAGE (OUTPATIENT)
Dept: FAMILY MEDICINE | Facility: CLINIC | Age: 70
End: 2020-05-05

## 2020-05-05 DIAGNOSIS — Z00.00 ENCOUNTER FOR PREVENTIVE HEALTH EXAMINATION: Primary | ICD-10-CM

## 2020-05-06 NOTE — TELEPHONE ENCOUNTER
Here is order for antibody test-- please advise of possible cost and also that lab is non-fasting, thanks

## 2020-05-07 ENCOUNTER — TELEPHONE (OUTPATIENT)
Dept: FAMILY MEDICINE | Facility: CLINIC | Age: 70
End: 2020-05-07

## 2020-05-07 NOTE — TELEPHONE ENCOUNTER
I called patient to schedule her antibody test. There was no answer and I left a message for a return call and a message thru the patient portal.

## 2020-05-08 ENCOUNTER — LAB VISIT (OUTPATIENT)
Dept: LAB | Facility: HOSPITAL | Age: 70
End: 2020-05-08
Attending: FAMILY MEDICINE
Payer: MEDICARE

## 2020-05-08 DIAGNOSIS — Z00.00 ENCOUNTER FOR PREVENTIVE HEALTH EXAMINATION: ICD-10-CM

## 2020-05-08 LAB — SARS-COV-2 IGG SERPLBLD QL IA.RAPID: NEGATIVE

## 2020-05-08 PROCEDURE — 36415 COLL VENOUS BLD VENIPUNCTURE: CPT

## 2020-05-08 PROCEDURE — 86769 SARS-COV-2 COVID-19 ANTIBODY: CPT

## 2020-07-06 ENCOUNTER — LAB VISIT (OUTPATIENT)
Dept: LAB | Facility: HOSPITAL | Age: 70
End: 2020-07-06
Attending: FAMILY MEDICINE
Payer: MEDICARE

## 2020-07-06 DIAGNOSIS — E03.9 ACQUIRED HYPOTHYROIDISM: ICD-10-CM

## 2020-07-06 DIAGNOSIS — I10 BENIGN ESSENTIAL HYPERTENSION: ICD-10-CM

## 2020-07-06 DIAGNOSIS — E78.1 HYPERTRIGLYCERIDEMIA: ICD-10-CM

## 2020-07-06 DIAGNOSIS — Z79.899 MEDICATION MANAGEMENT: ICD-10-CM

## 2020-07-06 LAB
ALBUMIN SERPL BCP-MCNC: 3.8 G/DL (ref 3.5–5.2)
ALP SERPL-CCNC: 148 U/L (ref 55–135)
ALT SERPL W/O P-5'-P-CCNC: 17 U/L (ref 10–44)
ANION GAP SERPL CALC-SCNC: 9 MMOL/L (ref 8–16)
AST SERPL-CCNC: 20 U/L (ref 10–40)
BILIRUB SERPL-MCNC: 0.2 MG/DL (ref 0.1–1)
BUN SERPL-MCNC: 14 MG/DL (ref 8–23)
CALCIUM SERPL-MCNC: 9.7 MG/DL (ref 8.7–10.5)
CHLORIDE SERPL-SCNC: 102 MMOL/L (ref 95–110)
CHOLEST SERPL-MCNC: 195 MG/DL (ref 120–199)
CHOLEST/HDLC SERPL: 3.1 {RATIO} (ref 2–5)
CO2 SERPL-SCNC: 30 MMOL/L (ref 23–29)
CREAT SERPL-MCNC: 0.8 MG/DL (ref 0.5–1.4)
EST. GFR  (AFRICAN AMERICAN): >60 ML/MIN/1.73 M^2
EST. GFR  (NON AFRICAN AMERICAN): >60 ML/MIN/1.73 M^2
GLUCOSE SERPL-MCNC: 107 MG/DL (ref 70–110)
HDLC SERPL-MCNC: 63 MG/DL (ref 40–75)
HDLC SERPL: 32.3 % (ref 20–50)
LDLC SERPL CALC-MCNC: 103.2 MG/DL (ref 63–159)
MAGNESIUM SERPL-MCNC: 2.2 MG/DL (ref 1.6–2.6)
NONHDLC SERPL-MCNC: 132 MG/DL
POTASSIUM SERPL-SCNC: 4.7 MMOL/L (ref 3.5–5.1)
PROT SERPL-MCNC: 7.6 G/DL (ref 6–8.4)
SODIUM SERPL-SCNC: 141 MMOL/L (ref 136–145)
TRIGL SERPL-MCNC: 144 MG/DL (ref 30–150)
TSH SERPL DL<=0.005 MIU/L-ACNC: 1.51 UIU/ML (ref 0.4–4)

## 2020-07-06 PROCEDURE — 80061 LIPID PANEL: CPT

## 2020-07-06 PROCEDURE — 80053 COMPREHEN METABOLIC PANEL: CPT

## 2020-07-06 PROCEDURE — 36415 COLL VENOUS BLD VENIPUNCTURE: CPT

## 2020-07-06 PROCEDURE — 84443 ASSAY THYROID STIM HORMONE: CPT

## 2020-07-06 PROCEDURE — 83735 ASSAY OF MAGNESIUM: CPT

## 2020-07-13 ENCOUNTER — OFFICE VISIT (OUTPATIENT)
Dept: FAMILY MEDICINE | Facility: CLINIC | Age: 70
End: 2020-07-13
Payer: MEDICARE

## 2020-07-13 ENCOUNTER — PATIENT MESSAGE (OUTPATIENT)
Dept: FAMILY MEDICINE | Facility: CLINIC | Age: 70
End: 2020-07-13

## 2020-07-13 ENCOUNTER — IMMUNIZATION (OUTPATIENT)
Dept: PHARMACY | Facility: CLINIC | Age: 70
End: 2020-07-13
Payer: MEDICARE

## 2020-07-13 VITALS
HEIGHT: 67 IN | BODY MASS INDEX: 36.06 KG/M2 | OXYGEN SATURATION: 98 % | WEIGHT: 229.75 LBS | DIASTOLIC BLOOD PRESSURE: 78 MMHG | SYSTOLIC BLOOD PRESSURE: 128 MMHG | HEART RATE: 67 BPM

## 2020-07-13 DIAGNOSIS — Z79.899 MEDICATION MANAGEMENT: ICD-10-CM

## 2020-07-13 DIAGNOSIS — I48.0 PAROXYSMAL ATRIAL FIBRILLATION: ICD-10-CM

## 2020-07-13 DIAGNOSIS — I10 BENIGN ESSENTIAL HYPERTENSION: ICD-10-CM

## 2020-07-13 DIAGNOSIS — I50.22 CHRONIC SYSTOLIC HEART FAILURE: ICD-10-CM

## 2020-07-13 DIAGNOSIS — Z79.01 CURRENT USE OF LONG TERM ANTICOAGULATION: ICD-10-CM

## 2020-07-13 DIAGNOSIS — E03.9 ACQUIRED HYPOTHYROIDISM: ICD-10-CM

## 2020-07-13 DIAGNOSIS — Z87.442 PERSONAL HISTORY OF KIDNEY STONES: ICD-10-CM

## 2020-07-13 DIAGNOSIS — Z87.11 HISTORY OF PEPTIC ULCER: ICD-10-CM

## 2020-07-13 DIAGNOSIS — Z00.00 ROUTINE GENERAL MEDICAL EXAMINATION AT A HEALTH CARE FACILITY: Primary | ICD-10-CM

## 2020-07-13 DIAGNOSIS — J30.89 ENVIRONMENTAL AND SEASONAL ALLERGIES: ICD-10-CM

## 2020-07-13 DIAGNOSIS — M17.11 PRIMARY OSTEOARTHRITIS OF RIGHT KNEE: ICD-10-CM

## 2020-07-13 DIAGNOSIS — E66.01 CLASS 2 SEVERE OBESITY DUE TO EXCESS CALORIES WITH SERIOUS COMORBIDITY AND BODY MASS INDEX (BMI) OF 35.0 TO 35.9 IN ADULT: ICD-10-CM

## 2020-07-13 DIAGNOSIS — I87.2 VENOUS INSUFFICIENCY OF BOTH LOWER EXTREMITIES: ICD-10-CM

## 2020-07-13 DIAGNOSIS — E55.9 VITAMIN D DEFICIENCY: ICD-10-CM

## 2020-07-13 DIAGNOSIS — M81.0 POSTMENOPAUSAL OSTEOPOROSIS: ICD-10-CM

## 2020-07-13 PROCEDURE — 99214 PR OFFICE/OUTPT VISIT, EST, LEVL IV, 30-39 MIN: ICD-10-PCS | Mod: S$GLB,,, | Performed by: FAMILY MEDICINE

## 2020-07-13 PROCEDURE — 99214 OFFICE O/P EST MOD 30 MIN: CPT | Mod: S$GLB,,, | Performed by: FAMILY MEDICINE

## 2020-07-13 PROCEDURE — 99999 PR PBB SHADOW E&M-EST. PATIENT-LVL IV: CPT | Mod: PBBFAC,,, | Performed by: FAMILY MEDICINE

## 2020-07-13 PROCEDURE — 99999 PR PBB SHADOW E&M-EST. PATIENT-LVL IV: ICD-10-PCS | Mod: PBBFAC,,, | Performed by: FAMILY MEDICINE

## 2020-07-13 PROCEDURE — 3078F DIAST BP <80 MM HG: CPT | Mod: CPTII,S$GLB,, | Performed by: FAMILY MEDICINE

## 2020-07-13 PROCEDURE — 99499 UNLISTED E&M SERVICE: CPT | Mod: S$GLB,,, | Performed by: FAMILY MEDICINE

## 2020-07-13 PROCEDURE — 3074F PR MOST RECENT SYSTOLIC BLOOD PRESSURE < 130 MM HG: ICD-10-PCS | Mod: CPTII,S$GLB,, | Performed by: FAMILY MEDICINE

## 2020-07-13 PROCEDURE — 3078F PR MOST RECENT DIASTOLIC BLOOD PRESSURE < 80 MM HG: ICD-10-PCS | Mod: CPTII,S$GLB,, | Performed by: FAMILY MEDICINE

## 2020-07-13 PROCEDURE — 99499 RISK ADDL DX/OHS AUDIT: ICD-10-PCS | Mod: S$GLB,,, | Performed by: FAMILY MEDICINE

## 2020-07-13 PROCEDURE — 3074F SYST BP LT 130 MM HG: CPT | Mod: CPTII,S$GLB,, | Performed by: FAMILY MEDICINE

## 2020-07-13 RX ORDER — LEVOTHYROXINE SODIUM 112 UG/1
112 TABLET ORAL DAILY
Qty: 90 TABLET | Refills: 4 | Status: SHIPPED | OUTPATIENT
Start: 2020-07-13 | End: 2021-07-16 | Stop reason: SDUPTHER

## 2020-07-13 RX ORDER — RIVAROXABAN 20 MG/1
20 TABLET, FILM COATED ORAL
Qty: 90 TABLET | Refills: 4 | Status: SHIPPED | OUTPATIENT
Start: 2020-07-13 | End: 2020-07-14 | Stop reason: SDUPTHER

## 2020-07-13 NOTE — PROGRESS NOTES
Office Visit    Patient Name: Violet Mirza    : 1950  MRN: [id number]    Subjective:  Violet is a 70 y.o. female who presents today for:    Annual Exam    Violet Mirza presents today for annual wellness exam and monitoring of chronic conditions that include paroxysmal AFib(2018 in [facility] ER was given diltiazem & converted spontaneously to NSR), hypothyroidism, hypertension, obesity, osteopenia, rosacea, remote prior history of kidney stones.    Labs drawn prior to office visit 2020 show normal liver and kidney function, fasting blood sugar of 107, LDL of 103-not on any cholesterol medications, TSH remaining at goal on 1+ range on current dose of thyroid medication.  Vitamin-D previously checked 2019 and found to have normalized at 46 on vitamin D3 supplement-was previously low and she has borderline osteoporosis.     She is postmenopausal.  She does have a gynecologist-- Renny Deshpande.      They have been feeling overall well. No acute concerns. R Knee arthritis and tibialis post tendinitis managed by outside ortho- [facility]/ Dr Callejas' office, last seen 20.  She is doing well from this standpoint with wearing the proper footwear and receiving Synvisc injections of her knee every 6 months     General lifestyle habits are as follows:  Diet is described as healthy and watches salt and carbs-- home cooked meals, exercise is described as recently poor- she is staying active but no longer going to the gym since COVID -19 and this was her source of cardiovascular exercise, sleep is described as fair-- interrupted to use the bathroom as she takes Lasix at night. Weight is overall stable at about 235 (BMI 35)-- had last some but then back up to 235 with 7 lb weight gain in last 6 months and BMI again 35.      Immunizations: TDaP 19-- repeat 10 years , yearly FLU 2018, SHINGLES 2011 & SHINGRIX 1/2 20, Pneumovax 23 11/3/2014, PREVNAR 13 10/18/2015     Screening  Tests: DEXA 4/5/19- BORDERLINE OSTEOPOROSIS, now on calcium/vitamin-D 3 and using some home weights & repeat 2 years, mammogram-- yearly per ob gyn (has every December), PAP no longer indicated-- >65 w/o h/o abnormal, colonoscopy through [facility] 1/22/2015-- repeat 10 years, Hep C screening 10/14/2015     Eye/Dental: UTD with Both- yearly      TTE 1/11/2019:  · Moderately decreased left ventricular systolic function. The estimated ejection fraction is 35%  · Grade I (mild) left ventricular diastolic dysfunction consistent with impaired relaxation.  · Eccentric left ventricular hypertrophy.  · Normal right ventricular systolic function.  · Mild left atrial enlargement.  · The estimated PA systolic pressure is 18 mm Hg    saw Cardiology Dr. Rausch electrophysiology 10/18/2019 and there was a concern for greater numbers of PVCs noted on EKG.  They advised a Holter monitor, which she declined, but since her ejection fraction is overall stable on echo they are okay with monitoring        Past Medical History  Past Medical History:   Diagnosis Date    Chondromalacia of right patella     MRI 6/2016 at [facility]    Diverticulitis     GERD (gastroesophageal reflux disease)     Hypoglycemia 11/22/2014    OA (osteoarthritis) of knee     bilateraly, followed by Dr. Callejas, [facility], s/p synvisc injections    Osteopenia 7/26/2013    Personal history of kidney stones 7/26/2013    Rosacea 7/26/2013    Tear of medial meniscus of right knee     MRI 6/2016       Past Surgical History  Past Surgical History:   Procedure Laterality Date    ADENOIDECTOMY      FOOT SURGERY Bilateral     KNEE ARTHROSCOPY W/ MENISCAL REPAIR Right 5/14    KNEE SURGERY Left     partial knee replacement    LIPOMA RESECTION Bilateral 4/22/2019    Procedure: EXCISION, LIPOMAS  chestwall , both groins/legs;  Surgeon: NOLA Werner MD;  Location: [facility];  Service: General;  Laterality: Bilateral;  evicel, surgiflo     TONSILLECTOMY      TUBAL LIGATION         Family History  Family History   Problem Relation Age of Onset    Hypertension Mother     Diabetes Mother     Glaucoma Mother     Cataracts Mother     Hypertension Father     Stroke Father     No Known Problems Sister     No Known Problems Brother     No Known Problems Daughter     No Known Problems Son     No Known Problems Sister     No Known Problems Daughter        Social History  Social History     Socioeconomic History    Marital status:      Spouse name: Not on file    Number of children: y    Years of education: Not on file    Highest education level: Not on file   Occupational History    Occupation:      Comment: Works at [employer] firm   Social Needs    Financial resource strain: Not on file    Food insecurity     Worry: Not on file     Inability: Not on file    Transportation needs     Medical: Not on file     Non-medical: Not on file   Tobacco Use    Smoking status: Former Smoker     Packs/day: 0.25     Years: 30.00     Pack years: 7.50     Quit date: 2/1/2005     Years since quitting: 15.4    Smokeless tobacco: Former User   Substance and Sexual Activity    Alcohol use: No    Drug use: No    Sexual activity: Not on file   Lifestyle    Physical activity     Days per week: Not on file     Minutes per session: Not on file    Stress: Not on file   Relationships    Social connections     Talks on phone: Not on file     Gets together: Not on file     Attends Hinduism service: Not on file     Active member of club or organization: Not on file     Attends meetings of clubs or organizations: Not on file     Relationship status: Not on file   Other Topics Concern    Not on file   Social History Narrative    Originally from [place], living in [place] with son       Current Medications  Medications reviewed and updated.     Allergies   Review of patient's allergies indicates:   Allergen Reactions    Aldactone [spironolactone]  "Other (See Comments)     Hyperkalemia when initiated on 01/23/2018        Review of Systems (Pertinent positives)  Review of Systems   Constitutional: Positive for unexpected weight change (weight gain).   HENT: Negative for trouble swallowing.    Eyes: Negative for visual disturbance.   Respiratory: Negative for shortness of breath.    Cardiovascular: Negative for chest pain and leg swelling.   Gastrointestinal: Negative for constipation and diarrhea.   Genitourinary: Positive for frequency. Negative for dysuria.   Musculoskeletal: Positive for arthralgias.   Allergic/Immunologic: Positive for environmental allergies.   Neurological: Negative for dizziness and light-headedness.   Psychiatric/Behavioral: Negative for sleep disturbance.       /78 (BP Location: Left arm, Patient Position: Sitting)   Pulse 67   Ht 5' 7" (1.702 m)   Wt 104.2 kg (229 lb 11.5 oz)   SpO2 98%   BMI 35.98 kg/m²     Physical Exam  Vitals signs reviewed.   Constitutional:       General: She is not in acute distress.     Appearance: Normal appearance. She is well-developed. She is obese.   HENT:      Head: Normocephalic and atraumatic.      Right Ear: Ear canal normal. Tympanic membrane is not erythematous or bulging.      Left Ear: Ear canal normal. Tympanic membrane is not erythematous or bulging.      Mouth/Throat:      Pharynx: No oropharyngeal exudate.   Eyes:      Conjunctiva/sclera: Conjunctivae normal.   Neck:      Thyroid: No thyroid mass or thyromegaly.      Vascular: No carotid bruit.   Cardiovascular:      Rate and Rhythm: Normal rate and regular rhythm.      Pulses:           Dorsalis pedis pulses are 2+ on the right side and 2+ on the left side.      Heart sounds: Normal heart sounds. No murmur.   Pulmonary:      Effort: Pulmonary effort is normal. No respiratory distress.      Breath sounds: Normal breath sounds.   Abdominal:      General: Bowel sounds are normal. There is no distension.      Palpations: Abdomen is " soft. There is no mass.      Tenderness: There is no abdominal tenderness.   Musculoskeletal: Normal range of motion.   Lymphadenopathy:      Cervical: No cervical adenopathy.   Skin:     General: Skin is warm and dry.      Findings: No rash.   Neurological:      General: No focal deficit present.      Mental Status: She is alert and oriented to person, place, and time.   Psychiatric:         Mood and Affect: Mood normal.         Behavior: Behavior normal.           Assessment/Plan:  Violet Mirza is a 70 y.o. female who presents today for :    Violet was seen today for annual exam.    Diagnoses and all orders for this visit:    Routine general medical examination at a health care facility  Comments:  HEALTH MAINTENANCE REVIEWED: LABS/VACCINES/CSCOPE UTD, MAMMO PER GYN & DEXA 4/2021. NEEDS SHINGRIX 2/2. ADVISED ON DIET/EXERCISE/SLEEP, EYE/DENTAL EXAMS  Orders:  -     Hemoglobin A1C; Future  -     Comprehensive metabolic panel; Future  -     Lipid Panel; Future  -     CBC auto differential; Future  -     TSH; Future  -     Vitamin D; Future  -     Magnesium; Future    Class 2 severe obesity due to excess calories with serious comorbidity and body mass index (BMI) of 35.0 to 35.9 in adult  Comments:  Needs to get back doing regular cardiovascular exercise-will try some walking as she is still hesitant to go to the gym given COVID-19 concerns    Primary osteoarthritis of right knee  Comments:  synvisc every 6 months-- stable    Benign essential hypertension  Comments:  Controlled on Toprol-XL to 100 mg daily with Cozaar 100 mg daily and daily Lasix dose of 40 mg.  Ongoing monitoring    Postmenopausal osteoporosis  Comments:  Vitamin-D now in normal range on over-the-counter supplement, continue calcium/D3/weight-bearing exercises, repeat DEXA 04/2021  Orders:  -     Comprehensive metabolic panel; Future  -     TSH; Future  -     Vitamin D; Future  -     Magnesium; Future    Vitamin D deficiency  -     Vitamin D;  Future    Acquired hypothyroidism  Comments:  TSH now in goal range on levothyroxine 112 mcg daily, recheck in 6 months  Orders:  -     levothyroxine (SYNTHROID) 112 MCG tablet; Take 1 tablet (112 mcg total) by mouth once daily.  -     TSH; Future    Paroxysmal atrial fibrillation  Comments:  Currently stable on Toprol  mg daily, on Xarelto for stroke prevention, will see cardiology November of 2020.   Orders:  -     rivaroxaban (XARELTO) 20 mg Tab; Take 1 tablet (20 mg total) by mouth daily with dinner or evening meal.    Chronic systolic heart failure  Comments:  Most recent echo January 2019 with improvement in ejection fraction of 35%.  Weight and exercise tolerance are stable, no evidence of volume overload on exam  Orders:  -     rivaroxaban (XARELTO) 20 mg Tab; Take 1 tablet (20 mg total) by mouth daily with dinner or evening meal.    Venous insufficiency of both lower extremities    Current use of long term anticoagulation  -     rivaroxaban (XARELTO) 20 mg Tab; Take 1 tablet (20 mg total) by mouth daily with dinner or evening meal.    Environmental and seasonal allergies  Comments:  Stable on nightly Xyzal    History of peptic ulcer    Personal history of kidney stones    Medication management  -     Hemoglobin A1C; Future  -     Comprehensive metabolic panel; Future  -     Lipid Panel; Future  -     CBC auto differential; Future  -     TSH; Future  -     Vitamin D; Future  -     Magnesium; Future            ICD-10-CM ICD-9-CM    1. Routine general medical examination at a health care facility  Z00.00 V70.0 Hemoglobin A1C      Comprehensive metabolic panel      Lipid Panel      CBC auto differential      TSH      Vitamin D      Magnesium    HEALTH MAINTENANCE REVIEWED: LABS/VACCINES/CSCOPE UTD, MAMMO PER GYN & DEXA 4/2021. NEEDS SHINGRIX 2/2. ADVISED ON DIET/EXERCISE/SLEEP, EYE/DENTAL EXAMS   2. Class 2 severe obesity due to excess calories with serious comorbidity and body mass index (BMI) of 35.0 to  35.9 in adult  E66.01 278.01     Z68.35 V85.35     Needs to get back doing regular cardiovascular exercise-will try some walking as she is still hesitant to go to the gym given COVID-19 concerns   3. Primary osteoarthritis of right knee  M17.11 715.16     synvisc every 6 months-- stable   4. Benign essential hypertension  I10 401.1     Controlled on Toprol-XL to 100 mg daily with Cozaar 100 mg daily and daily Lasix dose of 40 mg.  Ongoing monitoring   5. Postmenopausal osteoporosis  M81.0 733.01 Comprehensive metabolic panel      TSH      Vitamin D      Magnesium    Vitamin-D now in normal range on over-the-counter supplement, continue calcium/D3/weight-bearing exercises, repeat DEXA 04/2021   6. Vitamin D deficiency  E55.9 268.9 Vitamin D   7. Acquired hypothyroidism  E03.9 244.9 levothyroxine (SYNTHROID) 112 MCG tablet      TSH    TSH now in goal range on levothyroxine 112 mcg daily, recheck in 6 months   8. Paroxysmal atrial fibrillation  I48.0 427.31 rivaroxaban (XARELTO) 20 mg Tab    Currently stable on Toprol  mg daily, on Xarelto for stroke prevention, will see cardiology November of 2020.    9. Chronic systolic heart failure  I50.22 428.22 rivaroxaban (XARELTO) 20 mg Tab    Most recent echo January 2019 with improvement in ejection fraction of 35%.  Weight and exercise tolerance are stable, no evidence of volume overload on exam   10. Venous insufficiency of both lower extremities  I87.2 459.81    11. Current use of long term anticoagulation  Z79.01 V58.61 rivaroxaban (XARELTO) 20 mg Tab   12. Environmental and seasonal allergies  J30.89 477.8     Stable on nightly Xyzal   13. History of peptic ulcer  Z87.11 V12.71    14. Personal history of kidney stones  Z87.442 V13.01    15. Medication management  Z79.899 V58.69 Hemoglobin A1C      Comprehensive metabolic panel      Lipid Panel      CBC auto differential      TSH      Vitamin D      Magnesium       There are no Patient Instructions on file for this  visit.      Follow up in about 6 months (around 1/13/2021) for to follow up on lab results, return as needed for new concerns.

## 2020-07-14 ENCOUNTER — TELEPHONE (OUTPATIENT)
Dept: FAMILY MEDICINE | Facility: CLINIC | Age: 70
End: 2020-07-14

## 2020-07-14 ENCOUNTER — OFFICE VISIT (OUTPATIENT)
Dept: URGENT CARE | Facility: CLINIC | Age: 70
End: 2020-07-14
Payer: MEDICARE

## 2020-07-14 ENCOUNTER — PATIENT MESSAGE (OUTPATIENT)
Dept: FAMILY MEDICINE | Facility: CLINIC | Age: 70
End: 2020-07-14

## 2020-07-14 VITALS
SYSTOLIC BLOOD PRESSURE: 138 MMHG | DIASTOLIC BLOOD PRESSURE: 84 MMHG | WEIGHT: 229 LBS | BODY MASS INDEX: 35.94 KG/M2 | RESPIRATION RATE: 16 BRPM | HEART RATE: 72 BPM | OXYGEN SATURATION: 98 % | TEMPERATURE: 98 F | HEIGHT: 67 IN

## 2020-07-14 DIAGNOSIS — I50.22 CHRONIC SYSTOLIC HEART FAILURE: ICD-10-CM

## 2020-07-14 DIAGNOSIS — S00.412A ABRASION OF LEFT EAR, INITIAL ENCOUNTER: ICD-10-CM

## 2020-07-14 DIAGNOSIS — I48.0 PAROXYSMAL ATRIAL FIBRILLATION: ICD-10-CM

## 2020-07-14 DIAGNOSIS — Z79.01 CURRENT USE OF LONG TERM ANTICOAGULATION: ICD-10-CM

## 2020-07-14 DIAGNOSIS — H61.21 IMPACTED CERUMEN OF RIGHT EAR: Primary | ICD-10-CM

## 2020-07-14 PROCEDURE — 99214 PR OFFICE/OUTPT VISIT, EST, LEVL IV, 30-39 MIN: ICD-10-PCS | Mod: 25,S$GLB,, | Performed by: PHYSICIAN ASSISTANT

## 2020-07-14 PROCEDURE — 99214 OFFICE O/P EST MOD 30 MIN: CPT | Mod: 25,S$GLB,, | Performed by: PHYSICIAN ASSISTANT

## 2020-07-14 PROCEDURE — 69209 REMOVE IMPACTED EAR WAX UNI: CPT | Mod: LT,S$GLB,, | Performed by: PHYSICIAN ASSISTANT

## 2020-07-14 PROCEDURE — 69209 EAR CERUMEN REMOVAL: ICD-10-PCS | Mod: LT,S$GLB,, | Performed by: PHYSICIAN ASSISTANT

## 2020-07-14 RX ORDER — RIVAROXABAN 20 MG/1
20 TABLET, FILM COATED ORAL
Qty: 10 TABLET | Refills: 1 | Status: SHIPPED | OUTPATIENT
Start: 2020-07-14 | End: 2021-07-16 | Stop reason: SDUPTHER

## 2020-07-14 RX ORDER — RIVAROXABAN 20 MG/1
20 TABLET, FILM COATED ORAL
Qty: 90 TABLET | Refills: 4 | Status: SHIPPED | OUTPATIENT
Start: 2020-07-14 | End: 2021-09-28 | Stop reason: SDUPTHER

## 2020-07-14 NOTE — TELEPHONE ENCOUNTER
Patient came in to clinic requesting walk in.  Advised our schedule was full and recommended ER or urgent care.

## 2020-07-14 NOTE — PROCEDURES
"Ear Cerumen Removal    Date/Time: 7/14/2020 10:30 AM  Performed by: Sarabjit Shannon PA-C  Authorized by: Sarabjit Shannon PA-C     Time out: Immediately prior to procedure a "time out" was called to verify the correct patient, procedure, equipment, support staff and site/side marked as required.      Local anesthetic:  None  Cerumenolytics applied prior to procedure: none.    Ceruminolytic: plain warm tap water.  Location details:  Left ear  Procedure type: irrigation    Cerumen  Removal Results:  Other (wet blood clot completely removed; resolution of hearing loss post procedure)  Patient tolerance:  Patient tolerated the procedure well with no immediate complications      "

## 2020-07-14 NOTE — TELEPHONE ENCOUNTER
Spoke with patient over the phone-- she did not have any bleeding after ear cleaning until that night. She reports taking her PM dose of xarelto and then noting her left ear canal beginning to bleed and unfortunately it oozed throughout the night.    She attempted to be seen by me at the office but was informed by staff of no available appointments, and she was referred to urgent care.     Staff later informed me of this incident after patient had left, and I understood their rationale-- patient had come up to patient care area w/o appointment in violation of COVID-19 MOB procedures and staff felt appropriate for her to be seen at outside location.    Also understand patient's frustration that she was not able to see the doctor who performed a procedure and then gave her a complication for which she desired re-evaluation.    Called patient to apologize. Fortunately she went to urgent care and her ear issue has resolved. They flushed ear and removed scabbed blood. She reports no ongoing concerns, HEIDY Gunter MD

## 2020-07-14 NOTE — PATIENT INSTRUCTIONS
PLEASE READ YOUR DISCHARGE INSTRUCTIONS ENTIRELY AS IT CONTAINS IMPORTANT INFORMATION.  - OTC Tylenol/anti-inflammatory as needed for pain  - if no improvement or worsening symptoms, recommend follow-up with PCP for further evaluation.  Please call the number below to set up appointment.    - discussed weight loss given obesity  -You must understand that you've received an Urgent Care treatment only and that you may be released before all your medical problems are known or treated. You, the patient, will arrange for follow up care as instructed. Please arrange follow up with your primary medical clinic within 2-5 days if your signs and symptoms have not resolved or worsen.   - Follow up with your PCP or specialty clinic as directed.  You can call [phone] or [phone] to schedule an appointment with the appropriate provider.    - If your condition worsens or fails to improve we recommend that you receive another evaluation at the emergency room immediately or contact your primary medical clinic to discuss your concerns.      RED FLAGS/WARNING SYMPTOMS DISCUSSED WITH PATIENT THAT WOULD WARRANT EMERGENT MEDICAL ATTENTION. Patient aware and verbalized understanding.

## 2020-07-14 NOTE — PROGRESS NOTES
"Subjective:       Patient ID: Violet Mirza is a 70 y.o. female.    Vitals:  height is 5' 7" (1.702 m) and weight is 103.9 kg (229 lb). Her oral temperature is 97.9 °F (36.6 °C). Her blood pressure is 138/84 and her pulse is 72. Her respiration is 16 and oxygen saturation is 98%.     Chief Complaint: Otalgia (L EAR BLEEDING AFTER CLEANING YESTERDAY)    69 yo female with history of paroxysmal AFib(1/2018 in [facility] was given diltiazem & converted spontaneously to NSR), on longterm xalrelto, hypothyroidism, hypertension, obesity, osteopenia, and rosacea, remote prior history of kidney stones who presents to urgent care clinic for evaluation. Patient had her ears cleaned by PCP yesterday; unable to get appt with PCP today. Yesterday evening, she had intermittent spotting/bleeding in left ear. Now unable to hear on left side. No fever, chills, headache, sore throat, difficulty breathing/swallowing, sz, Changes in vision, chest pain, SOB, ABD pain, n/v/d, focal weakness/deficits, leg swelling, or gait instability.       Constitution: Negative for activity change, appetite change, chills, sweating, fatigue, fever and generalized weakness.   HENT: Positive for ear discharge and hearing loss. Negative for ear pain, mouth sores, tongue pain, facial swelling, congestion, postnasal drip, sinus pain, sinus pressure, sore throat, trouble swallowing and voice change.         L EAR HAD SOME BLEEDING POST CLEANING ON YESTERDAY WITH PCP   Neck: Negative for neck pain, neck stiffness and painful lymph nodes.   Cardiovascular: Negative for chest pain, leg swelling, palpitations, sob on exertion and passing out.   Eyes: Negative for eye discharge, eye pain, photophobia, vision loss, double vision and blurred vision.   Respiratory: Negative for chest tightness, cough, sputum production, bloody sputum, COPD, shortness of breath, stridor, wheezing and asthma.    Gastrointestinal: Negative for abdominal pain, nausea, vomiting, " constipation, diarrhea, bright red blood in stool, rectal bleeding, heartburn and bowel incontinence.   Genitourinary: Negative for dysuria, frequency, urgency, urine decreased, flank pain, bladder incontinence, hematuria and history of kidney stones.   Musculoskeletal: Negative for trauma, joint pain, joint swelling, abnormal ROM of joint, muscle cramps and muscle ache.   Skin: Negative for color change, pale, rash, wound and bruising.   Allergic/Immunologic: Negative for seasonal allergies, asthma and immunocompromised state.   Neurological: Negative for dizziness, history of vertigo, light-headedness, passing out, facial drooping, speech difficulty, coordination disturbances, loss of balance, headaches, disorientation, altered mental status, loss of consciousness, numbness, tingling and seizures.   Hematologic/Lymphatic: Negative for swollen lymph nodes, easy bruising/bleeding and trouble clotting. Does not bruise/bleed easily.   Psychiatric/Behavioral: Negative for altered mental status, disorientation, nervous/anxious, sleep disturbance and depression. The patient is not nervous/anxious.        Objective:      Physical Exam   Constitutional: She is oriented to person, place, and time. She appears well-developed. She is cooperative.  Non-toxic appearance. She does not appear ill. No distress.   HENT:   Head: Normocephalic and atraumatic.   Ears:   Right Ear: Hearing, external ear and ear canal normal. There is cerumen present. No drainage, swelling or tenderness. No mastoid tenderness. No middle ear effusion. No decreased hearing is noted.   Left Ear: Tympanic membrane, external ear and ear canal normal. No drainage, swelling or tenderness. No mastoid tenderness. Tympanic membrane is not erythematous.  No middle ear effusion. Decreased hearing is noted.   Nose: Nose normal. No rhinorrhea, purulent discharge or congestion. Right sinus exhibits no maxillary sinus tenderness and no frontal sinus tenderness. Left  sinus exhibits no maxillary sinus tenderness and no frontal sinus tenderness.   Mouth/Throat: Uvula is midline, oropharynx is clear and moist and mucous membranes are normal. Mucous membranes are moist. No oral lesions. No trismus in the jaw. No uvula swelling. No oropharyngeal exudate, posterior oropharyngeal edema, posterior oropharyngeal erythema, tonsillar abscesses or cobblestoning. Tonsils are 0 on the right. Tonsils are 0 on the left. No tonsillar exudate.   Soft cerumen impaction on right ear. Small abrasion on right ext canal with no active bleeding,erythema, or drainage. No concern for infection.      Left external canal with dry blood and 3x5mm wet blood clot easily removed with water irrigation. There is dry skin and small abrasion with no active bleeding,erythema, or drainage. No concern for infection.      Comments: Soft cerumen impaction on right ear. Small abrasion on right ext canal with no active bleeding,erythema, or drainage. No concern for infection.      Left external canal with dry blood and 3x5mm wet blood clot easily removed with water irrigation. There is dry skin and small abrasion with no active bleeding,erythema, or drainage. No concern for infection.  Eyes: Pupils are equal, round, and reactive to light. Conjunctivae, EOM and lids are normal. Right eye exhibits no discharge. Left eye exhibits no discharge.   Neck: Normal range of motion and full passive range of motion without pain. Neck supple. No neck rigidity.   Cardiovascular: Normal rate, regular rhythm, normal heart sounds and normal pulses.   Pulmonary/Chest: Effort normal and breath sounds normal. No accessory muscle usage. No respiratory distress. She has no wheezes. She exhibits no tenderness.   Abdominal: Soft. Normal appearance and bowel sounds are normal. She exhibits no distension. There is no abdominal tenderness. There is no rebound and no guarding.   Musculoskeletal: Normal range of motion.      Right lower leg: No  edema.      Left lower leg: No edema.      Comments: Moves all extremities with normal tone, strength, and ROM.   Lymphadenopathy:     She has no cervical adenopathy.   Neurological: She is alert and oriented to person, place, and time. She has normal motor skills, normal sensation and intact cranial nerves. She displays no weakness, facial symmetry and normal reflexes. No cranial nerve deficit or sensory deficit. She exhibits normal muscle tone. Gait and coordination normal. Coordination normal.   Skin: Skin is warm, dry and not diaphoretic. Capillary refill takes less than 2 seconds. Psychiatric: Her behavior is normal. Mood and thought content normal.   Nursing note and vitals reviewed.        Assessment:       1. Impacted cerumen of right ear    2. Abrasion of left ear, initial encounter          On exam, patient is nontoxic appearing and vitals are stable.  Patient is essentially neurovascularly intact on exam.  No systemic symptoms. Left ear blood clot removed with irrigation; there is small non bleeding abrasion present and no concern for infection. Patient was recommended OTC debrox in a week for right ear cerumen impaction given current abrasion and anticoagulation use for afib.  If symptoms do not improve/worsens, patient was referred back to PCP for continued outpatient workup and management.   Patient was instructed to return for re-evaluation for any worsening or change in current symptoms. Strict ED versus clinic precautions given in depth. Discharge and follow-up instructions given verbally/printed with the patient who expressed understanding and willingness to comply with my recommendations.  Patient verbalized understanding and agreed with the entirety of plan of care.  Plan:         Impacted cerumen of right ear  -     Ear Cerumen Removal    Abrasion of left ear, initial encounter  -     Ear Cerumen Removal           Patient Instructions   PLEASE READ YOUR DISCHARGE INSTRUCTIONS ENTIRELY AS IT  CONTAINS IMPORTANT INFORMATION.  - OTC Tylenol/anti-inflammatory as needed for pain  - if no improvement or worsening symptoms, recommend follow-up with PCP for further evaluation.  Please call the number below to set up appointment.    - discussed weight loss given obesity  -You must understand that you've received an Urgent Care treatment only and that you may be released before all your medical problems are known or treated. You, the patient, will arrange for follow up care as instructed. Please arrange follow up with your primary medical clinic within 2-5 days if your signs and symptoms have not resolved or worsen.   - Follow up with your PCP or specialty clinic as directed.  You can call [phone] or [phone] to schedule an appointment with the appropriate provider.    - If your condition worsens or fails to improve we recommend that you receive another evaluation at the emergency room immediately or contact your primary medical clinic to discuss your concerns.      RED FLAGS/WARNING SYMPTOMS DISCUSSED WITH PATIENT THAT WOULD WARRANT EMERGENT MEDICAL ATTENTION. Patient aware and verbalized understanding.

## 2020-07-28 ENCOUNTER — OFFICE VISIT (OUTPATIENT)
Dept: OTOLARYNGOLOGY | Facility: CLINIC | Age: 70
End: 2020-07-28
Payer: MEDICARE

## 2020-07-28 VITALS
HEIGHT: 67 IN | SYSTOLIC BLOOD PRESSURE: 152 MMHG | HEART RATE: 68 BPM | BODY MASS INDEX: 35.66 KG/M2 | DIASTOLIC BLOOD PRESSURE: 74 MMHG | WEIGHT: 227.19 LBS

## 2020-07-28 DIAGNOSIS — H60.322 ACUTE HEMORRHAGIC OTITIS EXTERNA OF LEFT EAR: Primary | ICD-10-CM

## 2020-07-28 DIAGNOSIS — H61.21 IMPACTED CERUMEN OF RIGHT EAR: ICD-10-CM

## 2020-07-28 DIAGNOSIS — J30.89 NON-SEASONAL ALLERGIC RHINITIS, UNSPECIFIED TRIGGER: ICD-10-CM

## 2020-07-28 PROCEDURE — 3008F BODY MASS INDEX DOCD: CPT | Mod: CPTII,S$GLB,, | Performed by: OTOLARYNGOLOGY

## 2020-07-28 PROCEDURE — 3008F PR BODY MASS INDEX (BMI) DOCUMENTED: ICD-10-PCS | Mod: CPTII,S$GLB,, | Performed by: OTOLARYNGOLOGY

## 2020-07-28 PROCEDURE — 99213 PR OFFICE/OUTPT VISIT, EST, LEVL III, 20-29 MIN: ICD-10-PCS | Mod: 25,S$GLB,, | Performed by: OTOLARYNGOLOGY

## 2020-07-28 PROCEDURE — 1159F PR MEDICATION LIST DOCUMENTED IN MEDICAL RECORD: ICD-10-PCS | Mod: S$GLB,,, | Performed by: OTOLARYNGOLOGY

## 2020-07-28 PROCEDURE — 3077F SYST BP >= 140 MM HG: CPT | Mod: CPTII,S$GLB,, | Performed by: OTOLARYNGOLOGY

## 2020-07-28 PROCEDURE — 1101F PR PT FALLS ASSESS DOC 0-1 FALLS W/OUT INJ PAST YR: ICD-10-PCS | Mod: CPTII,S$GLB,, | Performed by: OTOLARYNGOLOGY

## 2020-07-28 PROCEDURE — 3078F PR MOST RECENT DIASTOLIC BLOOD PRESSURE < 80 MM HG: ICD-10-PCS | Mod: CPTII,S$GLB,, | Performed by: OTOLARYNGOLOGY

## 2020-07-28 PROCEDURE — 99999 PR PBB SHADOW E&M-EST. PATIENT-LVL IV: ICD-10-PCS | Mod: PBBFAC,,, | Performed by: OTOLARYNGOLOGY

## 2020-07-28 PROCEDURE — 1159F MED LIST DOCD IN RCRD: CPT | Mod: S$GLB,,, | Performed by: OTOLARYNGOLOGY

## 2020-07-28 PROCEDURE — 3077F PR MOST RECENT SYSTOLIC BLOOD PRESSURE >= 140 MM HG: ICD-10-PCS | Mod: CPTII,S$GLB,, | Performed by: OTOLARYNGOLOGY

## 2020-07-28 PROCEDURE — 69210 PR REMOVAL IMPACTED CERUMEN REQUIRING INSTRUMENTATION, UNILATERAL: ICD-10-PCS | Mod: S$GLB,,, | Performed by: OTOLARYNGOLOGY

## 2020-07-28 PROCEDURE — 99213 OFFICE O/P EST LOW 20 MIN: CPT | Mod: 25,S$GLB,, | Performed by: OTOLARYNGOLOGY

## 2020-07-28 PROCEDURE — 1126F AMNT PAIN NOTED NONE PRSNT: CPT | Mod: S$GLB,,, | Performed by: OTOLARYNGOLOGY

## 2020-07-28 PROCEDURE — 3078F DIAST BP <80 MM HG: CPT | Mod: CPTII,S$GLB,, | Performed by: OTOLARYNGOLOGY

## 2020-07-28 PROCEDURE — 99999 PR PBB SHADOW E&M-EST. PATIENT-LVL IV: CPT | Mod: PBBFAC,,, | Performed by: OTOLARYNGOLOGY

## 2020-07-28 PROCEDURE — 1101F PT FALLS ASSESS-DOCD LE1/YR: CPT | Mod: CPTII,S$GLB,, | Performed by: OTOLARYNGOLOGY

## 2020-07-28 PROCEDURE — 1126F PR PAIN SEVERITY QUANTIFIED, NO PAIN PRESENT: ICD-10-PCS | Mod: S$GLB,,, | Performed by: OTOLARYNGOLOGY

## 2020-07-28 PROCEDURE — 69210 REMOVE IMPACTED EAR WAX UNI: CPT | Mod: S$GLB,,, | Performed by: OTOLARYNGOLOGY

## 2020-07-28 RX ORDER — OFLOXACIN 3 MG/ML
3 SOLUTION AURICULAR (OTIC) 2 TIMES DAILY
Qty: 10 ML | Refills: 0 | Status: SHIPPED | OUTPATIENT
Start: 2020-07-28 | End: 2020-08-22

## 2020-07-28 NOTE — PROGRESS NOTES
Chief Complaint   Patient presents with    Follow-up    Ear Fullness     wax build up,    .     HPI:  Mrs. Mirza is a 67 year old female presents for evaluation of aural fullness in bilateral ears.  She relates that she recently had cerumen removal done at her PCPs office and had bloody otorrhea afterwards.  She relates that she thinks ear canal was scratched in she is on a blood thinner.  She did see urgent care who flushed out the blood.  She denies any further drainage at this point.  She has complaints of hearing loss in the affected ears, but denies pain or drainage.  This has been an issue in the past.        Past Medical History:   Diagnosis Date    Chondromalacia of right patella     [facility] 6/2016 at [facility]    Diverticulitis     GERD (gastroesophageal reflux disease)     Hypoglycemia 11/22/2014    OA (osteoarthritis) of knee     bilateraly, followed by Dr. Callejas, [facility], s/p synvisc injections    Osteopenia 7/26/2013    Personal history of kidney stones 7/26/2013    Rosacea 7/26/2013    Tear of medial meniscus of right knee     MRI 6/2016     Social History     Socioeconomic History    Marital status:      Spouse name: Not on file    Number of children: y    Years of education: Not on file    Highest education level: Not on file   Occupational History    Occupation:      Comment: Works at [employer]   Social Needs    Financial resource strain: Not on file    Food insecurity     Worry: Not on file     Inability: Not on file    Transportation needs     Medical: Not on file     Non-medical: Not on file   Tobacco Use    Smoking status: Former Smoker     Packs/day: 0.25     Years: 30.00     Pack years: 7.50     Quit date: 2/1/2005     Years since quitting: 15.4    Smokeless tobacco: Former User   Substance and Sexual Activity    Alcohol use: No    Drug use: No    Sexual activity: Not on file   Lifestyle    Physical activity     Days per week:  Not on file     Minutes per session: Not on file    Stress: Not on file   Relationships    Social connections     Talks on phone: Not on file     Gets together: Not on file     Attends [employer] service: Not on file     Active member of club or organization: Not on file     Attends meetings of clubs or organizations: Not on file     Relationship status: Not on file   Other Topics Concern    Not on file   Social History Narrative    Originally from [place], living in [place] with son     Past Surgical History:   Procedure Laterality Date    ADENOIDECTOMY      FOOT SURGERY Bilateral     KNEE ARTHROSCOPY W/ MENISCAL REPAIR Right 5/14    KNEE SURGERY Left     partial knee replacement    LIPOMA RESECTION Bilateral 4/22/2019    Procedure: EXCISION, LIPOMAS  chestwall , both groins/legs;  Surgeon: NOLA Werner MD;  Location: [facility] OR;  Service: General;  Laterality: Bilateral;  evicel, surgiflo    TONSILLECTOMY      TUBAL LIGATION       Family History   Problem Relation Age of Onset    Hypertension Mother     Diabetes Mother     Glaucoma Mother     Cataracts Mother     Hypertension Father     Stroke Father     No Known Problems Sister     No Known Problems Brother     No Known Problems Daughter     No Known Problems Son     No Known Problems Sister     No Known Problems Daughter            Review of Systems  General: negative for chills, fever or weight loss  Psychological: negative for mood changes or depression  Ophthalmic: negative for blurry vision, photophobia or eye pain  ENT: see HPI  Respiratory ROS: no cough, shortness of breath, or wheezing  Cardiovascular: no chest pain or dyspnea on exertion  Gastrointestinal ROS: no abdominal pain, change in bowel habits, or black/ bloody stools  Musculoskeletal ROS: negative for gait disturbance or muscular weakness  Neurological: no syncope or seizures; no ataxia  Dermatological: negative for puritis,  rash and jaundice  Hematologic/lymphatic: no  easy bruising, no new lumps or bumps      Physical Exam:    Vitals:    07/28/20 1104   BP: (!) 152/74   Pulse: 68       Constitutional: Well appearing / communicating without difficutly.  NAD.  Eyes: EOM I Bilaterally  Head/Face: Normocephalic.  Negative paranasal sinus pressure/tenderness.  Salivary glands WNL.  House Brackmann I Bilaterally.    Right Ear: External ear normal and ear canal occluded. Decreased hearing is noted.   Left Ear: External ear canal with excoriation and scabbing. No active otorrhea. TM intact; normal mobility.       Nose: Nasal septal deviation. Inferior Turbinates 3+ bilaterally. No septal perforation. No masses/lesions. External nasal skin appears normal without masses/lesions.  Oral Cavity: Gingiva/lips within normal limits.  Dentition/gingiva healthy appearing. Mucus membranes moist. Floor of mouth soft, no masses palpated. Oral Tongue mobile. Hard Palate appears normal.    Oropharynx: Base of tongue appears normal. No masses/lesions noted. Tonsillar fossa/pharyngeal wall without lesions. Posterior oropharynx WNL.  Soft palate without masses. Midline uvula.   Neck/Lymphatic: No LAD I-VI bilaterally.  No thyromegaly.  No masses noted on exam.    Mirror laryngoscopy/nasopharyngoscopy: Active gag reflex.  Unable to perform.    Neuro/Psychiatric: AOx3.  Normal mood and affect.   Cardiovascular: Normal carotid pulses bilaterally, no increasing jugular venous distention noted at cervical region bilaterally.    Respiratory: Normal respiratory effort, no stridor, no retractions noted.            Assessment:    ICD-10-CM ICD-9-CM    1. Acute hemorrhagic otitis externa of left ear  H60.322 380.10    2. Impacted cerumen of right ear  H61.21 380.4    3. Non-seasonal allergic rhinitis, unspecified trigger  J30.89 477.8      The primary encounter diagnosis was Acute hemorrhagic otitis externa of left ear. Diagnoses of Impacted cerumen of right ear and Non-seasonal allergic rhinitis, unspecified  trigger were also pertinent to this visit.      Plan:  No orders of the defined types were placed in this encounter.    Chronic allergic rhinitis:  Continue Zyrtec. Flonase and Nasal saline irrigations BID.   Otitis externa As: start ofloxacin 3 drops to left ear BID for 10 days.    Cerumen impaction:  Removed today without difficulty.  I would recommend the use of a wax softening drop, either over the counter Debrox or mineral oil, on a weekly basis.  I also instructed the patient to avoid Qtips.      Sofia Yoder MD

## 2020-07-28 NOTE — PROCEDURES
Procedures     Procedure Note    DIAGNOSIS: Right Cerumen Impaction    Description:  The patient was seated in an exam chair.  An ear speculum was placed in the right EAC and was examined under the microscope.  Suction and/or loop curettes were used to remove a large cerumen impaction.  The tympanic membrane was visualized and was normal in appearance.  The patient tolerated the procedure well without complications.

## 2020-10-08 ENCOUNTER — PES CALL (OUTPATIENT)
Dept: ADMINISTRATIVE | Facility: CLINIC | Age: 70
End: 2020-10-08

## 2020-10-08 ENCOUNTER — IMMUNIZATION (OUTPATIENT)
Dept: PHARMACY | Facility: CLINIC | Age: 70
End: 2020-10-08
Payer: MEDICARE

## 2020-10-22 ENCOUNTER — PES CALL (OUTPATIENT)
Dept: ADMINISTRATIVE | Facility: CLINIC | Age: 70
End: 2020-10-22

## 2020-10-27 DIAGNOSIS — I48.0 PAROXYSMAL ATRIAL FIBRILLATION: ICD-10-CM

## 2020-10-27 DIAGNOSIS — I50.22 CHRONIC SYSTOLIC HEART FAILURE: ICD-10-CM

## 2020-10-27 RX ORDER — METOPROLOL SUCCINATE 200 MG/1
200 TABLET, EXTENDED RELEASE ORAL DAILY
Qty: 90 TABLET | Refills: 0 | Status: SHIPPED | OUTPATIENT
Start: 2020-10-27 | End: 2021-02-01 | Stop reason: SDUPTHER

## 2020-10-27 RX ORDER — METOPROLOL SUCCINATE 200 MG/1
200 TABLET, EXTENDED RELEASE ORAL DAILY
Qty: 90 TABLET | Refills: 3 | Status: CANCELLED | OUTPATIENT
Start: 2020-10-27

## 2021-01-11 ENCOUNTER — LAB VISIT (OUTPATIENT)
Dept: LAB | Facility: HOSPITAL | Age: 71
End: 2021-01-11
Attending: FAMILY MEDICINE
Payer: MEDICARE

## 2021-01-11 DIAGNOSIS — E03.9 ACQUIRED HYPOTHYROIDISM: ICD-10-CM

## 2021-01-11 DIAGNOSIS — E55.9 VITAMIN D DEFICIENCY: ICD-10-CM

## 2021-01-11 DIAGNOSIS — M81.0 POSTMENOPAUSAL OSTEOPOROSIS: ICD-10-CM

## 2021-01-11 DIAGNOSIS — Z79.899 MEDICATION MANAGEMENT: ICD-10-CM

## 2021-01-11 DIAGNOSIS — Z00.00 ROUTINE GENERAL MEDICAL EXAMINATION AT A HEALTH CARE FACILITY: ICD-10-CM

## 2021-01-11 LAB
25(OH)D3+25(OH)D2 SERPL-MCNC: 51 NG/ML (ref 30–96)
ALBUMIN SERPL BCP-MCNC: 3.9 G/DL (ref 3.5–5.2)
ALP SERPL-CCNC: 144 U/L (ref 55–135)
ALT SERPL W/O P-5'-P-CCNC: 17 U/L (ref 10–44)
ANION GAP SERPL CALC-SCNC: 13 MMOL/L (ref 8–16)
AST SERPL-CCNC: 19 U/L (ref 10–40)
BASOPHILS # BLD AUTO: 0.04 K/UL (ref 0–0.2)
BASOPHILS NFR BLD: 0.4 % (ref 0–1.9)
BILIRUB SERPL-MCNC: 0.3 MG/DL (ref 0.1–1)
BUN SERPL-MCNC: 19 MG/DL (ref 8–23)
CALCIUM SERPL-MCNC: 9.3 MG/DL (ref 8.7–10.5)
CHLORIDE SERPL-SCNC: 100 MMOL/L (ref 95–110)
CHOLEST SERPL-MCNC: 189 MG/DL (ref 120–199)
CHOLEST/HDLC SERPL: 3.4 {RATIO} (ref 2–5)
CO2 SERPL-SCNC: 29 MMOL/L (ref 23–29)
CREAT SERPL-MCNC: 0.9 MG/DL (ref 0.5–1.4)
DIFFERENTIAL METHOD: ABNORMAL
EOSINOPHIL # BLD AUTO: 0.2 K/UL (ref 0–0.5)
EOSINOPHIL NFR BLD: 2.1 % (ref 0–8)
ERYTHROCYTE [DISTWIDTH] IN BLOOD BY AUTOMATED COUNT: 14.4 % (ref 11.5–14.5)
EST. GFR  (AFRICAN AMERICAN): >60 ML/MIN/1.73 M^2
EST. GFR  (NON AFRICAN AMERICAN): >60 ML/MIN/1.73 M^2
ESTIMATED AVG GLUCOSE: 117 MG/DL (ref 68–131)
GLUCOSE SERPL-MCNC: 109 MG/DL (ref 70–110)
HBA1C MFR BLD HPLC: 5.7 % (ref 4–5.6)
HCT VFR BLD AUTO: 43.2 % (ref 37–48.5)
HDLC SERPL-MCNC: 56 MG/DL (ref 40–75)
HDLC SERPL: 29.6 % (ref 20–50)
HGB BLD-MCNC: 13.3 G/DL (ref 12–16)
IMM GRANULOCYTES # BLD AUTO: 0.03 K/UL (ref 0–0.04)
IMM GRANULOCYTES NFR BLD AUTO: 0.3 % (ref 0–0.5)
LDLC SERPL CALC-MCNC: 100.6 MG/DL (ref 63–159)
LYMPHOCYTES # BLD AUTO: 2.1 K/UL (ref 1–4.8)
LYMPHOCYTES NFR BLD: 22.8 % (ref 18–48)
MAGNESIUM SERPL-MCNC: 2 MG/DL (ref 1.6–2.6)
MCH RBC QN AUTO: 27.3 PG (ref 27–31)
MCHC RBC AUTO-ENTMCNC: 30.8 G/DL (ref 32–36)
MCV RBC AUTO: 89 FL (ref 82–98)
MONOCYTES # BLD AUTO: 0.5 K/UL (ref 0.3–1)
MONOCYTES NFR BLD: 5.8 % (ref 4–15)
NEUTROPHILS # BLD AUTO: 6.4 K/UL (ref 1.8–7.7)
NEUTROPHILS NFR BLD: 68.6 % (ref 38–73)
NONHDLC SERPL-MCNC: 133 MG/DL
NRBC BLD-RTO: 0 /100 WBC
PLATELET # BLD AUTO: 333 K/UL (ref 150–350)
PMV BLD AUTO: 11.6 FL (ref 9.2–12.9)
POTASSIUM SERPL-SCNC: 3.6 MMOL/L (ref 3.5–5.1)
PROT SERPL-MCNC: 7.4 G/DL (ref 6–8.4)
RBC # BLD AUTO: 4.87 M/UL (ref 4–5.4)
SODIUM SERPL-SCNC: 142 MMOL/L (ref 136–145)
TRIGL SERPL-MCNC: 162 MG/DL (ref 30–150)
TSH SERPL DL<=0.005 MIU/L-ACNC: 1.38 UIU/ML (ref 0.4–4)
WBC # BLD AUTO: 9.36 K/UL (ref 3.9–12.7)

## 2021-01-11 PROCEDURE — 83735 ASSAY OF MAGNESIUM: CPT

## 2021-01-11 PROCEDURE — 36415 COLL VENOUS BLD VENIPUNCTURE: CPT

## 2021-01-11 PROCEDURE — 83036 HEMOGLOBIN GLYCOSYLATED A1C: CPT

## 2021-01-11 PROCEDURE — 85025 COMPLETE CBC W/AUTO DIFF WBC: CPT

## 2021-01-11 PROCEDURE — 80061 LIPID PANEL: CPT

## 2021-01-11 PROCEDURE — 82306 VITAMIN D 25 HYDROXY: CPT

## 2021-01-11 PROCEDURE — 80053 COMPREHEN METABOLIC PANEL: CPT

## 2021-01-11 PROCEDURE — 84443 ASSAY THYROID STIM HORMONE: CPT

## 2021-01-15 ENCOUNTER — OFFICE VISIT (OUTPATIENT)
Dept: FAMILY MEDICINE | Facility: CLINIC | Age: 71
End: 2021-01-15
Payer: MEDICARE

## 2021-01-15 VITALS
DIASTOLIC BLOOD PRESSURE: 82 MMHG | SYSTOLIC BLOOD PRESSURE: 138 MMHG | WEIGHT: 236.31 LBS | BODY MASS INDEX: 35.81 KG/M2 | TEMPERATURE: 98 F | HEART RATE: 63 BPM | HEIGHT: 68 IN | OXYGEN SATURATION: 98 %

## 2021-01-15 DIAGNOSIS — I50.22 CHRONIC SYSTOLIC HEART FAILURE: ICD-10-CM

## 2021-01-15 DIAGNOSIS — Z23 HIGH PRIORITY FOR COVID-19 VIRUS VACCINATION: ICD-10-CM

## 2021-01-15 DIAGNOSIS — I50.32 DIASTOLIC DYSFUNCTION WITH CHRONIC HEART FAILURE: ICD-10-CM

## 2021-01-15 DIAGNOSIS — I50.20 HFREF (HEART FAILURE WITH REDUCED EJECTION FRACTION): ICD-10-CM

## 2021-01-15 DIAGNOSIS — E66.01 CLASS 2 SEVERE OBESITY DUE TO EXCESS CALORIES WITH SERIOUS COMORBIDITY AND BODY MASS INDEX (BMI) OF 35.0 TO 35.9 IN ADULT: ICD-10-CM

## 2021-01-15 DIAGNOSIS — I10 BENIGN ESSENTIAL HYPERTENSION: ICD-10-CM

## 2021-01-15 DIAGNOSIS — Z79.01 CURRENT USE OF LONG TERM ANTICOAGULATION: ICD-10-CM

## 2021-01-15 DIAGNOSIS — I87.2 VENOUS INSUFFICIENCY OF BOTH LOWER EXTREMITIES: ICD-10-CM

## 2021-01-15 DIAGNOSIS — M81.0 POSTMENOPAUSAL OSTEOPOROSIS: ICD-10-CM

## 2021-01-15 DIAGNOSIS — Z91.89 CANDIDATE FOR STATIN THERAPY DUE TO RISK OF FUTURE CARDIOVASCULAR EVENT: ICD-10-CM

## 2021-01-15 DIAGNOSIS — Z79.899 MEDICATION MANAGEMENT: ICD-10-CM

## 2021-01-15 DIAGNOSIS — E78.1 HYPERTRIGLYCERIDEMIA: ICD-10-CM

## 2021-01-15 DIAGNOSIS — I48.0 PAROXYSMAL ATRIAL FIBRILLATION: ICD-10-CM

## 2021-01-15 DIAGNOSIS — E03.9 ACQUIRED HYPOTHYROIDISM: Primary | ICD-10-CM

## 2021-01-15 DIAGNOSIS — Z12.31 ENCOUNTER FOR SCREENING MAMMOGRAM FOR BREAST CANCER: ICD-10-CM

## 2021-01-15 DIAGNOSIS — Z87.11 HISTORY OF PEPTIC ULCER: ICD-10-CM

## 2021-01-15 DIAGNOSIS — E55.9 VITAMIN D DEFICIENCY: ICD-10-CM

## 2021-01-15 PROCEDURE — 3288F FALL RISK ASSESSMENT DOCD: CPT | Mod: CPTII,S$GLB,, | Performed by: FAMILY MEDICINE

## 2021-01-15 PROCEDURE — 1126F PR PAIN SEVERITY QUANTIFIED, NO PAIN PRESENT: ICD-10-PCS | Mod: S$GLB,,, | Performed by: FAMILY MEDICINE

## 2021-01-15 PROCEDURE — 3079F PR MOST RECENT DIASTOLIC BLOOD PRESSURE 80-89 MM HG: ICD-10-PCS | Mod: CPTII,S$GLB,, | Performed by: FAMILY MEDICINE

## 2021-01-15 PROCEDURE — 1101F PR PT FALLS ASSESS DOC 0-1 FALLS W/OUT INJ PAST YR: ICD-10-PCS | Mod: CPTII,S$GLB,, | Performed by: FAMILY MEDICINE

## 2021-01-15 PROCEDURE — 1126F AMNT PAIN NOTED NONE PRSNT: CPT | Mod: S$GLB,,, | Performed by: FAMILY MEDICINE

## 2021-01-15 PROCEDURE — 3079F DIAST BP 80-89 MM HG: CPT | Mod: CPTII,S$GLB,, | Performed by: FAMILY MEDICINE

## 2021-01-15 PROCEDURE — 99214 OFFICE O/P EST MOD 30 MIN: CPT | Mod: S$GLB,,, | Performed by: FAMILY MEDICINE

## 2021-01-15 PROCEDURE — 99999 PR PBB SHADOW E&M-EST. PATIENT-LVL V: CPT | Mod: PBBFAC,,, | Performed by: FAMILY MEDICINE

## 2021-01-15 PROCEDURE — 99499 RISK ADDL DX/OHS AUDIT: ICD-10-PCS | Mod: S$GLB,,, | Performed by: FAMILY MEDICINE

## 2021-01-15 PROCEDURE — 3288F PR FALLS RISK ASSESSMENT DOCUMENTED: ICD-10-PCS | Mod: CPTII,S$GLB,, | Performed by: FAMILY MEDICINE

## 2021-01-15 PROCEDURE — 3075F SYST BP GE 130 - 139MM HG: CPT | Mod: CPTII,S$GLB,, | Performed by: FAMILY MEDICINE

## 2021-01-15 PROCEDURE — 1101F PT FALLS ASSESS-DOCD LE1/YR: CPT | Mod: CPTII,S$GLB,, | Performed by: FAMILY MEDICINE

## 2021-01-15 PROCEDURE — 3008F BODY MASS INDEX DOCD: CPT | Mod: CPTII,S$GLB,, | Performed by: FAMILY MEDICINE

## 2021-01-15 PROCEDURE — 3008F PR BODY MASS INDEX (BMI) DOCUMENTED: ICD-10-PCS | Mod: CPTII,S$GLB,, | Performed by: FAMILY MEDICINE

## 2021-01-15 PROCEDURE — 1159F PR MEDICATION LIST DOCUMENTED IN MEDICAL RECORD: ICD-10-PCS | Mod: S$GLB,,, | Performed by: FAMILY MEDICINE

## 2021-01-15 PROCEDURE — 1159F MED LIST DOCD IN RCRD: CPT | Mod: S$GLB,,, | Performed by: FAMILY MEDICINE

## 2021-01-15 PROCEDURE — 99999 PR PBB SHADOW E&M-EST. PATIENT-LVL V: ICD-10-PCS | Mod: PBBFAC,,, | Performed by: FAMILY MEDICINE

## 2021-01-15 PROCEDURE — 99499 UNLISTED E&M SERVICE: CPT | Mod: S$GLB,,, | Performed by: FAMILY MEDICINE

## 2021-01-15 PROCEDURE — 99214 PR OFFICE/OUTPT VISIT, EST, LEVL IV, 30-39 MIN: ICD-10-PCS | Mod: S$GLB,,, | Performed by: FAMILY MEDICINE

## 2021-01-15 PROCEDURE — 3075F PR MOST RECENT SYSTOLIC BLOOD PRESS GE 130-139MM HG: ICD-10-PCS | Mod: CPTII,S$GLB,, | Performed by: FAMILY MEDICINE

## 2021-01-15 RX ORDER — ATORVASTATIN CALCIUM 20 MG/1
20 TABLET, FILM COATED ORAL DAILY
Qty: 90 TABLET | Refills: 3 | Status: SHIPPED | OUTPATIENT
Start: 2021-01-15 | End: 2021-07-16

## 2021-01-18 ENCOUNTER — PATIENT MESSAGE (OUTPATIENT)
Dept: FAMILY MEDICINE | Facility: CLINIC | Age: 71
End: 2021-01-18

## 2021-01-20 ENCOUNTER — IMMUNIZATION (OUTPATIENT)
Dept: INTERNAL MEDICINE | Facility: CLINIC | Age: 71
End: 2021-01-20
Payer: MEDICARE

## 2021-01-20 DIAGNOSIS — Z23 NEED FOR VACCINATION: Primary | ICD-10-CM

## 2021-01-20 PROCEDURE — 91300 COVID-19, MRNA, LNP-S, PF, 30 MCG/0.3 ML DOSE VACCINE: CPT | Mod: PBBFAC | Performed by: FAMILY MEDICINE

## 2021-02-01 DIAGNOSIS — I48.0 PAROXYSMAL ATRIAL FIBRILLATION: ICD-10-CM

## 2021-02-01 DIAGNOSIS — I50.22 CHRONIC SYSTOLIC HEART FAILURE: ICD-10-CM

## 2021-02-01 RX ORDER — METOPROLOL SUCCINATE 200 MG/1
200 TABLET, EXTENDED RELEASE ORAL DAILY
Qty: 90 TABLET | Refills: 3 | Status: SHIPPED | OUTPATIENT
Start: 2021-02-01 | End: 2022-01-24 | Stop reason: SDUPTHER

## 2021-02-08 ENCOUNTER — IMMUNIZATION (OUTPATIENT)
Dept: INTERNAL MEDICINE | Facility: CLINIC | Age: 71
End: 2021-02-08
Payer: MEDICARE

## 2021-02-08 DIAGNOSIS — Z23 NEED FOR VACCINATION: Primary | ICD-10-CM

## 2021-02-08 PROCEDURE — 0002A COVID-19, MRNA, LNP-S, PF, 30 MCG/0.3 ML DOSE VACCINE: CPT | Mod: PBBFAC | Performed by: FAMILY MEDICINE

## 2021-02-08 PROCEDURE — 91300 COVID-19, MRNA, LNP-S, PF, 30 MCG/0.3 ML DOSE VACCINE: CPT | Mod: PBBFAC | Performed by: FAMILY MEDICINE

## 2021-03-12 ENCOUNTER — PATIENT OUTREACH (OUTPATIENT)
Dept: ADMINISTRATIVE | Facility: OTHER | Age: 71
End: 2021-03-12

## 2021-03-12 ENCOUNTER — PATIENT MESSAGE (OUTPATIENT)
Dept: FAMILY MEDICINE | Facility: CLINIC | Age: 71
End: 2021-03-12

## 2021-03-12 DIAGNOSIS — Z12.31 ENCOUNTER FOR SCREENING MAMMOGRAM FOR MALIGNANT NEOPLASM OF BREAST: Primary | ICD-10-CM

## 2021-03-16 ENCOUNTER — OFFICE VISIT (OUTPATIENT)
Dept: CARDIOLOGY | Facility: CLINIC | Age: 71
End: 2021-03-16
Payer: MEDICARE

## 2021-03-16 VITALS
BODY MASS INDEX: 35.8 KG/M2 | DIASTOLIC BLOOD PRESSURE: 86 MMHG | HEART RATE: 58 BPM | OXYGEN SATURATION: 96 % | HEIGHT: 68 IN | WEIGHT: 236.25 LBS | SYSTOLIC BLOOD PRESSURE: 137 MMHG

## 2021-03-16 DIAGNOSIS — I87.2 VENOUS INSUFFICIENCY OF BOTH LOWER EXTREMITIES: ICD-10-CM

## 2021-03-16 DIAGNOSIS — I34.0 MITRAL VALVE INSUFFICIENCY, UNSPECIFIED ETIOLOGY: ICD-10-CM

## 2021-03-16 DIAGNOSIS — I10 BENIGN ESSENTIAL HYPERTENSION: ICD-10-CM

## 2021-03-16 DIAGNOSIS — E66.01 CLASS 2 SEVERE OBESITY DUE TO EXCESS CALORIES WITH SERIOUS COMORBIDITY AND BODY MASS INDEX (BMI) OF 35.0 TO 35.9 IN ADULT: ICD-10-CM

## 2021-03-16 DIAGNOSIS — I49.3 PVC'S (PREMATURE VENTRICULAR CONTRACTIONS): ICD-10-CM

## 2021-03-16 DIAGNOSIS — I50.20 HFREF (HEART FAILURE WITH REDUCED EJECTION FRACTION): Primary | ICD-10-CM

## 2021-03-16 DIAGNOSIS — E78.1 HYPERTRIGLYCERIDEMIA: ICD-10-CM

## 2021-03-16 DIAGNOSIS — I50.32 DIASTOLIC DYSFUNCTION WITH CHRONIC HEART FAILURE: ICD-10-CM

## 2021-03-16 DIAGNOSIS — I48.0 PAROXYSMAL ATRIAL FIBRILLATION: ICD-10-CM

## 2021-03-16 DIAGNOSIS — Z79.01 CURRENT USE OF LONG TERM ANTICOAGULATION: ICD-10-CM

## 2021-03-16 PROCEDURE — 1159F MED LIST DOCD IN RCRD: CPT | Mod: S$GLB,,, | Performed by: INTERNAL MEDICINE

## 2021-03-16 PROCEDURE — 3288F FALL RISK ASSESSMENT DOCD: CPT | Mod: CPTII,S$GLB,, | Performed by: INTERNAL MEDICINE

## 2021-03-16 PROCEDURE — 3079F DIAST BP 80-89 MM HG: CPT | Mod: CPTII,S$GLB,, | Performed by: INTERNAL MEDICINE

## 2021-03-16 PROCEDURE — 3008F BODY MASS INDEX DOCD: CPT | Mod: CPTII,S$GLB,, | Performed by: INTERNAL MEDICINE

## 2021-03-16 PROCEDURE — 99214 OFFICE O/P EST MOD 30 MIN: CPT | Mod: S$GLB,,, | Performed by: INTERNAL MEDICINE

## 2021-03-16 PROCEDURE — 99999 PR PBB SHADOW E&M-EST. PATIENT-LVL IV: ICD-10-PCS | Mod: PBBFAC,,, | Performed by: INTERNAL MEDICINE

## 2021-03-16 PROCEDURE — 1159F PR MEDICATION LIST DOCUMENTED IN MEDICAL RECORD: ICD-10-PCS | Mod: S$GLB,,, | Performed by: INTERNAL MEDICINE

## 2021-03-16 PROCEDURE — 3008F PR BODY MASS INDEX (BMI) DOCUMENTED: ICD-10-PCS | Mod: CPTII,S$GLB,, | Performed by: INTERNAL MEDICINE

## 2021-03-16 PROCEDURE — 99214 PR OFFICE/OUTPT VISIT, EST, LEVL IV, 30-39 MIN: ICD-10-PCS | Mod: S$GLB,,, | Performed by: INTERNAL MEDICINE

## 2021-03-16 PROCEDURE — 99999 PR PBB SHADOW E&M-EST. PATIENT-LVL IV: CPT | Mod: PBBFAC,,, | Performed by: INTERNAL MEDICINE

## 2021-03-16 PROCEDURE — 3079F PR MOST RECENT DIASTOLIC BLOOD PRESSURE 80-89 MM HG: ICD-10-PCS | Mod: CPTII,S$GLB,, | Performed by: INTERNAL MEDICINE

## 2021-03-16 PROCEDURE — 3075F SYST BP GE 130 - 139MM HG: CPT | Mod: CPTII,S$GLB,, | Performed by: INTERNAL MEDICINE

## 2021-03-16 PROCEDURE — 1126F PR PAIN SEVERITY QUANTIFIED, NO PAIN PRESENT: ICD-10-PCS | Mod: S$GLB,,, | Performed by: INTERNAL MEDICINE

## 2021-03-16 PROCEDURE — 3075F PR MOST RECENT SYSTOLIC BLOOD PRESS GE 130-139MM HG: ICD-10-PCS | Mod: CPTII,S$GLB,, | Performed by: INTERNAL MEDICINE

## 2021-03-16 PROCEDURE — 3288F PR FALLS RISK ASSESSMENT DOCUMENTED: ICD-10-PCS | Mod: CPTII,S$GLB,, | Performed by: INTERNAL MEDICINE

## 2021-03-16 PROCEDURE — 1101F PR PT FALLS ASSESS DOC 0-1 FALLS W/OUT INJ PAST YR: ICD-10-PCS | Mod: CPTII,S$GLB,, | Performed by: INTERNAL MEDICINE

## 2021-03-16 PROCEDURE — 1126F AMNT PAIN NOTED NONE PRSNT: CPT | Mod: S$GLB,,, | Performed by: INTERNAL MEDICINE

## 2021-03-16 PROCEDURE — 1101F PT FALLS ASSESS-DOCD LE1/YR: CPT | Mod: CPTII,S$GLB,, | Performed by: INTERNAL MEDICINE

## 2021-03-18 DIAGNOSIS — I10 BENIGN ESSENTIAL HYPERTENSION: ICD-10-CM

## 2021-03-18 RX ORDER — LOSARTAN POTASSIUM 100 MG/1
100 TABLET ORAL DAILY
Qty: 90 TABLET | Refills: 4 | Status: SHIPPED | OUTPATIENT
Start: 2021-03-18 | End: 2022-08-01 | Stop reason: SDUPTHER

## 2021-03-25 ENCOUNTER — HOSPITAL ENCOUNTER (OUTPATIENT)
Dept: CARDIOLOGY | Facility: HOSPITAL | Age: 71
Discharge: HOME | End: 2021-03-25
Attending: INTERNAL MEDICINE
Payer: MEDICARE

## 2021-03-25 VITALS — WEIGHT: 236 LBS | HEIGHT: 68 IN | BODY MASS INDEX: 35.77 KG/M2

## 2021-03-25 DIAGNOSIS — I50.20 HFREF (HEART FAILURE WITH REDUCED EJECTION FRACTION): ICD-10-CM

## 2021-03-25 LAB
AORTIC ROOT ANNULUS: 3.02 CM
AV INDEX (PROSTH): 0.57
AV MEAN GRADIENT: 6 MMHG
AV PEAK GRADIENT: 10 MMHG
AV VALVE AREA: 1.76 CM2
AV VELOCITY RATIO: 0.64
BSA FOR ECHO PROCEDURE: 2.27 M2
CV ECHO LV RWT: 0.32 CM
DOP CALC AO PEAK VEL: 1.56 M/S
DOP CALC AO VTI: 36.81 CM
DOP CALC LVOT AREA: 3.1 CM2
DOP CALC LVOT DIAMETER: 1.99 CM
DOP CALC LVOT PEAK VEL: 1 M/S
DOP CALC LVOT STROKE VOLUME: 64.75 CM3
DOP CALCLVOT PEAK VEL VTI: 20.83 CM
E WAVE DECELERATION TIME: 263.05 MSEC
E/A RATIO: 0.97
E/E' RATIO: 17.6 M/S
ECHO LV POSTERIOR WALL: 0.9 CM (ref 0.6–1.1)
FRACTIONAL SHORTENING: 32 % (ref 28–44)
INTERVENTRICULAR SEPTUM: 0.7 CM (ref 0.6–1.1)
LA MAJOR: 5.14 CM
LA MINOR: 5.49 CM
LA WIDTH: 4.34 CM
LEFT ATRIUM SIZE: 4.61 CM
LEFT ATRIUM VOLUME INDEX MOD: 28.4 ML/M2
LEFT ATRIUM VOLUME INDEX: 41.2 ML/M2
LEFT ATRIUM VOLUME MOD: 62.25 CM3
LEFT ATRIUM VOLUME: 90.29 CM3
LEFT INTERNAL DIMENSION IN SYSTOLE: 3.88 CM (ref 2.1–4)
LEFT VENTRICLE DIASTOLIC VOLUME INDEX: 55.84 ML/M2
LEFT VENTRICLE DIASTOLIC VOLUME: 122.29 ML
LEFT VENTRICLE MASS INDEX: 78 G/M2
LEFT VENTRICLE SYSTOLIC VOLUME INDEX: 29.8 ML/M2
LEFT VENTRICLE SYSTOLIC VOLUME: 65.2 ML
LEFT VENTRICULAR INTERNAL DIMENSION IN DIASTOLE: 5.7 CM (ref 3.5–6)
LEFT VENTRICULAR MASS: 170.18 G
LV LATERAL E/E' RATIO: 14.67 M/S
LV SEPTAL E/E' RATIO: 22 M/S
MV A" WAVE DURATION": 11.7 MSEC
MV PEAK A VEL: 0.91 M/S
MV PEAK E VEL: 0.88 M/S
MV STENOSIS PRESSURE HALF TIME: 76.28 MS
MV VALVE AREA P 1/2 METHOD: 2.88 CM2
PISA TR MAX VEL: 1.82 M/S
PULM VEIN S/D RATIO: 0.89
PV PEAK D VEL: 0.47 M/S
PV PEAK S VEL: 0.42 M/S
RA MAJOR: 4.7 CM
RA PRESSURE: 3 MMHG
RA WIDTH: 3.22 CM
RIGHT VENTRICULAR END-DIASTOLIC DIMENSION: 3.84 CM
RV TISSUE DOPPLER FREE WALL SYSTOLIC VELOCITY 1 (APICAL 4 CHAMBER VIEW): 9.39 CM/S
TDI LATERAL: 0.06 M/S
TDI SEPTAL: 0.04 M/S
TDI: 0.05 M/S
TR MAX PG: 13 MMHG
TRICUSPID ANNULAR PLANE SYSTOLIC EXCURSION: 2.16 CM
TV REST PULMONARY ARTERY PRESSURE: 16 MMHG

## 2021-03-25 PROCEDURE — 93306 ECHO (CUPID ONLY): ICD-10-PCS | Mod: 26,,, | Performed by: INTERNAL MEDICINE

## 2021-03-25 PROCEDURE — 93306 TTE W/DOPPLER COMPLETE: CPT

## 2021-03-25 PROCEDURE — 93306 TTE W/DOPPLER COMPLETE: CPT | Mod: 26,,, | Performed by: INTERNAL MEDICINE

## 2021-03-31 ENCOUNTER — PATIENT MESSAGE (OUTPATIENT)
Dept: CARDIOLOGY | Facility: CLINIC | Age: 71
End: 2021-03-31

## 2021-04-01 ENCOUNTER — PATIENT MESSAGE (OUTPATIENT)
Dept: CARDIOLOGY | Facility: CLINIC | Age: 71
End: 2021-04-01

## 2021-04-15 DIAGNOSIS — I50.22 CHRONIC SYSTOLIC HEART FAILURE: ICD-10-CM

## 2021-04-15 RX ORDER — FUROSEMIDE 40 MG/1
40 TABLET ORAL DAILY
Qty: 90 TABLET | Refills: 4 | Status: SHIPPED | OUTPATIENT
Start: 2021-04-15 | End: 2021-07-16 | Stop reason: DRUGHIGH

## 2021-07-09 ENCOUNTER — LAB VISIT (OUTPATIENT)
Dept: LAB | Facility: HOSPITAL | Age: 71
End: 2021-07-09
Attending: FAMILY MEDICINE
Payer: MEDICARE

## 2021-07-09 DIAGNOSIS — Z79.899 MEDICATION MANAGEMENT: ICD-10-CM

## 2021-07-09 DIAGNOSIS — I10 BENIGN ESSENTIAL HYPERTENSION: ICD-10-CM

## 2021-07-09 DIAGNOSIS — Z91.89 CANDIDATE FOR STATIN THERAPY DUE TO RISK OF FUTURE CARDIOVASCULAR EVENT: ICD-10-CM

## 2021-07-09 DIAGNOSIS — E03.9 ACQUIRED HYPOTHYROIDISM: ICD-10-CM

## 2021-07-09 LAB
ALBUMIN SERPL BCP-MCNC: 3.6 G/DL (ref 3.5–5.2)
ALP SERPL-CCNC: 130 U/L (ref 55–135)
ALT SERPL W/O P-5'-P-CCNC: 16 U/L (ref 10–44)
ANION GAP SERPL CALC-SCNC: 12 MMOL/L (ref 8–16)
AST SERPL-CCNC: 19 U/L (ref 10–40)
BASOPHILS # BLD AUTO: 0.03 K/UL (ref 0–0.2)
BASOPHILS NFR BLD: 0.3 % (ref 0–1.9)
BILIRUB SERPL-MCNC: 0.3 MG/DL (ref 0.1–1)
BUN SERPL-MCNC: 15 MG/DL (ref 8–23)
CALCIUM SERPL-MCNC: 8.6 MG/DL (ref 8.7–10.5)
CHLORIDE SERPL-SCNC: 104 MMOL/L (ref 95–110)
CHOLEST SERPL-MCNC: 157 MG/DL (ref 120–199)
CHOLEST/HDLC SERPL: 2.9 {RATIO} (ref 2–5)
CO2 SERPL-SCNC: 26 MMOL/L (ref 23–29)
CREAT SERPL-MCNC: 0.8 MG/DL (ref 0.5–1.4)
DIFFERENTIAL METHOD: NORMAL
EOSINOPHIL # BLD AUTO: 0.2 K/UL (ref 0–0.5)
EOSINOPHIL NFR BLD: 2.5 % (ref 0–8)
ERYTHROCYTE [DISTWIDTH] IN BLOOD BY AUTOMATED COUNT: 14.4 % (ref 11.5–14.5)
EST. GFR  (AFRICAN AMERICAN): >60 ML/MIN/1.73 M^2
EST. GFR  (NON AFRICAN AMERICAN): >60 ML/MIN/1.73 M^2
GLUCOSE SERPL-MCNC: 119 MG/DL (ref 70–110)
HCT VFR BLD AUTO: 39.1 % (ref 37–48.5)
HDLC SERPL-MCNC: 55 MG/DL (ref 40–75)
HDLC SERPL: 35 % (ref 20–50)
HGB BLD-MCNC: 12.5 G/DL (ref 12–16)
IMM GRANULOCYTES # BLD AUTO: 0.02 K/UL (ref 0–0.04)
IMM GRANULOCYTES NFR BLD AUTO: 0.2 % (ref 0–0.5)
LDLC SERPL CALC-MCNC: 82 MG/DL (ref 63–159)
LYMPHOCYTES # BLD AUTO: 1.7 K/UL (ref 1–4.8)
LYMPHOCYTES NFR BLD: 18 % (ref 18–48)
MCH RBC QN AUTO: 27.7 PG (ref 27–31)
MCHC RBC AUTO-ENTMCNC: 32 G/DL (ref 32–36)
MCV RBC AUTO: 87 FL (ref 82–98)
MONOCYTES # BLD AUTO: 0.6 K/UL (ref 0.3–1)
MONOCYTES NFR BLD: 6.1 % (ref 4–15)
NEUTROPHILS # BLD AUTO: 6.8 K/UL (ref 1.8–7.7)
NEUTROPHILS NFR BLD: 72.9 % (ref 38–73)
NONHDLC SERPL-MCNC: 102 MG/DL
NRBC BLD-RTO: 0 /100 WBC
PLATELET # BLD AUTO: 277 K/UL (ref 150–450)
PMV BLD AUTO: 10.5 FL (ref 9.2–12.9)
POTASSIUM SERPL-SCNC: 3.6 MMOL/L (ref 3.5–5.1)
PROT SERPL-MCNC: 6.7 G/DL (ref 6–8.4)
RBC # BLD AUTO: 4.52 M/UL (ref 4–5.4)
SODIUM SERPL-SCNC: 142 MMOL/L (ref 136–145)
TRIGL SERPL-MCNC: 100 MG/DL (ref 30–150)
TSH SERPL DL<=0.005 MIU/L-ACNC: 1.65 UIU/ML (ref 0.4–4)
WBC # BLD AUTO: 9.28 K/UL (ref 3.9–12.7)

## 2021-07-09 PROCEDURE — 80053 COMPREHEN METABOLIC PANEL: CPT | Performed by: FAMILY MEDICINE

## 2021-07-09 PROCEDURE — 85025 COMPLETE CBC W/AUTO DIFF WBC: CPT | Performed by: FAMILY MEDICINE

## 2021-07-09 PROCEDURE — 80061 LIPID PANEL: CPT | Performed by: FAMILY MEDICINE

## 2021-07-09 PROCEDURE — 36415 COLL VENOUS BLD VENIPUNCTURE: CPT | Performed by: FAMILY MEDICINE

## 2021-07-09 PROCEDURE — 84443 ASSAY THYROID STIM HORMONE: CPT | Performed by: FAMILY MEDICINE

## 2021-07-16 ENCOUNTER — OFFICE VISIT (OUTPATIENT)
Dept: FAMILY MEDICINE | Facility: CLINIC | Age: 71
End: 2021-07-16
Payer: MEDICARE

## 2021-07-16 VITALS
OXYGEN SATURATION: 95 % | BODY MASS INDEX: 36.79 KG/M2 | HEIGHT: 68 IN | SYSTOLIC BLOOD PRESSURE: 132 MMHG | HEART RATE: 65 BPM | DIASTOLIC BLOOD PRESSURE: 72 MMHG | WEIGHT: 242.75 LBS

## 2021-07-16 DIAGNOSIS — I87.2 VENOUS INSUFFICIENCY OF BOTH LOWER EXTREMITIES: ICD-10-CM

## 2021-07-16 DIAGNOSIS — R73.01 IMPAIRED FASTING GLUCOSE: ICD-10-CM

## 2021-07-16 DIAGNOSIS — E66.01 CLASS 2 SEVERE OBESITY DUE TO EXCESS CALORIES WITH SERIOUS COMORBIDITY AND BODY MASS INDEX (BMI) OF 36.0 TO 36.9 IN ADULT: ICD-10-CM

## 2021-07-16 DIAGNOSIS — I48.0 PAROXYSMAL ATRIAL FIBRILLATION: ICD-10-CM

## 2021-07-16 DIAGNOSIS — I10 BENIGN ESSENTIAL HYPERTENSION: ICD-10-CM

## 2021-07-16 DIAGNOSIS — I50.20 HFREF (HEART FAILURE WITH REDUCED EJECTION FRACTION): ICD-10-CM

## 2021-07-16 DIAGNOSIS — Z00.00 ROUTINE GENERAL MEDICAL EXAMINATION AT A HEALTH CARE FACILITY: Primary | ICD-10-CM

## 2021-07-16 DIAGNOSIS — Z78.0 POSTMENOPAUSAL: ICD-10-CM

## 2021-07-16 DIAGNOSIS — M81.0 POSTMENOPAUSAL OSTEOPOROSIS: ICD-10-CM

## 2021-07-16 DIAGNOSIS — M75.101 TEAR OF RIGHT ROTATOR CUFF, UNSPECIFIED TEAR EXTENT, UNSPECIFIED WHETHER TRAUMATIC: ICD-10-CM

## 2021-07-16 DIAGNOSIS — E55.9 VITAMIN D DEFICIENCY: ICD-10-CM

## 2021-07-16 DIAGNOSIS — Z79.01 CURRENT USE OF LONG TERM ANTICOAGULATION: ICD-10-CM

## 2021-07-16 DIAGNOSIS — E03.9 ACQUIRED HYPOTHYROIDISM: ICD-10-CM

## 2021-07-16 DIAGNOSIS — I50.32 DIASTOLIC DYSFUNCTION WITH CHRONIC HEART FAILURE: ICD-10-CM

## 2021-07-16 DIAGNOSIS — J30.89 ENVIRONMENTAL AND SEASONAL ALLERGIES: ICD-10-CM

## 2021-07-16 PROCEDURE — 1101F PT FALLS ASSESS-DOCD LE1/YR: CPT | Mod: CPTII,S$GLB,, | Performed by: FAMILY MEDICINE

## 2021-07-16 PROCEDURE — 99499 RISK ADDL DX/OHS AUDIT: ICD-10-PCS | Mod: S$GLB,,, | Performed by: FAMILY MEDICINE

## 2021-07-16 PROCEDURE — 99214 PR OFFICE/OUTPT VISIT, EST, LEVL IV, 30-39 MIN: ICD-10-PCS | Mod: S$GLB,,, | Performed by: FAMILY MEDICINE

## 2021-07-16 PROCEDURE — 3075F SYST BP GE 130 - 139MM HG: CPT | Mod: CPTII,S$GLB,, | Performed by: FAMILY MEDICINE

## 2021-07-16 PROCEDURE — 1126F AMNT PAIN NOTED NONE PRSNT: CPT | Mod: S$GLB,,, | Performed by: FAMILY MEDICINE

## 2021-07-16 PROCEDURE — 99214 OFFICE O/P EST MOD 30 MIN: CPT | Mod: S$GLB,,, | Performed by: FAMILY MEDICINE

## 2021-07-16 PROCEDURE — 3288F PR FALLS RISK ASSESSMENT DOCUMENTED: ICD-10-PCS | Mod: CPTII,S$GLB,, | Performed by: FAMILY MEDICINE

## 2021-07-16 PROCEDURE — 99999 PR PBB SHADOW E&M-EST. PATIENT-LVL III: CPT | Mod: PBBFAC,,, | Performed by: FAMILY MEDICINE

## 2021-07-16 PROCEDURE — 1126F PR PAIN SEVERITY QUANTIFIED, NO PAIN PRESENT: ICD-10-PCS | Mod: S$GLB,,, | Performed by: FAMILY MEDICINE

## 2021-07-16 PROCEDURE — 3075F PR MOST RECENT SYSTOLIC BLOOD PRESS GE 130-139MM HG: ICD-10-PCS | Mod: CPTII,S$GLB,, | Performed by: FAMILY MEDICINE

## 2021-07-16 PROCEDURE — 1101F PR PT FALLS ASSESS DOC 0-1 FALLS W/OUT INJ PAST YR: ICD-10-PCS | Mod: CPTII,S$GLB,, | Performed by: FAMILY MEDICINE

## 2021-07-16 PROCEDURE — 3288F FALL RISK ASSESSMENT DOCD: CPT | Mod: CPTII,S$GLB,, | Performed by: FAMILY MEDICINE

## 2021-07-16 PROCEDURE — 99499 UNLISTED E&M SERVICE: CPT | Mod: S$GLB,,, | Performed by: FAMILY MEDICINE

## 2021-07-16 PROCEDURE — 3078F PR MOST RECENT DIASTOLIC BLOOD PRESSURE < 80 MM HG: ICD-10-PCS | Mod: CPTII,S$GLB,, | Performed by: FAMILY MEDICINE

## 2021-07-16 PROCEDURE — 99999 PR PBB SHADOW E&M-EST. PATIENT-LVL III: ICD-10-PCS | Mod: PBBFAC,,, | Performed by: FAMILY MEDICINE

## 2021-07-16 PROCEDURE — 3008F PR BODY MASS INDEX (BMI) DOCUMENTED: ICD-10-PCS | Mod: CPTII,S$GLB,, | Performed by: FAMILY MEDICINE

## 2021-07-16 PROCEDURE — 3008F BODY MASS INDEX DOCD: CPT | Mod: CPTII,S$GLB,, | Performed by: FAMILY MEDICINE

## 2021-07-16 PROCEDURE — 3078F DIAST BP <80 MM HG: CPT | Mod: CPTII,S$GLB,, | Performed by: FAMILY MEDICINE

## 2021-07-16 RX ORDER — FUROSEMIDE 20 MG/1
20 TABLET ORAL DAILY
Qty: 90 TABLET | Refills: 3 | Status: SHIPPED | OUTPATIENT
Start: 2021-07-16 | End: 2022-07-11 | Stop reason: SDUPTHER

## 2021-07-16 RX ORDER — LEVOTHYROXINE SODIUM 112 UG/1
112 TABLET ORAL DAILY
Qty: 90 TABLET | Refills: 4 | Status: SHIPPED | OUTPATIENT
Start: 2021-07-16 | End: 2022-08-01 | Stop reason: SDUPTHER

## 2021-07-19 ENCOUNTER — HOSPITAL ENCOUNTER (OUTPATIENT)
Dept: RADIOLOGY | Facility: HOSPITAL | Age: 71
Discharge: HOME | End: 2021-07-19
Attending: FAMILY MEDICINE
Payer: MEDICARE

## 2021-07-19 DIAGNOSIS — Z78.0 POSTMENOPAUSAL: ICD-10-CM

## 2021-07-19 PROCEDURE — 77080 DXA BONE DENSITY AXIAL: CPT | Mod: TC

## 2021-07-19 PROCEDURE — 77080 DEXA BONE DENSITY SPINE HIP: ICD-10-PCS | Mod: 26,,, | Performed by: RADIOLOGY

## 2021-07-19 PROCEDURE — 77080 DXA BONE DENSITY AXIAL: CPT | Mod: 26,,, | Performed by: RADIOLOGY

## 2021-08-04 ENCOUNTER — TELEPHONE (OUTPATIENT)
Dept: FAMILY MEDICINE | Facility: CLINIC | Age: 71
End: 2021-08-04

## 2021-08-04 ENCOUNTER — PATIENT MESSAGE (OUTPATIENT)
Dept: FAMILY MEDICINE | Facility: CLINIC | Age: 71
End: 2021-08-04

## 2021-08-04 DIAGNOSIS — I10 BENIGN ESSENTIAL HYPERTENSION: ICD-10-CM

## 2021-08-04 DIAGNOSIS — Z01.810 PREOP CARDIOVASCULAR EXAM: Primary | ICD-10-CM

## 2021-08-06 ENCOUNTER — PATIENT MESSAGE (OUTPATIENT)
Dept: CARDIOLOGY | Facility: CLINIC | Age: 71
End: 2021-08-06

## 2021-08-09 ENCOUNTER — PATIENT MESSAGE (OUTPATIENT)
Dept: FAMILY MEDICINE | Facility: CLINIC | Age: 71
End: 2021-08-09

## 2021-08-10 ENCOUNTER — LAB VISIT (OUTPATIENT)
Dept: LAB | Facility: HOSPITAL | Age: 71
End: 2021-08-10
Attending: FAMILY MEDICINE
Payer: MEDICARE

## 2021-08-10 DIAGNOSIS — Z01.810 PREOP CARDIOVASCULAR EXAM: ICD-10-CM

## 2021-08-10 DIAGNOSIS — I10 BENIGN ESSENTIAL HYPERTENSION: ICD-10-CM

## 2021-08-10 LAB
ANION GAP SERPL CALC-SCNC: 11 MMOL/L (ref 8–16)
BASOPHILS # BLD AUTO: 0.05 K/UL (ref 0–0.2)
BASOPHILS NFR BLD: 0.5 % (ref 0–1.9)
BUN SERPL-MCNC: 14 MG/DL (ref 8–23)
CALCIUM SERPL-MCNC: 10.4 MG/DL (ref 8.7–10.5)
CHLORIDE SERPL-SCNC: 102 MMOL/L (ref 95–110)
CO2 SERPL-SCNC: 27 MMOL/L (ref 23–29)
CREAT SERPL-MCNC: 0.8 MG/DL (ref 0.5–1.4)
DIFFERENTIAL METHOD: ABNORMAL
EOSINOPHIL # BLD AUTO: 0.2 K/UL (ref 0–0.5)
EOSINOPHIL NFR BLD: 1.6 % (ref 0–8)
ERYTHROCYTE [DISTWIDTH] IN BLOOD BY AUTOMATED COUNT: 14 % (ref 11.5–14.5)
EST. GFR  (AFRICAN AMERICAN): >60 ML/MIN/1.73 M^2
EST. GFR  (NON AFRICAN AMERICAN): >60 ML/MIN/1.73 M^2
GLUCOSE SERPL-MCNC: 99 MG/DL (ref 70–110)
HCT VFR BLD AUTO: 44 % (ref 37–48.5)
HGB BLD-MCNC: 13.5 G/DL (ref 12–16)
IMM GRANULOCYTES # BLD AUTO: 0.04 K/UL (ref 0–0.04)
IMM GRANULOCYTES NFR BLD AUTO: 0.4 % (ref 0–0.5)
LYMPHOCYTES # BLD AUTO: 1.6 K/UL (ref 1–4.8)
LYMPHOCYTES NFR BLD: 15.2 % (ref 18–48)
MCH RBC QN AUTO: 27 PG (ref 27–31)
MCHC RBC AUTO-ENTMCNC: 30.7 G/DL (ref 32–36)
MCV RBC AUTO: 88 FL (ref 82–98)
MONOCYTES # BLD AUTO: 0.8 K/UL (ref 0.3–1)
MONOCYTES NFR BLD: 7.6 % (ref 4–15)
NEUTROPHILS # BLD AUTO: 7.9 K/UL (ref 1.8–7.7)
NEUTROPHILS NFR BLD: 74.7 % (ref 38–73)
NRBC BLD-RTO: 0 /100 WBC
PLATELET # BLD AUTO: 293 K/UL (ref 150–450)
PMV BLD AUTO: 11 FL (ref 9.2–12.9)
POTASSIUM SERPL-SCNC: 3.9 MMOL/L (ref 3.5–5.1)
RBC # BLD AUTO: 5 M/UL (ref 4–5.4)
SODIUM SERPL-SCNC: 140 MMOL/L (ref 136–145)
WBC # BLD AUTO: 10.52 K/UL (ref 3.9–12.7)

## 2021-08-10 PROCEDURE — 36415 COLL VENOUS BLD VENIPUNCTURE: CPT | Performed by: FAMILY MEDICINE

## 2021-08-10 PROCEDURE — 93010 ELECTROCARDIOGRAM REPORT: CPT | Mod: ,,, | Performed by: INTERNAL MEDICINE

## 2021-08-10 PROCEDURE — 93005 ELECTROCARDIOGRAM TRACING: CPT

## 2021-08-10 PROCEDURE — 93010 EKG 12-LEAD: ICD-10-PCS | Mod: ,,, | Performed by: INTERNAL MEDICINE

## 2021-08-10 PROCEDURE — 80048 BASIC METABOLIC PNL TOTAL CA: CPT | Performed by: FAMILY MEDICINE

## 2021-08-10 PROCEDURE — 85025 COMPLETE CBC W/AUTO DIFF WBC: CPT | Performed by: FAMILY MEDICINE

## 2021-08-13 ENCOUNTER — PATIENT MESSAGE (OUTPATIENT)
Dept: OTOLARYNGOLOGY | Facility: CLINIC | Age: 71
End: 2021-08-13

## 2021-09-14 ENCOUNTER — OFFICE VISIT (OUTPATIENT)
Dept: OTOLARYNGOLOGY | Facility: CLINIC | Age: 71
End: 2021-09-14
Payer: MEDICARE

## 2021-09-14 VITALS
HEIGHT: 68 IN | SYSTOLIC BLOOD PRESSURE: 193 MMHG | HEART RATE: 63 BPM | BODY MASS INDEX: 36.12 KG/M2 | DIASTOLIC BLOOD PRESSURE: 82 MMHG | WEIGHT: 238.31 LBS

## 2021-09-14 DIAGNOSIS — H60.8X3 CHRONIC ECZEMATOUS OTITIS EXTERNA OF BOTH EARS: Primary | Chronic | ICD-10-CM

## 2021-09-14 DIAGNOSIS — H61.21 IMPACTED CERUMEN OF RIGHT EAR: ICD-10-CM

## 2021-09-14 PROCEDURE — 3079F DIAST BP 80-89 MM HG: CPT | Mod: CPTII,S$GLB,, | Performed by: OTOLARYNGOLOGY

## 2021-09-14 PROCEDURE — 3288F FALL RISK ASSESSMENT DOCD: CPT | Mod: CPTII,S$GLB,, | Performed by: OTOLARYNGOLOGY

## 2021-09-14 PROCEDURE — 4010F ACE/ARB THERAPY RXD/TAKEN: CPT | Mod: CPTII,S$GLB,, | Performed by: OTOLARYNGOLOGY

## 2021-09-14 PROCEDURE — 3288F PR FALLS RISK ASSESSMENT DOCUMENTED: ICD-10-PCS | Mod: CPTII,S$GLB,, | Performed by: OTOLARYNGOLOGY

## 2021-09-14 PROCEDURE — 1126F PR PAIN SEVERITY QUANTIFIED, NO PAIN PRESENT: ICD-10-PCS | Mod: CPTII,S$GLB,, | Performed by: OTOLARYNGOLOGY

## 2021-09-14 PROCEDURE — 3008F BODY MASS INDEX DOCD: CPT | Mod: CPTII,S$GLB,, | Performed by: OTOLARYNGOLOGY

## 2021-09-14 PROCEDURE — 1160F RVW MEDS BY RX/DR IN RCRD: CPT | Mod: CPTII,S$GLB,, | Performed by: OTOLARYNGOLOGY

## 2021-09-14 PROCEDURE — 69210 REMOVE IMPACTED EAR WAX UNI: CPT | Mod: S$GLB,,, | Performed by: OTOLARYNGOLOGY

## 2021-09-14 PROCEDURE — 99213 OFFICE O/P EST LOW 20 MIN: CPT | Mod: 25,S$GLB,, | Performed by: OTOLARYNGOLOGY

## 2021-09-14 PROCEDURE — 4010F PR ACE/ARB THEARPY RXD/TAKEN: ICD-10-PCS | Mod: CPTII,S$GLB,, | Performed by: OTOLARYNGOLOGY

## 2021-09-14 PROCEDURE — 1126F AMNT PAIN NOTED NONE PRSNT: CPT | Mod: CPTII,S$GLB,, | Performed by: OTOLARYNGOLOGY

## 2021-09-14 PROCEDURE — 69210 PR REMOVAL IMPACTED CERUMEN REQUIRING INSTRUMENTATION, UNILATERAL: ICD-10-PCS | Mod: S$GLB,,, | Performed by: OTOLARYNGOLOGY

## 2021-09-14 PROCEDURE — 1160F PR REVIEW ALL MEDS BY PRESCRIBER/CLIN PHARMACIST DOCUMENTED: ICD-10-PCS | Mod: CPTII,S$GLB,, | Performed by: OTOLARYNGOLOGY

## 2021-09-14 PROCEDURE — 1101F PR PT FALLS ASSESS DOC 0-1 FALLS W/OUT INJ PAST YR: ICD-10-PCS | Mod: CPTII,S$GLB,, | Performed by: OTOLARYNGOLOGY

## 2021-09-14 PROCEDURE — 3044F HG A1C LEVEL LT 7.0%: CPT | Mod: CPTII,S$GLB,, | Performed by: OTOLARYNGOLOGY

## 2021-09-14 PROCEDURE — 3008F PR BODY MASS INDEX (BMI) DOCUMENTED: ICD-10-PCS | Mod: CPTII,S$GLB,, | Performed by: OTOLARYNGOLOGY

## 2021-09-14 PROCEDURE — 1159F PR MEDICATION LIST DOCUMENTED IN MEDICAL RECORD: ICD-10-PCS | Mod: CPTII,S$GLB,, | Performed by: OTOLARYNGOLOGY

## 2021-09-14 PROCEDURE — 3079F PR MOST RECENT DIASTOLIC BLOOD PRESSURE 80-89 MM HG: ICD-10-PCS | Mod: CPTII,S$GLB,, | Performed by: OTOLARYNGOLOGY

## 2021-09-14 PROCEDURE — 1159F MED LIST DOCD IN RCRD: CPT | Mod: CPTII,S$GLB,, | Performed by: OTOLARYNGOLOGY

## 2021-09-14 PROCEDURE — 3077F SYST BP >= 140 MM HG: CPT | Mod: CPTII,S$GLB,, | Performed by: OTOLARYNGOLOGY

## 2021-09-14 PROCEDURE — 99999 PR PBB SHADOW E&M-EST. PATIENT-LVL III: CPT | Mod: PBBFAC,,, | Performed by: OTOLARYNGOLOGY

## 2021-09-14 PROCEDURE — 1101F PT FALLS ASSESS-DOCD LE1/YR: CPT | Mod: CPTII,S$GLB,, | Performed by: OTOLARYNGOLOGY

## 2021-09-14 PROCEDURE — 3077F PR MOST RECENT SYSTOLIC BLOOD PRESSURE >= 140 MM HG: ICD-10-PCS | Mod: CPTII,S$GLB,, | Performed by: OTOLARYNGOLOGY

## 2021-09-14 PROCEDURE — 99999 PR PBB SHADOW E&M-EST. PATIENT-LVL III: ICD-10-PCS | Mod: PBBFAC,,, | Performed by: OTOLARYNGOLOGY

## 2021-09-14 PROCEDURE — 3044F PR MOST RECENT HEMOGLOBIN A1C LEVEL <7.0%: ICD-10-PCS | Mod: CPTII,S$GLB,, | Performed by: OTOLARYNGOLOGY

## 2021-09-14 PROCEDURE — 99213 PR OFFICE/OUTPT VISIT, EST, LEVL III, 20-29 MIN: ICD-10-PCS | Mod: 25,S$GLB,, | Performed by: OTOLARYNGOLOGY

## 2021-09-28 DIAGNOSIS — I48.0 PAROXYSMAL ATRIAL FIBRILLATION: ICD-10-CM

## 2021-09-28 DIAGNOSIS — I50.22 CHRONIC SYSTOLIC HEART FAILURE: ICD-10-CM

## 2021-09-28 DIAGNOSIS — Z79.01 CURRENT USE OF LONG TERM ANTICOAGULATION: ICD-10-CM

## 2021-09-29 ENCOUNTER — PATIENT MESSAGE (OUTPATIENT)
Dept: FAMILY MEDICINE | Facility: CLINIC | Age: 71
End: 2021-09-29

## 2021-10-07 ENCOUNTER — IMMUNIZATION (OUTPATIENT)
Dept: INTERNAL MEDICINE | Facility: CLINIC | Age: 71
End: 2021-10-07
Payer: MEDICARE

## 2021-10-07 DIAGNOSIS — Z23 NEED FOR VACCINATION: Primary | ICD-10-CM

## 2021-10-07 PROCEDURE — 0003A COVID-19, MRNA, LNP-S, PF, 30 MCG/0.3 ML DOSE VACCINE: CPT | Mod: PBBFAC | Performed by: FAMILY MEDICINE

## 2021-10-07 PROCEDURE — 91300 COVID-19, MRNA, LNP-S, PF, 30 MCG/0.3 ML DOSE VACCINE: CPT | Mod: PBBFAC | Performed by: FAMILY MEDICINE

## 2021-11-23 ENCOUNTER — TELEPHONE (OUTPATIENT)
Dept: FAMILY MEDICINE | Facility: CLINIC | Age: 71
End: 2021-11-23
Payer: MEDICARE

## 2022-01-14 ENCOUNTER — LAB VISIT (OUTPATIENT)
Dept: LAB | Facility: HOSPITAL | Age: 72
End: 2022-01-14
Attending: FAMILY MEDICINE
Payer: MEDICARE

## 2022-01-14 DIAGNOSIS — Z00.00 ROUTINE GENERAL MEDICAL EXAMINATION AT A HEALTH CARE FACILITY: ICD-10-CM

## 2022-01-14 DIAGNOSIS — I10 BENIGN ESSENTIAL HYPERTENSION: ICD-10-CM

## 2022-01-14 DIAGNOSIS — E03.9 ACQUIRED HYPOTHYROIDISM: ICD-10-CM

## 2022-01-14 DIAGNOSIS — I48.0 PAROXYSMAL ATRIAL FIBRILLATION: ICD-10-CM

## 2022-01-14 DIAGNOSIS — M81.0 POSTMENOPAUSAL OSTEOPOROSIS: ICD-10-CM

## 2022-01-14 DIAGNOSIS — R73.01 IMPAIRED FASTING GLUCOSE: ICD-10-CM

## 2022-01-14 LAB
ALBUMIN SERPL BCP-MCNC: 3.8 G/DL (ref 3.5–5.2)
ALP SERPL-CCNC: 127 U/L (ref 55–135)
ALT SERPL W/O P-5'-P-CCNC: 17 U/L (ref 10–44)
ANION GAP SERPL CALC-SCNC: 8 MMOL/L (ref 8–16)
AST SERPL-CCNC: 19 U/L (ref 10–40)
BASOPHILS # BLD AUTO: 0.06 K/UL (ref 0–0.2)
BASOPHILS NFR BLD: 0.7 % (ref 0–1.9)
BILIRUB SERPL-MCNC: 0.4 MG/DL (ref 0.1–1)
BUN SERPL-MCNC: 17 MG/DL (ref 8–23)
CALCIUM SERPL-MCNC: 9.5 MG/DL (ref 8.7–10.5)
CHLORIDE SERPL-SCNC: 104 MMOL/L (ref 95–110)
CHOLEST SERPL-MCNC: 175 MG/DL (ref 120–199)
CHOLEST/HDLC SERPL: 3.1 {RATIO} (ref 2–5)
CO2 SERPL-SCNC: 31 MMOL/L (ref 23–29)
CREAT SERPL-MCNC: 0.8 MG/DL (ref 0.5–1.4)
DIFFERENTIAL METHOD: ABNORMAL
EOSINOPHIL # BLD AUTO: 0.3 K/UL (ref 0–0.5)
EOSINOPHIL NFR BLD: 3.2 % (ref 0–8)
ERYTHROCYTE [DISTWIDTH] IN BLOOD BY AUTOMATED COUNT: 14.8 % (ref 11.5–14.5)
EST. GFR  (AFRICAN AMERICAN): >60 ML/MIN/1.73 M^2
EST. GFR  (NON AFRICAN AMERICAN): >60 ML/MIN/1.73 M^2
ESTIMATED AVG GLUCOSE: 123 MG/DL (ref 68–131)
GLUCOSE SERPL-MCNC: 111 MG/DL (ref 70–110)
HBA1C MFR BLD: 5.9 % (ref 4–5.6)
HCT VFR BLD AUTO: 42 % (ref 37–48.5)
HDLC SERPL-MCNC: 56 MG/DL (ref 40–75)
HDLC SERPL: 32 % (ref 20–50)
HGB BLD-MCNC: 12.9 G/DL (ref 12–16)
IMM GRANULOCYTES # BLD AUTO: 0.01 K/UL (ref 0–0.04)
IMM GRANULOCYTES NFR BLD AUTO: 0.1 % (ref 0–0.5)
LDLC SERPL CALC-MCNC: 98 MG/DL (ref 63–159)
LYMPHOCYTES # BLD AUTO: 1.6 K/UL (ref 1–4.8)
LYMPHOCYTES NFR BLD: 18.7 % (ref 18–48)
MAGNESIUM SERPL-MCNC: 1.9 MG/DL (ref 1.6–2.6)
MCH RBC QN AUTO: 26.8 PG (ref 27–31)
MCHC RBC AUTO-ENTMCNC: 30.7 G/DL (ref 32–36)
MCV RBC AUTO: 87 FL (ref 82–98)
MONOCYTES # BLD AUTO: 0.5 K/UL (ref 0.3–1)
MONOCYTES NFR BLD: 6.1 % (ref 4–15)
NEUTROPHILS # BLD AUTO: 6.2 K/UL (ref 1.8–7.7)
NEUTROPHILS NFR BLD: 71.2 % (ref 38–73)
NONHDLC SERPL-MCNC: 119 MG/DL
NRBC BLD-RTO: 0 /100 WBC
PLATELET # BLD AUTO: 281 K/UL (ref 150–450)
PMV BLD AUTO: 10.7 FL (ref 9.2–12.9)
POTASSIUM SERPL-SCNC: 4.3 MMOL/L (ref 3.5–5.1)
PROT SERPL-MCNC: 7.2 G/DL (ref 6–8.4)
RBC # BLD AUTO: 4.82 M/UL (ref 4–5.4)
SODIUM SERPL-SCNC: 143 MMOL/L (ref 136–145)
TRIGL SERPL-MCNC: 105 MG/DL (ref 30–150)
TSH SERPL DL<=0.005 MIU/L-ACNC: 1.14 UIU/ML (ref 0.4–4)
WBC # BLD AUTO: 8.71 K/UL (ref 3.9–12.7)

## 2022-01-14 PROCEDURE — 85025 COMPLETE CBC W/AUTO DIFF WBC: CPT | Performed by: FAMILY MEDICINE

## 2022-01-14 PROCEDURE — 83036 HEMOGLOBIN GLYCOSYLATED A1C: CPT | Performed by: FAMILY MEDICINE

## 2022-01-14 PROCEDURE — 83735 ASSAY OF MAGNESIUM: CPT | Performed by: FAMILY MEDICINE

## 2022-01-14 PROCEDURE — 80053 COMPREHEN METABOLIC PANEL: CPT | Performed by: FAMILY MEDICINE

## 2022-01-14 PROCEDURE — 36415 COLL VENOUS BLD VENIPUNCTURE: CPT | Performed by: FAMILY MEDICINE

## 2022-01-14 PROCEDURE — 84443 ASSAY THYROID STIM HORMONE: CPT | Performed by: FAMILY MEDICINE

## 2022-01-14 PROCEDURE — 80061 LIPID PANEL: CPT | Performed by: FAMILY MEDICINE

## 2022-01-24 DIAGNOSIS — I50.22 CHRONIC SYSTOLIC HEART FAILURE: ICD-10-CM

## 2022-01-24 DIAGNOSIS — I48.0 PAROXYSMAL ATRIAL FIBRILLATION: ICD-10-CM

## 2022-01-24 RX ORDER — METOPROLOL SUCCINATE 200 MG/1
200 TABLET, EXTENDED RELEASE ORAL DAILY
Qty: 90 TABLET | Refills: 3 | Status: SHIPPED | OUTPATIENT
Start: 2022-01-24 | End: 2023-01-19 | Stop reason: SDUPTHER

## 2022-01-24 NOTE — TELEPHONE ENCOUNTER
No new care gaps identified.  Powered by [employer]. Reference number: [id number].   1/24/2022 11:41:46 AM CST

## 2022-01-31 ENCOUNTER — TELEPHONE (OUTPATIENT)
Dept: OPHTHALMOLOGY | Facility: CLINIC | Age: 72
End: 2022-01-31
Payer: MEDICARE

## 2022-01-31 ENCOUNTER — OFFICE VISIT (OUTPATIENT)
Dept: FAMILY MEDICINE | Facility: CLINIC | Age: 72
End: 2022-01-31
Payer: MEDICARE

## 2022-01-31 VITALS
WEIGHT: 232.56 LBS | BODY MASS INDEX: 35.36 KG/M2 | SYSTOLIC BLOOD PRESSURE: 138 MMHG | OXYGEN SATURATION: 96 % | HEART RATE: 67 BPM | DIASTOLIC BLOOD PRESSURE: 74 MMHG

## 2022-01-31 DIAGNOSIS — Z87.442 PERSONAL HISTORY OF KIDNEY STONES: ICD-10-CM

## 2022-01-31 DIAGNOSIS — I87.2 VENOUS INSUFFICIENCY OF BOTH LOWER EXTREMITIES: ICD-10-CM

## 2022-01-31 DIAGNOSIS — E66.01 CLASS 2 SEVERE OBESITY DUE TO EXCESS CALORIES WITH SERIOUS COMORBIDITY AND BODY MASS INDEX (BMI) OF 35.0 TO 35.9 IN ADULT: ICD-10-CM

## 2022-01-31 DIAGNOSIS — I50.32 DIASTOLIC DYSFUNCTION WITH CHRONIC HEART FAILURE: ICD-10-CM

## 2022-01-31 DIAGNOSIS — E78.1 HYPERTRIGLYCERIDEMIA: ICD-10-CM

## 2022-01-31 DIAGNOSIS — E55.9 VITAMIN D DEFICIENCY: ICD-10-CM

## 2022-01-31 DIAGNOSIS — I10 BENIGN ESSENTIAL HYPERTENSION: Primary | ICD-10-CM

## 2022-01-31 DIAGNOSIS — E03.9 ACQUIRED HYPOTHYROIDISM: ICD-10-CM

## 2022-01-31 DIAGNOSIS — I50.20 HFREF (HEART FAILURE WITH REDUCED EJECTION FRACTION): ICD-10-CM

## 2022-01-31 DIAGNOSIS — I49.3 PVC'S (PREMATURE VENTRICULAR CONTRACTIONS): ICD-10-CM

## 2022-01-31 DIAGNOSIS — L71.9 ROSACEA: ICD-10-CM

## 2022-01-31 DIAGNOSIS — Z79.899 MEDICATION MANAGEMENT: ICD-10-CM

## 2022-01-31 DIAGNOSIS — Z79.01 CURRENT USE OF LONG TERM ANTICOAGULATION: ICD-10-CM

## 2022-01-31 DIAGNOSIS — J30.89 ENVIRONMENTAL AND SEASONAL ALLERGIES: ICD-10-CM

## 2022-01-31 DIAGNOSIS — H25.9 SENILE CATARACT OF RIGHT EYE, UNSPECIFIED AGE-RELATED CATARACT TYPE: ICD-10-CM

## 2022-01-31 DIAGNOSIS — I48.0 PAROXYSMAL ATRIAL FIBRILLATION: ICD-10-CM

## 2022-01-31 PROCEDURE — 1160F PR REVIEW ALL MEDS BY PRESCRIBER/CLIN PHARMACIST DOCUMENTED: ICD-10-PCS | Mod: CPTII,S$GLB,, | Performed by: FAMILY MEDICINE

## 2022-01-31 PROCEDURE — 1126F PR PAIN SEVERITY QUANTIFIED, NO PAIN PRESENT: ICD-10-PCS | Mod: CPTII,S$GLB,, | Performed by: FAMILY MEDICINE

## 2022-01-31 PROCEDURE — 99999 PR PBB SHADOW E&M-EST. PATIENT-LVL V: ICD-10-PCS | Mod: PBBFAC,,, | Performed by: FAMILY MEDICINE

## 2022-01-31 PROCEDURE — 3078F DIAST BP <80 MM HG: CPT | Mod: CPTII,S$GLB,, | Performed by: FAMILY MEDICINE

## 2022-01-31 PROCEDURE — 1101F PR PT FALLS ASSESS DOC 0-1 FALLS W/OUT INJ PAST YR: ICD-10-PCS | Mod: CPTII,S$GLB,, | Performed by: FAMILY MEDICINE

## 2022-01-31 PROCEDURE — 1101F PT FALLS ASSESS-DOCD LE1/YR: CPT | Mod: CPTII,S$GLB,, | Performed by: FAMILY MEDICINE

## 2022-01-31 PROCEDURE — 99999 PR PBB SHADOW E&M-EST. PATIENT-LVL V: CPT | Mod: PBBFAC,,, | Performed by: FAMILY MEDICINE

## 2022-01-31 PROCEDURE — 3008F BODY MASS INDEX DOCD: CPT | Mod: CPTII,S$GLB,, | Performed by: FAMILY MEDICINE

## 2022-01-31 PROCEDURE — 99499 UNLISTED E&M SERVICE: CPT | Mod: S$GLB,,, | Performed by: FAMILY MEDICINE

## 2022-01-31 PROCEDURE — 3044F HG A1C LEVEL LT 7.0%: CPT | Mod: CPTII,S$GLB,, | Performed by: FAMILY MEDICINE

## 2022-01-31 PROCEDURE — 99214 PR OFFICE/OUTPT VISIT, EST, LEVL IV, 30-39 MIN: ICD-10-PCS | Mod: S$GLB,,, | Performed by: FAMILY MEDICINE

## 2022-01-31 PROCEDURE — 1126F AMNT PAIN NOTED NONE PRSNT: CPT | Mod: CPTII,S$GLB,, | Performed by: FAMILY MEDICINE

## 2022-01-31 PROCEDURE — 1159F PR MEDICATION LIST DOCUMENTED IN MEDICAL RECORD: ICD-10-PCS | Mod: CPTII,S$GLB,, | Performed by: FAMILY MEDICINE

## 2022-01-31 PROCEDURE — 3075F PR MOST RECENT SYSTOLIC BLOOD PRESS GE 130-139MM HG: ICD-10-PCS | Mod: CPTII,S$GLB,, | Performed by: FAMILY MEDICINE

## 2022-01-31 PROCEDURE — 3075F SYST BP GE 130 - 139MM HG: CPT | Mod: CPTII,S$GLB,, | Performed by: FAMILY MEDICINE

## 2022-01-31 PROCEDURE — 3288F FALL RISK ASSESSMENT DOCD: CPT | Mod: CPTII,S$GLB,, | Performed by: FAMILY MEDICINE

## 2022-01-31 PROCEDURE — 3008F PR BODY MASS INDEX (BMI) DOCUMENTED: ICD-10-PCS | Mod: CPTII,S$GLB,, | Performed by: FAMILY MEDICINE

## 2022-01-31 PROCEDURE — 3078F PR MOST RECENT DIASTOLIC BLOOD PRESSURE < 80 MM HG: ICD-10-PCS | Mod: CPTII,S$GLB,, | Performed by: FAMILY MEDICINE

## 2022-01-31 PROCEDURE — 3044F PR MOST RECENT HEMOGLOBIN A1C LEVEL <7.0%: ICD-10-PCS | Mod: CPTII,S$GLB,, | Performed by: FAMILY MEDICINE

## 2022-01-31 PROCEDURE — 1159F MED LIST DOCD IN RCRD: CPT | Mod: CPTII,S$GLB,, | Performed by: FAMILY MEDICINE

## 2022-01-31 PROCEDURE — 3288F PR FALLS RISK ASSESSMENT DOCUMENTED: ICD-10-PCS | Mod: CPTII,S$GLB,, | Performed by: FAMILY MEDICINE

## 2022-01-31 PROCEDURE — 1160F RVW MEDS BY RX/DR IN RCRD: CPT | Mod: CPTII,S$GLB,, | Performed by: FAMILY MEDICINE

## 2022-01-31 PROCEDURE — 99214 OFFICE O/P EST MOD 30 MIN: CPT | Mod: S$GLB,,, | Performed by: FAMILY MEDICINE

## 2022-01-31 PROCEDURE — 99499 RISK ADDL DX/OHS AUDIT: ICD-10-PCS | Mod: S$GLB,,, | Performed by: FAMILY MEDICINE

## 2022-01-31 NOTE — TELEPHONE ENCOUNTER
----- Message from Vandana Padilla sent at 1/31/2022  2:02 PM CST -----  Patient has a referral placed by Dr. Gunter her diagnosis is     H25.9 (ICD-10-CM) - Senile cataract of right eye, unspecified age-related cataract type    Can you please assist in scheduling, thank you

## 2022-02-01 ENCOUNTER — TELEPHONE (OUTPATIENT)
Dept: FAMILY MEDICINE | Facility: CLINIC | Age: 72
End: 2022-02-01
Payer: MEDICARE

## 2022-02-01 ENCOUNTER — PATIENT MESSAGE (OUTPATIENT)
Dept: FAMILY MEDICINE | Facility: CLINIC | Age: 72
End: 2022-02-01
Payer: MEDICARE

## 2022-02-24 LAB — BCS RECOMMENDATION EXT: NORMAL

## 2022-03-03 NOTE — TELEPHONE ENCOUNTER
Medication(s) Requested: clonazepam 1 mg   Last office visit: 10/18/2021   Next office visit 7/11/2022   Last refill: 12/21/21  Is the patient due for refill of this medication(s): Yes  PDMP review: Criteria met. Forwarded to Physician/YASMINE for signature.    Scheduled.    Patient notified.

## 2022-03-21 ENCOUNTER — OFFICE VISIT (OUTPATIENT)
Dept: FAMILY MEDICINE | Facility: CLINIC | Age: 72
End: 2022-03-21
Payer: MEDICARE

## 2022-03-21 VITALS
HEIGHT: 68 IN | TEMPERATURE: 99 F | SYSTOLIC BLOOD PRESSURE: 138 MMHG | WEIGHT: 227.06 LBS | DIASTOLIC BLOOD PRESSURE: 76 MMHG | HEART RATE: 62 BPM | OXYGEN SATURATION: 95 % | BODY MASS INDEX: 34.41 KG/M2

## 2022-03-21 DIAGNOSIS — E03.9 ACQUIRED HYPOTHYROIDISM: ICD-10-CM

## 2022-03-21 DIAGNOSIS — I50.20 HFREF (HEART FAILURE WITH REDUCED EJECTION FRACTION): ICD-10-CM

## 2022-03-21 DIAGNOSIS — I50.32 DIASTOLIC DYSFUNCTION WITH CHRONIC HEART FAILURE: ICD-10-CM

## 2022-03-21 DIAGNOSIS — Z01.810 PREOP CARDIOVASCULAR EXAM: ICD-10-CM

## 2022-03-21 DIAGNOSIS — H25.9 SENILE CATARACT OF LEFT EYE, UNSPECIFIED AGE-RELATED CATARACT TYPE: Primary | ICD-10-CM

## 2022-03-21 DIAGNOSIS — I10 BENIGN ESSENTIAL HYPERTENSION: ICD-10-CM

## 2022-03-21 PROCEDURE — 3078F DIAST BP <80 MM HG: CPT | Mod: CPTII,S$GLB,, | Performed by: FAMILY MEDICINE

## 2022-03-21 PROCEDURE — 1126F AMNT PAIN NOTED NONE PRSNT: CPT | Mod: CPTII,S$GLB,, | Performed by: FAMILY MEDICINE

## 2022-03-21 PROCEDURE — 99999 PR PBB SHADOW E&M-EST. PATIENT-LVL IV: CPT | Mod: PBBFAC,,, | Performed by: FAMILY MEDICINE

## 2022-03-21 PROCEDURE — 3008F BODY MASS INDEX DOCD: CPT | Mod: CPTII,S$GLB,, | Performed by: FAMILY MEDICINE

## 2022-03-21 PROCEDURE — 4010F ACE/ARB THERAPY RXD/TAKEN: CPT | Mod: CPTII,S$GLB,, | Performed by: FAMILY MEDICINE

## 2022-03-21 PROCEDURE — 99214 PR OFFICE/OUTPT VISIT, EST, LEVL IV, 30-39 MIN: ICD-10-PCS | Mod: S$GLB,,, | Performed by: FAMILY MEDICINE

## 2022-03-21 PROCEDURE — 3008F PR BODY MASS INDEX (BMI) DOCUMENTED: ICD-10-PCS | Mod: CPTII,S$GLB,, | Performed by: FAMILY MEDICINE

## 2022-03-21 PROCEDURE — 3044F PR MOST RECENT HEMOGLOBIN A1C LEVEL <7.0%: ICD-10-PCS | Mod: CPTII,S$GLB,, | Performed by: FAMILY MEDICINE

## 2022-03-21 PROCEDURE — 1160F RVW MEDS BY RX/DR IN RCRD: CPT | Mod: CPTII,S$GLB,, | Performed by: FAMILY MEDICINE

## 2022-03-21 PROCEDURE — 3075F SYST BP GE 130 - 139MM HG: CPT | Mod: CPTII,S$GLB,, | Performed by: FAMILY MEDICINE

## 2022-03-21 PROCEDURE — 1159F MED LIST DOCD IN RCRD: CPT | Mod: CPTII,S$GLB,, | Performed by: FAMILY MEDICINE

## 2022-03-21 PROCEDURE — 99214 OFFICE O/P EST MOD 30 MIN: CPT | Mod: S$GLB,,, | Performed by: FAMILY MEDICINE

## 2022-03-21 PROCEDURE — 1160F PR REVIEW ALL MEDS BY PRESCRIBER/CLIN PHARMACIST DOCUMENTED: ICD-10-PCS | Mod: CPTII,S$GLB,, | Performed by: FAMILY MEDICINE

## 2022-03-21 PROCEDURE — 3044F HG A1C LEVEL LT 7.0%: CPT | Mod: CPTII,S$GLB,, | Performed by: FAMILY MEDICINE

## 2022-03-21 PROCEDURE — 3075F PR MOST RECENT SYSTOLIC BLOOD PRESS GE 130-139MM HG: ICD-10-PCS | Mod: CPTII,S$GLB,, | Performed by: FAMILY MEDICINE

## 2022-03-21 PROCEDURE — 1159F PR MEDICATION LIST DOCUMENTED IN MEDICAL RECORD: ICD-10-PCS | Mod: CPTII,S$GLB,, | Performed by: FAMILY MEDICINE

## 2022-03-21 PROCEDURE — 4010F PR ACE/ARB THEARPY RXD/TAKEN: ICD-10-PCS | Mod: CPTII,S$GLB,, | Performed by: FAMILY MEDICINE

## 2022-03-21 PROCEDURE — 1126F PR PAIN SEVERITY QUANTIFIED, NO PAIN PRESENT: ICD-10-PCS | Mod: CPTII,S$GLB,, | Performed by: FAMILY MEDICINE

## 2022-03-21 PROCEDURE — 99999 PR PBB SHADOW E&M-EST. PATIENT-LVL IV: ICD-10-PCS | Mod: PBBFAC,,, | Performed by: FAMILY MEDICINE

## 2022-03-21 PROCEDURE — 3078F PR MOST RECENT DIASTOLIC BLOOD PRESSURE < 80 MM HG: ICD-10-PCS | Mod: CPTII,S$GLB,, | Performed by: FAMILY MEDICINE

## 2022-03-21 NOTE — PROGRESS NOTES
Office Visit    Patient Name: Violet Mirza    : 1950  MRN: [id number]    Subjective:  Violet is a 71 y.o. female who presents today for:    No chief complaint on file.       70 yo patient of mine, most recently seen by me 22 and advised to follow up in 6 months with labs prior, who presents today for surgical clearance for Cataract Surgery.     Chronic conditions include paroxysmal AFib(2018 in [facility] ER was given diltiazem & converted spontaneously to NSR), HFrEF (EF improved to 40-45% 21) followed by cardiology Dr Duran-- last seen 3/16/21, hypothyroidism, hypertension, obesity, osteopenia, rosacea, remote prior history of kidney stones.    Most recent labs 22 show normal liver/kidney function, fasting blood sugar of 111, LDL of 98-not on any cholesterol medications, TSH remaining at goal on 1+ range on current dose of thyroid medication & A1c W/ INCREASE TO 5.9  From 5.7.    Vitamin-D previously checked 21 and found to have normalized at 51 on vitamin D3 supplement-was previously low and she has borderline osteoporosis.    Surgery: L Eye Cataract Surgery  Indication: Blurry Vision of the L Eye  Surgeon and anticipated date of surgery: Sindi, 22    Feeling Healthy and Well Without Symptoms of Illness: yes, no  f/c/n/v, sore throat/URI.     Exercise Tolerance:  Can exercise vigorously for 30 minutes with out concerns. Denies chest pain, shortness of breath, palpitations, dizziness/lightheadedness, edema. Most recent EKG and ECHO reviewed and who NSR and ECHO with improvement in EF to 40-45%     Previous Trouble with Anesthesia: NONE    Easy Bleeding/Bruising? MILD BRUISING BUT NO BLEEDING.  Use of anticoagulants, blood thinners?: XARELTO 20     Chronic Conditions well Controlled: yes, see above             PAST MEDICAL HISTORY, SURGICAL/SOCIAL/FAMILY HISTORY REVIEWED AS PER CHART, WITH PERTINENT FINDINGS INCLUDED IN HISTORY SECTION OF NOTE.     Current  "Medications    Medication List with Changes/Refills   Current Medications    DICLOFENAC SODIUM (VOLTAREN) 1 % GEL    Apply 4 g  topically to affected area 4 times a day as needed.    FUROSEMIDE (LASIX) 20 MG TABLET    Take 1 tablet (20 mg total) by mouth once daily.    LEVOCETIRIZINE (XYZAL) 5 MG TABLET    Take 1 tablet (5 mg total) by mouth every evening.    LEVOTHYROXINE (SYNTHROID) 112 MCG TABLET    Take 1 tablet (112 mcg total) by mouth once daily.    LOSARTAN (COZAAR) 100 MG TABLET    TAKE 1 TABLET BY MOUTH ONCE DAILY    METOPROLOL SUCCINATE (TOPROL-XL) 200 MG 24 HR TABLET    Take 1 tablet (200 mg total) by mouth once daily.    RIVAROXABAN (XARELTO) 20 MG TAB    Take 1 tablet (20 mg total) by mouth daily with dinner or evening meal.    TRAMADOL (ULTRAM) 50 MG TABLET    Take 50 mg by mouth every 6 (six) hours as needed for Pain.       Allergies   Review of patient's allergies indicates:   Allergen Reactions    Aldactone [spironolactone] Other (See Comments)     Hyperkalemia when initiated on 01/23/2018          Review of Systems (Pertinent positives)  Review of Systems   Constitutional: Negative for unexpected weight change.   HENT: Negative for congestion, sinus pressure, sinus pain and sore throat.    Eyes: Negative for visual disturbance.   Respiratory: Negative for chest tightness and shortness of breath.    Cardiovascular: Negative for chest pain, palpitations and leg swelling.   Neurological: Negative for dizziness, light-headedness and headaches.       /76   Pulse 62   Temp 98.6 °F (37 °C) (Oral)   Ht 5' 8" (1.727 m)   Wt 103 kg (227 lb 1.2 oz)   SpO2 95%   BMI 34.53 kg/m²     Physical Exam  Vitals reviewed.   Constitutional:       General: She is not in acute distress.     Appearance: Normal appearance. She is well-developed.   HENT:      Head: Normocephalic and atraumatic.   Eyes:      Conjunctiva/sclera: Conjunctivae normal.   Cardiovascular:      Rate and Rhythm: Normal rate and regular " rhythm.   Pulmonary:      Effort: Pulmonary effort is normal.      Breath sounds: Normal breath sounds.   Musculoskeletal:      Right lower leg: No edema.      Left lower leg: No edema.   Skin:     General: Skin is warm and dry.   Neurological:      General: No focal deficit present.      Mental Status: She is alert and oriented to person, place, and time.   Psychiatric:         Mood and Affect: Mood normal.         Behavior: Behavior normal.           Assessment/Plan:  Violet Mirza is a 71 y.o. female who presents today for :        ICD-10-CM ICD-9-CM    1. Senile cataract of left eye, unspecified age-related cataract type  H25.9 366.10    2. Preop cardiovascular exam  Z01.810 V72.81    3. HFrEF (heart failure with reduced ejection fraction)  I50.20 428.20    4. Benign essential hypertension  I10 401.1    5. Diastolic dysfunction with chronic heart failure  I50.32 428.32    6. Acquired hypothyroidism  E03.9 244.9      Clear for cataract extractions scheduled on April 12, 2020 to her ophthalmologist  under monitored anesthesia care.    Chronic conditions all well controlled-in normal sinus rhythm today &  with good exercise tolerance > 4 Mets, euvolemic on exam with no recent concerns for heart failure exacerbation &  improvement on most recent echo to ejection fraction of 40-45%    Clear to stop Xarelto did 48-72 hours prior to surgery and she understands this plan/how to adjust her medications.    Will take her usual morning medications but no vitamins with a small sip of water on the morning of surgery.  She generally takes Lasix at night and can do so the night before.        There are no Patient Instructions on file for this visit.      Follow up for return as needed for new concerns, to follow up on lab results.

## 2022-04-01 ENCOUNTER — PATIENT OUTREACH (OUTPATIENT)
Dept: ADMINISTRATIVE | Facility: HOSPITAL | Age: 72
End: 2022-04-01
Payer: MEDICARE

## 2022-04-01 ENCOUNTER — TELEPHONE (OUTPATIENT)
Dept: ADMINISTRATIVE | Facility: HOSPITAL | Age: 72
End: 2022-04-01
Payer: MEDICARE

## 2022-05-02 ENCOUNTER — PATIENT OUTREACH (OUTPATIENT)
Dept: ADMINISTRATIVE | Facility: OTHER | Age: 72
End: 2022-05-02
Payer: MEDICARE

## 2022-05-02 NOTE — PROGRESS NOTES
Health Maintenance Due   Topic Date Due    COVID-19 Vaccine (4 - Booster for Pfizer series) 02/07/2022     Updates were requested from care everywhere.  Chart was reviewed for overdue Proactive [facility] Encounters ([facility]) topics (CRS, Breast Cancer Screening, Eye exam)  Health Maintenance has been updated.  [facility] immunization registry triggered.  Immunizations were reconciled.

## 2022-05-03 ENCOUNTER — OFFICE VISIT (OUTPATIENT)
Dept: CARDIOLOGY | Facility: CLINIC | Age: 72
End: 2022-05-03
Payer: MEDICARE

## 2022-05-03 VITALS
BODY MASS INDEX: 34.52 KG/M2 | DIASTOLIC BLOOD PRESSURE: 80 MMHG | HEART RATE: 67 BPM | OXYGEN SATURATION: 97 % | SYSTOLIC BLOOD PRESSURE: 138 MMHG | WEIGHT: 227.75 LBS | HEIGHT: 68 IN

## 2022-05-03 DIAGNOSIS — I87.2 VENOUS INSUFFICIENCY OF BOTH LOWER EXTREMITIES: ICD-10-CM

## 2022-05-03 DIAGNOSIS — E78.1 HYPERTRIGLYCERIDEMIA: ICD-10-CM

## 2022-05-03 DIAGNOSIS — Z79.01 CURRENT USE OF LONG TERM ANTICOAGULATION: ICD-10-CM

## 2022-05-03 DIAGNOSIS — I50.20 HFREF (HEART FAILURE WITH REDUCED EJECTION FRACTION): Primary | ICD-10-CM

## 2022-05-03 DIAGNOSIS — I50.32 DIASTOLIC DYSFUNCTION WITH CHRONIC HEART FAILURE: ICD-10-CM

## 2022-05-03 DIAGNOSIS — I10 BENIGN ESSENTIAL HYPERTENSION: ICD-10-CM

## 2022-05-03 DIAGNOSIS — I34.0 MITRAL VALVE INSUFFICIENCY, UNSPECIFIED ETIOLOGY: ICD-10-CM

## 2022-05-03 DIAGNOSIS — I49.3 PVC'S (PREMATURE VENTRICULAR CONTRACTIONS): ICD-10-CM

## 2022-05-03 DIAGNOSIS — E66.01 CLASS 2 SEVERE OBESITY DUE TO EXCESS CALORIES WITH SERIOUS COMORBIDITY AND BODY MASS INDEX (BMI) OF 35.0 TO 35.9 IN ADULT: ICD-10-CM

## 2022-05-03 DIAGNOSIS — I48.0 PAROXYSMAL ATRIAL FIBRILLATION: ICD-10-CM

## 2022-05-03 PROCEDURE — 4010F ACE/ARB THERAPY RXD/TAKEN: CPT | Mod: CPTII,S$GLB,, | Performed by: INTERNAL MEDICINE

## 2022-05-03 PROCEDURE — 1126F AMNT PAIN NOTED NONE PRSNT: CPT | Mod: CPTII,S$GLB,, | Performed by: INTERNAL MEDICINE

## 2022-05-03 PROCEDURE — 4010F PR ACE/ARB THEARPY RXD/TAKEN: ICD-10-PCS | Mod: CPTII,S$GLB,, | Performed by: INTERNAL MEDICINE

## 2022-05-03 PROCEDURE — 3079F PR MOST RECENT DIASTOLIC BLOOD PRESSURE 80-89 MM HG: ICD-10-PCS | Mod: CPTII,S$GLB,, | Performed by: INTERNAL MEDICINE

## 2022-05-03 PROCEDURE — 99214 PR OFFICE/OUTPT VISIT, EST, LEVL IV, 30-39 MIN: ICD-10-PCS | Mod: S$GLB,,, | Performed by: INTERNAL MEDICINE

## 2022-05-03 PROCEDURE — 3008F BODY MASS INDEX DOCD: CPT | Mod: CPTII,S$GLB,, | Performed by: INTERNAL MEDICINE

## 2022-05-03 PROCEDURE — 1160F PR REVIEW ALL MEDS BY PRESCRIBER/CLIN PHARMACIST DOCUMENTED: ICD-10-PCS | Mod: CPTII,S$GLB,, | Performed by: INTERNAL MEDICINE

## 2022-05-03 PROCEDURE — 3044F HG A1C LEVEL LT 7.0%: CPT | Mod: CPTII,S$GLB,, | Performed by: INTERNAL MEDICINE

## 2022-05-03 PROCEDURE — 1160F RVW MEDS BY RX/DR IN RCRD: CPT | Mod: CPTII,S$GLB,, | Performed by: INTERNAL MEDICINE

## 2022-05-03 PROCEDURE — 3079F DIAST BP 80-89 MM HG: CPT | Mod: CPTII,S$GLB,, | Performed by: INTERNAL MEDICINE

## 2022-05-03 PROCEDURE — 99999 PR PBB SHADOW E&M-EST. PATIENT-LVL IV: CPT | Mod: PBBFAC,,, | Performed by: INTERNAL MEDICINE

## 2022-05-03 PROCEDURE — 99499 RISK ADDL DX/OHS AUDIT: ICD-10-PCS | Mod: S$GLB,,, | Performed by: INTERNAL MEDICINE

## 2022-05-03 PROCEDURE — 3008F PR BODY MASS INDEX (BMI) DOCUMENTED: ICD-10-PCS | Mod: CPTII,S$GLB,, | Performed by: INTERNAL MEDICINE

## 2022-05-03 PROCEDURE — 1126F PR PAIN SEVERITY QUANTIFIED, NO PAIN PRESENT: ICD-10-PCS | Mod: CPTII,S$GLB,, | Performed by: INTERNAL MEDICINE

## 2022-05-03 PROCEDURE — 1159F MED LIST DOCD IN RCRD: CPT | Mod: CPTII,S$GLB,, | Performed by: INTERNAL MEDICINE

## 2022-05-03 PROCEDURE — 3075F PR MOST RECENT SYSTOLIC BLOOD PRESS GE 130-139MM HG: ICD-10-PCS | Mod: CPTII,S$GLB,, | Performed by: INTERNAL MEDICINE

## 2022-05-03 PROCEDURE — 1159F PR MEDICATION LIST DOCUMENTED IN MEDICAL RECORD: ICD-10-PCS | Mod: CPTII,S$GLB,, | Performed by: INTERNAL MEDICINE

## 2022-05-03 PROCEDURE — 99214 OFFICE O/P EST MOD 30 MIN: CPT | Mod: S$GLB,,, | Performed by: INTERNAL MEDICINE

## 2022-05-03 PROCEDURE — 99499 UNLISTED E&M SERVICE: CPT | Mod: S$GLB,,, | Performed by: INTERNAL MEDICINE

## 2022-05-03 PROCEDURE — 3044F PR MOST RECENT HEMOGLOBIN A1C LEVEL <7.0%: ICD-10-PCS | Mod: CPTII,S$GLB,, | Performed by: INTERNAL MEDICINE

## 2022-05-03 PROCEDURE — 99999 PR PBB SHADOW E&M-EST. PATIENT-LVL IV: ICD-10-PCS | Mod: PBBFAC,,, | Performed by: INTERNAL MEDICINE

## 2022-05-03 PROCEDURE — 3075F SYST BP GE 130 - 139MM HG: CPT | Mod: CPTII,S$GLB,, | Performed by: INTERNAL MEDICINE

## 2022-05-03 NOTE — PROGRESS NOTES
Subjective:    Patient ID:  Violet Mirza is a 71 y.o. female who presents for follow-up of No chief complaint on file.      HPI    72 y/o female with hx of HFrEF (originally with EF 10% with CHRISTINA 5.6 cm, last 2DE with EF 40-45% with CHRISTINA 5.7 cm), Pafib (CHADSVasc 4 for CHF, HTN, Age, Female) on chronic anticoagulation with Xarelto, mild MR on 2DE (initially mod-severe), HTN, hypothyroidism, GERD who presents for f/u. Initially presented to ED with worsening SOB/LE edema, found to be in ADHF and Pafib, had spontaneous conversion to SR, EF 10%, LHC with normal coronaries, diuresed appropriately and D/C'd home in improved condition. Was seen by Abbe Medel. Was also seen by Dr Loco, restarted on Amio, Holter placed, did not want antiarrhythmics or invasive procedures. Has been in SR. Had repeat 2DE with improvement in EF to 35% and improvement in MR to mild degree and subsequent 2DE with EF 40-45%.   She is doing well from a cardiac perspective and currently with NYHA FC I symptoms. Has very mild SANTANA with moderate activity and able to accomplish ADL's (>4 METs). Denies CP, orthopnea, PND, palps, syncope, LE edema. Compliant with meds and following low salt diet.     Review of Systems   Constitutional: Negative for malaise/fatigue.   HENT: Negative for congestion.    Eyes: Negative for blurred vision.   Cardiovascular: Negative for chest pain, claudication, cyanosis, dyspnea on exertion, irregular heartbeat, leg swelling, near-syncope, orthopnea, palpitations, paroxysmal nocturnal dyspnea and syncope.   Respiratory: Negative for shortness of breath.    Endocrine: Negative for polyuria.   Hematologic/Lymphatic: Negative for bleeding problem.   Skin: Negative for itching and rash.   Musculoskeletal: Negative for joint swelling, muscle cramps and muscle weakness.   Gastrointestinal: Negative for abdominal pain, hematemesis, hematochezia, melena, nausea and vomiting.   Genitourinary: Negative for dysuria and  hematuria.   Neurological: Negative for dizziness, focal weakness, headaches, light-headedness, loss of balance and weakness.   Psychiatric/Behavioral: Negative for depression. The patient is not nervous/anxious.         Objective:    Physical Exam  Constitutional:       Appearance: She is well-developed.   HENT:      Head: Normocephalic and atraumatic.   Neck:      Vascular: No JVD.   Cardiovascular:      Rate and Rhythm: Normal rate and regular rhythm.      Pulses:           Carotid pulses are 2+ on the right side and 2+ on the left side.       Radial pulses are 2+ on the right side and 2+ on the left side.        Femoral pulses are 2+ on the right side and 2+ on the left side.       Dorsalis pedis pulses are 2+ on the right side and 2+ on the left side.        Posterior tibial pulses are 2+ on the right side and 2+ on the left side.      Heart sounds: Normal heart sounds.   Pulmonary:      Effort: Pulmonary effort is normal.      Breath sounds: Normal breath sounds.   Abdominal:      General: Bowel sounds are normal.      Palpations: Abdomen is soft.   Musculoskeletal:      Cervical back: Neck supple.   Skin:     General: Skin is warm and dry.   Neurological:      Mental Status: She is alert and oriented to person, place, and time.   Psychiatric:         Behavior: Behavior normal.         Thought Content: Thought content normal.           Assessment:       1. HFrEF (heart failure with reduced ejection fraction)    2. Diastolic dysfunction with chronic heart failure    3. Benign essential hypertension    4. Hypertriglyceridemia    5. Mitral valve insufficiency, unspecified etiology    6. Paroxysmal atrial fibrillation    7. PVC's (premature ventricular contractions)    8. Venous insufficiency of both lower extremities    9. Current use of long term anticoagulation    10. Class 2 severe obesity due to excess calories with serious comorbidity and body mass index (BMI) of 35.0 to 35.9 in adult      72 y/o pt with hx  and presentation as above. Doing well from a cardiac perspective and compensated from a HF perspective, currently with NYHA FC I symptoms. Will update 2DE at next clinic visit. Discussed appropriate Xarelto daily dosing. Discussed the etiology, evaluation, and management of NICM, HFrEF, HTN, HLD, PAfib, prem insuff, thyroid issues. Discussed the importance of med compliance, heart healthy diet, and regular exercise.      Plan:       -F/u in 1 year with 2DE before  -Continue current medical management

## 2022-05-20 ENCOUNTER — PATIENT MESSAGE (OUTPATIENT)
Dept: CARDIOLOGY | Facility: CLINIC | Age: 72
End: 2022-05-20
Payer: MEDICARE

## 2022-07-11 DIAGNOSIS — I50.20 HFREF (HEART FAILURE WITH REDUCED EJECTION FRACTION): ICD-10-CM

## 2022-07-11 DIAGNOSIS — I50.32 DIASTOLIC DYSFUNCTION WITH CHRONIC HEART FAILURE: ICD-10-CM

## 2022-07-11 RX ORDER — FUROSEMIDE 20 MG/1
20 TABLET ORAL DAILY
Qty: 90 TABLET | Refills: 3 | Status: SHIPPED | OUTPATIENT
Start: 2022-07-11 | End: 2023-07-13 | Stop reason: SDUPTHER

## 2022-07-25 ENCOUNTER — LAB VISIT (OUTPATIENT)
Dept: LAB | Facility: HOSPITAL | Age: 72
End: 2022-07-25
Attending: FAMILY MEDICINE
Payer: MEDICARE

## 2022-07-25 DIAGNOSIS — E03.9 ACQUIRED HYPOTHYROIDISM: ICD-10-CM

## 2022-07-25 DIAGNOSIS — E78.1 HYPERTRIGLYCERIDEMIA: ICD-10-CM

## 2022-07-25 DIAGNOSIS — I10 BENIGN ESSENTIAL HYPERTENSION: ICD-10-CM

## 2022-07-25 DIAGNOSIS — Z79.899 MEDICATION MANAGEMENT: ICD-10-CM

## 2022-07-25 LAB
ALBUMIN SERPL BCP-MCNC: 3.5 G/DL (ref 3.5–5.2)
ALP SERPL-CCNC: 140 U/L (ref 55–135)
ALT SERPL W/O P-5'-P-CCNC: 18 U/L (ref 10–44)
ANION GAP SERPL CALC-SCNC: 9 MMOL/L (ref 8–16)
AST SERPL-CCNC: 17 U/L (ref 10–40)
BILIRUB SERPL-MCNC: 0.4 MG/DL (ref 0.1–1)
BUN SERPL-MCNC: 15 MG/DL (ref 8–23)
CALCIUM SERPL-MCNC: 9.3 MG/DL (ref 8.7–10.5)
CHLORIDE SERPL-SCNC: 101 MMOL/L (ref 95–110)
CHOLEST SERPL-MCNC: 181 MG/DL (ref 120–199)
CHOLEST/HDLC SERPL: 3.1 {RATIO} (ref 2–5)
CO2 SERPL-SCNC: 32 MMOL/L (ref 23–29)
CREAT SERPL-MCNC: 0.8 MG/DL (ref 0.5–1.4)
EST. GFR  (AFRICAN AMERICAN): >60 ML/MIN/1.73 M^2
EST. GFR  (NON AFRICAN AMERICAN): >60 ML/MIN/1.73 M^2
ESTIMATED AVG GLUCOSE: 134 MG/DL (ref 68–131)
GLUCOSE SERPL-MCNC: 101 MG/DL (ref 70–110)
HBA1C MFR BLD: 6.3 % (ref 4–5.6)
HDLC SERPL-MCNC: 58 MG/DL (ref 40–75)
HDLC SERPL: 32 % (ref 20–50)
LDLC SERPL CALC-MCNC: 102 MG/DL (ref 63–159)
NONHDLC SERPL-MCNC: 123 MG/DL
POTASSIUM SERPL-SCNC: 3.9 MMOL/L (ref 3.5–5.1)
PROT SERPL-MCNC: 7 G/DL (ref 6–8.4)
SODIUM SERPL-SCNC: 142 MMOL/L (ref 136–145)
TRIGL SERPL-MCNC: 105 MG/DL (ref 30–150)
TSH SERPL DL<=0.005 MIU/L-ACNC: 0.82 UIU/ML (ref 0.4–4)
VIT B12 SERPL-MCNC: 304 PG/ML (ref 210–950)

## 2022-07-25 PROCEDURE — 80053 COMPREHEN METABOLIC PANEL: CPT | Performed by: FAMILY MEDICINE

## 2022-07-25 PROCEDURE — 84443 ASSAY THYROID STIM HORMONE: CPT | Performed by: FAMILY MEDICINE

## 2022-07-25 PROCEDURE — 83036 HEMOGLOBIN GLYCOSYLATED A1C: CPT | Performed by: FAMILY MEDICINE

## 2022-07-25 PROCEDURE — 36415 COLL VENOUS BLD VENIPUNCTURE: CPT | Performed by: FAMILY MEDICINE

## 2022-07-25 PROCEDURE — 82607 VITAMIN B-12: CPT | Performed by: FAMILY MEDICINE

## 2022-07-25 PROCEDURE — 80061 LIPID PANEL: CPT | Performed by: FAMILY MEDICINE

## 2022-07-29 PROBLEM — R73.03 PREDIABETES: Status: ACTIVE | Noted: 2022-07-29

## 2022-08-01 ENCOUNTER — OFFICE VISIT (OUTPATIENT)
Dept: FAMILY MEDICINE | Facility: CLINIC | Age: 72
End: 2022-08-01
Payer: MEDICARE

## 2022-08-01 VITALS
OXYGEN SATURATION: 96 % | WEIGHT: 227.5 LBS | SYSTOLIC BLOOD PRESSURE: 144 MMHG | HEART RATE: 61 BPM | DIASTOLIC BLOOD PRESSURE: 82 MMHG | BODY MASS INDEX: 34.48 KG/M2 | HEIGHT: 68 IN

## 2022-08-01 DIAGNOSIS — I49.3 PVC'S (PREMATURE VENTRICULAR CONTRACTIONS): ICD-10-CM

## 2022-08-01 DIAGNOSIS — J30.89 ENVIRONMENTAL AND SEASONAL ALLERGIES: ICD-10-CM

## 2022-08-01 DIAGNOSIS — I50.32 DIASTOLIC DYSFUNCTION WITH CHRONIC HEART FAILURE: ICD-10-CM

## 2022-08-01 DIAGNOSIS — Z87.11 HISTORY OF PEPTIC ULCER: ICD-10-CM

## 2022-08-01 DIAGNOSIS — I50.20 HFREF (HEART FAILURE WITH REDUCED EJECTION FRACTION): ICD-10-CM

## 2022-08-01 DIAGNOSIS — E03.9 ACQUIRED HYPOTHYROIDISM: ICD-10-CM

## 2022-08-01 DIAGNOSIS — I48.0 PAROXYSMAL ATRIAL FIBRILLATION: ICD-10-CM

## 2022-08-01 DIAGNOSIS — M81.0 POSTMENOPAUSAL OSTEOPOROSIS: ICD-10-CM

## 2022-08-01 DIAGNOSIS — Z79.899 MEDICATION MANAGEMENT: ICD-10-CM

## 2022-08-01 DIAGNOSIS — I87.2 VENOUS INSUFFICIENCY OF BOTH LOWER EXTREMITIES: ICD-10-CM

## 2022-08-01 DIAGNOSIS — I34.0 MITRAL VALVE INSUFFICIENCY, UNSPECIFIED ETIOLOGY: ICD-10-CM

## 2022-08-01 DIAGNOSIS — I10 BENIGN ESSENTIAL HYPERTENSION: ICD-10-CM

## 2022-08-01 DIAGNOSIS — Z00.00 ROUTINE GENERAL MEDICAL EXAMINATION AT A HEALTH CARE FACILITY: Primary | ICD-10-CM

## 2022-08-01 DIAGNOSIS — E55.9 VITAMIN D DEFICIENCY: ICD-10-CM

## 2022-08-01 DIAGNOSIS — E66.01 CLASS 2 SEVERE OBESITY DUE TO EXCESS CALORIES WITH SERIOUS COMORBIDITY AND BODY MASS INDEX (BMI) OF 35.0 TO 35.9 IN ADULT: ICD-10-CM

## 2022-08-01 DIAGNOSIS — L50.9 HIVES: ICD-10-CM

## 2022-08-01 DIAGNOSIS — Z79.01 CURRENT USE OF LONG TERM ANTICOAGULATION: ICD-10-CM

## 2022-08-01 DIAGNOSIS — R73.03 PREDIABETES: ICD-10-CM

## 2022-08-01 PROCEDURE — 3077F SYST BP >= 140 MM HG: CPT | Mod: CPTII,S$GLB,, | Performed by: FAMILY MEDICINE

## 2022-08-01 PROCEDURE — 3288F PR FALLS RISK ASSESSMENT DOCUMENTED: ICD-10-PCS | Mod: CPTII,S$GLB,, | Performed by: FAMILY MEDICINE

## 2022-08-01 PROCEDURE — 1101F PT FALLS ASSESS-DOCD LE1/YR: CPT | Mod: CPTII,S$GLB,, | Performed by: FAMILY MEDICINE

## 2022-08-01 PROCEDURE — 99999 PR PBB SHADOW E&M-EST. PATIENT-LVL IV: ICD-10-PCS | Mod: PBBFAC,,, | Performed by: FAMILY MEDICINE

## 2022-08-01 PROCEDURE — 3077F PR MOST RECENT SYSTOLIC BLOOD PRESSURE >= 140 MM HG: ICD-10-PCS | Mod: CPTII,S$GLB,, | Performed by: FAMILY MEDICINE

## 2022-08-01 PROCEDURE — 3044F HG A1C LEVEL LT 7.0%: CPT | Mod: CPTII,S$GLB,, | Performed by: FAMILY MEDICINE

## 2022-08-01 PROCEDURE — 99397 PER PM REEVAL EST PAT 65+ YR: CPT | Mod: GZ,S$GLB,, | Performed by: FAMILY MEDICINE

## 2022-08-01 PROCEDURE — 4010F ACE/ARB THERAPY RXD/TAKEN: CPT | Mod: CPTII,S$GLB,, | Performed by: FAMILY MEDICINE

## 2022-08-01 PROCEDURE — 3079F DIAST BP 80-89 MM HG: CPT | Mod: CPTII,S$GLB,, | Performed by: FAMILY MEDICINE

## 2022-08-01 PROCEDURE — 99397 PR PREVENTIVE VISIT,EST,65 & OVER: ICD-10-PCS | Mod: GZ,S$GLB,, | Performed by: FAMILY MEDICINE

## 2022-08-01 PROCEDURE — 1160F PR REVIEW ALL MEDS BY PRESCRIBER/CLIN PHARMACIST DOCUMENTED: ICD-10-PCS | Mod: CPTII,S$GLB,, | Performed by: FAMILY MEDICINE

## 2022-08-01 PROCEDURE — 1159F MED LIST DOCD IN RCRD: CPT | Mod: CPTII,S$GLB,, | Performed by: FAMILY MEDICINE

## 2022-08-01 PROCEDURE — 1159F PR MEDICATION LIST DOCUMENTED IN MEDICAL RECORD: ICD-10-PCS | Mod: CPTII,S$GLB,, | Performed by: FAMILY MEDICINE

## 2022-08-01 PROCEDURE — 4010F PR ACE/ARB THEARPY RXD/TAKEN: ICD-10-PCS | Mod: CPTII,S$GLB,, | Performed by: FAMILY MEDICINE

## 2022-08-01 PROCEDURE — 1160F RVW MEDS BY RX/DR IN RCRD: CPT | Mod: CPTII,S$GLB,, | Performed by: FAMILY MEDICINE

## 2022-08-01 PROCEDURE — 1101F PR PT FALLS ASSESS DOC 0-1 FALLS W/OUT INJ PAST YR: ICD-10-PCS | Mod: CPTII,S$GLB,, | Performed by: FAMILY MEDICINE

## 2022-08-01 PROCEDURE — 3044F PR MOST RECENT HEMOGLOBIN A1C LEVEL <7.0%: ICD-10-PCS | Mod: CPTII,S$GLB,, | Performed by: FAMILY MEDICINE

## 2022-08-01 PROCEDURE — 3288F FALL RISK ASSESSMENT DOCD: CPT | Mod: CPTII,S$GLB,, | Performed by: FAMILY MEDICINE

## 2022-08-01 PROCEDURE — 99999 PR PBB SHADOW E&M-EST. PATIENT-LVL IV: CPT | Mod: PBBFAC,,, | Performed by: FAMILY MEDICINE

## 2022-08-01 PROCEDURE — 3008F BODY MASS INDEX DOCD: CPT | Mod: CPTII,S$GLB,, | Performed by: FAMILY MEDICINE

## 2022-08-01 PROCEDURE — 3079F PR MOST RECENT DIASTOLIC BLOOD PRESSURE 80-89 MM HG: ICD-10-PCS | Mod: CPTII,S$GLB,, | Performed by: FAMILY MEDICINE

## 2022-08-01 PROCEDURE — 3008F PR BODY MASS INDEX (BMI) DOCUMENTED: ICD-10-PCS | Mod: CPTII,S$GLB,, | Performed by: FAMILY MEDICINE

## 2022-08-01 RX ORDER — LOSARTAN POTASSIUM 100 MG/1
100 TABLET ORAL DAILY
Qty: 90 TABLET | Refills: 4 | Status: SHIPPED | OUTPATIENT
Start: 2022-08-01 | End: 2023-08-04 | Stop reason: SDUPTHER

## 2022-08-01 RX ORDER — METFORMIN HYDROCHLORIDE 500 MG/1
1000 TABLET, EXTENDED RELEASE ORAL
Qty: 180 TABLET | Refills: 4 | Status: SHIPPED | OUTPATIENT
Start: 2022-08-01 | End: 2022-11-07 | Stop reason: ALTCHOICE

## 2022-08-01 RX ORDER — LEVOTHYROXINE SODIUM 112 UG/1
112 TABLET ORAL DAILY
Qty: 90 TABLET | Refills: 4 | Status: SHIPPED | OUTPATIENT
Start: 2022-08-01 | End: 2023-10-19 | Stop reason: SDUPTHER

## 2022-08-01 RX ORDER — LEVOCETIRIZINE DIHYDROCHLORIDE 5 MG/1
5 TABLET, FILM COATED ORAL NIGHTLY
Qty: 30 TABLET | Refills: 1 | Status: SHIPPED | OUTPATIENT
Start: 2022-08-01 | End: 2024-02-19

## 2022-08-01 NOTE — PROGRESS NOTES
Office Visit    Patient Name: Violet Mirza    : 1950  MRN: [id number]    Subjective:  Violet is a 72 y.o. female who presents today for:    Annual Exam    73 yo patient of mine who presents for annual physical with monitoring of chronic conditions that include paroxysmal AFib(2018 in [facility] was given diltiazem & converted spontaneously to NSR), HFrEF (EF 40-45% 3/25/21) followed by cardiology Dr Duran-- last seen 5/3/22 & F/U 1 year with ECHO, hypothyroidism, hypertension, obesity, osteopenia, rosacea, remote prior history of kidney stones.     Labs drawn prior to office visit 22 show worsening of A1c from 5.9 to nearly the diabetic range at 6.3.  She attributes this to temporary lapse inher exercise routine.   Liver/kidney function are normal, fasting blood sugar is 101. LDL is 102-not on any cholesterol medications, TSH is at goal at 0.8 on current dose of thyroid medication.   Vitamin-D previously checked 21 and found to have normalized at 51 on vitamin D3 supplement-was previously low and she has borderline osteoporosis.     She is postmenopausal.  She does have a gynecologist-- Renny Deshpande, seen in January yearly.      She has been feeling overall well.  Following with outside orthopedists Marika Miller (PT for R rotator cuff tear) and Britta for shoulder and knee issues. Seen by dr Callejas 22.  Had synvisc 2022--helped.      General lifestyle habits are as follows:    Diet: healthy and watches salt and carbs-- home cooked meals  Exercise: recently very good-- doing a home [employer] Workout programs with good variety of muscle building and cardio but WAS OUT OF HER ROUTINE FOR SEVERAL WEEKS 2/2 MSK ISSUES  Sleep: fair-- interrupted to use the bathroom as she takes Lasix at night BUT WOULD PREFER THIS TO TAKING LASIX IN THE AM and increased daytime urinary frequency   Weight is STABLE in the last 6 months at BMI 34.      Immunizations: TDaP 19-- repeat 10 years, yearly  FLU 11/12/21, SHINGRIX 2/2 7/13/20, Pneumovax 23 11/3/2014, PREVNAR 13 10/18/2015, COVID-19 completed 2/8/21 w/ 1st PFIZER BOOSTER 10/7/21 & 2nd booster ADVISED.      Screening Tests: DEXA 7/19/21- improved Osteopenia-- RECHECK 2 years,  mammogram-- yearly per ob gyn (has every December), PAP no longer indicated-- >65 w/o h/o abnormal, colonoscopy through [facility] 1/22/2015-- repeat 10 years, Hep C screening 10/14/2015     Eye/Dental: UTD with Both- yearly, [facility]YE [facility]  3/25/22- L Cataract removed- laser planned 2/2 scar tissue complications     Saw Cardiology Dr. Rausch electrophysiology 10/18/2019 and there was a concern for greater numbers of PVCs noted on EKG.    He advised a Holter monitor, which she declined, but since her ejection fraction is overall stable on echo,  okay with monitoring        PAST MEDICAL HISTORY, SURGICAL/SOCIAL/FAMILY HISTORY REVIEWED AS PER CHART, WITH PERTINENT FINDINGS INCLUDED IN HISTORY SECTION OF NOTE.     Current Medications    Medication List with Changes/Refills   New Medications    METFORMIN (GLUCOPHAGE-XR) 500 MG ER 24HR TABLET    Take 2 tablets (1,000 mg total) by mouth daily with breakfast.   Current Medications    DICLOFENAC SODIUM (VOLTAREN) 1 % GEL    Apply 4 g  topically to affected area 4 times a day as needed.    FUROSEMIDE (LASIX) 20 MG TABLET    Take 1 tablet (20 mg total) by mouth once daily.    METOPROLOL SUCCINATE (TOPROL-XL) 200 MG 24 HR TABLET    Take 1 tablet (200 mg total) by mouth once daily.    RIVAROXABAN (XARELTO) 20 MG TAB    Take 1 tablet (20 mg total) by mouth daily with dinner or evening meal.    TRAMADOL (ULTRAM) 50 MG TABLET    Take 50 mg by mouth every 6 (six) hours as needed for Pain.   Changed and/or Refilled Medications    Modified Medication Previous Medication    LEVOCETIRIZINE (XYZAL) 5 MG TABLET levocetirizine (XYZAL) 5 MG tablet       Take 1 tablet (5 mg total) by mouth every evening.    Take 1 tablet (5 mg total) by mouth every evening.     "LEVOTHYROXINE (SYNTHROID) 112 MCG TABLET levothyroxine (SYNTHROID) 112 MCG tablet       Take 1 tablet (112 mcg total) by mouth once daily.    Take 1 tablet (112 mcg total) by mouth once daily.    LOSARTAN (COZAAR) 100 MG TABLET losartan (COZAAR) 100 MG tablet       TAKE 1 TABLET BY MOUTH ONCE DAILY    TAKE 1 TABLET BY MOUTH ONCE DAILY       Allergies   Review of patient's allergies indicates:   Allergen Reactions    Aldactone [spironolactone] Other (See Comments)     Hyperkalemia when initiated on 01/23/2018          Review of Systems (Pertinent positives)  Review of Systems   Constitutional: Negative for unexpected weight change.   HENT: Negative for sinus pressure and trouble swallowing.    Respiratory: Negative for shortness of breath.    Cardiovascular: Positive for leg swelling (mild, stable). Negative for chest pain.   Gastrointestinal: Negative for constipation, diarrhea, nausea and vomiting.   Genitourinary: Positive for frequency. Negative for difficulty urinating.   Musculoskeletal: Positive for arthralgias.   Skin: Negative for rash (h/o hives,resolved with daily antihistamine).   Neurological: Negative for dizziness and light-headedness.       BP (!) 144/82 (BP Location: Right arm, Patient Position: Sitting, BP Method: Medium (Manual))   Pulse 61   Ht 5' 8" (1.727 m)   Wt 103.2 kg (227 lb 8.2 oz)   SpO2 96%   BMI 34.59 kg/m²     Physical Exam  Vitals reviewed.   Constitutional:       General: She is not in acute distress.     Appearance: Normal appearance. She is well-developed.   HENT:      Head: Normocephalic and atraumatic.      Right Ear: Ear canal normal. Tympanic membrane is not erythematous or bulging.      Left Ear: Ear canal normal. Tympanic membrane is not erythematous or bulging.      Nose: Nose normal.      Mouth/Throat:      Mouth: Mucous membranes are moist.      Pharynx: No oropharyngeal exudate.   Eyes:      Extraocular Movements: Extraocular movements intact.      " Conjunctiva/sclera: Conjunctivae normal.   Neck:      Thyroid: No thyroid mass or thyromegaly.      Vascular: No carotid bruit.   Cardiovascular:      Rate and Rhythm: Normal rate and regular rhythm.      Pulses:           Dorsalis pedis pulses are 2+ on the right side and 2+ on the left side.      Heart sounds: Normal heart sounds. No murmur heard.  Pulmonary:      Effort: Pulmonary effort is normal. No respiratory distress.      Breath sounds: Normal breath sounds.   Abdominal:      General: Bowel sounds are normal. There is no distension.      Palpations: Abdomen is soft. There is no mass.      Tenderness: There is no abdominal tenderness.   Musculoskeletal:         General: Normal range of motion.      Right lower leg: No edema.      Left lower leg: No edema.   Lymphadenopathy:      Cervical: No cervical adenopathy.   Skin:     General: Skin is warm and dry.      Findings: No rash.   Neurological:      General: No focal deficit present.      Mental Status: She is alert and oriented to person, place, and time.   Psychiatric:         Mood and Affect: Mood normal.         Behavior: Behavior normal.           Assessment/Plan:  Violet Mirza is a 72 y.o. female who presents today for :        ICD-10-CM ICD-9-CM    1. Routine general medical examination at a health care facility  Z00.00 V70.0    2. Class 2 severe obesity due to excess calories with serious comorbidity and body mass index (BMI) of 35.0 to 35.9 in adult  E66.01 278.01     Z68.35 V85.35    3. Prediabetes  R73.03 790.29 metFORMIN (GLUCOPHAGE-XR) 500 MG ER 24hr tablet      Hemoglobin A1C   4. Benign essential hypertension  I10 401.1 losartan (COZAAR) 100 MG tablet   5. Mitral valve insufficiency, unspecified etiology  I34.0 424.0    6. Diastolic dysfunction with chronic heart failure  I50.32 428.32    7. HFrEF (heart failure with reduced ejection fraction)  I50.20 428.20    8. Paroxysmal atrial fibrillation  I48.0 427.31    9. PVC's (premature  ventricular contractions)  I49.3 427.69    10. Venous insufficiency of both lower extremities  I87.2 459.81    11. Current use of long term anticoagulation  Z79.01 V58.61    12. Environmental and seasonal allergies  J30.89 477.8 levocetirizine (XYZAL) 5 MG tablet   13. History of peptic ulcer  Z87.11 V12.71    14. Acquired hypothyroidism  E03.9 244.9 levothyroxine (SYNTHROID) 112 MCG tablet   15. Postmenopausal osteoporosis  M81.0 733.01    16. Vitamin D deficiency  E55.9 268.9    17. Medication management  Z79.899 V58.69    18. Hives  L50.9 708.9 levocetirizine (XYZAL) 5 MG tablet     ADVISED ON DIET/EXERCISE/SLEEP, ROUTINE EYE/DENTAL EXAMS, AND THE IMPORTANCE OF KEEPING UP WITH APPROPRIATE SCREENING TESTS BASED ON AGE AND RISK FACTORS.  EYE [facility]  3/25/22  HEALTH MAINTENANCE REVIEWED AND IS UP-TO-DATE EXCEPT 2ND COVID 19 BOOSTER ADVISED-- HAD 1ST PFIZER BOOSTER 10/7/21, NEXT DEXA 7/2023, NEXT MAMMO December 2022(YEARLY PER GYN), NEXT CSCOPE 1/2025    OBESITY W/ PREDIABETES: A1c 6.3.advised ongoing attn to low carb diet, resuming consistent exercise regimen and starting Metformin 500 XR BID with repeat labs and OV in 3 months      H/O HFwREF,DD, PAFIB: following with cardiology yearly and saw Dr Duran-- last seen 5/3/22 & F/U 1 year with ECHO, improvement on most recent ECHO 3/5/21 to 40=45%   CARDIAC MEDS INCLUDE: toprol xl 200 mg daily, and cozaar 100 mg daily, along with Xarelto for stroke prevention in Paroxysmal A Fib. Euvolemic on dose of 20 mg Lasix daily.      HYPOTHYROIDISM: TSH WNL on levothyroxine 112 mcg daily, ongoing monitoring      OP: improved to osteopenia with normalization of Vitamin D, regular exercise, recheck DEXA 7/2023     Allergies stable on daily Xyzal, thought she cannot go with out it.      Venous insufficiency improved following vein procedure and with reduced swelling 2/2 lasix and weight loss.     Patient Instructions   OBTAIN THE 2ND PFIZER BOOSTER DOWN STAIRS FROM THE PHARMACY  AND START METFORMIN TO 2 PILLS DAILY FOR PREVENTION OF PROGRESSION TO DIABETES.    SCHEDULE REPEAT LABS, OFFICE VISIT IN 3 MONTHS FOR FOLLOW-UP.    RESUME REGULAR EXERCISE ROUTINE, ONGOING ATTENTION TO LOW CARB DIET.        Follow up in about 3 months (around 11/1/2022) for to follow up on lab results, return as needed for new concerns.

## 2022-08-01 NOTE — PATIENT INSTRUCTIONS
OBTAIN THE 2ND PFIZER BOOSTER DOWN STAIRS FROM THE PHARMACY AND START METFORMIN TO 2 PILLS DAILY FOR PREVENTION OF PROGRESSION TO DIABETES.    SCHEDULE REPEAT LABS, OFFICE VISIT IN 3 MONTHS FOR FOLLOW-UP.    RESUME REGULAR EXERCISE ROUTINE, ONGOING ATTENTION TO LOW CARB DIET.

## 2022-08-02 PROBLEM — E78.1 HYPERTRIGLYCERIDEMIA: Status: RESOLVED | Noted: 2019-04-07 | Resolved: 2022-08-02

## 2022-08-11 ENCOUNTER — IMMUNIZATION (OUTPATIENT)
Dept: INTERNAL MEDICINE | Facility: CLINIC | Age: 72
End: 2022-08-11
Payer: MEDICARE

## 2022-08-11 DIAGNOSIS — Z23 NEED FOR VACCINATION: Primary | ICD-10-CM

## 2022-08-11 PROCEDURE — 0054A COVID-19, MRNA, LNP-S, PF, 30 MCG/0.3 ML DOSE VACCINE (PFIZER): CPT | Mod: PBBFAC | Performed by: FAMILY MEDICINE

## 2022-09-12 ENCOUNTER — OFFICE VISIT (OUTPATIENT)
Dept: OTOLARYNGOLOGY | Facility: CLINIC | Age: 72
End: 2022-09-12
Payer: MEDICARE

## 2022-09-12 VITALS
WEIGHT: 229.25 LBS | HEIGHT: 68 IN | HEART RATE: 62 BPM | BODY MASS INDEX: 34.75 KG/M2 | SYSTOLIC BLOOD PRESSURE: 126 MMHG | DIASTOLIC BLOOD PRESSURE: 69 MMHG

## 2022-09-12 DIAGNOSIS — H60.8X3 CHRONIC ECZEMATOUS OTITIS EXTERNA OF BOTH EARS: Chronic | ICD-10-CM

## 2022-09-12 DIAGNOSIS — H61.21 IMPACTED CERUMEN OF RIGHT EAR: ICD-10-CM

## 2022-09-12 DIAGNOSIS — J30.0 VASOMOTOR RHINITIS: Primary | Chronic | ICD-10-CM

## 2022-09-12 PROCEDURE — 99999 PR PBB SHADOW E&M-EST. PATIENT-LVL III: CPT | Mod: PBBFAC,,, | Performed by: OTOLARYNGOLOGY

## 2022-09-12 PROCEDURE — 1126F AMNT PAIN NOTED NONE PRSNT: CPT | Mod: CPTII,S$GLB,, | Performed by: OTOLARYNGOLOGY

## 2022-09-12 PROCEDURE — 1101F PR PT FALLS ASSESS DOC 0-1 FALLS W/OUT INJ PAST YR: ICD-10-PCS | Mod: CPTII,S$GLB,, | Performed by: OTOLARYNGOLOGY

## 2022-09-12 PROCEDURE — 3078F DIAST BP <80 MM HG: CPT | Mod: CPTII,S$GLB,, | Performed by: OTOLARYNGOLOGY

## 2022-09-12 PROCEDURE — 3288F FALL RISK ASSESSMENT DOCD: CPT | Mod: CPTII,S$GLB,, | Performed by: OTOLARYNGOLOGY

## 2022-09-12 PROCEDURE — 3008F PR BODY MASS INDEX (BMI) DOCUMENTED: ICD-10-PCS | Mod: CPTII,S$GLB,, | Performed by: OTOLARYNGOLOGY

## 2022-09-12 PROCEDURE — 4010F PR ACE/ARB THEARPY RXD/TAKEN: ICD-10-PCS | Mod: CPTII,S$GLB,, | Performed by: OTOLARYNGOLOGY

## 2022-09-12 PROCEDURE — 4010F ACE/ARB THERAPY RXD/TAKEN: CPT | Mod: CPTII,S$GLB,, | Performed by: OTOLARYNGOLOGY

## 2022-09-12 PROCEDURE — 3074F PR MOST RECENT SYSTOLIC BLOOD PRESSURE < 130 MM HG: ICD-10-PCS | Mod: CPTII,S$GLB,, | Performed by: OTOLARYNGOLOGY

## 2022-09-12 PROCEDURE — 3074F SYST BP LT 130 MM HG: CPT | Mod: CPTII,S$GLB,, | Performed by: OTOLARYNGOLOGY

## 2022-09-12 PROCEDURE — 99999 PR PBB SHADOW E&M-EST. PATIENT-LVL III: ICD-10-PCS | Mod: PBBFAC,,, | Performed by: OTOLARYNGOLOGY

## 2022-09-12 PROCEDURE — 99214 PR OFFICE/OUTPT VISIT, EST, LEVL IV, 30-39 MIN: ICD-10-PCS | Mod: S$GLB,,, | Performed by: OTOLARYNGOLOGY

## 2022-09-12 PROCEDURE — 99214 OFFICE O/P EST MOD 30 MIN: CPT | Mod: S$GLB,,, | Performed by: OTOLARYNGOLOGY

## 2022-09-12 PROCEDURE — 1160F PR REVIEW ALL MEDS BY PRESCRIBER/CLIN PHARMACIST DOCUMENTED: ICD-10-PCS | Mod: CPTII,S$GLB,, | Performed by: OTOLARYNGOLOGY

## 2022-09-12 PROCEDURE — 3078F PR MOST RECENT DIASTOLIC BLOOD PRESSURE < 80 MM HG: ICD-10-PCS | Mod: CPTII,S$GLB,, | Performed by: OTOLARYNGOLOGY

## 2022-09-12 PROCEDURE — 1159F PR MEDICATION LIST DOCUMENTED IN MEDICAL RECORD: ICD-10-PCS | Mod: CPTII,S$GLB,, | Performed by: OTOLARYNGOLOGY

## 2022-09-12 PROCEDURE — 1101F PT FALLS ASSESS-DOCD LE1/YR: CPT | Mod: CPTII,S$GLB,, | Performed by: OTOLARYNGOLOGY

## 2022-09-12 PROCEDURE — 1160F RVW MEDS BY RX/DR IN RCRD: CPT | Mod: CPTII,S$GLB,, | Performed by: OTOLARYNGOLOGY

## 2022-09-12 PROCEDURE — 1159F MED LIST DOCD IN RCRD: CPT | Mod: CPTII,S$GLB,, | Performed by: OTOLARYNGOLOGY

## 2022-09-12 PROCEDURE — 3008F BODY MASS INDEX DOCD: CPT | Mod: CPTII,S$GLB,, | Performed by: OTOLARYNGOLOGY

## 2022-09-12 PROCEDURE — 1126F PR PAIN SEVERITY QUANTIFIED, NO PAIN PRESENT: ICD-10-PCS | Mod: CPTII,S$GLB,, | Performed by: OTOLARYNGOLOGY

## 2022-09-12 PROCEDURE — 3044F PR MOST RECENT HEMOGLOBIN A1C LEVEL <7.0%: ICD-10-PCS | Mod: CPTII,S$GLB,, | Performed by: OTOLARYNGOLOGY

## 2022-09-12 PROCEDURE — 3044F HG A1C LEVEL LT 7.0%: CPT | Mod: CPTII,S$GLB,, | Performed by: OTOLARYNGOLOGY

## 2022-09-12 PROCEDURE — 3288F PR FALLS RISK ASSESSMENT DOCUMENTED: ICD-10-PCS | Mod: CPTII,S$GLB,, | Performed by: OTOLARYNGOLOGY

## 2022-09-12 RX ORDER — IPRATROPIUM BROMIDE 21 UG/1
2 SPRAY, METERED NASAL 2 TIMES DAILY
Qty: 30 ML | Refills: 3 | Status: SHIPPED | OUTPATIENT
Start: 2022-09-12 | End: 2023-08-14 | Stop reason: SDUPTHER

## 2022-09-12 NOTE — PROGRESS NOTES
Chief Complaint   Patient presents with    Ear Fullness     bilateral   .     HPI:  Violet Mirza  is here to see me today for evaluation of aural fullness  in bilateral ears.  She has complaints of hearing loss in the affected ears. She has not had ear  pain or drainage.  This has been an issue in the past.  The patient has not noted any alleviating factors. She does note rhinorrhea.  Seems to be triggered by heat, sweating, spicy foods.     Past Medical History:   Diagnosis Date    Chondromalacia of right patella     MRI 2016 at [facility]    Diverticulitis     GERD (gastroesophageal reflux disease)     Hypoglycemia 2014    OA (osteoarthritis) of knee     bilateraly, followed by Dr. Callejas, [facility], s/p synvisc injections    Osteopenia 2013    Personal history of kidney stones 2013    Rosacea 2013    Tear of medial meniscus of right knee     MRI 2016     Social History     Socioeconomic History    Marital status:     Number of children: y   Occupational History    Occupation:      Comment: Works at Law firm   Tobacco Use    Smoking status: Former     Packs/day: 0.25     Years: 30.00     Pack years: 7.50     Types: Cigarettes     Quit date: 2005     Years since quittin.6    Smokeless tobacco: Former   Substance and Sexual Activity    Alcohol use: No    Drug use: No   Social History Narrative    Originally from [place], living in [place] with son     Past Surgical History:   Procedure Laterality Date    ADENOIDECTOMY      FOOT SURGERY Bilateral     KNEE ARTHROSCOPY W/ MENISCAL REPAIR Right     KNEE SURGERY Left     partial knee replacement    LIPOMA RESECTION Bilateral 2019    Procedure: EXCISION, LIPOMAS  chestwall , both groins/legs;  Surgeon: NOLA Werner MD;  Location: [facility] OR;  Service: General;  Laterality: Bilateral;  evicel, surgiflo    ROTATOR CUFF REPAIR Right 2021    DR CALLEJAS    TONSILLECTOMY      TUBAL  LIGATION       Family History   Problem Relation Age of Onset    Hypertension Mother     Diabetes Mother     Glaucoma Mother     Cataracts Mother     Hypertension Father     Stroke Father     No Known Problems Sister     No Known Problems Brother     No Known Problems Daughter     No Known Problems Son     No Known Problems Sister     No Known Problems Daughter            Review of Systems  General: negative for chills, fever or weight loss  Psychological: negative for mood changes or depression  Ophthalmic: negative for blurry vision, photophobia or eye pain  ENT: see HPI  Respiratory ROS: no cough, shortness of breath, or wheezing  Cardiovascular: no chest pain or dyspnea on exertion  Gastrointestinal ROS: no abdominal pain, change in bowel habits, or black/ bloody stools  Musculoskeletal ROS: negative for gait disturbance or muscular weakness  Neurological: no syncope or seizures; no ataxia  Dermatological: negative for puritis,  rash and jaundice  Hematologic/lymphatic: no easy bruising, no new lumps or bumps      Physical Exam:    Vitals:    09/12/22 1435   BP: 126/69   Pulse: 62       Constitutional: Well appearing / communicating without difficutly.  NAD.  Eyes: EOM I Bilaterally  Head/Face: Normocephalic.  Negative paranasal sinus pressure/tenderness.  Salivary glands WNL.  House Brackmann I Bilaterally.    Right Ear: External ear normal and ear canal occluded. Decreased hearing is noted.   Left Ear: External ear normal and ear canal normal occluded. Decreased hearing is noted.   Bilateral complete cerumen impactions, removal described in a separate procedure note    Nose: No gross nasal septal deviation. Inferior Turbinates 3+ bilaterally. No septal perforation. No masses/lesions. External nasal skin appears normal without masses/lesions.  Oral Cavity: Gingiva/lips within normal limits.  Dentition/gingiva healthy appearing. Mucus membranes moist. Floor of mouth soft, no masses palpated. Oral Tongue mobile.  Hard Palate appears normal.    Oropharynx: Base of tongue appears normal. No masses/lesions noted. Tonsillar fossa/pharyngeal wall without lesions. Posterior oropharynx WNL.  Soft palate without masses. Midline uvula.   Neck/Lymphatic: No LAD I-VI bilaterally.  No thyromegaly.  No masses noted on exam.        Assessment:    ICD-10-CM ICD-9-CM    1. Chronic eczematous otitis externa of both ears  H60.8X3 380.23       2. Impacted cerumen of right ear  H61.21 380.4           The primary encounter diagnosis was Chronic eczematous otitis externa of both ears. A diagnosis of Impacted cerumen of right ear was also pertinent to this visit.      Plan:  No orders of the defined types were placed in this encounter.    Nonallergic rhinitis is a common condition characterized by the chronic presence of onf nasal congestion (stuffiness), rhinorrhea, and postnasal drainage. Clinically, it is distinguished from allergic rhinitis by the the fact that it has onset at a later age and absence of nasal and ocular itching and prominent sneezing.  Typical triggers in nonallergic rhinitis include irritant odors and strong fragrances, such as tobacco smoke, perfumes, diesel and car exhaust (ie, patients become congested when sitting in traffic), cleaning products, newsprint, changes in temperature, and alcoholic beverages. Subtypes of nonallergic rhinitis include Vasomotor rhinitis, which is characterized by intermittent symptoms of congestion (stuffiness) and/or watery nasal discharge, and an exaggerated reaction to nonspecific irritants, such as air pollution or temperature changes, especially exposure to cold, dry air and gustatory rhinitis, which is an episodic condition with prominent watery rhinorrhea triggered most often by hot or spicy foods and is caused by a vagally-mediated reflex.  Treatment options primarily involve intranasal medications.  Will start the patient on atrovent nasal spray.    Cerumen impaction:  Removed today  without difficulty.  I would recommend the use of a wax softening drop, either over the counter Debrox or mineral oil, on a weekly basis.  I also instructed the patient to avoid Qtips.    Sofia Yoder MD

## 2022-10-13 ENCOUNTER — PATIENT MESSAGE (OUTPATIENT)
Dept: FAMILY MEDICINE | Facility: CLINIC | Age: 72
End: 2022-10-13
Payer: MEDICARE

## 2022-10-17 ENCOUNTER — PATIENT MESSAGE (OUTPATIENT)
Dept: FAMILY MEDICINE | Facility: CLINIC | Age: 72
End: 2022-10-17
Payer: MEDICARE

## 2022-11-01 ENCOUNTER — LAB VISIT (OUTPATIENT)
Dept: LAB | Facility: HOSPITAL | Age: 72
End: 2022-11-01
Attending: FAMILY MEDICINE
Payer: MEDICARE

## 2022-11-01 DIAGNOSIS — R73.03 PREDIABETES: ICD-10-CM

## 2022-11-01 LAB
ESTIMATED AVG GLUCOSE: 117 MG/DL (ref 68–131)
HBA1C MFR BLD: 5.7 % (ref 4–5.6)

## 2022-11-01 PROCEDURE — 36415 COLL VENOUS BLD VENIPUNCTURE: CPT | Performed by: FAMILY MEDICINE

## 2022-11-01 PROCEDURE — 83036 HEMOGLOBIN GLYCOSYLATED A1C: CPT | Performed by: FAMILY MEDICINE

## 2022-11-07 ENCOUNTER — PATIENT MESSAGE (OUTPATIENT)
Dept: FAMILY MEDICINE | Facility: CLINIC | Age: 72
End: 2022-11-07

## 2022-11-07 ENCOUNTER — OFFICE VISIT (OUTPATIENT)
Dept: FAMILY MEDICINE | Facility: CLINIC | Age: 72
End: 2022-11-07
Payer: MEDICARE

## 2022-11-07 VITALS
DIASTOLIC BLOOD PRESSURE: 78 MMHG | SYSTOLIC BLOOD PRESSURE: 130 MMHG | BODY MASS INDEX: 34.32 KG/M2 | HEART RATE: 68 BPM | WEIGHT: 226.44 LBS | TEMPERATURE: 98 F | HEIGHT: 68 IN | OXYGEN SATURATION: 96 %

## 2022-11-07 DIAGNOSIS — E03.9 ACQUIRED HYPOTHYROIDISM: ICD-10-CM

## 2022-11-07 DIAGNOSIS — Z79.899 MEDICATION MANAGEMENT: ICD-10-CM

## 2022-11-07 DIAGNOSIS — I10 BENIGN ESSENTIAL HYPERTENSION: ICD-10-CM

## 2022-11-07 DIAGNOSIS — I48.0 PAROXYSMAL ATRIAL FIBRILLATION: ICD-10-CM

## 2022-11-07 DIAGNOSIS — Z78.0 OSTEOPENIA AFTER MENOPAUSE: ICD-10-CM

## 2022-11-07 DIAGNOSIS — E66.09 CLASS 1 OBESITY DUE TO EXCESS CALORIES WITH SERIOUS COMORBIDITY AND BODY MASS INDEX (BMI) OF 34.0 TO 34.9 IN ADULT: ICD-10-CM

## 2022-11-07 DIAGNOSIS — M81.0 POSTMENOPAUSAL OSTEOPOROSIS: ICD-10-CM

## 2022-11-07 DIAGNOSIS — I50.20 HFREF (HEART FAILURE WITH REDUCED EJECTION FRACTION): ICD-10-CM

## 2022-11-07 DIAGNOSIS — Z79.01 CURRENT USE OF LONG TERM ANTICOAGULATION: ICD-10-CM

## 2022-11-07 DIAGNOSIS — R73.03 PREDIABETES: Primary | ICD-10-CM

## 2022-11-07 DIAGNOSIS — D48.5 NEOPLASM OF UNCERTAIN BEHAVIOR OF SKIN: Primary | ICD-10-CM

## 2022-11-07 DIAGNOSIS — M85.80 OSTEOPENIA AFTER MENOPAUSE: ICD-10-CM

## 2022-11-07 DIAGNOSIS — J30.0 VASOMOTOR RHINITIS: ICD-10-CM

## 2022-11-07 DIAGNOSIS — I50.32 DIASTOLIC DYSFUNCTION WITH CHRONIC HEART FAILURE: ICD-10-CM

## 2022-11-07 PROCEDURE — 3288F PR FALLS RISK ASSESSMENT DOCUMENTED: ICD-10-PCS | Mod: CPTII,S$GLB,, | Performed by: FAMILY MEDICINE

## 2022-11-07 PROCEDURE — 1126F PR PAIN SEVERITY QUANTIFIED, NO PAIN PRESENT: ICD-10-PCS | Mod: CPTII,S$GLB,, | Performed by: FAMILY MEDICINE

## 2022-11-07 PROCEDURE — 3078F PR MOST RECENT DIASTOLIC BLOOD PRESSURE < 80 MM HG: ICD-10-PCS | Mod: CPTII,S$GLB,, | Performed by: FAMILY MEDICINE

## 2022-11-07 PROCEDURE — 99999 PR PBB SHADOW E&M-EST. PATIENT-LVL IV: CPT | Mod: PBBFAC,,, | Performed by: FAMILY MEDICINE

## 2022-11-07 PROCEDURE — 1159F PR MEDICATION LIST DOCUMENTED IN MEDICAL RECORD: ICD-10-PCS | Mod: CPTII,S$GLB,, | Performed by: FAMILY MEDICINE

## 2022-11-07 PROCEDURE — 4010F ACE/ARB THERAPY RXD/TAKEN: CPT | Mod: CPTII,S$GLB,, | Performed by: FAMILY MEDICINE

## 2022-11-07 PROCEDURE — 99214 PR OFFICE/OUTPT VISIT, EST, LEVL IV, 30-39 MIN: ICD-10-PCS | Mod: S$GLB,,, | Performed by: FAMILY MEDICINE

## 2022-11-07 PROCEDURE — 3078F DIAST BP <80 MM HG: CPT | Mod: CPTII,S$GLB,, | Performed by: FAMILY MEDICINE

## 2022-11-07 PROCEDURE — 1126F AMNT PAIN NOTED NONE PRSNT: CPT | Mod: CPTII,S$GLB,, | Performed by: FAMILY MEDICINE

## 2022-11-07 PROCEDURE — 1159F MED LIST DOCD IN RCRD: CPT | Mod: CPTII,S$GLB,, | Performed by: FAMILY MEDICINE

## 2022-11-07 PROCEDURE — 3288F FALL RISK ASSESSMENT DOCD: CPT | Mod: CPTII,S$GLB,, | Performed by: FAMILY MEDICINE

## 2022-11-07 PROCEDURE — 3044F PR MOST RECENT HEMOGLOBIN A1C LEVEL <7.0%: ICD-10-PCS | Mod: CPTII,S$GLB,, | Performed by: FAMILY MEDICINE

## 2022-11-07 PROCEDURE — 3075F SYST BP GE 130 - 139MM HG: CPT | Mod: CPTII,S$GLB,, | Performed by: FAMILY MEDICINE

## 2022-11-07 PROCEDURE — 3008F BODY MASS INDEX DOCD: CPT | Mod: CPTII,S$GLB,, | Performed by: FAMILY MEDICINE

## 2022-11-07 PROCEDURE — 1101F PT FALLS ASSESS-DOCD LE1/YR: CPT | Mod: CPTII,S$GLB,, | Performed by: FAMILY MEDICINE

## 2022-11-07 PROCEDURE — 4010F PR ACE/ARB THEARPY RXD/TAKEN: ICD-10-PCS | Mod: CPTII,S$GLB,, | Performed by: FAMILY MEDICINE

## 2022-11-07 PROCEDURE — 3008F PR BODY MASS INDEX (BMI) DOCUMENTED: ICD-10-PCS | Mod: CPTII,S$GLB,, | Performed by: FAMILY MEDICINE

## 2022-11-07 PROCEDURE — 3075F PR MOST RECENT SYSTOLIC BLOOD PRESS GE 130-139MM HG: ICD-10-PCS | Mod: CPTII,S$GLB,, | Performed by: FAMILY MEDICINE

## 2022-11-07 PROCEDURE — 99214 OFFICE O/P EST MOD 30 MIN: CPT | Mod: S$GLB,,, | Performed by: FAMILY MEDICINE

## 2022-11-07 PROCEDURE — 1101F PR PT FALLS ASSESS DOC 0-1 FALLS W/OUT INJ PAST YR: ICD-10-PCS | Mod: CPTII,S$GLB,, | Performed by: FAMILY MEDICINE

## 2022-11-07 PROCEDURE — 3044F HG A1C LEVEL LT 7.0%: CPT | Mod: CPTII,S$GLB,, | Performed by: FAMILY MEDICINE

## 2022-11-07 PROCEDURE — 99999 PR PBB SHADOW E&M-EST. PATIENT-LVL IV: ICD-10-PCS | Mod: PBBFAC,,, | Performed by: FAMILY MEDICINE

## 2022-11-08 PROBLEM — M85.80 OSTEOPENIA AFTER MENOPAUSE: Status: ACTIVE | Noted: 2022-11-08

## 2022-11-08 PROBLEM — Z78.0 OSTEOPENIA AFTER MENOPAUSE: Status: ACTIVE | Noted: 2022-11-08

## 2022-11-08 NOTE — TELEPHONE ENCOUNTER
Pt would like a dermatologist recommendation. Then Pt will check and see if they are covered under her insurance.

## 2022-11-28 DIAGNOSIS — I48.0 PAROXYSMAL ATRIAL FIBRILLATION: ICD-10-CM

## 2022-11-28 DIAGNOSIS — I50.22 CHRONIC SYSTOLIC HEART FAILURE: ICD-10-CM

## 2022-11-28 DIAGNOSIS — Z79.01 CURRENT USE OF LONG TERM ANTICOAGULATION: ICD-10-CM

## 2022-12-07 ENCOUNTER — PATIENT MESSAGE (OUTPATIENT)
Dept: FAMILY MEDICINE | Facility: CLINIC | Age: 72
End: 2022-12-07
Payer: MEDICARE

## 2022-12-07 DIAGNOSIS — R73.03 PREDIABETES: ICD-10-CM

## 2022-12-08 ENCOUNTER — PATIENT MESSAGE (OUTPATIENT)
Dept: FAMILY MEDICINE | Facility: CLINIC | Age: 72
End: 2022-12-08
Payer: MEDICARE

## 2022-12-12 ENCOUNTER — PES CALL (OUTPATIENT)
Dept: ADMINISTRATIVE | Facility: CLINIC | Age: 72
End: 2022-12-12
Payer: MEDICARE

## 2022-12-29 ENCOUNTER — E-VISIT (OUTPATIENT)
Dept: FAMILY MEDICINE | Facility: CLINIC | Age: 72
End: 2022-12-29
Payer: MEDICARE

## 2022-12-29 ENCOUNTER — PATIENT MESSAGE (OUTPATIENT)
Dept: FAMILY MEDICINE | Facility: CLINIC | Age: 72
End: 2022-12-29

## 2022-12-29 DIAGNOSIS — Z87.442 PERSONAL HISTORY OF KIDNEY STONES: ICD-10-CM

## 2022-12-29 DIAGNOSIS — R10.9 RIGHT FLANK PAIN: ICD-10-CM

## 2022-12-29 DIAGNOSIS — R35.0 URINARY FREQUENCY: Primary | ICD-10-CM

## 2022-12-29 DIAGNOSIS — R73.03 PREDIABETES: ICD-10-CM

## 2022-12-29 DIAGNOSIS — R39.15 URINARY URGENCY: ICD-10-CM

## 2022-12-29 DIAGNOSIS — R10.2 PELVIC PRESSURE IN FEMALE: ICD-10-CM

## 2022-12-29 PROCEDURE — 99421 OL DIG E/M SVC 5-10 MIN: CPT | Mod: S$GLB,,, | Performed by: FAMILY MEDICINE

## 2022-12-29 PROCEDURE — 99421 PR E&M, ONLINE DIGIT, EST, < 7 DAYS, 5-10 MINS: ICD-10-PCS | Mod: S$GLB,,, | Performed by: FAMILY MEDICINE

## 2022-12-30 ENCOUNTER — PATIENT MESSAGE (OUTPATIENT)
Dept: FAMILY MEDICINE | Facility: CLINIC | Age: 72
End: 2022-12-30
Payer: MEDICARE

## 2022-12-30 NOTE — PROGRESS NOTES
Patient ID: Violet Mirza is a 72 y.o. female.    Chief Complaint: Urinary Frequency and Flank Pain (Right flank)    The patient initiated a request through be[facility] on 12/29/2022 for evaluation and management with a chief complaint of Urinary Frequency and Flank Pain (Right flank)     I evaluated the questionnaire submission on 12/29/22.     [facility] Peq Evisit Uti Questionnaire    12/29/2022  5:23 PM CST - Filed by Patient   Do you agree to participate in an E-Visit? Yes   If you have any of the following problems, please present to your local ER or call 911:  I acknowledge   What is the main issue that you would like for your doctor to address today? increased pressure and frequency of urination.  No burning or blood.  Urine is clear.   Are you able to take your vital signs? No   What symptoms do you currently have? Pain while passing urine   When did your symptoms first appear? 12/25/2022   List what you have done or taken to help your symptoms. There is no pain.  Just pressure and increased frequency.  Constantly drinking water.   Please indicate whether you have had the following symptoms during the past 24 hours     Urgent urination (a sudden and uncontrollable urge to urinate) Severe   Frequent urination of small amounts of urine (going to the toilet very often) Moderate   Burning pain when urinating None   Incomplete bladder emptying (still feel like you need to urinate again after urination) None   Pain not associated with urination in the lower abdomen below the belly button) None   What does your urine look like? Clear   Blood seen in the urine (without menstrual cycle) None   Flank pain (pain in one or both sides of the lower back) Moderate   Abnormal Vaginal Discharge (abnormal amount, color and/or odor) None   Discharge from the urethra (urinary opening) without urination None   High body temperature/fever? Yes, mild-99.6 F-100.2 F   Please rate how much discomfort you have experience because of the  symptoms in the past 24 hours: Severe   Please indicate how the symptoms have interfered with your every day activities/work in the past 24 hours: Moderate   Please indicate how these symptoms have interfered with your social activities (visiting people, meeting with friends, etc.) in the past 24 hours? None   Are you a diabetic? No   If you are female, please indicate whether you have the following at the time of completion of this questionnaire: Signs of menopause syndrome (hot flashes)   Are you currently pregnant, could you be pregnant, or are you breast feeding? None of the above   Provide any information you feel is important to your history not asked above Am 72 years old.  No menstruation.  Have not had a bladder infection for many years.  Had kidney stones 2 times.  None in last 40 years.  Have a deep ache in right side of back.   Please attach any relevant images or files (if you have performed a home test for UTI, please submit a photo of results)           Pressure, urgency and frequency with low grade temp and R flank pain. Prior remote h/o kidney stones.     Active Problem List with Overview Notes    Diagnosis Date Noted    Osteopenia after menopause 11/08/2022     Improved from osteoporosis on DEXA 7/16/21      Vasomotor rhinitis 11/07/2022    Prediabetes 07/29/2022    Tear of right rotator cuff 07/16/2021    Diastolic dysfunction with chronic heart failure 01/15/2021    Environmental and seasonal allergies 06/18/2019     Had some blood work for allergy testing 05/17/2019 by dermatologist Dr. Diaz with [facility].  The results are positive for allergies to dermatophagoides, dog dander, cockroach, mountain cedar, immunoglobulin E  elevation in general phone: [phone]      Lipoma of anterior chest wall 04/22/2019    Lipoma of left thigh     Lipoma of right thigh     Benign lipomatous neoplasm of skin and subcutaneous tissue of right arm     Vitamin D deficiency 04/07/2019    PVC's  (premature ventricular contractions) 02/07/2019    Current use of long term anticoagulation 09/11/2018    Class 1 obesity due to excess calories with serious comorbidity and body mass index (BMI) of 34.0 to 34.9 in adult 09/11/2018    Venous insufficiency of both lower extremities 04/02/2018         S/p R calf phlebectomy    2014 and 2011       Spider veins injections-saline   Several times   Dermatologist-Heather       Venous ultrasound 4/2018     R GSV 3.2 mm without reflux    R LSV 3.6 mm with reflux 1160 msec     L GSV 5.7 mm without reflux   L LSV 3.2 mm without reflux        S/p EVLT of R LSV  10/24/2018                            Mitral regurgitation 02/08/2018    HFrEF (heart failure with reduced ejection fraction) 01/22/2018    Paroxysmal atrial fibrillation 01/22/2018    Left knee DJD 01/26/2015     S/p Synvisc 2015  Dr. Callejas      Hypothyroidism 11/22/2014     TPO neg 11/14      Benign essential hypertension 07/26/2013    History of peptic ulcer 07/26/2013     EGD 9/09  Dr. Gomez      Personal history of kidney stones 07/26/2013    Rosacea 07/26/2013      Recent Labs Obtained:  No visits with results within 7 Day(s) from this visit.   Latest known visit with results is:   Lab Visit on 11/01/2022   Component Date Value Ref Range Status    Hemoglobin A1C 11/01/2022 5.7 (H)  4.0 - 5.6 % Final    Comment: ADA Screening Guidelines:  5.7-6.4%  Consistent with prediabetes  >or=6.5%  Consistent with diabetes    High levels of fetal hemoglobin interfere with the HbA1C  assay. Heterozygous hemoglobin variants (HbS, HgC, etc)do  not significantly interfere with this assay.   However, presence of multiple variants may affect accuracy.      Estimated Avg Glucose 11/01/2022 117  68 - 131 mg/dL Final       Encounter Diagnoses   Name Primary?    Urinary frequency Yes    Urinary urgency     Pelvic pressure in female     Right flank pain     Personal history of kidney stones     Prediabetes         Orders Placed This  Encounter   Procedures    Urine culture     Standing Status:   Future     Standing Expiration Date:   2/27/2024    Urinalysis     Standing Status:   Future     Standing Expiration Date:   2/27/2024     Order Specific Question:   Collection Type     Answer:   Urine, Clean Catch        Start with UA/ Culture an dif symptoms not clearly du eto infection then will plan on imaging to eval for alternative pathology like kidney stone, especially given prior history. Will advise further based on UA/ Culture results. Message sent to staff to reach ou to patient to schedule the UA/culture.     E-Visit Time Tracking: 10 minutes

## 2023-01-03 ENCOUNTER — TELEPHONE (OUTPATIENT)
Dept: FAMILY MEDICINE | Facility: CLINIC | Age: 73
End: 2023-01-03
Payer: MEDICARE

## 2023-01-03 ENCOUNTER — LAB VISIT (OUTPATIENT)
Dept: LAB | Facility: HOSPITAL | Age: 73
End: 2023-01-03
Attending: FAMILY MEDICINE
Payer: MEDICARE

## 2023-01-03 DIAGNOSIS — R35.0 URINARY FREQUENCY: ICD-10-CM

## 2023-01-03 DIAGNOSIS — R10.9 RIGHT FLANK PAIN: ICD-10-CM

## 2023-01-03 DIAGNOSIS — N30.00 ACUTE CYSTITIS WITHOUT HEMATURIA: Primary | ICD-10-CM

## 2023-01-03 DIAGNOSIS — R39.15 URINARY URGENCY: ICD-10-CM

## 2023-01-03 LAB
BACTERIA #/AREA URNS HPF: ABNORMAL /HPF
BILIRUB UR QL STRIP: NEGATIVE
CLARITY UR: ABNORMAL
COLOR UR: YELLOW
GLUCOSE UR QL STRIP: NEGATIVE
HGB UR QL STRIP: ABNORMAL
KETONES UR QL STRIP: NEGATIVE
LEUKOCYTE ESTERASE UR QL STRIP: ABNORMAL
MICROSCOPIC COMMENT: ABNORMAL
NITRITE UR QL STRIP: POSITIVE
NON-SQ EPI CELLS #/AREA URNS HPF: 2 /HPF
PH UR STRIP: 6 [PH] (ref 5–8)
PROT UR QL STRIP: NEGATIVE
RBC #/AREA URNS HPF: 0 /HPF (ref 0–4)
SP GR UR STRIP: 1 (ref 1–1.03)
SQUAMOUS #/AREA URNS HPF: 1 /HPF
URN SPEC COLLECT METH UR: ABNORMAL
UROBILINOGEN UR STRIP-ACNC: NEGATIVE EU/DL
WBC #/AREA URNS HPF: >100 /HPF (ref 0–5)
WBC CLUMPS URNS QL MICRO: ABNORMAL

## 2023-01-03 PROCEDURE — 81000 URINALYSIS NONAUTO W/SCOPE: CPT | Performed by: FAMILY MEDICINE

## 2023-01-03 PROCEDURE — 87086 URINE CULTURE/COLONY COUNT: CPT | Performed by: FAMILY MEDICINE

## 2023-01-03 PROCEDURE — 87088 URINE BACTERIA CULTURE: CPT | Performed by: FAMILY MEDICINE

## 2023-01-03 PROCEDURE — 87186 SC STD MICRODIL/AGAR DIL: CPT | Performed by: FAMILY MEDICINE

## 2023-01-03 PROCEDURE — 87077 CULTURE AEROBIC IDENTIFY: CPT | Performed by: FAMILY MEDICINE

## 2023-01-03 RX ORDER — SULFAMETHOXAZOLE AND TRIMETHOPRIM 800; 160 MG/1; MG/1
1 TABLET ORAL 2 TIMES DAILY
Qty: 10 TABLET | Refills: 0 | Status: SHIPPED | OUTPATIENT
Start: 2023-01-03 | End: 2023-01-08

## 2023-01-05 LAB — BACTERIA UR CULT: ABNORMAL

## 2023-01-14 NOTE — PROGRESS NOTES
Office Visit    Patient Name: Violet Mirza    : 1950  MRN: [id number]    Subjective:  Violet is a 72 y.o. female who presents today for:    Follow-up (3 month)    Physical 22    73 yo patient of mine who presents for annual physical with monitoring of chronic conditions that include paroxysmal AFib(2018 in [facility] was given diltiazem & converted spontaneously to NSR), HFrEF (EF 40-45% 3/25/21) followed by cardiology Dr Duran-- last seen 5/3/22 & F/U 1 year with ECHO, hypothyroidism, hypertension, obesity, osteopenia, rosacea, remote prior history of kidney stones.     Labs drawn prior to office visit 22 show worsening of A1c from 5.9 to nearly the diabetic range at 6.3.  She attributes this to temporary lapse inher exercise routine.   Liver/kidney function are normal, fasting blood sugar is 101. LDL is 102-not on any cholesterol medications, TSH is at goal at 0.8 on current dose of thyroid medication.   Vitamin-D previously checked 21 and found to have normalized at 51 on vitamin D3 supplement-was previously low and she has borderline osteoporosis.    Repeat A1c prior to office visit drawn 2022 shows improvement to 5.7.    She was overall unable to tolerate metformin due to GI side effects-- was only on Metformin about 2 weeks.    Has therefore tried to cut back sugars in her diet-- cut back on cold coffee with sugar and decrease portions of frozen fruit.      She is postmenopausal.  She does have a gynecologist-- Renny Deshpande, seen in January yearly.      She has been feeling overall well. Managing right knee pain.   Following with outside orthopedists Marika Miller (PT for R rotator cuff tear) and Britta for shoulder and knee issues. Seen by dr Callejas 22.  Most recent Synvisc 2022.   She is tyring to avoid a knee replacement on the Right, though she has done really well with her knee parital knee replacement.      General lifestyle habits are as follows:    Diet:  healthy and watches salt and carbs-- home cooked meals, Hydration, and has cut back on sugars some   Exercise: recently fair -- doing a home YouTube Work Out about once weekly at times she she is able to work up to 3 times weekly depending on her knee.   Sleep: fair-- interrupted to use the bathroom as she takes Lasix at night BUT WOULD PREFER THIS TO TAKING LASIX IN THE AM and increased daytime urinary frequency   Weight is STABLE in the last 6 months at BMI 34.      Immunizations: TDaP 4/5/19-- repeat 10 years, yearly FLU 10/18/22, SHINGRIX 2/2 7/13/20, Pneumovax 23 11/3/2014, PREVNAR 13 10/18/2015, COVID-19 completed 2/8/21 w/ 1st PFIZER BOOSTER 10/7/21 & 2nd booster 8/11/22     Screening Tests: DEXA 7/19/21- improved Osteopenia-- RECHECK 2 years,  mammo-yearly per ob gyn & due Feb 2023, PAP not indicated-- >65 w/o h/o abnormal, colonoscopy through [facility] 1/22/2015-- repeat 10 years, Hep C screening 10/14/2015     Eye/Dental: UTD with Both- yearly, EYE [facility]IO  3/25/22- L Cataract removed- laser planned 2/2 scar tissue complications     Saw Cardiology Dr. Rausch electrophysiology 10/18/2019 and there was a concern for greater numbers of PVCs noted on EKG.    He advised a Holter monitor, which she declined, but since her ejection fraction is overall stable on echo,  okay with monitoring           PAST MEDICAL HISTORY, SURGICAL/SOCIAL/FAMILY HISTORY REVIEWED AS PER CHART, WITH PERTINENT FINDINGS INCLUDED IN HISTORY SECTION OF NOTE.     Current Medications    Medication List with Changes/Refills   New Medications    SEMAGLUTIDE (OZEMPIC) 0.25 MG OR 0.5 MG(2 MG/1.5 ML) PEN INJECTOR    Inject 0.25 mg into the skin weekly then increase to 0.5 mg weekly thereafter   Current Medications    DICLOFENAC SODIUM (VOLTAREN) 1 % GEL    Apply 4 g  topically to affected area 4 times a day as needed.    FUROSEMIDE (LASIX) 20 MG TABLET    Take 1 tablet (20 mg total) by mouth once daily.    IPRATROPIUM (ATROVENT) 21 MCG (0.03 %) NASAL  "SPRAY    2 sprays by Nasal route 2 (two) times daily.    LEVOCETIRIZINE (XYZAL) 5 MG TABLET    Take 1 tablet (5 mg total) by mouth every evening.    LEVOTHYROXINE (SYNTHROID) 112 MCG TABLET    Take 1 tablet (112 mcg total) by mouth once daily.    LOSARTAN (COZAAR) 100 MG TABLET    TAKE 1 TABLET BY MOUTH ONCE DAILY    METOPROLOL SUCCINATE (TOPROL-XL) 200 MG 24 HR TABLET    Take 1 tablet (200 mg total) by mouth once daily.    RIVAROXABAN (XARELTO) 20 MG TAB    Take 1 tablet (20 mg total) by mouth daily with dinner or evening meal.    TRAMADOL (ULTRAM) 50 MG TABLET    Take 50 mg by mouth every 6 (six) hours as needed for Pain.   Discontinued Medications    METFORMIN (GLUCOPHAGE-XR) 500 MG ER 24HR TABLET    Take 2 tablets (1,000 mg total) by mouth daily with breakfast.       Allergies   Review of patient's allergies indicates:   Allergen Reactions    Aldactone [spironolactone] Other (See Comments)     Hyperkalemia when initiated on 01/23/2018          Review of Systems (Pertinent positives)  Review of Systems   Constitutional:  Negative for unexpected weight change.   HENT:  Positive for rhinorrhea.    Cardiovascular:  Negative for chest pain and leg swelling.   Gastrointestinal:  Negative for abdominal pain, constipation, diarrhea, nausea and vomiting.   Musculoskeletal:  Positive for arthralgias (knee pain).   Neurological:  Negative for dizziness and light-headedness.     /78 (BP Location: Right arm, Patient Position: Sitting, BP Method: Large (Manual))   Pulse 68   Temp 98.1 °F (36.7 °C) (Oral)   Ht 5' 8" (1.727 m)   Wt 102.7 kg (226 lb 6.6 oz)   SpO2 96%   BMI 34.43 kg/m²     Physical Exam  Vitals reviewed.   Constitutional:       General: She is not in acute distress.     Appearance: Normal appearance. She is well-developed. She is obese.   HENT:      Head: Normocephalic and atraumatic.   Eyes:      Conjunctiva/sclera: Conjunctivae normal.   Cardiovascular:      Rate and Rhythm: Normal rate and regular " rhythm.   Pulmonary:      Effort: Pulmonary effort is normal.      Breath sounds: Normal breath sounds.   Musculoskeletal:      Right lower leg: No edema.      Left lower leg: No edema.   Skin:     General: Skin is warm and dry.   Neurological:      General: No focal deficit present.      Mental Status: She is alert and oriented to person, place, and time.   Psychiatric:         Mood and Affect: Mood normal.         Behavior: Behavior normal.         Assessment/Plan:  Violet Mirza is a 72 y.o. female who presents today for :        ICD-10-CM ICD-9-CM    1. Prediabetes  R73.03 790.29 semaglutide (OZEMPIC) 0.25 mg or 0.5 mg(2 mg/1.5 mL) pen injector      Hemoglobin A1C      Comprehensive Metabolic Panel      Lipid Panel      CBC Auto Differential      TSH      Vitamin D      2. Class 1 obesity due to excess calories with serious comorbidity and body mass index (BMI) of 34.0 to 34.9 in adult  E66.09 278.00     Z68.34 V85.34       3. Benign essential hypertension  I10 401.1 Hemoglobin A1C      Comprehensive Metabolic Panel      Lipid Panel      CBC Auto Differential      TSH      Vitamin D      4. HFrEF (heart failure with reduced ejection fraction)  I50.20 428.20       5. Diastolic dysfunction with chronic heart failure  I50.32 428.32       6. Paroxysmal atrial fibrillation  I48.0 427.31       7. Current use of long term anticoagulation  Z79.01 V58.61       8. Acquired hypothyroidism  E03.9 244.9 TSH      9. Postmenopausal osteoporosis  M81.0 733.01 Vitamin D      10. Medication management  Z79.899 V58.69 Hemoglobin A1C      Comprehensive Metabolic Panel      Lipid Panel      CBC Auto Differential      TSH      Vitamin D      11. Vasomotor rhinitis  J30.0 477.9         EYE EXAM MARIO  3/25/22  2ND COVID 19 BOOSTER 8/2022. NEXT DEXA 7/2023, NEXT MAMMO Feb 2023(YEARLY PER GYN), NEXT CSCOPE 1/2025     OBESITY W/ PREDIABETES: Intolerant of Metformin, trial of Ozempic instead. Start 0.25--> 0.5 mg weekly, recheck labs  in 3 months. Low carb diet and increased exercise advised.      H/O HFwREF,DD, PAFIB: following with cardiology yearly and saw Dr Duran-- last seen 5/3/22 & F/U 1 year with ECHO, improvement on most recent ECHO 3/5/21 to 40=45%   CARDIAC MEDS INCLUDE: toprol xl 200 mg daily, and cozaar 100 mg daily, along with Xarelto for stroke prevention in Paroxysmal A Fib. Euvolemic on dose of 20 mg Lasix daily.      R KNEE PAIN 2/2 OA: Following with ORTHO Dr Callejas for Periodic Synvisc injections. Knee pain is limiting--considering partial R Knee replacement that she had and did well with on the left     HYPOTHYROIDISM: TSH WNL on levothyroxine 112 mcg daily, ongoing monitoring       OP: improved to osteopenia with normalization of Vitamin D, regular exercise, recheck DEXA 7/2023     Allergies: stable on daily Xyzal, thought she cannot go with out it. Rhinorrhea improved with Atrovent Nasal spray for vasomotor rhinitis     Venous insufficiency:  improved following vein procedure and with reduced swelling 2/2 lasix and weight loss. Also noting benefit from a specialty varicose vein cream        There are no Patient Instructions on file for this visit.      Follow up in about 3 months (around 2/7/2023) for to follow up on lab results, return as needed for new concerns.       no yes

## 2023-01-19 DIAGNOSIS — I50.22 CHRONIC SYSTOLIC HEART FAILURE: ICD-10-CM

## 2023-01-19 DIAGNOSIS — I48.0 PAROXYSMAL ATRIAL FIBRILLATION: ICD-10-CM

## 2023-01-19 RX ORDER — METOPROLOL SUCCINATE 200 MG/1
200 TABLET, EXTENDED RELEASE ORAL DAILY
Qty: 90 TABLET | Refills: 3 | Status: SHIPPED | OUTPATIENT
Start: 2023-01-19 | End: 2024-01-15 | Stop reason: SDUPTHER

## 2023-01-19 NOTE — TELEPHONE ENCOUNTER
No new care gaps identified.  [facility] Embedded Care Gaps. Reference number: [id number]. 1/19/2023   12:27:36 PM CST

## 2023-01-19 NOTE — TELEPHONE ENCOUNTER
Refill Authorization Note   Violet Mirza  is requesting a refill authorization.  Brief Assessment and Rationale for Refill:  Approve     Medication Therapy Plan:       Medication Reconciliation Completed: No   Comments:     No Care Gaps recommended.     Note composed:12:50 PM 01/19/2023

## 2023-01-19 NOTE — TELEPHONE ENCOUNTER
Refill Routing Note   Medication(s) are not appropriate for processing by [facility] for the following reason(s):      - Drug-Disease Interaction (Venous insufficiency of both lower extremities)    [facility] action(s):  Defer Medication-related problems identified: Drug-disease interaction        Medication reconciliation completed: No     Appointments  past 12m or future 3m with PCP    Date Provider   Last Visit   11/7/2022 Phyllis Gunter MD   Next Visit   2/8/2023 Phyllis Gunter MD   ED visits in past 90 days: 0        Note composed:12:41 PM 01/19/2023

## 2023-02-03 ENCOUNTER — LAB VISIT (OUTPATIENT)
Dept: LAB | Facility: HOSPITAL | Age: 73
End: 2023-02-03
Attending: FAMILY MEDICINE
Payer: MEDICARE

## 2023-02-03 DIAGNOSIS — E03.9 ACQUIRED HYPOTHYROIDISM: ICD-10-CM

## 2023-02-03 DIAGNOSIS — Z79.899 MEDICATION MANAGEMENT: ICD-10-CM

## 2023-02-03 DIAGNOSIS — M81.0 POSTMENOPAUSAL OSTEOPOROSIS: ICD-10-CM

## 2023-02-03 DIAGNOSIS — I10 BENIGN ESSENTIAL HYPERTENSION: ICD-10-CM

## 2023-02-03 DIAGNOSIS — R73.03 PREDIABETES: ICD-10-CM

## 2023-02-03 LAB
25(OH)D3+25(OH)D2 SERPL-MCNC: 59 NG/ML (ref 30–96)
ALBUMIN SERPL BCP-MCNC: 3.7 G/DL (ref 3.5–5.2)
ALP SERPL-CCNC: 113 U/L (ref 55–135)
ALT SERPL W/O P-5'-P-CCNC: 18 U/L (ref 10–44)
ANION GAP SERPL CALC-SCNC: 9 MMOL/L (ref 8–16)
AST SERPL-CCNC: 20 U/L (ref 10–40)
BASOPHILS # BLD AUTO: 0.04 K/UL (ref 0–0.2)
BASOPHILS NFR BLD: 0.5 % (ref 0–1.9)
BILIRUB SERPL-MCNC: 0.3 MG/DL (ref 0.1–1)
BUN SERPL-MCNC: 12 MG/DL (ref 8–23)
CALCIUM SERPL-MCNC: 9.1 MG/DL (ref 8.7–10.5)
CHLORIDE SERPL-SCNC: 103 MMOL/L (ref 95–110)
CHOLEST SERPL-MCNC: 198 MG/DL (ref 120–199)
CHOLEST/HDLC SERPL: 3.8 {RATIO} (ref 2–5)
CO2 SERPL-SCNC: 28 MMOL/L (ref 23–29)
CREAT SERPL-MCNC: 0.8 MG/DL (ref 0.5–1.4)
DIFFERENTIAL METHOD: ABNORMAL
EOSINOPHIL # BLD AUTO: 0.2 K/UL (ref 0–0.5)
EOSINOPHIL NFR BLD: 2.5 % (ref 0–8)
ERYTHROCYTE [DISTWIDTH] IN BLOOD BY AUTOMATED COUNT: 14.2 % (ref 11.5–14.5)
EST. GFR  (NO RACE VARIABLE): >60 ML/MIN/1.73 M^2
ESTIMATED AVG GLUCOSE: 111 MG/DL (ref 68–131)
GLUCOSE SERPL-MCNC: 100 MG/DL (ref 70–110)
HBA1C MFR BLD: 5.5 % (ref 4–5.6)
HCT VFR BLD AUTO: 41.2 % (ref 37–48.5)
HDLC SERPL-MCNC: 52 MG/DL (ref 40–75)
HDLC SERPL: 26.3 % (ref 20–50)
HGB BLD-MCNC: 12.9 G/DL (ref 12–16)
IMM GRANULOCYTES # BLD AUTO: 0.02 K/UL (ref 0–0.04)
IMM GRANULOCYTES NFR BLD AUTO: 0.2 % (ref 0–0.5)
LDLC SERPL CALC-MCNC: 119.4 MG/DL (ref 63–159)
LYMPHOCYTES # BLD AUTO: 2.3 K/UL (ref 1–4.8)
LYMPHOCYTES NFR BLD: 26.6 % (ref 18–48)
MCH RBC QN AUTO: 27.4 PG (ref 27–31)
MCHC RBC AUTO-ENTMCNC: 31.3 G/DL (ref 32–36)
MCV RBC AUTO: 88 FL (ref 82–98)
MONOCYTES # BLD AUTO: 0.6 K/UL (ref 0.3–1)
MONOCYTES NFR BLD: 6.4 % (ref 4–15)
NEUTROPHILS # BLD AUTO: 5.6 K/UL (ref 1.8–7.7)
NEUTROPHILS NFR BLD: 63.8 % (ref 38–73)
NONHDLC SERPL-MCNC: 146 MG/DL
NRBC BLD-RTO: 0 /100 WBC
PLATELET # BLD AUTO: 264 K/UL (ref 150–450)
PMV BLD AUTO: 10.8 FL (ref 9.2–12.9)
POTASSIUM SERPL-SCNC: 3.7 MMOL/L (ref 3.5–5.1)
PROT SERPL-MCNC: 7.1 G/DL (ref 6–8.4)
RBC # BLD AUTO: 4.71 M/UL (ref 4–5.4)
SODIUM SERPL-SCNC: 140 MMOL/L (ref 136–145)
TRIGL SERPL-MCNC: 133 MG/DL (ref 30–150)
TSH SERPL DL<=0.005 MIU/L-ACNC: 0.49 UIU/ML (ref 0.4–4)
WBC # BLD AUTO: 8.79 K/UL (ref 3.9–12.7)

## 2023-02-03 PROCEDURE — 83036 HEMOGLOBIN GLYCOSYLATED A1C: CPT | Performed by: FAMILY MEDICINE

## 2023-02-03 PROCEDURE — 80053 COMPREHEN METABOLIC PANEL: CPT | Performed by: FAMILY MEDICINE

## 2023-02-03 PROCEDURE — 80061 LIPID PANEL: CPT | Performed by: FAMILY MEDICINE

## 2023-02-03 PROCEDURE — 36415 COLL VENOUS BLD VENIPUNCTURE: CPT | Performed by: FAMILY MEDICINE

## 2023-02-03 PROCEDURE — 82306 VITAMIN D 25 HYDROXY: CPT | Performed by: FAMILY MEDICINE

## 2023-02-03 PROCEDURE — 85025 COMPLETE CBC W/AUTO DIFF WBC: CPT | Performed by: FAMILY MEDICINE

## 2023-02-03 PROCEDURE — 84443 ASSAY THYROID STIM HORMONE: CPT | Performed by: FAMILY MEDICINE

## 2023-02-08 ENCOUNTER — OFFICE VISIT (OUTPATIENT)
Dept: FAMILY MEDICINE | Facility: CLINIC | Age: 73
End: 2023-02-08
Payer: MEDICARE

## 2023-02-08 VITALS
TEMPERATURE: 98 F | HEART RATE: 81 BPM | WEIGHT: 219.81 LBS | DIASTOLIC BLOOD PRESSURE: 80 MMHG | HEIGHT: 68 IN | SYSTOLIC BLOOD PRESSURE: 128 MMHG | OXYGEN SATURATION: 98 % | BODY MASS INDEX: 33.31 KG/M2

## 2023-02-08 DIAGNOSIS — Z78.0 OSTEOPENIA AFTER MENOPAUSE: ICD-10-CM

## 2023-02-08 DIAGNOSIS — I10 BENIGN ESSENTIAL HYPERTENSION: ICD-10-CM

## 2023-02-08 DIAGNOSIS — Z79.01 CURRENT USE OF LONG TERM ANTICOAGULATION: ICD-10-CM

## 2023-02-08 DIAGNOSIS — I50.20 HFREF (HEART FAILURE WITH REDUCED EJECTION FRACTION): ICD-10-CM

## 2023-02-08 DIAGNOSIS — M85.80 OSTEOPENIA AFTER MENOPAUSE: ICD-10-CM

## 2023-02-08 DIAGNOSIS — E66.09 CLASS 1 OBESITY DUE TO EXCESS CALORIES WITH SERIOUS COMORBIDITY AND BODY MASS INDEX (BMI) OF 33.0 TO 33.9 IN ADULT: ICD-10-CM

## 2023-02-08 DIAGNOSIS — I49.3 PVC'S (PREMATURE VENTRICULAR CONTRACTIONS): ICD-10-CM

## 2023-02-08 DIAGNOSIS — E55.9 VITAMIN D DEFICIENCY: ICD-10-CM

## 2023-02-08 DIAGNOSIS — Z12.31 SCREENING MAMMOGRAM FOR BREAST CANCER: ICD-10-CM

## 2023-02-08 DIAGNOSIS — E03.9 ACQUIRED HYPOTHYROIDISM: ICD-10-CM

## 2023-02-08 DIAGNOSIS — R73.03 PREDIABETES: Primary | ICD-10-CM

## 2023-02-08 DIAGNOSIS — Z79.899 MEDICATION MANAGEMENT: ICD-10-CM

## 2023-02-08 DIAGNOSIS — I87.2 VENOUS INSUFFICIENCY OF BOTH LOWER EXTREMITIES: ICD-10-CM

## 2023-02-08 DIAGNOSIS — J30.89 ENVIRONMENTAL AND SEASONAL ALLERGIES: ICD-10-CM

## 2023-02-08 DIAGNOSIS — I48.0 PAROXYSMAL ATRIAL FIBRILLATION: ICD-10-CM

## 2023-02-08 DIAGNOSIS — I50.32 DIASTOLIC DYSFUNCTION WITH CHRONIC HEART FAILURE: ICD-10-CM

## 2023-02-08 DIAGNOSIS — Z87.11 HISTORY OF PEPTIC ULCER: ICD-10-CM

## 2023-02-08 PROCEDURE — 99999 PR PBB SHADOW E&M-EST. PATIENT-LVL IV: CPT | Mod: PBBFAC,,, | Performed by: FAMILY MEDICINE

## 2023-02-08 PROCEDURE — 99214 OFFICE O/P EST MOD 30 MIN: CPT | Mod: S$GLB,,, | Performed by: FAMILY MEDICINE

## 2023-02-08 PROCEDURE — 1101F PT FALLS ASSESS-DOCD LE1/YR: CPT | Mod: CPTII,S$GLB,, | Performed by: FAMILY MEDICINE

## 2023-02-08 PROCEDURE — 1159F PR MEDICATION LIST DOCUMENTED IN MEDICAL RECORD: ICD-10-PCS | Mod: CPTII,S$GLB,, | Performed by: FAMILY MEDICINE

## 2023-02-08 PROCEDURE — 3044F HG A1C LEVEL LT 7.0%: CPT | Mod: CPTII,S$GLB,, | Performed by: FAMILY MEDICINE

## 2023-02-08 PROCEDURE — 1126F PR PAIN SEVERITY QUANTIFIED, NO PAIN PRESENT: ICD-10-PCS | Mod: CPTII,S$GLB,, | Performed by: FAMILY MEDICINE

## 2023-02-08 PROCEDURE — 1126F AMNT PAIN NOTED NONE PRSNT: CPT | Mod: CPTII,S$GLB,, | Performed by: FAMILY MEDICINE

## 2023-02-08 PROCEDURE — 3288F PR FALLS RISK ASSESSMENT DOCUMENTED: ICD-10-PCS | Mod: CPTII,S$GLB,, | Performed by: FAMILY MEDICINE

## 2023-02-08 PROCEDURE — 3288F FALL RISK ASSESSMENT DOCD: CPT | Mod: CPTII,S$GLB,, | Performed by: FAMILY MEDICINE

## 2023-02-08 PROCEDURE — 3074F PR MOST RECENT SYSTOLIC BLOOD PRESSURE < 130 MM HG: ICD-10-PCS | Mod: CPTII,S$GLB,, | Performed by: FAMILY MEDICINE

## 2023-02-08 PROCEDURE — 3044F PR MOST RECENT HEMOGLOBIN A1C LEVEL <7.0%: ICD-10-PCS | Mod: CPTII,S$GLB,, | Performed by: FAMILY MEDICINE

## 2023-02-08 PROCEDURE — 3008F PR BODY MASS INDEX (BMI) DOCUMENTED: ICD-10-PCS | Mod: CPTII,S$GLB,, | Performed by: FAMILY MEDICINE

## 2023-02-08 PROCEDURE — 1101F PR PT FALLS ASSESS DOC 0-1 FALLS W/OUT INJ PAST YR: ICD-10-PCS | Mod: CPTII,S$GLB,, | Performed by: FAMILY MEDICINE

## 2023-02-08 PROCEDURE — 3079F PR MOST RECENT DIASTOLIC BLOOD PRESSURE 80-89 MM HG: ICD-10-PCS | Mod: CPTII,S$GLB,, | Performed by: FAMILY MEDICINE

## 2023-02-08 PROCEDURE — 99214 PR OFFICE/OUTPT VISIT, EST, LEVL IV, 30-39 MIN: ICD-10-PCS | Mod: S$GLB,,, | Performed by: FAMILY MEDICINE

## 2023-02-08 PROCEDURE — 3079F DIAST BP 80-89 MM HG: CPT | Mod: CPTII,S$GLB,, | Performed by: FAMILY MEDICINE

## 2023-02-08 PROCEDURE — 3008F BODY MASS INDEX DOCD: CPT | Mod: CPTII,S$GLB,, | Performed by: FAMILY MEDICINE

## 2023-02-08 PROCEDURE — 1160F RVW MEDS BY RX/DR IN RCRD: CPT | Mod: CPTII,S$GLB,, | Performed by: FAMILY MEDICINE

## 2023-02-08 PROCEDURE — 1159F MED LIST DOCD IN RCRD: CPT | Mod: CPTII,S$GLB,, | Performed by: FAMILY MEDICINE

## 2023-02-08 PROCEDURE — 99999 PR PBB SHADOW E&M-EST. PATIENT-LVL IV: ICD-10-PCS | Mod: PBBFAC,,, | Performed by: FAMILY MEDICINE

## 2023-02-08 PROCEDURE — 3074F SYST BP LT 130 MM HG: CPT | Mod: CPTII,S$GLB,, | Performed by: FAMILY MEDICINE

## 2023-02-08 PROCEDURE — 1160F PR REVIEW ALL MEDS BY PRESCRIBER/CLIN PHARMACIST DOCUMENTED: ICD-10-PCS | Mod: CPTII,S$GLB,, | Performed by: FAMILY MEDICINE

## 2023-02-08 RX ORDER — SEMAGLUTIDE 1.34 MG/ML
1 INJECTION, SOLUTION SUBCUTANEOUS
Qty: 3 PEN | Refills: 4 | Status: SHIPPED | OUTPATIENT
Start: 2023-02-08 | End: 2024-02-08

## 2023-02-08 NOTE — PROGRESS NOTES
Office Visit    Patient Name: Violet Mirza    : 1950  MRN: [id number]    Subjective:  Violet is a 72 y.o. female who presents today for:    Follow-up (3 month)    Most Recent OV with me 22, PHYSICAL 22     73 yo patient of mine who presents for follow up of chronic conditions that include paroxysmal AFib(2018 in [facility] was given diltiazem & converted spontaneously to NSR), HFrEF (EF 40-45% 3/25/21) followed by cardiology Dr Duran-- last seen 5/3/22 & F/U 1 year with ECHO, hypothyroidism, hypertension, obesity, osteopenia, rosacea, remote prior history of kidney stones.    Most Recent UTI symptoms resolved following antibiotic therapy 1/3/23     Labs drawn prior to office visit 2023 show improvement in A1c from 5.7 down to 5.5 with initiation of Ozempic.  Previously her A1c was 6.3 when metformin was initiated, could not tolerate this from a GI standpoint we did help with A1c lowering, and therefore was transitioned to Ozempic.    Otherwise labs without concerns-normal liver/kidney function, electrolytes, normal vitamin-D, TSH, lipids with fairly stable LDL of 119--she is not on a statin.    She is postmenopausal.  She does have a gynecologist-- Renny Deshpande, seen in January yearly.  He has retired and she requests that I start ordering her yearly mammograms-generally receives them through [facility].    Today she is feeling overall well, tolerating the Ozempic without any significant side effects and pleased that she lost weight, though she wishes she had lost more.  Feels some benefit in terms of decreased joint pain.     Follows with outside orthopedists Marika Miller (PT for R rotator cuff tear) and Britta for shoulder and knee issues. Seen by dr Callejas 22.  Most recent Synvisc 2022.   She is tyring to avoid a knee replacement on the Right, though she has done really well with her L knee parital knee replacement.      General lifestyle habits are as follows:    Diet: healthy and  watches salt and carbs-- home cooked meals, Hydration, and has cut back on sugars some   Exercise: recently fair -- doing a home [employer] Work Out about 3 times weekly   Sleep: fair-- interrupted to use the bathroom as she takes Lasix at night BUT WOULD PREFER THIS TO TAKING LASIX IN THE AM and increased daytime urinary frequency   Weight: DOWN about 7 lb in the last 3 months, BMI 33.4     Immunizations: TDaP 4/5/19-- repeat 10 years, yearly FLU 10/18/22, SHINGRIX 2/2 7/13/20, Pneumovax 23 11/3/2014, PREVNAR 13 10/18/2015, COVID-19 completed 2/8/21 w/ 1st PFIZER BOOSTER 10/7/21 & 2nd booster 8/11/22-- OMICRON BOOSTER DECLINED     Screening Tests: DEXA 7/19/21- improved Osteopenia-- RECHECK 2 years,  mammo-yearly per ob gyn & due Feb 2023, PAP not indicated-- >65 w/o h/o abnormal, colonoscopy through [facility] 1/22/2015-- repeat 10 years, Hep C screening 10/14/2015     Eye/Dental: UTD with Both- yearly, EYE MARIO  11/11/22 - L Cataract removed- laser 2/2 scar tissue complications     Saw Cardiology Dr. Ruasch electrophysiology 10/18/2019 and there was a concern for greater numbers of PVCs noted on EKG.    He advised a Holter monitor, which she declined, but since her ejection fraction is overall stable on echo,  okay with monitoring       ECHO 3/25/21:  The left ventricle is mildly enlarged with mildly decreased systolic function. The estimated ejection fraction is 40-45%  Mild left atrial enlargement.  Grade II left ventricular diastolic dysfunction.  Normal right ventricular size with normal right ventricular systolic function.  Normal central venous pressure (3 mmHg).  The estimated PA systolic pressure is 16 mmHg.           PAST MEDICAL HISTORY, SURGICAL/SOCIAL/FAMILY HISTORY REVIEWED AS PER CHART, WITH PERTINENT FINDINGS INCLUDED IN HISTORY SECTION OF NOTE.     Current Medications    Medication List with Changes/Refills   New Medications    SEMAGLUTIDE (OZEMPIC) 1 MG/DOSE (4 MG/3 ML)    Inject 1 mg into the skin every  7 days.   Current Medications    DICLOFENAC SODIUM (VOLTAREN) 1 % GEL    Apply 4 g  topically to affected area 4 times a day as needed.    FUROSEMIDE (LASIX) 20 MG TABLET    Take 1 tablet (20 mg total) by mouth once daily.    IPRATROPIUM (ATROVENT) 21 MCG (0.03 %) NASAL SPRAY    2 sprays by Nasal route 2 (two) times daily.    LEVOCETIRIZINE (XYZAL) 5 MG TABLET    Take 1 tablet (5 mg total) by mouth every evening.    LEVOTHYROXINE (SYNTHROID) 112 MCG TABLET    Take 1 tablet (112 mcg total) by mouth once daily.    LOSARTAN (COZAAR) 100 MG TABLET    TAKE 1 TABLET BY MOUTH ONCE DAILY    METOPROLOL SUCCINATE (TOPROL-XL) 200 MG 24 HR TABLET    Take 1 tablet (200 mg total) by mouth once daily.    RIVAROXABAN (XARELTO) 20 MG TAB    Take 1 tablet (20 mg total) by mouth daily with dinner or evening meal.    TRAMADOL (ULTRAM) 50 MG TABLET    Take 50 mg by mouth every 6 (six) hours as needed for Pain.   Discontinued Medications    SEMAGLUTIDE (OZEMPIC) 0.25 MG OR 0.5 MG(2 MG/1.5 ML) PEN INJECTOR    Inject 0.5 mg into the skin every 7 days.       Allergies   Review of patient's allergies indicates:   Allergen Reactions    Aldactone [spironolactone] Other (See Comments)     Hyperkalemia when initiated on 01/23/2018          Review of Systems (Pertinent positives)  Review of Systems   Constitutional:  Negative for unexpected weight change (intentional wegiht loss).   Eyes:  Negative for visual disturbance.   Respiratory:  Negative for chest tightness and shortness of breath.    Cardiovascular:  Negative for chest pain, palpitations and leg swelling.   Gastrointestinal:  Negative for abdominal pain, constipation, diarrhea, nausea and vomiting.   Musculoskeletal:  Positive for arthralgias (intermittent). Negative for back pain.   Neurological:  Negative for dizziness, light-headedness and headaches.     /80 (BP Location: Right arm, Patient Position: Sitting, BP Method: Medium (Manual))   Pulse 81   Temp 98.1 °F (36.7 °C)  "(Oral)   Ht 5' 8" (1.727 m)   Wt 99.7 kg (219 lb 12.8 oz)   SpO2 98%   BMI 33.42 kg/m²     Physical Exam  Vitals reviewed.   Constitutional:       General: She is not in acute distress.     Appearance: Normal appearance. She is well-developed. She is obese.   HENT:      Head: Normocephalic and atraumatic.   Eyes:      Conjunctiva/sclera: Conjunctivae normal.   Cardiovascular:      Rate and Rhythm: Normal rate and regular rhythm.   Pulmonary:      Effort: Pulmonary effort is normal.      Breath sounds: Normal breath sounds.   Musculoskeletal:      Right lower leg: No edema.      Left lower leg: No edema.   Skin:     General: Skin is warm and dry.   Neurological:      General: No focal deficit present.      Mental Status: She is alert and oriented to person, place, and time.   Psychiatric:         Mood and Affect: Mood normal.         Behavior: Behavior normal.         Assessment/Plan:  Violet Mirza is a 72 y.o. female who presents today for :        ICD-10-CM ICD-9-CM    1. Prediabetes  R73.03 790.29 Hemoglobin A1C      Comprehensive Metabolic Panel      CBC Auto Differential      TSH      Magnesium      Vitamin B12      semaglutide (OZEMPIC) 1 mg/dose (4 mg/3 mL)      2. Benign essential hypertension  I10 401.1       3. Class 1 obesity due to excess calories with serious comorbidity and body mass index (BMI) of 33.0 to 33.9 in adult  E66.09 278.00     Z68.33 V85.33       4. Paroxysmal atrial fibrillation  I48.0 427.31 Hemoglobin A1C      Comprehensive Metabolic Panel      CBC Auto Differential      TSH      Magnesium      Vitamin B12      5. PVC's (premature ventricular contractions)  I49.3 427.69       6. HFrEF (heart failure with reduced ejection fraction)  I50.20 428.20       7. Diastolic dysfunction with chronic heart failure  I50.32 428.32       8. Current use of long term anticoagulation  Z79.01 V58.61       9. Acquired hypothyroidism  E03.9 244.9 TSH      10. History of peptic ulcer  Z87.11 V12.71     "   11. Osteopenia after menopause  M85.80 733.90 Comprehensive Metabolic Panel    Z78.0 V49.81 TSH      Magnesium      12. Vitamin D deficiency  E55.9 268.9       13. Venous insufficiency of both lower extremities  I87.2 459.81 Hemoglobin A1C      Comprehensive Metabolic Panel      CBC Auto Differential      TSH      Magnesium      Vitamin B12      14. Environmental and seasonal allergies  J30.89 477.8       15. Medication management  Z79.899 V58.69 Hemoglobin A1C      Comprehensive Metabolic Panel      CBC Auto Differential      TSH      Magnesium      Vitamin B12      16. Screening mammogram for breast cancer  Z12.31 V76.12 Mammo Digital Screening Bilat      Mammo Digital Screening Bilat           EYE EXAM MARIO  11/2022  2ND COVID 19 BOOSTER 8/2022--she decliens omicron booster. NEXT DEXA 7/2023, NEXT MAMMO Feb 2023(YEARLY PER GYN)-- ordered through [facility], NEXT CSCOPE 1/2025     OBESITY W/ PREDIABETES: Intolerant of Metformin, RESPONDED WELL TO OZEMPIC.  INCREASE TO 1 MG PER WEEK DOES, REPEAT A1C/LABS, WEIGHT CHECK IN 6 MONTHS.  Low carb diet & regular exercise advised.      H/O HFwREF,DD, PAFIB: following with cardiology yearly and saw Dr Duran-- last seen 5/3/22 & F/U 1 year with ECHO, improvement on most recent ECHO 3/5/21 to 40-45%   CARDIAC MEDS INCLUDE: toprol xl 200 mg daily, and cozaar 100 mg daily, along with Xarelto for stroke prevention in Paroxysmal A Fib. Euvolemic on dose of 20 mg Lasix daily.       R KNEE PAIN 2/2 OA: Following with ORTHO Dr Callejas for Periodic Synvisc injections. Knee pain is limiting--considering partial R Knee replacement that she had and did well with on the left, there with recent weight loss she reports some improvement.     HYPOTHYROIDISM: TSH WNL on levothyroxine 112 mcg daily, ongoing monitoring       OP: improved to osteopenia with normalization of Vitamin D, regular exercise, recheck DEXA 7/2023     Allergies: stable on daily Xyzal, thought she cannot go with out it.  Rhinorrhea improved with Atrovent Nasal spray for vasomotor rhinitis     Venous insufficiency:  improved following vein procedure and with reduced swelling 2/2 lasix and weight loss. Also noting benefit from a specialty varicose vein cream        There are no Patient Instructions on file for this visit.      Follow up in about 6 months (around 8/8/2023) for to follow up on lab results, return as needed for new concerns.

## 2023-02-27 ENCOUNTER — HOSPITAL ENCOUNTER (OUTPATIENT)
Dept: RADIOLOGY | Facility: HOSPITAL | Age: 73
Discharge: HOME | End: 2023-02-27
Attending: FAMILY MEDICINE
Payer: MEDICARE

## 2023-02-27 DIAGNOSIS — Z12.31 SCREENING MAMMOGRAM FOR BREAST CANCER: ICD-10-CM

## 2023-02-27 PROCEDURE — 77063 MAMMO DIGITAL SCREENING BILAT WITH TOMO: ICD-10-PCS | Mod: 26,,, | Performed by: INTERNAL MEDICINE

## 2023-02-27 PROCEDURE — 77067 SCR MAMMO BI INCL CAD: CPT | Mod: TC

## 2023-02-27 PROCEDURE — 77063 BREAST TOMOSYNTHESIS BI: CPT | Mod: 26,,, | Performed by: INTERNAL MEDICINE

## 2023-02-27 PROCEDURE — 77067 MAMMO DIGITAL SCREENING BILAT WITH TOMO: ICD-10-PCS | Mod: 26,,, | Performed by: INTERNAL MEDICINE

## 2023-02-27 PROCEDURE — 77067 SCR MAMMO BI INCL CAD: CPT | Mod: 26,,, | Performed by: INTERNAL MEDICINE

## 2023-03-09 ENCOUNTER — PATIENT MESSAGE (OUTPATIENT)
Dept: OTOLARYNGOLOGY | Facility: CLINIC | Age: 73
End: 2023-03-09
Payer: MEDICARE

## 2023-03-10 ENCOUNTER — TELEPHONE (OUTPATIENT)
Dept: OTOLARYNGOLOGY | Facility: CLINIC | Age: 73
End: 2023-03-10
Payer: MEDICARE

## 2023-03-10 ENCOUNTER — OFFICE VISIT (OUTPATIENT)
Dept: OTOLARYNGOLOGY | Facility: CLINIC | Age: 73
End: 2023-03-10
Payer: MEDICARE

## 2023-03-10 VITALS
DIASTOLIC BLOOD PRESSURE: 89 MMHG | SYSTOLIC BLOOD PRESSURE: 148 MMHG | WEIGHT: 216.5 LBS | HEART RATE: 86 BPM | BODY MASS INDEX: 32.92 KG/M2

## 2023-03-10 DIAGNOSIS — J06.9 UPPER RESPIRATORY TRACT INFECTION, UNSPECIFIED TYPE: Primary | ICD-10-CM

## 2023-03-10 DIAGNOSIS — J30.9 CHRONIC ALLERGIC RHINITIS: Chronic | ICD-10-CM

## 2023-03-10 DIAGNOSIS — J30.0 VASOMOTOR RHINITIS: Chronic | ICD-10-CM

## 2023-03-10 PROCEDURE — 99214 PR OFFICE/OUTPT VISIT, EST, LEVL IV, 30-39 MIN: ICD-10-PCS | Mod: 25,S$GLB,, | Performed by: OTOLARYNGOLOGY

## 2023-03-10 PROCEDURE — 4010F ACE/ARB THERAPY RXD/TAKEN: CPT | Mod: CPTII,S$GLB,, | Performed by: OTOLARYNGOLOGY

## 2023-03-10 PROCEDURE — 99999 PR PBB SHADOW E&M-EST. PATIENT-LVL III: CPT | Mod: PBBFAC,,, | Performed by: OTOLARYNGOLOGY

## 2023-03-10 PROCEDURE — 3079F DIAST BP 80-89 MM HG: CPT | Mod: CPTII,S$GLB,, | Performed by: OTOLARYNGOLOGY

## 2023-03-10 PROCEDURE — 3077F SYST BP >= 140 MM HG: CPT | Mod: CPTII,S$GLB,, | Performed by: OTOLARYNGOLOGY

## 2023-03-10 PROCEDURE — 1160F RVW MEDS BY RX/DR IN RCRD: CPT | Mod: CPTII,S$GLB,, | Performed by: OTOLARYNGOLOGY

## 2023-03-10 PROCEDURE — 1159F PR MEDICATION LIST DOCUMENTED IN MEDICAL RECORD: ICD-10-PCS | Mod: CPTII,S$GLB,, | Performed by: OTOLARYNGOLOGY

## 2023-03-10 PROCEDURE — 3008F PR BODY MASS INDEX (BMI) DOCUMENTED: ICD-10-PCS | Mod: CPTII,S$GLB,, | Performed by: OTOLARYNGOLOGY

## 2023-03-10 PROCEDURE — 1125F AMNT PAIN NOTED PAIN PRSNT: CPT | Mod: CPTII,S$GLB,, | Performed by: OTOLARYNGOLOGY

## 2023-03-10 PROCEDURE — 3288F PR FALLS RISK ASSESSMENT DOCUMENTED: ICD-10-PCS | Mod: CPTII,S$GLB,, | Performed by: OTOLARYNGOLOGY

## 2023-03-10 PROCEDURE — 3288F FALL RISK ASSESSMENT DOCD: CPT | Mod: CPTII,S$GLB,, | Performed by: OTOLARYNGOLOGY

## 2023-03-10 PROCEDURE — 96372 PR INJECTION,THERAP/PROPH/DIAG2ST, IM OR SUBCUT: ICD-10-PCS | Mod: S$GLB,,, | Performed by: OTOLARYNGOLOGY

## 2023-03-10 PROCEDURE — 1101F PR PT FALLS ASSESS DOC 0-1 FALLS W/OUT INJ PAST YR: ICD-10-PCS | Mod: CPTII,S$GLB,, | Performed by: OTOLARYNGOLOGY

## 2023-03-10 PROCEDURE — 4010F PR ACE/ARB THEARPY RXD/TAKEN: ICD-10-PCS | Mod: CPTII,S$GLB,, | Performed by: OTOLARYNGOLOGY

## 2023-03-10 PROCEDURE — 1159F MED LIST DOCD IN RCRD: CPT | Mod: CPTII,S$GLB,, | Performed by: OTOLARYNGOLOGY

## 2023-03-10 PROCEDURE — 3044F PR MOST RECENT HEMOGLOBIN A1C LEVEL <7.0%: ICD-10-PCS | Mod: CPTII,S$GLB,, | Performed by: OTOLARYNGOLOGY

## 2023-03-10 PROCEDURE — 3077F PR MOST RECENT SYSTOLIC BLOOD PRESSURE >= 140 MM HG: ICD-10-PCS | Mod: CPTII,S$GLB,, | Performed by: OTOLARYNGOLOGY

## 2023-03-10 PROCEDURE — 96372 THER/PROPH/DIAG INJ SC/IM: CPT | Mod: S$GLB,,, | Performed by: OTOLARYNGOLOGY

## 2023-03-10 PROCEDURE — 99999 PR PBB SHADOW E&M-EST. PATIENT-LVL III: ICD-10-PCS | Mod: PBBFAC,,, | Performed by: OTOLARYNGOLOGY

## 2023-03-10 PROCEDURE — 3079F PR MOST RECENT DIASTOLIC BLOOD PRESSURE 80-89 MM HG: ICD-10-PCS | Mod: CPTII,S$GLB,, | Performed by: OTOLARYNGOLOGY

## 2023-03-10 PROCEDURE — 1125F PR PAIN SEVERITY QUANTIFIED, PAIN PRESENT: ICD-10-PCS | Mod: CPTII,S$GLB,, | Performed by: OTOLARYNGOLOGY

## 2023-03-10 PROCEDURE — 1160F PR REVIEW ALL MEDS BY PRESCRIBER/CLIN PHARMACIST DOCUMENTED: ICD-10-PCS | Mod: CPTII,S$GLB,, | Performed by: OTOLARYNGOLOGY

## 2023-03-10 PROCEDURE — 1101F PT FALLS ASSESS-DOCD LE1/YR: CPT | Mod: CPTII,S$GLB,, | Performed by: OTOLARYNGOLOGY

## 2023-03-10 PROCEDURE — 99214 OFFICE O/P EST MOD 30 MIN: CPT | Mod: 25,S$GLB,, | Performed by: OTOLARYNGOLOGY

## 2023-03-10 PROCEDURE — 3008F BODY MASS INDEX DOCD: CPT | Mod: CPTII,S$GLB,, | Performed by: OTOLARYNGOLOGY

## 2023-03-10 PROCEDURE — 3044F HG A1C LEVEL LT 7.0%: CPT | Mod: CPTII,S$GLB,, | Performed by: OTOLARYNGOLOGY

## 2023-03-10 RX ORDER — PROMETHAZINE HYDROCHLORIDE AND DEXTROMETHORPHAN HYDROBROMIDE 6.25; 15 MG/5ML; MG/5ML
5 SYRUP ORAL EVERY 4 HOURS PRN
Qty: 118 ML | Refills: 0 | Status: SHIPPED | OUTPATIENT
Start: 2023-03-10 | End: 2023-03-20

## 2023-03-10 RX ORDER — METHYLPREDNISOLONE ACETATE 40 MG/ML
40 INJECTION, SUSPENSION INTRA-ARTICULAR; INTRALESIONAL; INTRAMUSCULAR; SOFT TISSUE
Status: COMPLETED | OUTPATIENT
Start: 2023-03-10 | End: 2023-03-10

## 2023-03-10 RX ADMIN — METHYLPREDNISOLONE ACETATE 40 MG: 40 INJECTION, SUSPENSION INTRA-ARTICULAR; INTRALESIONAL; INTRAMUSCULAR; SOFT TISSUE at 03:03

## 2023-03-10 NOTE — PROGRESS NOTES
Chief Complaint   Patient presents with    Sore Throat     Started Tuesday evening    Cough    Headache   .    HPI:     Violet Mirza is a 72 y.o. female who presents for evaluation of a five day history of sore throat, hoarseness, nasal congestion, and postnasal drip. She has had associated cough. She denies fever but has had chills. She has tried vicks to help with congestion at night. Otherwise has not noted any relieving factors. She has been using xyzal as well for seasonal allergies.       Past Medical History:   Diagnosis Date    Chondromalacia of right patella     MRI 2016 at [facility]    Diverticulitis     GERD (gastroesophageal reflux disease)     Hypoglycemia 2014    OA (osteoarthritis) of knee     bilateraly, followed by Dr. Callejas, [facility], s/p synvisc injections    Osteopenia 2013    Personal history of kidney stones 2013    Rosacea 2013    Tear of medial meniscus of right knee     MRI 2016     Social History     Socioeconomic History    Marital status:     Number of children: y   Occupational History    Occupation:      Comment: Works at [employer] firm   Tobacco Use    Smoking status: Former     Packs/day: 0.25     Years: 30.00     Pack years: 7.50     Types: Cigarettes     Quit date: 2005     Years since quittin.1    Smokeless tobacco: Former   Substance and Sexual Activity    Alcohol use: No    Drug use: No   Social History Narrative    Originally from [place], living in [place] with son     Past Surgical History:   Procedure Laterality Date    ADENOIDECTOMY      FOOT SURGERY Bilateral     KNEE ARTHROSCOPY W/ MENISCAL REPAIR Right     KNEE SURGERY Left     partial knee replacement    LIPOMA RESECTION Bilateral 2019    Procedure: EXCISION, LIPOMAS  chestwall , both groins/legs;  Surgeon: NOLA Werner MD;  Location: [facility] OR;  Service: General;  Laterality: Bilateral;  evicel, surgiflo    ROTATOR CUFF REPAIR  Right 08/26/2021    DR PANDEY    TONSILLECTOMY      TUBAL LIGATION       Family History   Problem Relation Age of Onset    Hypertension Mother     Diabetes Mother     Glaucoma Mother     Cataracts Mother     Hypertension Father     Stroke Father     No Known Problems Sister     No Known Problems Brother     No Known Problems Daughter     No Known Problems Son     No Known Problems Sister     No Known Problems Daughter            Review of Systems  General: negative for chills, fever or weight loss  Psychological: negative for mood changes or depression  Ophthalmic: negative for blurry vision, photophobia or eye pain  ENT: see HPI  Respiratory: no cough, shortness of breath, or wheezing  Cardiovascular: no chest pain or dyspnea on exertion  Gastrointestinal: no abdominal pain, change in bowel habits, or black/ bloody stools  Musculoskeletal: negative for gait disturbance or muscular weakness  Neurological: no syncope or seizures; no ataxia  Dermatological: negative for puritis,  rash and jaundice  Hematologic/lymphatic: no easy bruising, no new lumps or bumps      Physical Exam:    Vitals:    03/10/23 1417   BP: (!) 148/89   Pulse: 86       Constitutional: Well appearing / communicating without difficutly.  NAD.  Eyes: EOM I Bilaterally  Head/Face: Normocephalic.  Negative paranasal sinus pressure/tenderness.  Salivary glands WNL.  House Brackmann I Bilaterally.    Right Ear: Auricle normal appearance. External Auditory Canal within normal limits,TM w/o masses/lesions/perforations. TM mobility noted.   Left Ear: Auricle normal appearance. External Auditory Canal WNL,TM w/o masses/lesions/perforations. TM mobility noted.  Nose: No gross nasal septal deviation. Inferior Turbinates 3+ bilaterally. No septal perforation. No masses/lesions. External nasal skin appears normal without masses/lesions.  Oral Cavity: Gingiva/lips within normal limits.  Dentition/gingiva healthy appearing. Mucus membranes moist. Floor of mouth  soft, no masses palpated. Oral Tongue mobile. Hard Palate appears normal.    Oropharynx: Base of tongue appears normal. No masses/lesions noted. Tonsillar fossa/pharyngeal wall without lesions. Posterior oropharynx WNL.  Soft palate without masses. Midline uvula.   Neck/Lymphatic: No LAD I-VI bilaterally.  No thyromegaly.  No masses noted on exam.        Assessment:    ICD-10-CM ICD-9-CM    1. Upper respiratory tract infection, unspecified type  J06.9 465.9       2. Vasomotor rhinitis  J30.0 477.9       3. Chronic allergic rhinitis  J30.9 477.9         The primary encounter diagnosis was Upper respiratory tract infection, unspecified type. Diagnoses of Vasomotor rhinitis and Chronic allergic rhinitis were also pertinent to this visit.      Plan:  No orders of the defined types were placed in this encounter.    Recommend supportive care with OTC preparations(like dayquil/tylenol cold and sinus)  Depomedrol IM injection given today  Promethazine DM prescription given.  Nasal saline rinses BID    Sofia Yoder MD

## 2023-03-10 NOTE — TELEPHONE ENCOUNTER
Returned call. Apt scheduled for today, March 10th, at 2:20PM. Pt thanked me and verbalized understanding.     ----- Message from Susie Henning sent at 3/10/2023 10:41 AM CST -----  Type:  Same Day Appointment Request    Caller is requesting a same day appointment.  Caller declined first available appointment listed below.    Name of Caller: pt  When is the first available appointment? 3/17/2023  Symptoms: sore throat and cough  Best Call Back Number: [phone]   Additional Information:

## 2023-03-15 ENCOUNTER — PATIENT MESSAGE (OUTPATIENT)
Dept: OTOLARYNGOLOGY | Facility: CLINIC | Age: 73
End: 2023-03-15
Payer: MEDICARE

## 2023-03-15 RX ORDER — METHYLPREDNISOLONE 4 MG/1
TABLET ORAL
Qty: 1 EACH | Refills: 0 | Status: SHIPPED | OUTPATIENT
Start: 2023-03-15 | End: 2023-08-13 | Stop reason: ALTCHOICE

## 2023-03-16 ENCOUNTER — TELEPHONE (OUTPATIENT)
Dept: OTOLARYNGOLOGY | Facility: CLINIC | Age: 73
End: 2023-03-16
Payer: MEDICARE

## 2023-03-16 NOTE — TELEPHONE ENCOUNTER
Replied to pts portal msg    ----- Message from Kelli Dixon sent at 3/16/2023  8:11 AM CDT -----  Type:  Needs Medical Advice    Who Called: pt  Symptoms (please be specific): sore throat/ cough  How long has patient had these symptoms:  a week    Would the patient rather a call back or a response via [facility]? Call   Best Call Back Number:  [phone]  Additional Information: Pt stated her condition didn't improve   Books were closed and provider is out pt is welcome to see anyone

## 2023-04-11 ENCOUNTER — PATIENT MESSAGE (OUTPATIENT)
Dept: FAMILY MEDICINE | Facility: CLINIC | Age: 73
End: 2023-04-11
Payer: MEDICARE

## 2023-04-12 ENCOUNTER — PATIENT MESSAGE (OUTPATIENT)
Dept: FAMILY MEDICINE | Facility: CLINIC | Age: 73
End: 2023-04-12
Payer: MEDICARE

## 2023-04-12 ENCOUNTER — TELEPHONE (OUTPATIENT)
Dept: FAMILY MEDICINE | Facility: CLINIC | Age: 73
End: 2023-04-12
Payer: MEDICARE

## 2023-04-24 ENCOUNTER — PES CALL (OUTPATIENT)
Dept: ADMINISTRATIVE | Facility: CLINIC | Age: 73
End: 2023-04-24
Payer: MEDICARE

## 2023-05-05 ENCOUNTER — TELEPHONE (OUTPATIENT)
Dept: FAMILY MEDICINE | Facility: CLINIC | Age: 73
End: 2023-05-05
Payer: MEDICARE

## 2023-05-08 ENCOUNTER — TELEPHONE (OUTPATIENT)
Dept: FAMILY MEDICINE | Facility: CLINIC | Age: 73
End: 2023-05-08
Payer: MEDICARE

## 2023-05-08 NOTE — TELEPHONE ENCOUNTER
Called [facility] app to let them know pt received medication. When I called it rang for 5 mins then disconnected the call

## 2023-05-08 NOTE — TELEPHONE ENCOUNTER
----- Message from Brooke Lomax sent at 5/5/2023 12:07 PM CDT -----  Type:  Needs Medical Advice    Who Called: pt assistant program [employer]  Symptoms (please be specific): they  are calling to confirm that the pt insulin has been received by this provider the pt medication was delivered to [id number] by mistake    Would the patient rather a call back or a response via [facility]? call  Best Call Back Number: [phone]  Additional Information:

## 2023-05-15 ENCOUNTER — PATIENT MESSAGE (OUTPATIENT)
Dept: FAMILY MEDICINE | Facility: CLINIC | Age: 73
End: 2023-05-15
Payer: MEDICARE

## 2023-07-07 ENCOUNTER — PATIENT OUTREACH (OUTPATIENT)
Dept: ADMINISTRATIVE | Facility: HOSPITAL | Age: 73
End: 2023-07-07
Payer: MEDICARE

## 2023-07-07 NOTE — PROGRESS NOTES
Care Everywhere updates requested and reviewed.  Immunizations reconciled. Media reports reviewed.  Duplicate HM overrides and  orders removed.  Overdue HM topic chart audit and/or requested.  Overdue lab testing linked to upcoming lab appointments if applies.        Health Maintenance Due   Topic Date Due    COVID-19 Vaccine (5 - Pfizer series) 10/06/2022

## 2023-07-13 DIAGNOSIS — I50.32 DIASTOLIC DYSFUNCTION WITH CHRONIC HEART FAILURE: ICD-10-CM

## 2023-07-13 DIAGNOSIS — I50.20 HFREF (HEART FAILURE WITH REDUCED EJECTION FRACTION): ICD-10-CM

## 2023-07-13 RX ORDER — FUROSEMIDE 20 MG/1
20 TABLET ORAL DAILY
Qty: 90 TABLET | Refills: 3 | Status: SHIPPED | OUTPATIENT
Start: 2023-07-13

## 2023-07-13 NOTE — TELEPHONE ENCOUNTER
Care Due:                  Date            Visit Type   Department     Provider  --------------------------------------------------------------------------------                                EP -                              [facility]  Last Visit: 02-      CARE ([facility])   NADINE Gunter                              [facility]  Next Visit: 08-      CARE ([facility])   NADINE Gunter                                                            Last  Test          Frequency    Reason                     Performed    Due Date  --------------------------------------------------------------------------------    HBA1C.......  6 months...  semaglutide..............  02- 08-    Health [facility] Embedded Care Due Messages. Reference number: [id number].   7/13/2023 9:13:14 AM CDT

## 2023-07-13 NOTE — TELEPHONE ENCOUNTER
Refill Routing Note   Medication(s) are not appropriate for processing by [facility] for the following reason(s):      Required vitals abnormal    [facility] action(s):  Defer Care Due:  None identified     Medication Therapy Plan: FLOS      Appointments  past 12m or future 3m with PCP    Date Provider   Last Visit   2/8/2023 Phyllis Gunter MD   Next Visit   8/14/2023 Phyllis Gunter MD   ED visits in past 90 days: 0        Note composed:11:57 AM 07/13/2023

## 2023-08-04 DIAGNOSIS — I10 BENIGN ESSENTIAL HYPERTENSION: ICD-10-CM

## 2023-08-04 RX ORDER — LOSARTAN POTASSIUM 100 MG/1
100 TABLET ORAL DAILY
Qty: 90 TABLET | Refills: 4 | Status: SHIPPED | OUTPATIENT
Start: 2023-08-04 | End: 2024-08-03

## 2023-08-04 NOTE — TELEPHONE ENCOUNTER
Refill Routing Note   Medication(s) are not appropriate for processing by [facility] for the following reason(s):      Required vitals abnormal    [facility] action(s):  Defer Care Due:  None identified            Appointments  past 12m or future 3m with PCP    Date Provider   Last Visit   2/8/2023 Phyllis Gunter MD   Next Visit   8/14/2023 Phyllis Gunter MD   ED visits in past 90 days: 0        Note composed:4:48 PM 08/04/2023

## 2023-08-04 NOTE — TELEPHONE ENCOUNTER
No care due was identified.  Health [facility] Embedded Care Due Messages. Reference number: [id number].   8/04/2023 10:21:53 AM CDT

## 2023-08-10 ENCOUNTER — TELEPHONE (OUTPATIENT)
Dept: FAMILY MEDICINE | Facility: CLINIC | Age: 73
End: 2023-08-10
Payer: MEDICARE

## 2023-08-10 NOTE — TELEPHONE ENCOUNTER
----- Message from Phyllis Gunter MD sent at 8/10/2023 10:57 AM CDT -----  Regarding: ozempic  This is the patient who the Ozempic is for, thanks!

## 2023-08-11 ENCOUNTER — LAB VISIT (OUTPATIENT)
Dept: LAB | Facility: HOSPITAL | Age: 73
End: 2023-08-11
Payer: MEDICARE

## 2023-08-11 DIAGNOSIS — I48.0 PAROXYSMAL ATRIAL FIBRILLATION: ICD-10-CM

## 2023-08-11 DIAGNOSIS — Z78.0 OSTEOPENIA AFTER MENOPAUSE: ICD-10-CM

## 2023-08-11 DIAGNOSIS — Z79.899 MEDICATION MANAGEMENT: ICD-10-CM

## 2023-08-11 DIAGNOSIS — M85.80 OSTEOPENIA AFTER MENOPAUSE: ICD-10-CM

## 2023-08-11 DIAGNOSIS — I87.2 VENOUS INSUFFICIENCY OF BOTH LOWER EXTREMITIES: ICD-10-CM

## 2023-08-11 DIAGNOSIS — R73.03 PREDIABETES: ICD-10-CM

## 2023-08-11 DIAGNOSIS — E03.9 ACQUIRED HYPOTHYROIDISM: ICD-10-CM

## 2023-08-11 LAB
ALBUMIN SERPL BCP-MCNC: 3.7 G/DL (ref 3.5–5.2)
ALP SERPL-CCNC: 111 U/L (ref 55–135)
ALT SERPL W/O P-5'-P-CCNC: 20 U/L (ref 10–44)
ANION GAP SERPL CALC-SCNC: 9 MMOL/L (ref 8–16)
AST SERPL-CCNC: 21 U/L (ref 10–40)
BASOPHILS # BLD AUTO: 0.06 K/UL (ref 0–0.2)
BASOPHILS NFR BLD: 0.7 % (ref 0–1.9)
BILIRUB SERPL-MCNC: 0.4 MG/DL (ref 0.1–1)
BUN SERPL-MCNC: 19 MG/DL (ref 8–23)
CALCIUM SERPL-MCNC: 9.5 MG/DL (ref 8.7–10.5)
CHLORIDE SERPL-SCNC: 105 MMOL/L (ref 95–110)
CO2 SERPL-SCNC: 26 MMOL/L (ref 23–29)
CREAT SERPL-MCNC: 0.8 MG/DL (ref 0.5–1.4)
DIFFERENTIAL METHOD: ABNORMAL
EOSINOPHIL # BLD AUTO: 0.5 K/UL (ref 0–0.5)
EOSINOPHIL NFR BLD: 6 % (ref 0–8)
ERYTHROCYTE [DISTWIDTH] IN BLOOD BY AUTOMATED COUNT: 14.2 % (ref 11.5–14.5)
EST. GFR  (NO RACE VARIABLE): >60 ML/MIN/1.73 M^2
ESTIMATED AVG GLUCOSE: 108 MG/DL (ref 68–131)
GLUCOSE SERPL-MCNC: 95 MG/DL (ref 70–110)
HBA1C MFR BLD: 5.4 % (ref 4–5.6)
HCT VFR BLD AUTO: 41.4 % (ref 37–48.5)
HGB BLD-MCNC: 13.1 G/DL (ref 12–16)
IMM GRANULOCYTES # BLD AUTO: 0.02 K/UL (ref 0–0.04)
IMM GRANULOCYTES NFR BLD AUTO: 0.2 % (ref 0–0.5)
LYMPHOCYTES # BLD AUTO: 1.7 K/UL (ref 1–4.8)
LYMPHOCYTES NFR BLD: 20.6 % (ref 18–48)
MAGNESIUM SERPL-MCNC: 1.9 MG/DL (ref 1.6–2.6)
MCH RBC QN AUTO: 28.2 PG (ref 27–31)
MCHC RBC AUTO-ENTMCNC: 31.6 G/DL (ref 32–36)
MCV RBC AUTO: 89 FL (ref 82–98)
MONOCYTES # BLD AUTO: 0.5 K/UL (ref 0.3–1)
MONOCYTES NFR BLD: 6.2 % (ref 4–15)
NEUTROPHILS # BLD AUTO: 5.5 K/UL (ref 1.8–7.7)
NEUTROPHILS NFR BLD: 66.3 % (ref 38–73)
NRBC BLD-RTO: 0 /100 WBC
PLATELET # BLD AUTO: 242 K/UL (ref 150–450)
PMV BLD AUTO: 11.6 FL (ref 9.2–12.9)
POTASSIUM SERPL-SCNC: 4 MMOL/L (ref 3.5–5.1)
PROT SERPL-MCNC: 6.9 G/DL (ref 6–8.4)
RBC # BLD AUTO: 4.65 M/UL (ref 4–5.4)
SODIUM SERPL-SCNC: 140 MMOL/L (ref 136–145)
T4 FREE SERPL-MCNC: 1 NG/DL (ref 0.71–1.51)
TSH SERPL DL<=0.005 MIU/L-ACNC: 0.36 UIU/ML (ref 0.4–4)
VIT B12 SERPL-MCNC: 407 PG/ML (ref 210–950)
WBC # BLD AUTO: 8.35 K/UL (ref 3.9–12.7)

## 2023-08-11 PROCEDURE — 84443 ASSAY THYROID STIM HORMONE: CPT | Performed by: FAMILY MEDICINE

## 2023-08-11 PROCEDURE — 36415 COLL VENOUS BLD VENIPUNCTURE: CPT | Performed by: FAMILY MEDICINE

## 2023-08-11 PROCEDURE — 85025 COMPLETE CBC W/AUTO DIFF WBC: CPT | Performed by: FAMILY MEDICINE

## 2023-08-11 PROCEDURE — 82607 VITAMIN B-12: CPT | Performed by: FAMILY MEDICINE

## 2023-08-11 PROCEDURE — 84439 ASSAY OF FREE THYROXINE: CPT | Performed by: FAMILY MEDICINE

## 2023-08-11 PROCEDURE — 83735 ASSAY OF MAGNESIUM: CPT | Performed by: FAMILY MEDICINE

## 2023-08-11 PROCEDURE — 80053 COMPREHEN METABOLIC PANEL: CPT | Performed by: FAMILY MEDICINE

## 2023-08-11 PROCEDURE — 83036 HEMOGLOBIN GLYCOSYLATED A1C: CPT | Performed by: FAMILY MEDICINE

## 2023-08-13 PROBLEM — J30.9 CHRONIC ALLERGIC RHINITIS: Status: ACTIVE | Noted: 2023-08-13

## 2023-08-14 ENCOUNTER — TELEPHONE (OUTPATIENT)
Dept: CARDIOLOGY | Facility: CLINIC | Age: 73
End: 2023-08-14
Payer: MEDICARE

## 2023-08-14 ENCOUNTER — OFFICE VISIT (OUTPATIENT)
Dept: FAMILY MEDICINE | Facility: CLINIC | Age: 73
End: 2023-08-14
Payer: MEDICARE

## 2023-08-14 VITALS
WEIGHT: 208.75 LBS | RESPIRATION RATE: 18 BRPM | BODY MASS INDEX: 31.64 KG/M2 | HEIGHT: 68 IN | DIASTOLIC BLOOD PRESSURE: 78 MMHG | SYSTOLIC BLOOD PRESSURE: 138 MMHG | HEART RATE: 84 BPM | OXYGEN SATURATION: 98 %

## 2023-08-14 DIAGNOSIS — I50.20 HFREF (HEART FAILURE WITH REDUCED EJECTION FRACTION): ICD-10-CM

## 2023-08-14 DIAGNOSIS — L71.9 ROSACEA: ICD-10-CM

## 2023-08-14 DIAGNOSIS — I49.3 PVC'S (PREMATURE VENTRICULAR CONTRACTIONS): ICD-10-CM

## 2023-08-14 DIAGNOSIS — M85.80 OSTEOPENIA AFTER MENOPAUSE: ICD-10-CM

## 2023-08-14 DIAGNOSIS — I10 BENIGN ESSENTIAL HYPERTENSION: ICD-10-CM

## 2023-08-14 DIAGNOSIS — Z00.00 ROUTINE GENERAL MEDICAL EXAMINATION AT A HEALTH CARE FACILITY: Primary | ICD-10-CM

## 2023-08-14 DIAGNOSIS — Z78.0 POSTMENOPAUSAL: ICD-10-CM

## 2023-08-14 DIAGNOSIS — I48.0 PAROXYSMAL ATRIAL FIBRILLATION: ICD-10-CM

## 2023-08-14 DIAGNOSIS — J30.0 VASOMOTOR RHINITIS: ICD-10-CM

## 2023-08-14 DIAGNOSIS — Z12.31 ENCOUNTER FOR SCREENING MAMMOGRAM FOR BREAST CANCER: ICD-10-CM

## 2023-08-14 DIAGNOSIS — Z78.0 OSTEOPENIA AFTER MENOPAUSE: ICD-10-CM

## 2023-08-14 DIAGNOSIS — I87.2 VENOUS INSUFFICIENCY OF BOTH LOWER EXTREMITIES: ICD-10-CM

## 2023-08-14 DIAGNOSIS — Z79.01 CURRENT USE OF LONG TERM ANTICOAGULATION: ICD-10-CM

## 2023-08-14 DIAGNOSIS — E66.09 CLASS 1 OBESITY DUE TO EXCESS CALORIES WITH SERIOUS COMORBIDITY AND BODY MASS INDEX (BMI) OF 33.0 TO 33.9 IN ADULT: ICD-10-CM

## 2023-08-14 DIAGNOSIS — Z79.899 MEDICATION MANAGEMENT: ICD-10-CM

## 2023-08-14 DIAGNOSIS — J30.9 CHRONIC ALLERGIC RHINITIS: ICD-10-CM

## 2023-08-14 DIAGNOSIS — Z87.11 HISTORY OF PEPTIC ULCER: ICD-10-CM

## 2023-08-14 DIAGNOSIS — J30.89 ENVIRONMENTAL AND SEASONAL ALLERGIES: ICD-10-CM

## 2023-08-14 DIAGNOSIS — E03.9 ACQUIRED HYPOTHYROIDISM: ICD-10-CM

## 2023-08-14 DIAGNOSIS — I50.32 DIASTOLIC DYSFUNCTION WITH CHRONIC HEART FAILURE: ICD-10-CM

## 2023-08-14 DIAGNOSIS — R73.03 PREDIABETES: ICD-10-CM

## 2023-08-14 DIAGNOSIS — E55.9 VITAMIN D DEFICIENCY: ICD-10-CM

## 2023-08-14 PROCEDURE — 3044F PR MOST RECENT HEMOGLOBIN A1C LEVEL <7.0%: ICD-10-PCS | Mod: CPTII,S$GLB,, | Performed by: FAMILY MEDICINE

## 2023-08-14 PROCEDURE — 1159F PR MEDICATION LIST DOCUMENTED IN MEDICAL RECORD: ICD-10-PCS | Mod: CPTII,S$GLB,, | Performed by: FAMILY MEDICINE

## 2023-08-14 PROCEDURE — 99397 PR PREVENTIVE VISIT,EST,65 & OVER: ICD-10-PCS | Mod: GZ,S$GLB,, | Performed by: FAMILY MEDICINE

## 2023-08-14 PROCEDURE — 3075F PR MOST RECENT SYSTOLIC BLOOD PRESS GE 130-139MM HG: ICD-10-PCS | Mod: CPTII,S$GLB,, | Performed by: FAMILY MEDICINE

## 2023-08-14 PROCEDURE — 3078F DIAST BP <80 MM HG: CPT | Mod: CPTII,S$GLB,, | Performed by: FAMILY MEDICINE

## 2023-08-14 PROCEDURE — 1159F MED LIST DOCD IN RCRD: CPT | Mod: CPTII,S$GLB,, | Performed by: FAMILY MEDICINE

## 2023-08-14 PROCEDURE — 3044F HG A1C LEVEL LT 7.0%: CPT | Mod: CPTII,S$GLB,, | Performed by: FAMILY MEDICINE

## 2023-08-14 PROCEDURE — 3075F SYST BP GE 130 - 139MM HG: CPT | Mod: CPTII,S$GLB,, | Performed by: FAMILY MEDICINE

## 2023-08-14 PROCEDURE — 99397 PER PM REEVAL EST PAT 65+ YR: CPT | Mod: GZ,S$GLB,, | Performed by: FAMILY MEDICINE

## 2023-08-14 PROCEDURE — 1126F PR PAIN SEVERITY QUANTIFIED, NO PAIN PRESENT: ICD-10-PCS | Mod: CPTII,S$GLB,, | Performed by: FAMILY MEDICINE

## 2023-08-14 PROCEDURE — 1126F AMNT PAIN NOTED NONE PRSNT: CPT | Mod: CPTII,S$GLB,, | Performed by: FAMILY MEDICINE

## 2023-08-14 PROCEDURE — 99999 PR PBB SHADOW E&M-EST. PATIENT-LVL V: CPT | Mod: PBBFAC,,, | Performed by: FAMILY MEDICINE

## 2023-08-14 PROCEDURE — 1101F PR PT FALLS ASSESS DOC 0-1 FALLS W/OUT INJ PAST YR: ICD-10-PCS | Mod: CPTII,S$GLB,, | Performed by: FAMILY MEDICINE

## 2023-08-14 PROCEDURE — 99999 PR PBB SHADOW E&M-EST. PATIENT-LVL V: ICD-10-PCS | Mod: PBBFAC,,, | Performed by: FAMILY MEDICINE

## 2023-08-14 PROCEDURE — 3288F PR FALLS RISK ASSESSMENT DOCUMENTED: ICD-10-PCS | Mod: CPTII,S$GLB,, | Performed by: FAMILY MEDICINE

## 2023-08-14 PROCEDURE — 1160F RVW MEDS BY RX/DR IN RCRD: CPT | Mod: CPTII,S$GLB,, | Performed by: FAMILY MEDICINE

## 2023-08-14 PROCEDURE — 1101F PT FALLS ASSESS-DOCD LE1/YR: CPT | Mod: CPTII,S$GLB,, | Performed by: FAMILY MEDICINE

## 2023-08-14 PROCEDURE — 4010F ACE/ARB THERAPY RXD/TAKEN: CPT | Mod: CPTII,S$GLB,, | Performed by: FAMILY MEDICINE

## 2023-08-14 PROCEDURE — 1160F PR REVIEW ALL MEDS BY PRESCRIBER/CLIN PHARMACIST DOCUMENTED: ICD-10-PCS | Mod: CPTII,S$GLB,, | Performed by: FAMILY MEDICINE

## 2023-08-14 PROCEDURE — 3288F FALL RISK ASSESSMENT DOCD: CPT | Mod: CPTII,S$GLB,, | Performed by: FAMILY MEDICINE

## 2023-08-14 PROCEDURE — 4010F PR ACE/ARB THEARPY RXD/TAKEN: ICD-10-PCS | Mod: CPTII,S$GLB,, | Performed by: FAMILY MEDICINE

## 2023-08-14 PROCEDURE — 3078F PR MOST RECENT DIASTOLIC BLOOD PRESSURE < 80 MM HG: ICD-10-PCS | Mod: CPTII,S$GLB,, | Performed by: FAMILY MEDICINE

## 2023-08-14 RX ORDER — IPRATROPIUM BROMIDE 21 UG/1
2 SPRAY, METERED NASAL 2 TIMES DAILY
Qty: 30 ML | Refills: 3 | Status: SHIPPED | OUTPATIENT
Start: 2023-08-14

## 2023-08-14 RX ORDER — FLUTICASONE PROPIONATE 50 MCG
2 SPRAY, SUSPENSION (ML) NASAL DAILY
Qty: 16 G | Refills: 0 | Status: SHIPPED | OUTPATIENT
Start: 2023-08-14

## 2023-08-14 NOTE — TELEPHONE ENCOUNTER
----- Message from Osvaldo Taylor sent at 8/14/2023  3:45 PM CDT -----  Pt has a referral in the system. Pt would like to schedule a appt asap. Pt would like the office to give her a call back.             Pt can be reached at [phone]            TY

## 2023-08-14 NOTE — PROGRESS NOTES
Office Visit    Patient Name: Violet Mirza    : 1950  MRN: [id number]    Subjective:  Violet is a 73 y.o. female who presents today for:    Annual Exam    Most Recent OV with me 23, PRIOR PHYSICAL 22  Seen by ORTHO Dr Callejas [facility]-most recently 23 for rib contusion  Also seen by ortho for R knee and shoulder pain  Dr Vann-Chiropractor 23 neck/back pain   ENT [facility] 3/10/23 URI/AR/Vasomotor rhinitis     72 yo patient of mine who presents for ANNUAL PHYSICAL with lab review and monitoring of  chronic conditions that include paroxysmal AFib(2018 in [facility] was given diltiazem & converted spontaneously to NSR), HFrEF (EF 40-45% 3/25/21) followed by cardiology Dr Duran-- last seen 5/3/22 & F/U 1 year with ECHO, hypothyroidism, hypertension, obesity, osteopenia, rosacea, remote prior history of kidney stones.     Labs drawn prior to office visit 23 show continued improvement in A1c down to 5.4.   Previously her A1c was 6.3 when metformin was initiated, could not tolerate this from a GI standpoint we did help with A1c lowering, and therefore was transitioned to Ozempic.    She is currently on OZEMPIC 1mg/week.      Otherwise labs without concerns-normal liver/kidney function, electrolytes, and TSH just below normal at 0.362 with normal TSH on Levothyroxine 112 mcg daily.  Of note, her weight has decreased by 11 lb since prior TSH check  Previously Vit D 59 and  2/3/23-- she is not on a statin- no history of obstructive coronary artery disease.     She is postmenopausal.  Previously had a gynecologist-- Renny Deshpande, seen in January yearly.    He retired and she requests that I start ordering her yearly mammograms-generally receives them through [facility].     Today she is feeling overall well, tolerating the Ozempic 1 mg / week without any significant side effects and pleased that she lost more weight on the increased dose.  Has occasional intermittent nausea and diarrhea but  very tolerable.    Feels some benefit in terms of decreased joint pain.  She is tyring to avoid a knee replacement on the Right, though she has done really well with her L knee parital knee replacement.   Notes decreased joint pain with her weight loss.    Blood Pressure controlled on toprol xl 200 mg daily, and cozaar 100 mg daily, along with Xarelto for stroke prevention in Paroxysmal A Fib.   Leg swelling stable on dose of 20 mg Lasix daily- has no complaints of swelling currently.     General lifestyle habits are as follows:    Diet: healthy and watches salt and carbs-- home cooked meals, Hydration, and has cut back on sugars some   Exercise: recently fair -- doing a home [employer] Work Out about 3 times weekly   Sleep: fair-- interrupted to use the bathroom as she takes Lasix at night BUT WOULD PREFER THIS TO TAKING LASIX IN THE AM and increased daytime urinary frequency   Weight:  Previously down 7 lb and now down an additional 11 lb in the last 6 months.  BMI currently 31.7.     Immunizations: TDaP 4/5/19-- repeat 10 years, yearly FLU 10/18/22, SHINGRIX 2/2 7/13/20, Pneumovax 23 11/3/2014, PREVNAR 13 10/18/2015, COVID-19 completed 2/8/21 w/ 1st PFIZER BOOSTER 10/7/21 & 2nd booster 8/11/22-- OMICRON BOOSTER DECLINED     Screening Tests: DEXA 7/19/21- improved Osteopenia-- RECHECK 2 years & ORDERED TO BE DONE WITH NEXT MAMMOGRAM IN FEBRUARY 2024,  mammo 2/27/23- Repeat 1 year, PAP not indicated-- >65 w/o h/o abnormal, colonoscopy through [facility] 1/22/2015-- repeat 10 years, Hep C (-) 10/14/2015     Eye/Dental: UTD with Both- yearly, EYE MARIO  11/11/22 - L Cataract removed- laser 2/2 scar tissue complications     Saw Cardiology Dr. Rausch electrophysiology 10/18/2019 and there was a concern for greater numbers of PVCs noted on EKG.    He advised a Holter monitor, which she declined, but since her ejection fraction is overall stable on echo,  okay with monitoring         ECHO 3/25/21:  The left ventricle is mildly  enlarged with mildly decreased systolic function. The estimated ejection fraction is 40-45%  Mild left atrial enlargement.  Grade II left ventricular diastolic dysfunction.  Normal right ventricular size with normal right ventricular systolic function.  Normal central venous pressure (3 mmHg).  The estimated PA systolic pressure is 16 mmHg.          PAST MEDICAL HISTORY, SURGICAL/SOCIAL/FAMILY HISTORY REVIEWED AS PER CHART, WITH PERTINENT FINDINGS INCLUDED IN HISTORY SECTION OF NOTE.     Current Medications    Medication List with Changes/Refills   Current Medications    DICLOFENAC SODIUM (VOLTAREN) 1 % GEL    Apply 4 g  topically to affected area 4 times a day as needed.    FLUTICASONE PROPIONATE (FLONASE) 50 MCG/ACTUATION NASAL SPRAY    2 sprays (100 mcg total) by Each Nare route once daily.    FUROSEMIDE (LASIX) 20 MG TABLET    Take 1 tablet (20 mg total) by mouth once daily.    IPRATROPIUM (ATROVENT) 21 MCG (0.03 %) NASAL SPRAY    2 sprays by Each Nostril route 2 (two) times daily.    LEVOCETIRIZINE (XYZAL) 5 MG TABLET    Take 1 tablet (5 mg total) by mouth every evening.    LEVOTHYROXINE (SYNTHROID) 112 MCG TABLET    Take 1 tablet (112 mcg total) by mouth once daily.    LOSARTAN (COZAAR) 100 MG TABLET    TAKE 1 TABLET BY MOUTH ONCE DAILY    METOPROLOL SUCCINATE (TOPROL-XL) 200 MG 24 HR TABLET    Take 1 tablet (200 mg total) by mouth once daily.    RIVAROXABAN (XARELTO) 20 MG TAB    Take 1 tablet (20 mg total) by mouth daily with dinner or evening meal.    SEMAGLUTIDE (OZEMPIC) 1 MG/DOSE (4 MG/3 ML)    Inject 1 mg into the skin every 7 days.    TRAMADOL (ULTRAM) 50 MG TABLET    Take 50 mg by mouth every 6 (six) hours as needed for Pain.   Discontinued Medications    METHYLPREDNISOLONE (MEDROL DOSEPACK) 4 MG TABLET    use as directed       Allergies   Review of patient's allergies indicates:   Allergen Reactions    Aldactone [spironolactone] Other (See Comments)     Hyperkalemia when initiated on 01/23/2018   "        Review of Systems (Pertinent positives)  Review of Systems   Constitutional:  Negative for unexpected weight change (INTENTIONAL WEIGHT LOSS WITH USE OF OZEMPIC).   HENT:  Negative for dental problem.    Eyes:  Negative for visual disturbance.   Respiratory:  Negative for shortness of breath.    Cardiovascular:  Negative for chest pain and leg swelling.   Gastrointestinal:  Positive for nausea (mild intermittent, tolerable).   Genitourinary:  Positive for frequency.   Musculoskeletal:  Positive for arthralgias (overall stable to improved with recent weight loss).   Neurological:  Negative for dizziness and light-headedness.   Psychiatric/Behavioral:  Negative for sleep disturbance.        /78 (BP Location: Right arm, Patient Position: Sitting)   Pulse 84   Resp 18   Ht 5' 8" (1.727 m)   Wt 94.7 kg (208 lb 12.4 oz)   SpO2 98%   BMI 31.74 kg/m²     Physical Exam  Vitals reviewed.   Constitutional:       General: She is not in acute distress.     Appearance: Normal appearance. She is well-developed.   HENT:      Head: Normocephalic and atraumatic.      Right Ear: Ear canal normal. Tympanic membrane is not erythematous or bulging.      Left Ear: Ear canal normal. Tympanic membrane is not erythematous or bulging.      Nose: Nose normal.      Mouth/Throat:      Mouth: Mucous membranes are moist.      Pharynx: No oropharyngeal exudate.   Eyes:      Extraocular Movements: Extraocular movements intact.      Conjunctiva/sclera: Conjunctivae normal.   Neck:      Thyroid: No thyroid mass or thyromegaly.      Vascular: No carotid bruit.   Cardiovascular:      Rate and Rhythm: Normal rate. Rhythm irregular.      Pulses:           Dorsalis pedis pulses are 2+ on the right side and 2+ on the left side.      Heart sounds: Normal heart sounds. No murmur heard.  Pulmonary:      Effort: Pulmonary effort is normal. No respiratory distress.      Breath sounds: Normal breath sounds.   Abdominal:      General: Bowel " sounds are normal. There is no distension.      Palpations: Abdomen is soft. There is no mass.      Tenderness: There is no abdominal tenderness.   Musculoskeletal:         General: Normal range of motion.      Right lower leg: No edema.      Left lower leg: No edema.   Lymphadenopathy:      Cervical: No cervical adenopathy.   Skin:     General: Skin is warm and dry.      Findings: No rash.   Neurological:      General: No focal deficit present.      Mental Status: She is alert and oriented to person, place, and time.   Psychiatric:         Mood and Affect: Mood normal.         Behavior: Behavior normal.           Assessment/Plan:  Violet Mirza is a 73 y.o. female who presents today for :        ICD-10-CM ICD-9-CM    1. Routine general medical examination at a health care facility  Z00.00 V70.0       2. Class 1 obesity due to excess calories with serious comorbidity and body mass index (BMI) of 33.0 to 33.9 in adult  E66.09 278.00     Z68.33 V85.33       3. Prediabetes  R73.03 790.29 DXA Bone Density Axial Skeleton 1 or more sites      Hemoglobin A1C      Comprehensive Metabolic Panel      Lipid Panel      TSH      Vitamin D      4. Benign essential hypertension  I10 401.1 Comprehensive Metabolic Panel      Lipid Panel      5. HFrEF (heart failure with reduced ejection fraction)  I50.20 428.20 Echo Saline Bubble? No      Ambulatory referral/consult to Cardiology      6. Diastolic dysfunction with chronic heart failure  I50.32 428.32 Echo Saline Bubble? No      Ambulatory referral/consult to Cardiology      7. Paroxysmal atrial fibrillation  I48.0 427.31 Echo Saline Bubble? No      Ambulatory referral/consult to Cardiology      8. PVC's (premature ventricular contractions)  I49.3 427.69       9. Current use of long term anticoagulation  Z79.01 V58.61       10. History of peptic ulcer  Z87.11 V12.71       11. Environmental and seasonal allergies  J30.89 477.8       12. Acquired hypothyroidism  E03.9 244.9 TSH       13. Osteopenia after menopause  M85.80 733.90 Vitamin D    Z78.0 V49.81       14. Vitamin D deficiency  E55.9 268.9 Vitamin D      15. Venous insufficiency of both lower extremities  I87.2 459.81       16. Vasomotor rhinitis  J30.0 477.9       17. Rosacea  L71.9 695.3       18. Chronic allergic rhinitis  J30.9 477.9       19. Medication management  Z79.899 V58.69 Hemoglobin A1C      Comprehensive Metabolic Panel      Lipid Panel      TSH      Vitamin D      20. Postmenopausal  Z78.0 V49.81 DXA Bone Density Axial Skeleton 1 or more sites      21. Encounter for screening mammogram for breast cancer  Z12.31 V76.12 Mammo Digital Screening Bilat        ADVISED ON DIET/EXERCISE/SLEEP, ROUTINE EYE/DENTAL EXAMS, AND THE IMPORTANCE OF KEEPING UP WITH APPROPRIATE SCREENING TESTS BASED ON AGE AND RISK FACTORS.  EYE EXAM MARIO  11/2022  2ND COVID 19 BOOSTER 8/2022--she Declines Omicron Booster  NEXT DEXA 7/2023--ORDERED- OKAY TO WAIT AND DO IN FEBRUARY WITH UPCOMING MAMMOGRAM  NEXT MAMMO Feb 2024  NEXT CSCOPE 1/2025     OBESITY W/ PREDIABETES: Intolerant of Metformin, RESPONDED WELL TO OZEMPIC. Continue 1 mg / week as A1c now with improvement to 5.4 from 6.3 max and she has lost 11 ADDITIONAL LB.  REPEAT A1C/LABS, WEIGHT CHECK IN 6 MONTHS.   Low carb diet & regular exercise advised.      H/O HFwREF,DD, PAFIB: following with cardiology yearly and saw Dr Duran-- last seen 5/3/22 & F/U 1 year with ECHO, improvement on most recent ECHO 3/5/21 to 40-45%   Updated ECHO Ordered and Cardiology Referral placed per prior recommendations.   CARDIAC MEDS INCLUDE: toprol xl 200 mg daily, and cozaar 100 mg daily, along with Xarelto for stroke prevention in Paroxysmal A Fib. Euvolemic on dose of 20 mg Lasix daily.   GIVEN HER SIGNIFICANT WEIGHT LOSS ON OZEMPIC OKAY TO TRY DECREASING LASIX TO P.R.N..       R KNEE PAIN 2/2 OA: Following with ORTHO Dr Callejas for Periodic Synvisc injections. Knee pain is limiting--considering partial R Knee  replacement that she had and did well with on the left, there with recent weight loss she reports some improvement.     HYPOTHYROIDISM: TSH slightly suppressed an conjunction with weight loss on Ozempic. Advised  SKIPPING SUNDAY DOSE of her Levothyroxine 112 mcg daily, ongoing monitoring w/ recheck in 6 Months, sooner if concerns.      OP: improved to osteopenia with normalization of Vitamin D, regular exercise, recheck DEXA 7/2023--ORDERED- PLANS TO DO IN FEBRUARY WITH YEARLY MAMMOGRAM.     Allergies: stable on daily Xyzal, thought she cannot go with out it. Rhinorrhea improved with Atrovent Nasal spray for vasomotor rhinitis per ENT Dr Almanza     Venous insufficiency:  improved following vein procedure and with reduced swelling 2/2 lasix and weight loss. Also noting benefit from a specialty varicose vein cream             Patient Instructions   DID NOT UPDATE PRESCRIPTIONS BUT VERBALLY ADVISED SHE COULD TRY DECREASING LASIX TO AS NEEDED GIVEN HER SIGNIFICANT WEIGHT LOSS.      SHE WILL ALSO SKIP HER LEVOTHYROXINE DOSE ON SUNDAY GIVEN HER MILDLY SUPPRESSED TSH AND ONGOING WEIGHT LOSS WITH OZEMPIC DECREASING HER TOTAL THYROID HORMONE NEED.      Follow up in about 6 months (around 2/14/2024) for to follow up on lab results, return as needed for new concerns.

## 2023-08-14 NOTE — PATIENT INSTRUCTIONS
DID NOT UPDATE PRESCRIPTIONS BUT VERBALLY ADVISED SHE COULD TRY DECREASING LASIX TO AS NEEDED GIVEN HER SIGNIFICANT WEIGHT LOSS.      SHE WILL ALSO SKIP HER LEVOTHYROXINE DOSE ON SUNDAY GIVEN HER MILDLY SUPPRESSED TSH AND ONGOING WEIGHT LOSS WITH OZEMPIC DECREASING HER TOTAL THYROID HORMONE NEED.

## 2023-08-15 ENCOUNTER — PATIENT MESSAGE (OUTPATIENT)
Dept: OTOLARYNGOLOGY | Facility: CLINIC | Age: 73
End: 2023-08-15
Payer: MEDICARE

## 2023-08-15 ENCOUNTER — PATIENT MESSAGE (OUTPATIENT)
Dept: CARDIOLOGY | Facility: CLINIC | Age: 73
End: 2023-08-15
Payer: MEDICARE

## 2023-08-21 ENCOUNTER — TELEPHONE (OUTPATIENT)
Dept: FAMILY MEDICINE | Facility: CLINIC | Age: 73
End: 2023-08-21
Payer: MEDICARE

## 2023-08-21 NOTE — TELEPHONE ENCOUNTER
----- Message from Denia Abraham sent at 8/21/2023 12:02 PM CDT -----  Type:  Appointment    Who Called:pt  Does the patient know what this is regarding?:appointment  Would the patient rather a call back or a response via [facility]? call  Best Call Back Number:[phone]  Additional Information: Pre surgery checkup before her eye surgery no later than the  24th of October

## 2023-08-22 ENCOUNTER — PATIENT MESSAGE (OUTPATIENT)
Dept: FAMILY MEDICINE | Facility: CLINIC | Age: 73
End: 2023-08-22
Payer: MEDICARE

## 2023-10-10 NOTE — PROGRESS NOTES
Office Visit    Patient Name: Violet Mirza    : 1950  MRN: [id number]    Subjective:  Violet is a 73 y.o. female who presents today for:    Pre-op Exam    Seen by me for PHYSICAL 23, advised on 6 month routine follow up with labs-scheduled    Surgery: CATARACT EXTRACTION, R Eye   Indication: worsening cataract w/ visual impairment  Surgeon and anticipated date of surgery: 2023, DR MARTIN    Feeling Healthy and Well Without Symptoms of Illness: yes, denies, f/c/n/v, sore throat/URI sx, dysuria, diarrhea, rash    Exercise Tolerance:  Can walk 2 FLIGHTS OF STAIR with out chest pain or excess shortness of shortness of breath. Denies palpitations, dizziness/lightheadedness, edema  4 MET ACTIVITIES- climbing a flight of stairs or walk 3-4mph on level ground     Previous Trouble with Anesthesia? None    Easy Bleeding/Bruising? None  Use of anticoagulants, aspirin/ anti-platelets/ NSAIDS?: YES, XARELTO 20 mg daily for stroke prevention given h/o AFib, otherwise no aspirin or NSAIDs other than very rare Aleve    Chronic Conditions well Controlled: YES. Had unremarkable labs 23 with normal CMP and CBC, does not need repeat prior to cataract surgery    paroxysmal AFib(2018 in [facility] was given diltiazem & converted spontaneously to NSR), stable on Toprol , on chronic Xarelto 20-- EKG PERFORMED TODAY SHOWS NORMAL SINUS RHYTHM W/A RATE OF 65bpm  HFrEF (EF 40-45% 3/25/21) followed by cardiology Dr Duran-- last seen 5/3/22 & F/U 1 year with ECHO, euvolemic on lasix 20 mg daily  Hypothyroidism-TSH ok on levothyroxine  Hypertension: BP controlled on TOPROL  & LOSARTAN 100  Obesity, A1c 5.4 and has lost weight on Ozempic    ECHO 3/25/21:   The left ventricle is mildly enlarged with mildly decreased systolic function. The estimated ejection fraction is 40-45%  Mild left atrial enlargement.  Grade II left ventricular diastolic dysfunction.  Normal right ventricular size with  normal right ventricular systolic function.  Normal central venous pressure (3 mmHg).  The estimated PA systolic pressure is 16 mmHg.     Prior EKG 8/10/21: NSR w/ rate of 56, left axis deviation, otw unremarkable    PAST MEDICAL HISTORY, SURGICAL/SOCIAL/FAMILY HISTORY REVIEWED AS PER CHART, WITH PERTINENT FINDINGS INCLUDED IN HISTORY SECTION OF NOTE.     Current Medications    Medication List with Changes/Refills   Current Medications    DICLOFENAC SODIUM (VOLTAREN) 1 % GEL    Apply 4 g  topically to affected area 4 times a day as needed.    FLUTICASONE PROPIONATE (FLONASE) 50 MCG/ACTUATION NASAL SPRAY    2 sprays (100 mcg total) by Each Nare route once daily.    FUROSEMIDE (LASIX) 20 MG TABLET    Take 1 tablet (20 mg total) by mouth once daily.    IPRATROPIUM (ATROVENT) 21 MCG (0.03 %) NASAL SPRAY    2 sprays by Each Nostril route 2 (two) times daily.    LEVOCETIRIZINE (XYZAL) 5 MG TABLET    Take 1 tablet (5 mg total) by mouth every evening.    LEVOTHYROXINE (SYNTHROID) 112 MCG TABLET    Take 1 tablet (112 mcg total) by mouth once daily.    LOSARTAN (COZAAR) 100 MG TABLET    TAKE 1 TABLET BY MOUTH ONCE DAILY    METOPROLOL SUCCINATE (TOPROL-XL) 200 MG 24 HR TABLET    Take 1 tablet (200 mg total) by mouth once daily.    RIVAROXABAN (XARELTO) 20 MG TAB    Take 1 tablet (20 mg total) by mouth daily with dinner or evening meal.    SEMAGLUTIDE (OZEMPIC) 1 MG/DOSE (4 MG/3 ML)    Inject 1 mg into the skin every 7 days.    TRAMADOL (ULTRAM) 50 MG TABLET    Take 50 mg by mouth every 6 (six) hours as needed for Pain.       Allergies   Review of patient's allergies indicates:   Allergen Reactions    Aldactone [spironolactone] Other (See Comments)     Hyperkalemia when initiated on 01/23/2018          Review of Systems (Pertinent positives)  Review of Systems   Constitutional:  Negative for activity change, fever and unexpected weight change.   HENT:  Negative for sore throat.    Eyes:  Negative for visual disturbance.  "  Respiratory:  Negative for shortness of breath.    Cardiovascular:  Negative for chest pain, palpitations and leg swelling.   Gastrointestinal:  Negative for constipation and diarrhea.   Endocrine: Negative for heat intolerance.   Genitourinary:  Negative for difficulty urinating and dysuria.   Musculoskeletal:  Negative for arthralgias and back pain.   Skin:  Negative for rash.   Neurological:  Negative for dizziness and light-headedness.       BP (!) 150/84   Pulse 78   Temp 98.3 °F (36.8 °C) (Oral)   Ht 5' 8" (1.727 m)   Wt 92.7 kg (204 lb 5.9 oz)   SpO2 98%   BMI 31.07 kg/m²     Physical Exam  Vitals reviewed.   Constitutional:       General: She is not in acute distress.     Appearance: Normal appearance. She is well-developed.   HENT:      Head: Normocephalic and atraumatic.      Right Ear: There is impacted cerumen.      Left Ear: There is impacted cerumen.      Nose: No congestion or rhinorrhea.      Mouth/Throat:      Pharynx: No oropharyngeal exudate or posterior oropharyngeal erythema.   Eyes:      Conjunctiva/sclera: Conjunctivae normal.   Cardiovascular:      Rate and Rhythm: Normal rate and regular rhythm.      Heart sounds: No murmur heard.  Pulmonary:      Effort: Pulmonary effort is normal.      Breath sounds: Normal breath sounds.   Musculoskeletal:      Cervical back: Neck supple.      Right lower leg: No edema.      Left lower leg: No edema.   Lymphadenopathy:      Cervical: No cervical adenopathy.   Skin:     General: Skin is warm and dry.   Neurological:      General: No focal deficit present.      Mental Status: She is alert and oriented to person, place, and time.   Psychiatric:         Mood and Affect: Mood normal.         Behavior: Behavior normal.           Assessment/Plan:  Violet Mirza is a 73 y.o. female who presents today for :        ICD-10-CM ICD-9-CM    1. Senile cataract of right eye, unspecified age-related cataract type  H25.9 366.10       2. Decreased vision of right " eye  H54.61 369.8       3. Class 1 obesity due to excess calories with serious comorbidity and body mass index (BMI) of 31.0 to 31.9 in adult  E66.09 278.00     Z68.31 V85.31       4. Preop cardiovascular exam  Z01.810 V72.81 EKG 12-lead      CANCELED: SCHEDULED EKG 12-LEAD (to Muse)      5. HFrEF (heart failure with reduced ejection fraction)  I50.20 428.20       6. Diastolic dysfunction with chronic heart failure  I50.32 428.32       7. Paroxysmal atrial fibrillation  I48.0 427.31 EKG 12-lead      CANCELED: SCHEDULED EKG 12-LEAD (to Muse)      8. Current use of long term anticoagulation  Z79.01 V58.61       9. Benign essential hypertension  I10 401.1 CANCELED: SCHEDULED EKG 12-LEAD (to Muse)      10. Prediabetes  R73.03 790.29       11. Medication management  Z79.899 V58.69         74 YO female patient of mine with perioperative risk factors that include obesity/prediabetes, HTN, diastolic/systolic heart Failure (compensated w/ EF 40-45% on most recent ECHO), chronic use of Xarelto 2/2 h/o AFib episode/ P Afib concern who presents today for pre-op clearance for cataract extraction.    Chronic conditions well controlled-- EKG UPDATED TODAY SHOWS NORMAL SINUS RHYTHM.     Exercise tolerance of 3-4 mets    MEDICATIONS REVIEWED: NOT ON ASA or NSAIDS BUT TAKES XARELTO     ADVISED TO STOP XARELTO 2 NIGHTS PRIOR TO SURGERY (DO NOT TAKE ON NOVEMBER 6TH)  THEN RESUME THE DAY FOLLOWING SURGERY PROVIDED NO BLEEDING COMPLICATIONS  DO NOT TAKE LASIX AM OF SURGERY  TAKE ALL OTHER MEDS THROUGH AM OF SURGERY AS PRESCRIBED    she is CLEARED FOR CATARACT SURGERY.    Patient Instructions   CLEAR FOR R EYE CATARACT EXTRACTION.      ADVISED TO STOP XARELTO 2 NIGHTS PRIOR TO SURGERY (DO NOT TAKE ON NOVEMBER 6TH)  THEN RESUME THE DAY FOLLOWING SURGERY PROVIDED NO BLEEDING COMPLICATIONS  DO NOT TAKE LASIX AM OF SURGERY  TAKE ALL OTHER MEDS THROUGH AM OF SURGERY AS PRESCRIBED      Follow up in about 4 months (around 2/11/2024) for to  follow up on lab results, return as needed for new concerns.

## 2023-10-11 ENCOUNTER — OFFICE VISIT (OUTPATIENT)
Dept: FAMILY MEDICINE | Facility: CLINIC | Age: 73
End: 2023-10-11
Payer: MEDICARE

## 2023-10-11 VITALS
WEIGHT: 204.38 LBS | HEART RATE: 78 BPM | SYSTOLIC BLOOD PRESSURE: 150 MMHG | TEMPERATURE: 98 F | BODY MASS INDEX: 30.98 KG/M2 | DIASTOLIC BLOOD PRESSURE: 84 MMHG | OXYGEN SATURATION: 98 % | HEIGHT: 68 IN

## 2023-10-11 DIAGNOSIS — I10 BENIGN ESSENTIAL HYPERTENSION: ICD-10-CM

## 2023-10-11 DIAGNOSIS — R73.03 PREDIABETES: ICD-10-CM

## 2023-10-11 DIAGNOSIS — Z79.01 CURRENT USE OF LONG TERM ANTICOAGULATION: ICD-10-CM

## 2023-10-11 DIAGNOSIS — I48.0 PAROXYSMAL ATRIAL FIBRILLATION: ICD-10-CM

## 2023-10-11 DIAGNOSIS — I50.20 HFREF (HEART FAILURE WITH REDUCED EJECTION FRACTION): ICD-10-CM

## 2023-10-11 DIAGNOSIS — Z79.899 MEDICATION MANAGEMENT: ICD-10-CM

## 2023-10-11 DIAGNOSIS — H25.9 SENILE CATARACT OF RIGHT EYE, UNSPECIFIED AGE-RELATED CATARACT TYPE: Primary | ICD-10-CM

## 2023-10-11 DIAGNOSIS — E66.09 CLASS 1 OBESITY DUE TO EXCESS CALORIES WITH SERIOUS COMORBIDITY AND BODY MASS INDEX (BMI) OF 31.0 TO 31.9 IN ADULT: ICD-10-CM

## 2023-10-11 DIAGNOSIS — H54.61 DECREASED VISION OF RIGHT EYE: ICD-10-CM

## 2023-10-11 DIAGNOSIS — I50.32 DIASTOLIC DYSFUNCTION WITH CHRONIC HEART FAILURE: ICD-10-CM

## 2023-10-11 DIAGNOSIS — Z01.810 PREOP CARDIOVASCULAR EXAM: ICD-10-CM

## 2023-10-11 PROCEDURE — 1101F PR PT FALLS ASSESS DOC 0-1 FALLS W/OUT INJ PAST YR: ICD-10-PCS | Mod: CPTII,S$GLB,, | Performed by: FAMILY MEDICINE

## 2023-10-11 PROCEDURE — 99999 PR PBB SHADOW E&M-EST. PATIENT-LVL IV: ICD-10-PCS | Mod: PBBFAC,,, | Performed by: FAMILY MEDICINE

## 2023-10-11 PROCEDURE — 3288F PR FALLS RISK ASSESSMENT DOCUMENTED: ICD-10-PCS | Mod: CPTII,S$GLB,, | Performed by: FAMILY MEDICINE

## 2023-10-11 PROCEDURE — 99214 PR OFFICE/OUTPT VISIT, EST, LEVL IV, 30-39 MIN: ICD-10-PCS | Mod: S$GLB,,, | Performed by: FAMILY MEDICINE

## 2023-10-11 PROCEDURE — 3079F PR MOST RECENT DIASTOLIC BLOOD PRESSURE 80-89 MM HG: ICD-10-PCS | Mod: CPTII,S$GLB,, | Performed by: FAMILY MEDICINE

## 2023-10-11 PROCEDURE — 99214 OFFICE O/P EST MOD 30 MIN: CPT | Mod: S$GLB,,, | Performed by: FAMILY MEDICINE

## 2023-10-11 PROCEDURE — 93010 ELECTROCARDIOGRAM REPORT: CPT | Mod: S$GLB,,, | Performed by: INTERNAL MEDICINE

## 2023-10-11 PROCEDURE — 1160F PR REVIEW ALL MEDS BY PRESCRIBER/CLIN PHARMACIST DOCUMENTED: ICD-10-PCS | Mod: CPTII,S$GLB,, | Performed by: FAMILY MEDICINE

## 2023-10-11 PROCEDURE — 1126F PR PAIN SEVERITY QUANTIFIED, NO PAIN PRESENT: ICD-10-PCS | Mod: CPTII,S$GLB,, | Performed by: FAMILY MEDICINE

## 2023-10-11 PROCEDURE — 3044F HG A1C LEVEL LT 7.0%: CPT | Mod: CPTII,S$GLB,, | Performed by: FAMILY MEDICINE

## 2023-10-11 PROCEDURE — 93010 EKG 12-LEAD: ICD-10-PCS | Mod: S$GLB,,, | Performed by: INTERNAL MEDICINE

## 2023-10-11 PROCEDURE — 1160F RVW MEDS BY RX/DR IN RCRD: CPT | Mod: CPTII,S$GLB,, | Performed by: FAMILY MEDICINE

## 2023-10-11 PROCEDURE — 1159F MED LIST DOCD IN RCRD: CPT | Mod: CPTII,S$GLB,, | Performed by: FAMILY MEDICINE

## 2023-10-11 PROCEDURE — 4010F PR ACE/ARB THEARPY RXD/TAKEN: ICD-10-PCS | Mod: CPTII,S$GLB,, | Performed by: FAMILY MEDICINE

## 2023-10-11 PROCEDURE — 3077F SYST BP >= 140 MM HG: CPT | Mod: CPTII,S$GLB,, | Performed by: FAMILY MEDICINE

## 2023-10-11 PROCEDURE — 1159F PR MEDICATION LIST DOCUMENTED IN MEDICAL RECORD: ICD-10-PCS | Mod: CPTII,S$GLB,, | Performed by: FAMILY MEDICINE

## 2023-10-11 PROCEDURE — 3077F PR MOST RECENT SYSTOLIC BLOOD PRESSURE >= 140 MM HG: ICD-10-PCS | Mod: CPTII,S$GLB,, | Performed by: FAMILY MEDICINE

## 2023-10-11 PROCEDURE — 99999 PR PBB SHADOW E&M-EST. PATIENT-LVL IV: CPT | Mod: PBBFAC,,, | Performed by: FAMILY MEDICINE

## 2023-10-11 PROCEDURE — 4010F ACE/ARB THERAPY RXD/TAKEN: CPT | Mod: CPTII,S$GLB,, | Performed by: FAMILY MEDICINE

## 2023-10-11 PROCEDURE — 3288F FALL RISK ASSESSMENT DOCD: CPT | Mod: CPTII,S$GLB,, | Performed by: FAMILY MEDICINE

## 2023-10-11 PROCEDURE — 3079F DIAST BP 80-89 MM HG: CPT | Mod: CPTII,S$GLB,, | Performed by: FAMILY MEDICINE

## 2023-10-11 PROCEDURE — 1101F PT FALLS ASSESS-DOCD LE1/YR: CPT | Mod: CPTII,S$GLB,, | Performed by: FAMILY MEDICINE

## 2023-10-11 PROCEDURE — 93005 EKG 12-LEAD: ICD-10-PCS | Mod: S$GLB,,, | Performed by: FAMILY MEDICINE

## 2023-10-11 PROCEDURE — 93005 ELECTROCARDIOGRAM TRACING: CPT | Mod: S$GLB,,, | Performed by: FAMILY MEDICINE

## 2023-10-11 PROCEDURE — 1126F AMNT PAIN NOTED NONE PRSNT: CPT | Mod: CPTII,S$GLB,, | Performed by: FAMILY MEDICINE

## 2023-10-11 PROCEDURE — 3044F PR MOST RECENT HEMOGLOBIN A1C LEVEL <7.0%: ICD-10-PCS | Mod: CPTII,S$GLB,, | Performed by: FAMILY MEDICINE

## 2023-10-11 NOTE — PATIENT INSTRUCTIONS
CLEAR FOR R EYE CATARACT EXTRACTION.      ADVISED TO STOP XARELTO 2 NIGHTS PRIOR TO SURGERY (DO NOT TAKE ON NOVEMBER 6TH)  THEN RESUME THE DAY FOLLOWING SURGERY PROVIDED NO BLEEDING COMPLICATIONS  DO NOT TAKE LASIX AM OF SURGERY  TAKE ALL OTHER MEDS THROUGH AM OF SURGERY AS PRESCRIBED

## 2023-10-12 ENCOUNTER — PATIENT MESSAGE (OUTPATIENT)
Dept: RESPIRATORY THERAPY | Facility: HOSPITAL | Age: 73
End: 2023-10-12
Payer: MEDICARE

## 2023-10-19 DIAGNOSIS — E03.9 ACQUIRED HYPOTHYROIDISM: ICD-10-CM

## 2023-10-19 RX ORDER — LEVOTHYROXINE SODIUM 112 UG/1
112 TABLET ORAL DAILY
Qty: 90 TABLET | Refills: 3 | Status: SHIPPED | OUTPATIENT
Start: 2023-10-19

## 2023-10-19 NOTE — TELEPHONE ENCOUNTER
Refill Routing Note   Medication(s) are not appropriate for processing by [facility] for the following reason(s):      Required labs abnormal    [facility] action(s):  Defer Care Due:  None identified            Appointments  past 12m or future 3m with PCP    Date Provider   Last Visit   10/11/2023 Phyllis Gunter MD   Next Visit   2/19/2024 Phyllis Gunter MD   ED visits in past 90 days: 0        Note composed:10:44 AM 10/19/2023

## 2023-10-19 NOTE — TELEPHONE ENCOUNTER
No care due was identified.  [facility] Embedded Care Due Messages. Reference number: [id number].   10/19/2023 9:04:30 AM CDT

## 2023-10-23 ENCOUNTER — PATIENT MESSAGE (OUTPATIENT)
Dept: ADMINISTRATIVE | Facility: HOSPITAL | Age: 73
End: 2023-10-23
Payer: MEDICARE

## 2023-10-23 ENCOUNTER — TELEPHONE (OUTPATIENT)
Dept: FAMILY MEDICINE | Facility: CLINIC | Age: 73
End: 2023-10-23
Payer: MEDICARE

## 2023-10-23 NOTE — TELEPHONE ENCOUNTER
----- Message from Saad Holland sent at 10/23/2023  1:22 PM CDT -----  .Type:  Needs Medical Advice    Who Called: Billy at Dr. Carolyn Delong office    Would the patient rather a call back or a response via [facility]? Call back  Best Call Back Number:   Additional Information:     Billy stated she would like for the medical clearance form she sent signed and sent back as soon as possible please

## 2023-10-24 ENCOUNTER — HOSPITAL ENCOUNTER (OUTPATIENT)
Dept: CARDIOLOGY | Facility: HOSPITAL | Age: 73
Discharge: HOME | End: 2023-10-24
Attending: FAMILY MEDICINE
Payer: MEDICARE

## 2023-10-24 ENCOUNTER — PATIENT MESSAGE (OUTPATIENT)
Dept: FAMILY MEDICINE | Facility: CLINIC | Age: 73
End: 2023-10-24
Payer: MEDICARE

## 2023-10-24 VITALS — WEIGHT: 204 LBS | BODY MASS INDEX: 30.92 KG/M2 | HEIGHT: 68 IN

## 2023-10-24 DIAGNOSIS — I50.32 DIASTOLIC DYSFUNCTION WITH CHRONIC HEART FAILURE: ICD-10-CM

## 2023-10-24 DIAGNOSIS — I48.0 PAROXYSMAL ATRIAL FIBRILLATION: ICD-10-CM

## 2023-10-24 DIAGNOSIS — I50.20 HFREF (HEART FAILURE WITH REDUCED EJECTION FRACTION): ICD-10-CM

## 2023-10-24 LAB
ASCENDING AORTA: 2.82 CM
AV INDEX (PROSTH): 0.75
AV MEAN GRADIENT: 6 MMHG
AV PEAK GRADIENT: 9 MMHG
AV VALVE AREA BY VELOCITY RATIO: 2.19 CM²
AV VALVE AREA: 2.41 CM²
AV VELOCITY RATIO: 0.68
BSA FOR ECHO PROCEDURE: 2.11 M2
CV ECHO LV RWT: 0.27 CM
DOP CALC AO PEAK VEL: 1.52 M/S
DOP CALC AO VTI: 30.6 CM
DOP CALC LVOT AREA: 3.2 CM2
DOP CALC LVOT DIAMETER: 2.03 CM
DOP CALC LVOT PEAK VEL: 1.03 M/S
DOP CALC LVOT STROKE VOLUME: 73.76 CM3
DOP CALC MV VTI: 32.3 CM
DOP CALCLVOT PEAK VEL VTI: 22.8 CM
E WAVE DECELERATION TIME: 233 MSEC
E/A RATIO: 0.95
E/E' RATIO: 15.64 M/S
ECHO LV POSTERIOR WALL: 0.79 CM (ref 0.6–1.1)
FRACTIONAL SHORTENING: 27 % (ref 28–44)
INTERVENTRICULAR SEPTUM: 0.65 CM (ref 0.6–1.1)
IVC DIAMETER: 1.91 CM
LA MAJOR: 6.04 CM
LA MINOR: 5.81 CM
LA WIDTH: 4.6 CM
LEFT ATRIUM SIZE: 4.41 CM
LEFT ATRIUM VOLUME INDEX MOD: 31.4 ML/M2
LEFT ATRIUM VOLUME INDEX: 49.6 ML/M2
LEFT ATRIUM VOLUME MOD: 64.73 CM3
LEFT ATRIUM VOLUME: 102.13 CM3
LEFT INTERNAL DIMENSION IN SYSTOLE: 4.28 CM (ref 2.1–4)
LEFT VENTRICLE DIASTOLIC VOLUME INDEX: 84.22 ML/M2
LEFT VENTRICLE DIASTOLIC VOLUME: 173.49 ML
LEFT VENTRICLE MASS INDEX: 77 G/M2
LEFT VENTRICLE SYSTOLIC VOLUME INDEX: 39.9 ML/M2
LEFT VENTRICLE SYSTOLIC VOLUME: 82.18 ML
LEFT VENTRICULAR INTERNAL DIMENSION IN DIASTOLE: 5.9 CM (ref 3.5–6)
LEFT VENTRICULAR MASS: 158.74 G
LV LATERAL E/E' RATIO: 12.29 M/S
LV SEPTAL E/E' RATIO: 21.5 M/S
LVOT MG: 2.39 MMHG
LVOT MV: 0.74 CM/S
MV PEAK A VEL: 0.91 M/S
MV PEAK E VEL: 0.86 M/S
MV PEAK GRADIENT: 4 MMHG
MV STENOSIS PRESSURE HALF TIME: 67.57 MS
MV VALVE AREA BY CONTINUITY EQUATION: 2.28 CM2
MV VALVE AREA P 1/2 METHOD: 3.26 CM2
OHS LV EJECTION FRACTION SIMPSONS BIPLANE MOD: 41 %
PISA AR MAX VEL: 1.86 M/S
PISA MRMAX VEL: 5.4 M/S
PISA TR MAX VEL: 2.91 M/S
PULM VEIN S/D RATIO: 1.18
PV PEAK D VEL: 0.38 M/S
PV PEAK GRADIENT: 5 MMHG
PV PEAK S VEL: 0.45 M/S
PV PEAK VELOCITY: 1.07 M/S
RA MAJOR: 4.67 CM
RA PRESSURE ESTIMATED: 3 MMHG
RA WIDTH: 3.38 CM
RIGHT VENTRICULAR END-DIASTOLIC DIMENSION: 3.76 CM
RV TB RVSP: 6 MMHG
RV TISSUE DOPPLER FREE WALL SYSTOLIC VELOCITY 1 (APICAL 4 CHAMBER VIEW): 8.65 CM/S
SINUS: 2.72 CM
STJ: 2.39 CM
TDI LATERAL: 0.07 M/S
TDI SEPTAL: 0.04 M/S
TDI: 0.06 M/S
TR MAX PG: 34 MMHG
TRICUSPID ANNULAR PLANE SYSTOLIC EXCURSION: 2.14 CM
TV REST PULMONARY ARTERY PRESSURE: 37 MMHG
Z-SCORE OF LEFT VENTRICULAR DIMENSION IN END DIASTOLE: -0.53
Z-SCORE OF LEFT VENTRICULAR DIMENSION IN END SYSTOLE: 0.92

## 2023-10-24 PROCEDURE — 93306 ECHO (CUPID ONLY): ICD-10-PCS | Mod: 26,,, | Performed by: INTERNAL MEDICINE

## 2023-10-24 PROCEDURE — 93306 TTE W/DOPPLER COMPLETE: CPT

## 2023-10-24 PROCEDURE — 93306 TTE W/DOPPLER COMPLETE: CPT | Mod: 26,,, | Performed by: INTERNAL MEDICINE

## 2023-10-25 ENCOUNTER — TELEPHONE (OUTPATIENT)
Dept: FAMILY MEDICINE | Facility: CLINIC | Age: 73
End: 2023-10-25
Payer: MEDICARE

## 2023-10-25 NOTE — TELEPHONE ENCOUNTER
----- Message from Nano Salomon sent at 10/25/2023 10:43 AM CDT -----  Type:  Patient Returning Call    Who Called:Billy / Dr Carolyn Delong office   Would the patient rather a call back or a response via [facility]? Call back   Best Call Back Number:[phone]... fax # [phone]   Additional Information: needs clearance for surgery

## 2023-10-26 ENCOUNTER — PATIENT MESSAGE (OUTPATIENT)
Dept: ADMINISTRATIVE | Facility: HOSPITAL | Age: 73
End: 2023-10-26
Payer: MEDICARE

## 2023-10-27 ENCOUNTER — OFFICE VISIT (OUTPATIENT)
Dept: CARDIOLOGY | Facility: CLINIC | Age: 73
End: 2023-10-27
Payer: MEDICARE

## 2023-10-27 VITALS
OXYGEN SATURATION: 97 % | BODY MASS INDEX: 30.69 KG/M2 | DIASTOLIC BLOOD PRESSURE: 91 MMHG | SYSTOLIC BLOOD PRESSURE: 161 MMHG | HEIGHT: 68 IN | WEIGHT: 202.5 LBS | HEART RATE: 70 BPM

## 2023-10-27 DIAGNOSIS — I49.3 PVC'S (PREMATURE VENTRICULAR CONTRACTIONS): ICD-10-CM

## 2023-10-27 DIAGNOSIS — I87.2 VENOUS INSUFFICIENCY OF BOTH LOWER EXTREMITIES: ICD-10-CM

## 2023-10-27 DIAGNOSIS — I48.0 PAROXYSMAL ATRIAL FIBRILLATION: ICD-10-CM

## 2023-10-27 DIAGNOSIS — I50.20 HFREF (HEART FAILURE WITH REDUCED EJECTION FRACTION): ICD-10-CM

## 2023-10-27 DIAGNOSIS — I10 BENIGN ESSENTIAL HYPERTENSION: ICD-10-CM

## 2023-10-27 DIAGNOSIS — I50.32 DIASTOLIC DYSFUNCTION WITH CHRONIC HEART FAILURE: ICD-10-CM

## 2023-10-27 DIAGNOSIS — E66.09 CLASS 1 OBESITY DUE TO EXCESS CALORIES WITH SERIOUS COMORBIDITY AND BODY MASS INDEX (BMI) OF 31.0 TO 31.9 IN ADULT: ICD-10-CM

## 2023-10-27 DIAGNOSIS — Z79.01 CURRENT USE OF LONG TERM ANTICOAGULATION: Primary | ICD-10-CM

## 2023-10-27 PROCEDURE — 99214 PR OFFICE/OUTPT VISIT, EST, LEVL IV, 30-39 MIN: ICD-10-PCS | Mod: S$GLB,,, | Performed by: INTERNAL MEDICINE

## 2023-10-27 PROCEDURE — 1101F PR PT FALLS ASSESS DOC 0-1 FALLS W/OUT INJ PAST YR: ICD-10-PCS | Mod: CPTII,S$GLB,, | Performed by: INTERNAL MEDICINE

## 2023-10-27 PROCEDURE — 3044F HG A1C LEVEL LT 7.0%: CPT | Mod: CPTII,S$GLB,, | Performed by: INTERNAL MEDICINE

## 2023-10-27 PROCEDURE — 3077F PR MOST RECENT SYSTOLIC BLOOD PRESSURE >= 140 MM HG: ICD-10-PCS | Mod: CPTII,S$GLB,, | Performed by: INTERNAL MEDICINE

## 2023-10-27 PROCEDURE — 1126F AMNT PAIN NOTED NONE PRSNT: CPT | Mod: CPTII,S$GLB,, | Performed by: INTERNAL MEDICINE

## 2023-10-27 PROCEDURE — 3080F DIAST BP >= 90 MM HG: CPT | Mod: CPTII,S$GLB,, | Performed by: INTERNAL MEDICINE

## 2023-10-27 PROCEDURE — 1159F PR MEDICATION LIST DOCUMENTED IN MEDICAL RECORD: ICD-10-PCS | Mod: CPTII,S$GLB,, | Performed by: INTERNAL MEDICINE

## 2023-10-27 PROCEDURE — 99999 PR PBB SHADOW E&M-EST. PATIENT-LVL IV: CPT | Mod: PBBFAC,,, | Performed by: INTERNAL MEDICINE

## 2023-10-27 PROCEDURE — 1126F PR PAIN SEVERITY QUANTIFIED, NO PAIN PRESENT: ICD-10-PCS | Mod: CPTII,S$GLB,, | Performed by: INTERNAL MEDICINE

## 2023-10-27 PROCEDURE — 4010F ACE/ARB THERAPY RXD/TAKEN: CPT | Mod: CPTII,S$GLB,, | Performed by: INTERNAL MEDICINE

## 2023-10-27 PROCEDURE — 1159F MED LIST DOCD IN RCRD: CPT | Mod: CPTII,S$GLB,, | Performed by: INTERNAL MEDICINE

## 2023-10-27 PROCEDURE — 99999 PR PBB SHADOW E&M-EST. PATIENT-LVL IV: ICD-10-PCS | Mod: PBBFAC,,, | Performed by: INTERNAL MEDICINE

## 2023-10-27 PROCEDURE — 3288F FALL RISK ASSESSMENT DOCD: CPT | Mod: CPTII,S$GLB,, | Performed by: INTERNAL MEDICINE

## 2023-10-27 PROCEDURE — 3080F PR MOST RECENT DIASTOLIC BLOOD PRESSURE >= 90 MM HG: ICD-10-PCS | Mod: CPTII,S$GLB,, | Performed by: INTERNAL MEDICINE

## 2023-10-27 PROCEDURE — 4010F PR ACE/ARB THEARPY RXD/TAKEN: ICD-10-PCS | Mod: CPTII,S$GLB,, | Performed by: INTERNAL MEDICINE

## 2023-10-27 PROCEDURE — 3077F SYST BP >= 140 MM HG: CPT | Mod: CPTII,S$GLB,, | Performed by: INTERNAL MEDICINE

## 2023-10-27 PROCEDURE — 3288F PR FALLS RISK ASSESSMENT DOCUMENTED: ICD-10-PCS | Mod: CPTII,S$GLB,, | Performed by: INTERNAL MEDICINE

## 2023-10-27 PROCEDURE — 1101F PT FALLS ASSESS-DOCD LE1/YR: CPT | Mod: CPTII,S$GLB,, | Performed by: INTERNAL MEDICINE

## 2023-10-27 PROCEDURE — 99214 OFFICE O/P EST MOD 30 MIN: CPT | Mod: S$GLB,,, | Performed by: INTERNAL MEDICINE

## 2023-10-27 PROCEDURE — 3044F PR MOST RECENT HEMOGLOBIN A1C LEVEL <7.0%: ICD-10-PCS | Mod: CPTII,S$GLB,, | Performed by: INTERNAL MEDICINE

## 2023-10-27 PROCEDURE — 1160F RVW MEDS BY RX/DR IN RCRD: CPT | Mod: CPTII,S$GLB,, | Performed by: INTERNAL MEDICINE

## 2023-10-27 PROCEDURE — 1160F PR REVIEW ALL MEDS BY PRESCRIBER/CLIN PHARMACIST DOCUMENTED: ICD-10-PCS | Mod: CPTII,S$GLB,, | Performed by: INTERNAL MEDICINE

## 2023-10-27 RX ORDER — PREDNISOLONE ACETATE 10 MG/ML
SUSPENSION/ DROPS OPHTHALMIC
COMMUNITY

## 2023-10-27 NOTE — PROGRESS NOTES
Subjective:    Patient ID:  Violet Mirza is a 73 y.o. female who presents for follow-up of Congestive Heart Failure      HPI    72 y/o female with hx of HFrEF (originally with EF 10% with CHRISTINA 5.6 cm, last 2DE with EF 40-45% with CHRISTINA 5.7 cm), Pafib (CHADSVasc 4 for CHF, HTN, Age, Female) on chronic anticoagulation with Xarelto, mild MR on 2DE (initially mod-severe), HTN, hypothyroidism, GERD who presents for f/u. Initially presented to ED with worsening SOB/LE edema, found to be in ADHF and Pafib, had spontaneous conversion to SR, EF 10%, LHC with normal coronaries, diuresed appropriately and D/C'd home in improved condition. Was seen by Abbe Medel. Was also seen by Dr Loco, restarted on Amio, Holter placed, did not want antiarrhythmics or invasive procedures. Has been in SR. Had repeat 2DE with improvement in EF to 35% and improvement in MR to mild degree and subsequent 2DE with EF 40-45%.   She is doing well from a cardiac perspective and currently with NYHA FC I symptoms. Has very mild SANTANA with moderate activity and able to accomplish ADL's (>4 METs). Denies CP, orthopnea, PND, palps, syncope, LE edema. Compliant with meds and following low salt diet.     10/27/2023:  Since last visit has done well with no new cardiac complaints. Has lost ~20 lbs and feels good. Compliant with meds. Repeat . On Ozempic. No hospitalizations for CHF in the last year. 2DE reviewed and EF unchanged.     Review of Systems   Constitutional: Negative for malaise/fatigue.   HENT:  Negative for congestion.    Eyes:  Negative for blurred vision.   Cardiovascular:  Negative for chest pain, claudication, cyanosis, dyspnea on exertion, irregular heartbeat, leg swelling, near-syncope, orthopnea, palpitations, paroxysmal nocturnal dyspnea and syncope.   Respiratory:  Negative for shortness of breath.    Endocrine: Negative for polyuria.   Hematologic/Lymphatic: Negative for bleeding problem.   Skin:  Negative for itching and  rash.   Musculoskeletal:  Negative for joint swelling, muscle cramps and muscle weakness.   Gastrointestinal:  Negative for abdominal pain, hematemesis, hematochezia, melena, nausea and vomiting.   Genitourinary:  Negative for dysuria and hematuria.   Neurological:  Negative for dizziness, focal weakness, headaches, light-headedness, loss of balance and weakness.   Psychiatric/Behavioral:  Negative for depression. The patient is not nervous/anxious.         Objective:    Physical Exam  Constitutional:       Appearance: She is well-developed.   HENT:      Head: Normocephalic and atraumatic.   Neck:      Vascular: No JVD.   Cardiovascular:      Rate and Rhythm: Normal rate and regular rhythm.      Pulses:           Carotid pulses are 2+ on the right side and 2+ on the left side.       Radial pulses are 2+ on the right side and 2+ on the left side.        Femoral pulses are 2+ on the right side and 2+ on the left side.       Dorsalis pedis pulses are 2+ on the right side and 2+ on the left side.        Posterior tibial pulses are 2+ on the right side and 2+ on the left side.      Heart sounds: Normal heart sounds.   Pulmonary:      Effort: Pulmonary effort is normal.      Breath sounds: Normal breath sounds.   Abdominal:      General: Bowel sounds are normal.      Palpations: Abdomen is soft.   Musculoskeletal:      Cervical back: Neck supple.   Skin:     General: Skin is warm and dry.   Neurological:      Mental Status: She is alert and oriented to person, place, and time.   Psychiatric:         Behavior: Behavior normal.         Thought Content: Thought content normal.           Assessment:       1. Current use of long term anticoagulation    2. HFrEF (heart failure with reduced ejection fraction)    3. Diastolic dysfunction with chronic heart failure    4. Paroxysmal atrial fibrillation    5. Class 1 obesity due to excess calories with serious comorbidity and body mass index (BMI) of 31.0 to 31.9 in adult    6.  Venous insufficiency of both lower extremities    7. PVC's (premature ventricular contractions)    8. Benign essential hypertension      72 y/o pt with hx and presentation as above. Doing well from a cardiac perspective and compensated from a HF perspective, currently with NYHA FC I symptoms. Needs to stay active. Discussed the etiology, evaluation, and management of NICM, HFrEF, HTN, HLD, PAfib, prem insuff, thyroid issues. Discussed the importance of med compliance, heart healthy diet, and regular exercise.      Plan:       -F/u in 1 year  -Continue current medical management

## 2023-11-06 ENCOUNTER — PATIENT MESSAGE (OUTPATIENT)
Dept: FAMILY MEDICINE | Facility: CLINIC | Age: 73
End: 2023-11-06
Payer: MEDICARE

## 2023-11-07 NOTE — PATIENT INSTRUCTIONS
Exercise for a Healthier Heart  You may wonder how you can improve the health of your heart. If youre thinking about exercise, youre on the right track. You dont need to become an athlete, but you do need a certain amount of brisk exercise to help strengthen your heart. If you have been diagnosed with a heart condition, your doctor may recommend exercise to help stabilize your condition. To help make exercise a habit, choose safe, fun activities.     Exercise with a friend. When activity is fun, you're more likely to stick with it.     Be sure to check with your healthcare provider before starting an exercise program.   Why exercise?  Exercising regularly offers many healthy rewards. It can help you do all of the following:  · Improve your blood cholesterol level to help prevent further heart trouble  · Lower your blood pressure to help prevent a stroke or heart attack  · Control diabetes, or reduce your risk of getting this disease  · Improve your heart and lung function  · Reach and maintain a healthy weight  · Make your muscles stronger and more limber so you can stay active  · Prevent falls and fractures by slowing the loss of bone mass (osteoporosis)  · Manage stress better  · Reduce your blood pressure  · Improve your sense of self and your body image  Exercise tips  Ease into your routine. Set small goals. Then build on them.  Exercise on most days. Aim for a total of 150 or more minutes of moderate to  vigorous intensity activity each week. Consider 40 minutes, 3 to 4 times a week. For best results, activity should last for 40 minutes on average. It is OK to work up to the 40 minute period over time. Examples of moderate-intensity activity is walking 1 mile in 15 minutes or 30 to 45 minutes of yard work.  Step up your daily activity level. Along with your exercise program, try being more active throughout the day. Walk instead of drive. Do more household tasks or yard work.  Choose one or more  Spoke with patient's mother over the phone confirming scheduled admission for tomorrow, 11/8 at 1000.  Parent will check-in at main desk in [facility] upon arrival.  Patient's mother aware that hospital will call if there is any last minute change or arrival time adjustment.  Lorrie Vaca RN   activities you enjoy. Walking is one of the easiest things you can do. You can also try swimming, riding a bike, dancing, or taking an exercise class.  Stop exercising and call your doctor if you:  · Have chest pain or feel dizzy or lightheaded  · Feel burning, tightness, pressure, or heaviness in your chest, neck, shoulders, back, or arms  · Have unusual shortness of breath  · Have increased joint or muscle pain  · Have palpitations or an irregular heartbeat   Date Last Reviewed: 5/1/2016  © 4476-4155 [employer]. [address] [place] [address]. All rights reserved. This information is not intended as a substitute for professional medical care. Always follow your healthcare professional's instructions.

## 2023-11-14 ENCOUNTER — TELEPHONE (OUTPATIENT)
Dept: FAMILY MEDICINE | Facility: CLINIC | Age: 73
End: 2023-11-14
Payer: MEDICARE

## 2024-01-11 DIAGNOSIS — Z00.00 ENCOUNTER FOR MEDICARE ANNUAL WELLNESS EXAM: ICD-10-CM

## 2024-01-15 DIAGNOSIS — I50.22 CHRONIC SYSTOLIC HEART FAILURE: ICD-10-CM

## 2024-01-15 DIAGNOSIS — Z79.01 CURRENT USE OF LONG TERM ANTICOAGULATION: ICD-10-CM

## 2024-01-15 DIAGNOSIS — I48.0 PAROXYSMAL ATRIAL FIBRILLATION: ICD-10-CM

## 2024-01-15 NOTE — TELEPHONE ENCOUNTER
Care Due:                  Date            Visit Type   Department     Provider  --------------------------------------------------------------------------------                                EP -                              [facility]  Last Visit: 10-      CARE ([facility])   NADINE Gunter                              [facility]  Next Visit: 02-      CARE ([facility])   NADINE Gunter                                                            Last  Test          Frequency    Reason                     Performed    Due Date  --------------------------------------------------------------------------------    HBA1C.......  6 months...  semaglutide..............  08- 02-    [facility] Embedded Care Due Messages. Reference number: [id number].   1/15/2024 9:17:39 AM CST

## 2024-01-16 RX ORDER — METOPROLOL SUCCINATE 200 MG/1
200 TABLET, EXTENDED RELEASE ORAL DAILY
Qty: 90 TABLET | Refills: 3 | Status: SHIPPED | OUTPATIENT
Start: 2024-01-16

## 2024-01-16 NOTE — TELEPHONE ENCOUNTER
Refill Routing Note   Medication(s) are not appropriate for processing by [facility] for the following reason(s):        Required vitals abnormal    [facility] action(s):  Defer        Medication Therapy Plan: FOVS; FLOS      Appointments  past 12m or future 3m with PCP    Date Provider   Last Visit   10/11/2023 Phyllis Gunter MD   Next Visit   2/19/2024 Phyllis Gunter MD   ED visits in past 90 days: 0        Note composed:11:41 AM 01/16/2024

## 2024-02-05 ENCOUNTER — PATIENT OUTREACH (OUTPATIENT)
Dept: ADMINISTRATIVE | Facility: HOSPITAL | Age: 74
End: 2024-02-05
Payer: MEDICARE

## 2024-02-05 NOTE — PROGRESS NOTES
Care Everywhere updates requested and reviewed.  Immunizations reconciled. Media reports reviewed.  Duplicate HM overrides and  orders removed.  Overdue HM topic chart audit and/or requested.       Health Maintenance Due   Topic Date Due    COVID-19 Vaccine (2023-24 season) 2023    Lipid Panel  2024    Mammogram  2024

## 2024-02-09 ENCOUNTER — HOSPITAL ENCOUNTER (OUTPATIENT)
Dept: RADIOLOGY | Facility: HOSPITAL | Age: 74
Discharge: HOME | End: 2024-02-09
Attending: FAMILY MEDICINE
Payer: MEDICARE

## 2024-02-09 DIAGNOSIS — Z78.0 POSTMENOPAUSAL: ICD-10-CM

## 2024-02-09 DIAGNOSIS — R73.03 PREDIABETES: ICD-10-CM

## 2024-02-09 PROCEDURE — 77080 DXA BONE DENSITY AXIAL: CPT | Mod: 26,,, | Performed by: STUDENT IN AN ORGANIZED HEALTH CARE EDUCATION/TRAINING PROGRAM

## 2024-02-09 PROCEDURE — 77080 DXA BONE DENSITY AXIAL: CPT | Mod: TC

## 2024-02-12 ENCOUNTER — LAB VISIT (OUTPATIENT)
Dept: LAB | Facility: HOSPITAL | Age: 74
End: 2024-02-12
Attending: FAMILY MEDICINE
Payer: MEDICARE

## 2024-02-12 ENCOUNTER — TELEPHONE (OUTPATIENT)
Dept: FAMILY MEDICINE | Facility: CLINIC | Age: 74
End: 2024-02-12
Payer: MEDICARE

## 2024-02-12 DIAGNOSIS — E55.9 VITAMIN D DEFICIENCY: ICD-10-CM

## 2024-02-12 DIAGNOSIS — Z78.0 OSTEOPENIA AFTER MENOPAUSE: ICD-10-CM

## 2024-02-12 DIAGNOSIS — M85.80 OSTEOPENIA AFTER MENOPAUSE: ICD-10-CM

## 2024-02-12 DIAGNOSIS — I10 BENIGN ESSENTIAL HYPERTENSION: ICD-10-CM

## 2024-02-12 DIAGNOSIS — E03.9 ACQUIRED HYPOTHYROIDISM: ICD-10-CM

## 2024-02-12 DIAGNOSIS — R73.03 PREDIABETES: ICD-10-CM

## 2024-02-12 DIAGNOSIS — Z79.899 MEDICATION MANAGEMENT: ICD-10-CM

## 2024-02-12 LAB
25(OH)D3+25(OH)D2 SERPL-MCNC: 74 NG/ML (ref 30–96)
ALBUMIN SERPL BCP-MCNC: 3.7 G/DL (ref 3.5–5.2)
ALP SERPL-CCNC: 115 U/L (ref 55–135)
ALT SERPL W/O P-5'-P-CCNC: 15 U/L (ref 10–44)
ANION GAP SERPL CALC-SCNC: 12 MMOL/L (ref 8–16)
AST SERPL-CCNC: 17 U/L (ref 10–40)
BILIRUB SERPL-MCNC: 0.4 MG/DL (ref 0.1–1)
BUN SERPL-MCNC: 12 MG/DL (ref 8–23)
CALCIUM SERPL-MCNC: 9.5 MG/DL (ref 8.7–10.5)
CHLORIDE SERPL-SCNC: 102 MMOL/L (ref 95–110)
CHOLEST SERPL-MCNC: 188 MG/DL (ref 120–199)
CHOLEST/HDLC SERPL: 3 {RATIO} (ref 2–5)
CO2 SERPL-SCNC: 25 MMOL/L (ref 23–29)
CREAT SERPL-MCNC: 0.9 MG/DL (ref 0.5–1.4)
EST. GFR  (NO RACE VARIABLE): >60 ML/MIN/1.73 M^2
ESTIMATED AVG GLUCOSE: 105 MG/DL (ref 68–131)
GLUCOSE SERPL-MCNC: 90 MG/DL (ref 70–110)
HBA1C MFR BLD: 5.3 % (ref 4–5.6)
HDLC SERPL-MCNC: 62 MG/DL (ref 40–75)
HDLC SERPL: 33 % (ref 20–50)
LDLC SERPL CALC-MCNC: 108.4 MG/DL (ref 63–159)
NONHDLC SERPL-MCNC: 126 MG/DL
POTASSIUM SERPL-SCNC: 3.8 MMOL/L (ref 3.5–5.1)
PROT SERPL-MCNC: 6.8 G/DL (ref 6–8.4)
SODIUM SERPL-SCNC: 139 MMOL/L (ref 136–145)
TRIGL SERPL-MCNC: 88 MG/DL (ref 30–150)
TSH SERPL DL<=0.005 MIU/L-ACNC: 1.81 UIU/ML (ref 0.4–4)

## 2024-02-12 PROCEDURE — 80053 COMPREHEN METABOLIC PANEL: CPT | Performed by: FAMILY MEDICINE

## 2024-02-12 PROCEDURE — 80061 LIPID PANEL: CPT | Performed by: FAMILY MEDICINE

## 2024-02-12 PROCEDURE — 84443 ASSAY THYROID STIM HORMONE: CPT | Performed by: FAMILY MEDICINE

## 2024-02-12 PROCEDURE — 36415 COLL VENOUS BLD VENIPUNCTURE: CPT | Performed by: FAMILY MEDICINE

## 2024-02-12 PROCEDURE — 83036 HEMOGLOBIN GLYCOSYLATED A1C: CPT | Performed by: FAMILY MEDICINE

## 2024-02-12 PROCEDURE — 82306 VITAMIN D 25 HYDROXY: CPT | Performed by: FAMILY MEDICINE

## 2024-02-12 NOTE — TELEPHONE ENCOUNTER
Called pt today to inform her that we received her ozempic on Friday and the  lady did not inform me and unfortunately it stayed out all weekend. We spoke with [facility] [employer] and they will be sending us a replacement. I called to ask how much medication she had as it will take 2 weeks to get the medication in.  Pt has 2 weeks left of her mediation

## 2024-02-18 NOTE — PROGRESS NOTES
Office Visit    Patient Name: Violet Mirza    : 1950  MRN: [id number]    Subjective:  Violet is a 73 y.o. female who presents today for:    Follow-up (6m)    Most Recent OV with me 10/11/23-preop for Cataract Extraction, PRIOR PHYSICAL 23    Seen by ORTHO Dr Callejas Office-most recently 23  for R knee pain  Also seen by ortho for R knee and shoulder pain    CARDIOLOGY Dr Duran 10/27/23--Stable & F/U 1 year, ECHO 10/2023 w/ Est EF 40-45%  Dr Vann-Chiropractor 23 neck/back pain   ENT [facility] 3/10/23 URI/AR/Vasomotor rhinitis     74 yo patient of mine who presents for 6 month follow up  with lab review and monitoring of  chronic conditions that include paroxysmal AFib(2018 in [facility] was given diltiazem & converted spontaneously to NSR), HFrEF (EF 40-45% 10/24/23) followed by cardiology Dr Duran-- last seen 10/27/23 & F/U 1 year, hypothyroidism, hypertension, obesity, prediabetes, osteopenia, rosacea, remote prior history of kidney stones.     Labs drawn prior to office visit 24  show continued improvement in A1c down to 5.3(was 5.7 1 yr ago).   Previously her A1c was 6.3 when metformin was initiated, could not tolerate this from a GI standpoint but did help with A1c lowering, and therefore was transitioned to Ozempic.     She is currently on OZEMPIC 1mg/week.      Otherwise labs without concerns-normal liver/kidney function, electrolytes, and TSH 1.8 on Levothyroxine 112 mcg daily-- now skipping  dose w/ wt loss on Ozempic.   Vit D 74. -- -- she is not on a statin- no history of obstructive coronary artery disease.     She is postmenopausal.  Previously had a gynecologist-- Renny Deshpande, seen in January yearly.    He retired and she requests that I start ordering her yearly mammograms-generally receives them through [facility] & most recently 23 & Scheduled for 3/1/24.     Today she is feeling overall well, tolerating the Ozempic 1 mg / week without any significant  side effects and pleased that she has continued to loose additional weight on the increased dose.  Has occasional intermittent nausea and diarrhea but very tolerable.    Feels some benefit in terms of decreased joint pain.    She is tyring to avoid a knee replacement on the Right, though she has done really well with her L knee parital knee replacement.   Notes decreased joint pain with her weight loss.     Blood Pressure controlled on toprol xl 200 mg daily, and cozaar 100 mg daily, along with Xarelto 20 for stroke prevention in Paroxysmal A Fib.   Leg swelling stable on dose of 20 mg Lasix daily- has no complaints of swelling currently. Decreased lasix to PRN with good results and she is currently only taking Lasix 20 once per week.     General lifestyle habits are as follows:    Diet: healthy and watches salt and carbs-- home cooked meals, Hydration, and has cut back on sugars some   Exercise: recently fair -- doing a home [employer] Work Out about 3 times weekly - this involves walking and some home weights.  Sleep: fair-- improving since decreasing Lasix to p.r.n..  Not up as much at night to use the bathroom-maybe only 1-2 times instead of multiple.  Weight:  Has lost about 45 lb altogether with the help of Ozempic, recently about 15 in the last 6 months.  BMI has improved to 29.5     Immunizations: TDaP 4/5/19-- repeat 10 years, yearly FLU 10/9/23, SHINGRIX 2/2 7/13/20, Pneumovax 23 11/3/2014, PREVNAR 13 10/18/2015, COVID-19 completed 2/8/21 w/ 1st PFIZER BOOSTER 10/7/21 & 2nd booster 8/11/22-- FURTHER COVID-19 VACCINES DECLINED      Screening Tests: DEXA 2/9/24- improved Osteopenia-- RECHECK 2 years,  mammo 2/27/23- Repeat 1 year & scheduled, PAP not indicated-- >65 w/o h/o abnormal, colonoscopy through [facility] 1/22/2015-- repeat 10 years(1/2025) , Hep C (-) 10/14/2015     Eye/Dental: UTD with Both- yearly, EYE [facility]  11/8/23 - CATARACT EXTRACTION, R Eye, L Cataract removed- laser 2/2 scar tissue  complications    Saw Cardiology Dr. Rausch electrophysiology 10/18/2019 and there was a concern for greater numbers of PVCs noted on EKG.    He advised a Holter monitor, which she declined, but since her ejection fraction is overall stable on echo,  okay with monitoring         ECHO 10/24/23:     Left Ventricle: The left ventricle is mildly dilated. Normal wall thickness. Mild global hypokinesis present. There is mildly reduced systolic function with a visually estimated ejection fraction of 40 - 45%. Biplane (2D)     method of discs ejection fraction is 41%. Grade I diastolic dysfunction. Elevated left ventricular filling pressure.    Left Atrium: Left atrium is severely dilated.    Right Ventricle: Normal right ventricular cavity size. Wall thickness is normal. Right ventricle wall motion  is normal. Systolic function is normal.    Mitral Valve: There is mild regurgitation.    Pulmonary Artery: The estimated pulmonary artery systolic pressure is 37 mmHg.    IVC/SVC: Normal venous pressure at 3 mmHg.    PAST MEDICAL HISTORY, SURGICAL/SOCIAL/FAMILY HISTORY REVIEWED AS PER CHART, WITH PERTINENT FINDINGS INCLUDED IN HISTORY SECTION OF NOTE.     Current Medications    Medication List with Changes/Refills   Current Medications    DICLOFENAC SODIUM (VOLTAREN) 1 % GEL    Apply 4 g  topically to affected area 4 times a day as needed.    FLUTICASONE PROPIONATE (FLONASE) 50 MCG/ACTUATION NASAL SPRAY    2 sprays (100 mcg total) by Each Nare route once daily.    FUROSEMIDE (LASIX) 20 MG TABLET    Take 1 tablet (20 mg total) by mouth once daily.    IPRATROPIUM (ATROVENT) 21 MCG (0.03 %) NASAL SPRAY    2 sprays by Each Nostril route 2 (two) times daily.    LEVOCETIRIZINE (XYZAL) 5 MG TABLET    Take 1 tablet (5 mg total) by mouth every evening.    LEVOTHYROXINE (SYNTHROID) 112 MCG TABLET    Take 1 tablet (112 mcg total) by mouth once daily.    LOSARTAN (COZAAR) 100 MG TABLET    TAKE 1 TABLET BY MOUTH ONCE DAILY    METOPROLOL  "SUCCINATE (TOPROL-XL) 200 MG 24 HR TABLET    Take 1 tablet (200 mg total) by mouth once daily.    PREDNISOLONE ACETATE (PRED FORTE) 1 % DRPS    Place into both eyes.    RIVAROXABAN (XARELTO) 20 MG TAB    Take 1 tablet (20 mg total) by mouth daily with dinner or evening meal.    TRAMADOL (ULTRAM) 50 MG TABLET    Take 50 mg by mouth every 6 (six) hours as needed for Pain.       Allergies   Review of patient's allergies indicates:   Allergen Reactions    Aldactone [spironolactone] Other (See Comments)     Hyperkalemia when initiated on 01/23/2018          Review of Systems (Pertinent positives)  Review of Systems   Constitutional:  Negative for unexpected weight change (intentional weight loss).   Eyes:  Negative for visual disturbance.   Respiratory:  Negative for chest tightness and shortness of breath.    Cardiovascular:  Negative for chest pain, palpitations and leg swelling.   Gastrointestinal:  Negative for constipation and vomiting.   Genitourinary:  Negative for dysuria and urgency.   Musculoskeletal:  Positive for arthralgias.   Neurological:  Negative for dizziness, light-headedness and headaches.       BP (!) 142/86 (BP Location: Right arm, Patient Position: Sitting, BP Method: Large (Manual))   Pulse 77   Temp 98.1 °F (36.7 °C)   Ht 5' 8" (1.727 m)   Wt 88.2 kg (194 lb 7.1 oz)   SpO2 99%   BMI 29.57 kg/m²     Physical Exam  Vitals reviewed.   Constitutional:       General: She is not in acute distress.     Appearance: Normal appearance. She is well-developed.   HENT:      Head: Normocephalic and atraumatic.   Eyes:      Conjunctiva/sclera: Conjunctivae normal.   Cardiovascular:      Rate and Rhythm: Normal rate and regular rhythm.      Heart sounds: No murmur heard.  Pulmonary:      Effort: Pulmonary effort is normal.      Breath sounds: Normal breath sounds.   Musculoskeletal:      Right lower leg: No edema.      Left lower leg: No edema.   Skin:     General: Skin is warm and dry.   Neurological:     "  General: No focal deficit present.      Mental Status: She is alert and oriented to person, place, and time.   Psychiatric:         Mood and Affect: Mood normal.         Behavior: Behavior normal.           Assessment/Plan:  Violet Mirza is a 73 y.o. female who presents today for :        ICD-10-CM ICD-9-CM    1. Benign essential hypertension  I10 401.1 Hemoglobin A1C      Comprehensive Metabolic Panel      Lipid Panel      CBC Auto Differential      TSH      Vitamin B12      Magnesium      Ferritin      2. Class 1 obesity due to excess calories with serious comorbidity and body mass index (BMI) of 31.0 to 31.9 in adult  E66.09 278.00     Z68.31 V85.31       3. PVC's (premature ventricular contractions)  I49.3 427.69       4. Paroxysmal atrial fibrillation  I48.0 427.31 Hemoglobin A1C      Comprehensive Metabolic Panel      Lipid Panel      CBC Auto Differential      TSH      Vitamin B12      Magnesium      Ferritin      5. HFrEF (heart failure with reduced ejection fraction)  I50.20 428.20       6. Diastolic dysfunction with chronic heart failure  I50.32 428.32       7. Prediabetes  R73.03 790.29 Hemoglobin A1C      Comprehensive Metabolic Panel      Lipid Panel      CBC Auto Differential      TSH      Vitamin B12      Magnesium      Ferritin      8. Osteopenia after menopause  M85.80 733.90     Z78.0 V49.81       9. Vitamin D deficiency  E55.9 268.9       10. Chronic allergic rhinitis  J30.9 477.9       11. Vasomotor rhinitis  J30.0 477.9       12. Acquired hypothyroidism  E03.9 244.9 TSH      13. History of peptic ulcer  Z87.11 V12.71       14. Personal history of kidney stones  Z87.442 V13.01       15. Current use of long term anticoagulation  Z79.01 V58.61 Hemoglobin A1C      Comprehensive Metabolic Panel      Lipid Panel      CBC Auto Differential      TSH      Vitamin B12      Magnesium      Ferritin      16. Venous insufficiency of both lower extremities  I87.2 459.81       17. Medication management   Z79.899 V58.69 Hemoglobin A1C      Comprehensive Metabolic Panel      Lipid Panel      CBC Auto Differential      TSH      Vitamin B12      Magnesium      Ferritin        EYE MARIO  11/8/23 - CATARACT EXTRACTION, R Eye  2ND COVID 19 BOOSTER 8/2022--she Declines Furth COVID vaccines  NEXT DEXA DUE  NEXT MAMMO Feb 2024 & Scheduled for 3/1/24.  NEXT CSCOPE 1/2025     OBESITY W/ PREDIABETES: Intolerant of Metformin, RESPONDED WELL TO OZEMPIC. Continue 1 mg / week as A1c now with improvement to 5.3 from 6.3 max and she has lost 15 ADDITIONAL LB, about 45 lb altogether.  REPEAT A1C/LABS, WEIGHT CHECK IN 6 MONTHS.   Low carb diet & regular exercise advised.      H/O HFwREF,DD, PAFIB: following with cardiology yearly and saw Dr Duran-- last seen 10/27/23 & F/U 1 year advised, Continued improved/stable EF on most recent ECHO 10/24/23 of 40-45%   CARDIAC MEDS INCLUDE: Toprol xl 200 mg daily, and Cozaar 100 mg daily, along with Xarelto 20 for stroke prevention in Paroxysmal A Fib. Euvolemic on dose of 20 mg Lasix daily.   GIVEN HER SIGNIFICANT WEIGHT LOSS ON OZEMPIC SHE IS NO LONGER GETTING MUCH VENOUS STASIS SYMPTOMS AND HAS DONE WELL ON A REDUCED DOSE OF LASIX 20 MG WEEKLY.    R KNEE PAIN 2/2 OA: Following with ORTHO Dr Callejas for Periodic Synvisc injections. Knee pain is limiting--considering partial R Knee replacement that she had and did well with on the left, though with recent weight loss she reports some improvement.     HYPOTHYROIDISM: TSH 1.8 on levothyroxine 112 mcg and now SKIPPING SUNDAY DOSE s/p wt loss w/ ozempic. ongoing monitoring w/ recheck in 6 Months, sooner if concerns.       OP: improved to osteopenia with normalization of Vitamin D, regular exercise, recheck DEXA DUE 2/2026     Allergies: stable on daily Xyzal, thought she cannot go with out it. Rhinorrhea improved with Atrovent Nasal spray for vasomotor rhinitis per ENT Dr Almanza     Venous insufficiency:  improved following vein procedure and  with reduced swelling 2/2 lasix and weight loss. Also noting benefit from a specialty varicose vein cream         There are no Patient Instructions on file for this visit.      Follow up in about 6 months (around 8/19/2024) for to follow up on lab results, return as needed for new concerns.

## 2024-02-19 ENCOUNTER — OFFICE VISIT (OUTPATIENT)
Dept: FAMILY MEDICINE | Facility: CLINIC | Age: 74
End: 2024-02-19
Payer: MEDICARE

## 2024-02-19 VITALS
DIASTOLIC BLOOD PRESSURE: 86 MMHG | HEART RATE: 77 BPM | SYSTOLIC BLOOD PRESSURE: 142 MMHG | TEMPERATURE: 98 F | BODY MASS INDEX: 29.47 KG/M2 | OXYGEN SATURATION: 99 % | WEIGHT: 194.44 LBS | HEIGHT: 68 IN

## 2024-02-19 DIAGNOSIS — E66.09 CLASS 1 OBESITY DUE TO EXCESS CALORIES WITH SERIOUS COMORBIDITY AND BODY MASS INDEX (BMI) OF 31.0 TO 31.9 IN ADULT: ICD-10-CM

## 2024-02-19 DIAGNOSIS — R73.03 PREDIABETES: ICD-10-CM

## 2024-02-19 DIAGNOSIS — Z79.01 CURRENT USE OF LONG TERM ANTICOAGULATION: ICD-10-CM

## 2024-02-19 DIAGNOSIS — Z87.442 PERSONAL HISTORY OF KIDNEY STONES: ICD-10-CM

## 2024-02-19 DIAGNOSIS — I50.32 DIASTOLIC DYSFUNCTION WITH CHRONIC HEART FAILURE: ICD-10-CM

## 2024-02-19 DIAGNOSIS — Z87.11 HISTORY OF PEPTIC ULCER: ICD-10-CM

## 2024-02-19 DIAGNOSIS — J30.9 CHRONIC ALLERGIC RHINITIS: ICD-10-CM

## 2024-02-19 DIAGNOSIS — I10 BENIGN ESSENTIAL HYPERTENSION: Primary | ICD-10-CM

## 2024-02-19 DIAGNOSIS — E55.9 VITAMIN D DEFICIENCY: ICD-10-CM

## 2024-02-19 DIAGNOSIS — I49.3 PVC'S (PREMATURE VENTRICULAR CONTRACTIONS): ICD-10-CM

## 2024-02-19 DIAGNOSIS — Z78.0 OSTEOPENIA AFTER MENOPAUSE: ICD-10-CM

## 2024-02-19 DIAGNOSIS — M85.80 OSTEOPENIA AFTER MENOPAUSE: ICD-10-CM

## 2024-02-19 DIAGNOSIS — I48.0 PAROXYSMAL ATRIAL FIBRILLATION: ICD-10-CM

## 2024-02-19 DIAGNOSIS — I87.2 VENOUS INSUFFICIENCY OF BOTH LOWER EXTREMITIES: ICD-10-CM

## 2024-02-19 DIAGNOSIS — E03.9 ACQUIRED HYPOTHYROIDISM: ICD-10-CM

## 2024-02-19 DIAGNOSIS — J30.0 VASOMOTOR RHINITIS: ICD-10-CM

## 2024-02-19 DIAGNOSIS — I50.20 HFREF (HEART FAILURE WITH REDUCED EJECTION FRACTION): ICD-10-CM

## 2024-02-19 DIAGNOSIS — Z79.899 MEDICATION MANAGEMENT: ICD-10-CM

## 2024-02-19 PROCEDURE — 99999 PR PBB SHADOW E&M-EST. PATIENT-LVL IV: CPT | Mod: PBBFAC,,, | Performed by: FAMILY MEDICINE

## 2024-02-19 PROCEDURE — 99214 OFFICE O/P EST MOD 30 MIN: CPT | Mod: S$GLB,,, | Performed by: FAMILY MEDICINE

## 2024-02-23 ENCOUNTER — TELEPHONE (OUTPATIENT)
Dept: OTOLARYNGOLOGY | Facility: CLINIC | Age: 74
End: 2024-02-23
Payer: MEDICARE

## 2024-02-23 NOTE — TELEPHONE ENCOUNTER
I called patient to get her scheduled to have her ears cleaned. Patient thanked me and ended the call.    ----- Message from Brooke Lomax sent at 2/23/2024 12:27 PM CST -----  Type:  Needs Medical Advice    Who Called: pt  Symptoms (please be specific): ear cleaning      Would the patient rather a call back or a response via [facility]?  Call   Best Call Back Number: [phone]  Additional Information: pt would like to get scheduled to only see Dr Almanza for ear cleaning

## 2024-02-26 ENCOUNTER — PATIENT MESSAGE (OUTPATIENT)
Dept: FAMILY MEDICINE | Facility: CLINIC | Age: 74
End: 2024-02-26
Payer: MEDICARE

## 2024-02-26 DIAGNOSIS — Z12.83 SCREENING FOR MALIGNANT NEOPLASM OF SKIN: Primary | ICD-10-CM

## 2024-03-01 ENCOUNTER — HOSPITAL ENCOUNTER (OUTPATIENT)
Dept: RADIOLOGY | Facility: HOSPITAL | Age: 74
Discharge: HOME | End: 2024-03-01
Attending: FAMILY MEDICINE
Payer: MEDICARE

## 2024-03-01 ENCOUNTER — PATIENT MESSAGE (OUTPATIENT)
Dept: DERMATOLOGY | Facility: CLINIC | Age: 74
End: 2024-03-01
Payer: MEDICARE

## 2024-03-01 DIAGNOSIS — Z12.31 ENCOUNTER FOR SCREENING MAMMOGRAM FOR BREAST CANCER: ICD-10-CM

## 2024-03-01 PROCEDURE — 77067 SCR MAMMO BI INCL CAD: CPT | Mod: TC

## 2024-03-01 PROCEDURE — 77067 SCR MAMMO BI INCL CAD: CPT | Mod: 26,,, | Performed by: RADIOLOGY

## 2024-03-01 PROCEDURE — 77063 BREAST TOMOSYNTHESIS BI: CPT | Mod: 26,,, | Performed by: RADIOLOGY

## 2024-03-05 ENCOUNTER — PATIENT MESSAGE (OUTPATIENT)
Dept: ADMINISTRATIVE | Facility: HOSPITAL | Age: 74
End: 2024-03-05
Payer: MEDICARE

## 2024-03-06 ENCOUNTER — PATIENT MESSAGE (OUTPATIENT)
Dept: FAMILY MEDICINE | Facility: CLINIC | Age: 74
End: 2024-03-06

## 2024-03-06 ENCOUNTER — CLINICAL SUPPORT (OUTPATIENT)
Dept: FAMILY MEDICINE | Facility: CLINIC | Age: 74
End: 2024-03-06
Payer: MEDICARE

## 2024-03-06 VITALS — DIASTOLIC BLOOD PRESSURE: 88 MMHG | SYSTOLIC BLOOD PRESSURE: 132 MMHG

## 2024-03-06 DIAGNOSIS — I10 HYPERTENSION, UNSPECIFIED TYPE: Primary | ICD-10-CM

## 2024-04-04 ENCOUNTER — OFFICE VISIT (OUTPATIENT)
Dept: OTOLARYNGOLOGY | Facility: CLINIC | Age: 74
End: 2024-04-04
Payer: MEDICARE

## 2024-04-04 ENCOUNTER — PATIENT MESSAGE (OUTPATIENT)
Dept: FAMILY MEDICINE | Facility: CLINIC | Age: 74
End: 2024-04-04
Payer: MEDICARE

## 2024-04-04 VITALS
WEIGHT: 191.25 LBS | BODY MASS INDEX: 29.08 KG/M2 | HEART RATE: 64 BPM | SYSTOLIC BLOOD PRESSURE: 143 MMHG | DIASTOLIC BLOOD PRESSURE: 81 MMHG

## 2024-04-04 DIAGNOSIS — R73.03 PREDIABETES: Primary | ICD-10-CM

## 2024-04-04 DIAGNOSIS — H61.23 BILATERAL IMPACTED CERUMEN: Chronic | ICD-10-CM

## 2024-04-04 DIAGNOSIS — H60.63 CHRONIC OTITIS EXTERNA OF BOTH EARS, UNSPECIFIED TYPE: Primary | Chronic | ICD-10-CM

## 2024-04-04 PROCEDURE — 99999 PR PBB SHADOW E&M-EST. PATIENT-LVL III: CPT | Mod: PBBFAC,,, | Performed by: OTOLARYNGOLOGY

## 2024-04-04 PROCEDURE — 99213 OFFICE O/P EST LOW 20 MIN: CPT | Mod: 25,S$GLB,, | Performed by: OTOLARYNGOLOGY

## 2024-04-04 PROCEDURE — 69210 REMOVE IMPACTED EAR WAX UNI: CPT | Mod: S$GLB,,, | Performed by: OTOLARYNGOLOGY

## 2024-04-04 PROCEDURE — 1101F PT FALLS ASSESS-DOCD LE1/YR: CPT | Mod: CPTII,S$GLB,, | Performed by: OTOLARYNGOLOGY

## 2024-04-04 PROCEDURE — 3044F HG A1C LEVEL LT 7.0%: CPT | Mod: CPTII,S$GLB,, | Performed by: OTOLARYNGOLOGY

## 2024-04-04 PROCEDURE — 3079F DIAST BP 80-89 MM HG: CPT | Mod: CPTII,S$GLB,, | Performed by: OTOLARYNGOLOGY

## 2024-04-04 PROCEDURE — 1159F MED LIST DOCD IN RCRD: CPT | Mod: CPTII,S$GLB,, | Performed by: OTOLARYNGOLOGY

## 2024-04-04 PROCEDURE — 3077F SYST BP >= 140 MM HG: CPT | Mod: CPTII,S$GLB,, | Performed by: OTOLARYNGOLOGY

## 2024-04-04 PROCEDURE — 1126F AMNT PAIN NOTED NONE PRSNT: CPT | Mod: CPTII,S$GLB,, | Performed by: OTOLARYNGOLOGY

## 2024-04-04 PROCEDURE — 3288F FALL RISK ASSESSMENT DOCD: CPT | Mod: CPTII,S$GLB,, | Performed by: OTOLARYNGOLOGY

## 2024-04-04 RX ORDER — SEMAGLUTIDE 2.68 MG/ML
2 INJECTION, SOLUTION SUBCUTANEOUS
Qty: 9 ML | Refills: 4 | Status: SHIPPED | OUTPATIENT
Start: 2024-04-04 | End: 2025-04-04

## 2024-04-04 NOTE — PATIENT INSTRUCTIONS
I would recommend the use of a wax softening drop, either over the counter baby oil, vitamin E oil, or mineral oil, on a weekly basis.  Use debrox kit monthly to flush ears.    I also instructed the patient to avoid Qtips.

## 2024-04-04 NOTE — PROGRESS NOTES
Chief Complaint   Patient presents with    Cerumen Impaction     Both ears        HPI: Patient is here to see me today for evaluation of a possible wax impaction in bilateral ears.  She has complaints of hearing loss in the affected ears, but denies pain or drainage.  This has been an issue in the past.  The patient has not been using any sort of ear drop to soften the wax.              Past Medical History:   Diagnosis Date    Chondromalacia of right patella     MRI 2016 at [facility]    Diverticulitis     GERD (gastroesophageal reflux disease)     Hypoglycemia 2014    OA (osteoarthritis) of knee     bilateraly, followed by Dr. Callejas, [facility], s/p synvisc injections    Osteopenia 2013    Personal history of kidney stones 2013    Rosacea 2013    Tear of medial meniscus of right knee     MRI 2016     Social History     Socioeconomic History    Marital status:     Number of children: y   Occupational History    Occupation:      Comment: Works at Law firm   Tobacco Use    Smoking status: Former     Current packs/day: 0.00     Average packs/day: 0.3 packs/day for 30.0 years (7.5 ttl pk-yrs)     Types: Cigarettes     Start date: 1975     Quit date: 2005     Years since quittin.1    Smokeless tobacco: Former   Substance and Sexual Activity    Alcohol use: No    Drug use: No   Social History Narrative    Originally from [place], living in [place] with son     Past Surgical History:   Procedure Laterality Date    ADENOIDECTOMY      FOOT SURGERY Bilateral     KNEE ARTHROSCOPY W/ MENISCAL REPAIR Right     KNEE SURGERY Left     partial knee replacement    LIPOMA RESECTION Bilateral 2019    Procedure: EXCISION, LIPOMAS  chestwall , both groins/legs;  Surgeon: NOLA Werner MD;  Location: [facility];  Service: General;  Laterality: Bilateral;  evicel, surgiflo    ROTATOR CUFF REPAIR Right 2021    DR CALLEJAS    TONSILLECTOMY       TUBAL LIGATION       Family History   Problem Relation Age of Onset    Hypertension Mother     Diabetes Mother     Glaucoma Mother     Cataracts Mother     Hypertension Father     Stroke Father     No Known Problems Sister     No Known Problems Brother     No Known Problems Daughter     No Known Problems Son     No Known Problems Sister     No Known Problems Daughter            Review of Systems  General: negative for chills, fever or weight loss  Psychological: negative for mood changes or depression  Ophthalmic: negative for blurry vision, photophobia or eye pain  ENT: see HPI  Respiratory: no cough, shortness of breath, or wheezing  Cardiovascular: no chest pain or dyspnea on exertion  Gastrointestinal: no abdominal pain, change in bowel habits, or black/ bloody stools  Musculoskeletal: negative for gait disturbance or muscular weakness  Neurological: no syncope or seizures; no ataxia  Dermatological: negative for pruritis,  rash and jaundice  Hematologic/lymphatic: no easy bruising, no new adenopathy      Physical Exam:    Vitals:    04/04/24 1022   BP: (!) 143/81   Pulse: 64         Constitutional:   She is oriented to person, place, and time. Vital signs are normal. She appears well-developed and well-nourished. She appears alert. She is cooperative.  Non-toxic appearance. Normal speech.      Head:  Normocephalic and atraumatic. Salivary glands normal.  Facial strength is normal.      Ears:  Hearing normal to normal and whispered voice; external ear normal without scars, lesions, or masses; ear canal, tympanic membrane, and middle ear normal., right ear hearing normal to normal and whispered voice; external ear normal without scars, lesions, or masses; ear canal, tympanic membrane, and middle ear normal. and left ear hearing normal to normal and whispered voice; external ear normal without scars, lesions, or masses; ear canal, tympanic membrane, and middle ear normal..   Right Ear: Tympanic membrane is not  perforated, not erythematous and not retracted. No middle ear effusion.   Left Ear: Tympanic membrane is not perforated, not erythematous and not retracted.  No middle ear effusion.   Cerumen impaction bilaterally.    Dry skin and skin irritation bilateral ear canals, consistent with chronic otitis externa.      Nose:  Mucosal edema present. No rhinorrhea or polyps. No epistaxis. Turbinate hypertrophy: 2+ size.      Mouth/Throat  Oropharynx clear and moist without lesions or asymmetry, lips, teeth, and gums normal and oropharynx normal. Normal dentition. Mirror exam not performed due to patient tolerance.  Mirror exam not performed due to patient tolerance.      Neck:  Neck normal without thyromegaly masses, asymmetry, normal tracheal structure, crepitus, and tenderness, phonation normal and no adenopathy.     Cardiovascular:    Normal rate, regular rhythm, normal heart sounds and rate and rhythm, heart sounds, and pulses normal.              Pulmonary/Chest:   Effort and breath sounds normal.     Psychiatric:   She has a normal mood and affect. Her speech is normal and behavior is normal.     Neurological:   She is alert and oriented to person, place, and time. She has neurological normal, alert and oriented. No cranial nerve deficit.     Skin:   No abrasions, lacerations, lesions, or rashes.           Ear Cerumen Removal    Date/Time: 4/4/2024 10:20 AM    Performed by: Tatyana Camargo MD  Authorized by: Tatyana Camargo MD    Consent Done?:  Yes (Verbal)    Local anesthetic:  None  Location details:  Both ears  Procedure type: curette    Cerumen  Removal Results:  Cerumen completely removed  Patient tolerance:  Patient tolerated the procedure well with no immediate complications     Dry skin and skin irritation bilateral ear canals, consistent with chronic otitis externa.          Assessment:    ICD-10-CM ICD-9-CM    1. Chronic otitis externa of both ears, unspecified type  H60.63 380.23       2. Bilateral  impacted cerumen  H61.23 380.4 Ear Cerumen Removal        The primary encounter diagnosis was Chronic otitis externa of both ears, unspecified type. A diagnosis of Bilateral impacted cerumen was also pertinent to this visit.      Plan:        I would recommend the use of a wax softening drop, either over the counter baby oil, vitamin E oil, or mineral oil, on a weekly basis.  Use debrox kit monthly to flush ears.    I also instructed the patient to avoid Qtips.       Tatyana Camargo MD

## 2024-04-05 ENCOUNTER — TELEPHONE (OUTPATIENT)
Dept: FAMILY MEDICINE | Facility: CLINIC | Age: 74
End: 2024-04-05
Payer: MEDICARE

## 2024-04-22 ENCOUNTER — PATIENT MESSAGE (OUTPATIENT)
Dept: DERMATOLOGY | Facility: CLINIC | Age: 74
End: 2024-04-22
Payer: MEDICARE

## 2024-04-23 ENCOUNTER — PATIENT MESSAGE (OUTPATIENT)
Dept: FAMILY MEDICINE | Facility: CLINIC | Age: 74
End: 2024-04-23
Payer: MEDICARE

## 2024-04-24 ENCOUNTER — TELEPHONE (OUTPATIENT)
Dept: FAMILY MEDICINE | Facility: CLINIC | Age: 74
End: 2024-04-24
Payer: MEDICARE

## 2024-04-25 ENCOUNTER — TELEPHONE (OUTPATIENT)
Dept: FAMILY MEDICINE | Facility: CLINIC | Age: 74
End: 2024-04-25
Payer: MEDICARE

## 2024-04-25 NOTE — TELEPHONE ENCOUNTER
Called pt and told her that the paperwork I faxed over yesterday was kicked back to me and not received. I have contacted the dental office to verify the fax number and to get an alternate fax and have tried to fax it again this morning from 2 different fax machines and both have returned to me saying the fax number is invalid.  Told pt she will have to  the paperwork in office

## 2024-05-14 ENCOUNTER — TELEPHONE (OUTPATIENT)
Dept: FAMILY MEDICINE | Facility: CLINIC | Age: 74
End: 2024-05-14
Payer: MEDICARE

## 2024-05-14 NOTE — TELEPHONE ENCOUNTER
Called pt and left message stating that her ozempic has come in and she needs to pick it up today before 4:30 pm

## 2024-07-10 ENCOUNTER — TELEPHONE (OUTPATIENT)
Dept: FAMILY MEDICINE | Facility: CLINIC | Age: 74
End: 2024-07-10
Payer: MEDICARE

## 2024-08-12 ENCOUNTER — LAB VISIT (OUTPATIENT)
Dept: LAB | Facility: HOSPITAL | Age: 74
End: 2024-08-12
Attending: FAMILY MEDICINE
Payer: MEDICARE

## 2024-08-12 DIAGNOSIS — E03.9 ACQUIRED HYPOTHYROIDISM: ICD-10-CM

## 2024-08-12 DIAGNOSIS — I10 BENIGN ESSENTIAL HYPERTENSION: ICD-10-CM

## 2024-08-12 DIAGNOSIS — Z79.01 CURRENT USE OF LONG TERM ANTICOAGULATION: ICD-10-CM

## 2024-08-12 DIAGNOSIS — Z79.899 MEDICATION MANAGEMENT: ICD-10-CM

## 2024-08-12 DIAGNOSIS — R73.03 PREDIABETES: ICD-10-CM

## 2024-08-12 DIAGNOSIS — I48.0 PAROXYSMAL ATRIAL FIBRILLATION: ICD-10-CM

## 2024-08-12 LAB
ALBUMIN SERPL BCP-MCNC: 3.8 G/DL (ref 3.5–5.2)
ALP SERPL-CCNC: 128 U/L (ref 55–135)
ALT SERPL W/O P-5'-P-CCNC: 22 U/L (ref 10–44)
ANION GAP SERPL CALC-SCNC: 10 MMOL/L (ref 8–16)
AST SERPL-CCNC: 25 U/L (ref 10–40)
BASOPHILS # BLD AUTO: 0.03 K/UL (ref 0–0.2)
BASOPHILS NFR BLD: 0.4 % (ref 0–1.9)
BILIRUB SERPL-MCNC: 0.4 MG/DL (ref 0.1–1)
BUN SERPL-MCNC: 20 MG/DL (ref 8–23)
CALCIUM SERPL-MCNC: 10 MG/DL (ref 8.7–10.5)
CHLORIDE SERPL-SCNC: 102 MMOL/L (ref 95–110)
CHOLEST SERPL-MCNC: 197 MG/DL (ref 120–199)
CHOLEST/HDLC SERPL: 3 {RATIO} (ref 2–5)
CO2 SERPL-SCNC: 29 MMOL/L (ref 23–29)
CREAT SERPL-MCNC: 0.8 MG/DL (ref 0.5–1.4)
DIFFERENTIAL METHOD BLD: NORMAL
EOSINOPHIL # BLD AUTO: 0.1 K/UL (ref 0–0.5)
EOSINOPHIL NFR BLD: 1.7 % (ref 0–8)
ERYTHROCYTE [DISTWIDTH] IN BLOOD BY AUTOMATED COUNT: 14 % (ref 11.5–14.5)
EST. GFR  (NO RACE VARIABLE): >60 ML/MIN/1.73 M^2
ESTIMATED AVG GLUCOSE: 105 MG/DL (ref 68–131)
FERRITIN SERPL-MCNC: 130 NG/ML (ref 20–300)
GLUCOSE SERPL-MCNC: 99 MG/DL (ref 70–110)
HBA1C MFR BLD: 5.3 % (ref 4–5.6)
HCT VFR BLD AUTO: 41.9 % (ref 37–48.5)
HDLC SERPL-MCNC: 65 MG/DL (ref 40–75)
HDLC SERPL: 33 % (ref 20–50)
HGB BLD-MCNC: 13.4 G/DL (ref 12–16)
IMM GRANULOCYTES # BLD AUTO: 0.02 K/UL (ref 0–0.04)
IMM GRANULOCYTES NFR BLD AUTO: 0.2 % (ref 0–0.5)
LDLC SERPL CALC-MCNC: 114 MG/DL (ref 63–159)
LYMPHOCYTES # BLD AUTO: 2 K/UL (ref 1–4.8)
LYMPHOCYTES NFR BLD: 24.5 % (ref 18–48)
MAGNESIUM SERPL-MCNC: 2 MG/DL (ref 1.6–2.6)
MCH RBC QN AUTO: 28.8 PG (ref 27–31)
MCHC RBC AUTO-ENTMCNC: 32 G/DL (ref 32–36)
MCV RBC AUTO: 90 FL (ref 82–98)
MONOCYTES # BLD AUTO: 0.6 K/UL (ref 0.3–1)
MONOCYTES NFR BLD: 6.8 % (ref 4–15)
NEUTROPHILS # BLD AUTO: 5.4 K/UL (ref 1.8–7.7)
NEUTROPHILS NFR BLD: 66.4 % (ref 38–73)
NONHDLC SERPL-MCNC: 132 MG/DL
NRBC BLD-RTO: 0 /100 WBC
PLATELET # BLD AUTO: 246 K/UL (ref 150–450)
PMV BLD AUTO: 11.3 FL (ref 9.2–12.9)
POTASSIUM SERPL-SCNC: 4.4 MMOL/L (ref 3.5–5.1)
PROT SERPL-MCNC: 7.1 G/DL (ref 6–8.4)
RBC # BLD AUTO: 4.65 M/UL (ref 4–5.4)
SODIUM SERPL-SCNC: 141 MMOL/L (ref 136–145)
TRIGL SERPL-MCNC: 90 MG/DL (ref 30–150)
TSH SERPL DL<=0.005 MIU/L-ACNC: 0.85 UIU/ML (ref 0.4–4)
VIT B12 SERPL-MCNC: 281 PG/ML (ref 210–950)
WBC # BLD AUTO: 8.19 K/UL (ref 3.9–12.7)

## 2024-08-12 PROCEDURE — 82607 VITAMIN B-12: CPT | Performed by: FAMILY MEDICINE

## 2024-08-12 PROCEDURE — 84443 ASSAY THYROID STIM HORMONE: CPT | Performed by: FAMILY MEDICINE

## 2024-08-12 PROCEDURE — 80061 LIPID PANEL: CPT | Performed by: FAMILY MEDICINE

## 2024-08-12 PROCEDURE — 36415 COLL VENOUS BLD VENIPUNCTURE: CPT | Performed by: FAMILY MEDICINE

## 2024-08-12 PROCEDURE — 83036 HEMOGLOBIN GLYCOSYLATED A1C: CPT | Performed by: FAMILY MEDICINE

## 2024-08-12 PROCEDURE — 85025 COMPLETE CBC W/AUTO DIFF WBC: CPT | Performed by: FAMILY MEDICINE

## 2024-08-12 PROCEDURE — 80053 COMPREHEN METABOLIC PANEL: CPT | Performed by: FAMILY MEDICINE

## 2024-08-12 PROCEDURE — 83735 ASSAY OF MAGNESIUM: CPT | Performed by: FAMILY MEDICINE

## 2024-08-12 PROCEDURE — 82728 ASSAY OF FERRITIN: CPT | Performed by: FAMILY MEDICINE

## 2024-08-18 PROBLEM — E66.3 OVERWEIGHT (BMI 25.0-29.9): Status: ACTIVE | Noted: 2018-09-11

## 2024-08-18 NOTE — PROGRESS NOTES
Office Visit    Patient Name: Violet Mirza    : 1950  MRN: [id number]    Subjective:  Violet is a 74 y.o. female who presents today for:    Annual Exam       Most Recent OV with me 24- routine F/U & prior PHYSICAL 23     Seen by ORTHO Dr Callejas Office-most recently 3/15/24 for R knee pain--Synvisc injections are helping  Also seen by ortho for h/o shoulder pain     CARDIOLOGY Dr Duran 10/27/23--Stable & F/U 1 year, ECHO 10/2023 w/ Est EF 40-45%  Dr Vann-Chiropractor 24 neck/back pain   [facility] 3/10/23 URI/AR/Vasomotor rhinitis & 24 by Dr Camargo for cerumen removal      75 yo patient of mine who presents for annual physical with lab review and monitoring of chronic conditions that include paroxysmal AFib(2018 in [facility] was given diltiazem & converted spontaneously to NSR), HFrEF (EF 40-45% 10/24/23) followed by cardiology Dr Duran-- last seen 10/27/23 & F/U 1 year, hypothyroidism, hypertension, obesity, prediabetes, osteopenia, rosacea, remote prior history of kidney stones.     Labs drawn prior to office visit 24 all normal w/ continued improved A1c of 5.3 (peak A1c was 6.3 22). B12 281.   TSH 0.8 w/ normal Free T4 on Levothyroxine 112 mcg daily every day but .   Vit D previously 74. -- -- she is not on a statin- no history of obstructive coronary artery disease.     She is currently on OZEMPIC 2 mg/week.   Also take 2,000 total of D3 daily. But no other vitamins.     She is postmenopausal.  Previously had a gynecologist-- Renny Deshpande, seen in January yearly.    He retired, and I now order her yearly mammograms-- through [facility], most recently 3/1/24.     Today she is feeling overall well, tolerating the Ozempic 2 mg / week without any significant side effects and pleased that she has continued to loose additional weight on the increased dose.  Has occasional intermittent nausea and diarrhea but very tolerable.    Feels some benefit in terms of  decreased joint pain.     She is tyring to avoid a knee replacement on the Right, though she has done really well with her L knee parital knee replacement.   Notes decreased joint pain with her weight loss.     Blood Pressure controlled on toprol xl 200 mg daily, and cozaar 100 mg daily, along with Xarelto 20 for stroke prevention in Paroxysmal A Fib.   Leg swelling stable on dose of 20 mg Lasix daily- has no complaints of swelling currently. Decreased lasix to PRN with good results and she is currently only taking Lasix 20 once per week on Sundays.     Acute Complaints: None but Right knee pain is worsening.      General lifestyle habits are as follows:    Diet: healthy and watches salt and carbs-- home cooked meals, Hydration, and has cut back on sugars some   Exercise: recently fair -- doing a home [employer] Work Out about 1-2 times weekly, walking and some home weights.  Sleep: fair-- improving since decreasing Lasix to p.r.n..  Not up as much at night to use the bathroom-maybe only 1-2 times instead of multiple.  Weight:  Has lost about 45 lb altogether with the help of Ozempic, recently about 15 as of 2/19/24-- BMI has improved to 29, which she has maintained.     Immunizations: TDaP 4/5/19-- repeat 10 years, yearly FLU 10/9/23, SHINGRIX 2/2 7/13/20, Pneumovax 23 11/3/2014, PREVNAR 13 10/18/2015, COVID-19 completed 2/8/21 w/ 2nd pfizer booster 8/11/22-- FURTHER COVID-19 VACCINES DECLINED   RSV 11/6/23     Screening Tests: DEXA 2/9/24- improved Osteopenia-- RECHECK 2 years(2/2026),  mammo 3/1/24- Repeat 1 year, PAP not indicated-- >65 w/o h/o abnormal, colonoscopy through [facility] 1/22/2015-- repeat 10 years(1/2025) , Hep C (-) 10/14/2015     Eye/Dental: UTD with Both- yearly, EYE [facility]IO 2/23/24, 11/8/23 - CATARACT EXTRACTION R Eye, L Cataract removed- laser 2/2 scar tissue complications     Saw Cardiology Dr. Rausch electrophysiology 10/18/2019 and there was a concern for greater numbers of PVCs noted on EKG.    He  advised a Holter monitor, which she declined, but since her ejection fraction is overall stable on echo,  okay with monitoring      ECHO 10/24/23:    Left Ventricle: The left ventricle is mildly dilated. Normal wall thickness. Mild global hypokinesis present. There is mildly reduced systolic function with a visually estimated ejection fraction of 40 - 45%. Biplane (2D)     method of discs ejection fraction is 41%. Grade I diastolic dysfunction. Elevated left ventricular filling pressure.    Left Atrium: Left atrium is severely dilated.    Right Ventricle: Normal right ventricular cavity size. Wall thickness is normal. Right ventricle wall motion  is normal. Systolic function is normal.    Mitral Valve: There is mild regurgitation.    Pulmonary Artery: The estimated pulmonary artery systolic pressure is 37 mmHg.    IVC/SVC: Normal venous pressure at 3 mmHg.       PAST MEDICAL HISTORY, SURGICAL/SOCIAL/FAMILY HISTORY REVIEWED AS PER CHART, WITH PERTINENT FINDINGS INCLUDED IN HISTORY SECTION OF NOTE.     Current Medications    Medication List with Changes/Refills   Current Medications    DICLOFENAC SODIUM (VOLTAREN) 1 % GEL    Apply 4 g  topically to affected area 4 times a day as needed.    FLUTICASONE PROPIONATE (FLONASE) 50 MCG/ACTUATION NASAL SPRAY    2 sprays (100 mcg total) by Each Nare route once daily.    FUROSEMIDE (LASIX) 20 MG TABLET    Take 1 tablet (20 mg total) by mouth once daily.    IPRATROPIUM (ATROVENT) 21 MCG (0.03 %) NASAL SPRAY    2 sprays by Each Nostril route 2 (two) times daily.    LEVOCETIRIZINE (XYZAL) 5 MG TABLET    Take 1 tablet (5 mg total) by mouth every evening.    LEVOTHYROXINE (SYNTHROID) 112 MCG TABLET    Take 1 tablet (112 mcg total) by mouth once daily.    LOSARTAN (COZAAR) 100 MG TABLET    TAKE 1 TABLET BY MOUTH ONCE DAILY    METOPROLOL SUCCINATE (TOPROL-XL) 200 MG 24 HR TABLET    Take 1 tablet (200 mg total) by mouth once daily.    NEOMYCIN-POLYMYXIN-DEXAMETHASONE (DEXACINE) 3.5  "MG/G-10,000 UNIT/G-0.1 % OINT    apply to the right eye at bedtime    NEOMYCIN-POLYMYXIN-DEXAMETHASONE (MAXITROL) 3.5MG/ML-10,000 UNIT/ML-0.1 % DRPS    instill 1 drop in the right eye three times a day    PREDNISOLONE ACETATE (PRED FORTE) 1 % DRPS    Place into both eyes.    RIVAROXABAN (XARELTO) 20 MG TAB    Take 1 tablet (20 mg total) by mouth daily with dinner or evening meal.    SEMAGLUTIDE (OZEMPIC) 2 MG/DOSE (8 MG/3 ML) PNIJ    Inject 2 mg into the skin every 7 days.    TRAMADOL (ULTRAM) 50 MG TABLET    Take 50 mg by mouth every 6 (six) hours as needed for Pain.       Allergies   Review of patient's allergies indicates:   Allergen Reactions    Aldactone [spironolactone] Other (See Comments)     Hyperkalemia when initiated on 01/23/2018          Review of Systems (Pertinent positives)  Review of Systems   Constitutional:  Negative for unexpected weight change.   HENT:  Negative for ear pain and trouble swallowing.    Eyes:  Negative for visual disturbance.   Respiratory:  Negative for shortness of breath.    Cardiovascular:  Negative for leg swelling.   Gastrointestinal:  Negative for constipation and diarrhea.   Genitourinary:  Negative for dysuria and urgency.   Musculoskeletal:  Positive for arthralgias (KNEE PAIN).   Neurological:  Negative for dizziness and light-headedness.   Psychiatric/Behavioral:  Negative for sleep disturbance.        /68 (BP Location: Left arm, Patient Position: Sitting, BP Method: Large (Manual))   Pulse 73   Temp 98.2 °F (36.8 °C)   Ht 5' 8" (1.727 m)   Wt 87.4 kg (192 lb 10.9 oz)   SpO2 96%   BMI 29.30 kg/m²     Physical Exam  Vitals reviewed.   Constitutional:       General: She is not in acute distress.     Appearance: Normal appearance. She is well-developed.   HENT:      Head: Normocephalic and atraumatic.      Right Ear: Tympanic membrane and ear canal normal. Tympanic membrane is not erythematous or bulging.      Left Ear: Tympanic membrane and ear canal normal. " Tympanic membrane is not erythematous or bulging.      Nose: Nose normal.      Mouth/Throat:      Mouth: Mucous membranes are moist.      Pharynx: No oropharyngeal exudate or posterior oropharyngeal erythema.   Eyes:      Extraocular Movements: Extraocular movements intact.      Conjunctiva/sclera: Conjunctivae normal.   Neck:      Thyroid: No thyroid mass or thyromegaly.      Vascular: No carotid bruit.   Cardiovascular:      Rate and Rhythm: Normal rate and regular rhythm.      Pulses:           Dorsalis pedis pulses are 2+ on the right side and 2+ on the left side.      Heart sounds: Normal heart sounds. No murmur heard.  Pulmonary:      Effort: Pulmonary effort is normal. No respiratory distress.      Breath sounds: Normal breath sounds.   Abdominal:      General: Bowel sounds are normal. There is no distension.      Palpations: Abdomen is soft. There is no mass.      Tenderness: There is no abdominal tenderness.   Musculoskeletal:         General: Normal range of motion.      Right lower leg: No edema.      Left lower leg: No edema.   Lymphadenopathy:      Cervical: No cervical adenopathy.   Skin:     General: Skin is warm and dry.      Findings: No rash.   Neurological:      General: No focal deficit present.      Mental Status: She is alert and oriented to person, place, and time.   Psychiatric:         Mood and Affect: Mood normal.         Behavior: Behavior normal.           Assessment/Plan:  Violet Mirza is a 74 y.o. female who presents today for :        ICD-10-CM ICD-9-CM    1. Routine general medical examination at a health care facility  Z00.00 V70.0       2. Benign essential hypertension  I10 401.1 Hemoglobin A1C      Comprehensive Metabolic Panel      Lipid Panel      3. Prediabetes  R73.03 790.29 Hemoglobin A1C      Comprehensive Metabolic Panel      Lipid Panel      4. Chronic combined systolic and diastolic heart failure  I50.42 428.42       5. Acquired hypothyroidism  E03.9 244.9 TSH      6.  PVC's (premature ventricular contractions)  I49.3 427.69       7. Paroxysmal atrial fibrillation  I48.0 427.31       8. Current use of long term anticoagulation  Z79.01 V58.61       9. Overweight (BMI 25.0-29.9)  E66.3 278.02       10. Osteopenia after menopause  M85.80 733.90     Z78.0 V49.81       11. Vitamin D deficiency  E55.9 268.9 Vitamin D      12. Environmental and seasonal allergies  J30.89 477.8       13. Vasomotor rhinitis  J30.0 477.9       14. Chronic allergic rhinitis  J30.9 477.9       15. Medication management  Z79.899 V58.69 Hemoglobin A1C      Comprehensive Metabolic Panel      Lipid Panel      TSH      Vitamin D        ADVISED ON DIET/EXERCISE/SLEEP, ROUTINE EYE/DENTAL EXAMS, AND THE IMPORTANCE OF KEEPING UP WITH APPROPRIATE SCREENING TESTS BASED ON AGE AND RISK FACTORS.  HEALTH MAINTENANCE REVIEWED & UTD EXCEPT FOR COVID VACCINE  EYE MARIO  2/23/24 11/8/23 - CATARACT EXTRACTION, R Eye  2ND COVID 19 BOOSTER 8/2022--she Declines Further COVID vaccines  NEXT DEXA DUE 2/9/26  MAMMO 3/1/24-- repeat 1 year [facility]   NEXT CSCOPE 1/2025     OBESITY W/ PREDIABETES: Intolerant of Metformin, RESPONDED WELL TO OZEMPIC. Continue 2 mg / week as A1c now with improvement to 5.3 from 6.3 max and she has lost 15 ADDITIONAL LB, about 45 lb altogether, recently stable at BMI 29-- this is a bit of a plateau.   REPEAT A1C/LABS, WEIGHT CHECK IN 6 MONTHS.   Low carb diet & regular exercise advised.      H/O HFwREF,DD, PAFIB: following with cardiology yearly and saw Dr Duran-- last seen 10/27/23 & F/U 1 year advised, Continued improved/stable EF on most recent ECHO 10/24/23 of 40-45%   CARDIAC MEDS INCLUDE: Toprol xl 200 mg daily, and Cozaar 100 mg daily, along with Xarelto 20 for stroke prevention in Paroxysmal A Fib. Euvolemic on dose of 20 mg Lasix weekly.      R KNEE PAIN 2/2 OA: Following with ORTHO Dr Callejas for Periodic Synvisc injections. Knee pain is limiting--considering partial R Knee replacement that she had  and did well with on the left, though with recent weight loss she reports some improvement.     HYPOTHYROIDISM: TSH 0.8 on levothyroxine 112 mcg and now SKIPPING SUNDAY DOSE s/p wt loss w/ ozempic. ongoing monitoring w/ recheck in 6 Months, sooner if concerns.       OP: improved to osteopenia with normalization of Vitamin D, regular exercise, recheck DEXA DUE 2/9/2026    Allergies: stable on daily Xyzal, thought she cannot go with out it. Rhinorrhea improved with Atrovent Nasal spray for vasomotor rhinitis per ENT Dr Almanza     Venous insufficiency:  improved following vein procedure and with reduced swelling 2/2 lasix and weight loss. Also noting benefit from a specialty varicose vein cream         There are no Patient Instructions on file for this visit.      Follow up in about 6 months (around 2/19/2025) for to follow up on lab results, return as needed for new concerns.

## 2024-08-19 ENCOUNTER — OFFICE VISIT (OUTPATIENT)
Dept: FAMILY MEDICINE | Facility: CLINIC | Age: 74
End: 2024-08-19
Payer: MEDICARE

## 2024-08-19 VITALS
HEIGHT: 68 IN | WEIGHT: 192.69 LBS | SYSTOLIC BLOOD PRESSURE: 110 MMHG | HEART RATE: 73 BPM | DIASTOLIC BLOOD PRESSURE: 68 MMHG | OXYGEN SATURATION: 96 % | BODY MASS INDEX: 29.2 KG/M2 | TEMPERATURE: 98 F

## 2024-08-19 DIAGNOSIS — E55.9 VITAMIN D DEFICIENCY: ICD-10-CM

## 2024-08-19 DIAGNOSIS — E03.9 ACQUIRED HYPOTHYROIDISM: ICD-10-CM

## 2024-08-19 DIAGNOSIS — J30.89 ENVIRONMENTAL AND SEASONAL ALLERGIES: ICD-10-CM

## 2024-08-19 DIAGNOSIS — R79.89 LOW VITAMIN B12 LEVEL: ICD-10-CM

## 2024-08-19 DIAGNOSIS — Z79.01 CURRENT USE OF LONG TERM ANTICOAGULATION: ICD-10-CM

## 2024-08-19 DIAGNOSIS — I49.3 PVC'S (PREMATURE VENTRICULAR CONTRACTIONS): ICD-10-CM

## 2024-08-19 DIAGNOSIS — I10 BENIGN ESSENTIAL HYPERTENSION: ICD-10-CM

## 2024-08-19 DIAGNOSIS — R73.03 PREDIABETES: ICD-10-CM

## 2024-08-19 DIAGNOSIS — J30.9 CHRONIC ALLERGIC RHINITIS: ICD-10-CM

## 2024-08-19 DIAGNOSIS — M85.80 OSTEOPENIA AFTER MENOPAUSE: ICD-10-CM

## 2024-08-19 DIAGNOSIS — I48.0 PAROXYSMAL ATRIAL FIBRILLATION: ICD-10-CM

## 2024-08-19 DIAGNOSIS — Z79.899 MEDICATION MANAGEMENT: ICD-10-CM

## 2024-08-19 DIAGNOSIS — I50.42 CHRONIC COMBINED SYSTOLIC AND DIASTOLIC HEART FAILURE: ICD-10-CM

## 2024-08-19 DIAGNOSIS — E66.3 OVERWEIGHT (BMI 25.0-29.9): ICD-10-CM

## 2024-08-19 DIAGNOSIS — E53.8 DEFICIENCY OF OTHER SPECIFIED B GROUP VITAMINS: ICD-10-CM

## 2024-08-19 DIAGNOSIS — Z78.0 OSTEOPENIA AFTER MENOPAUSE: ICD-10-CM

## 2024-08-19 DIAGNOSIS — Z00.00 ROUTINE GENERAL MEDICAL EXAMINATION AT A HEALTH CARE FACILITY: Primary | ICD-10-CM

## 2024-08-19 DIAGNOSIS — J30.0 VASOMOTOR RHINITIS: ICD-10-CM

## 2024-08-19 PROCEDURE — 1159F MED LIST DOCD IN RCRD: CPT | Mod: CPTII,S$GLB,, | Performed by: FAMILY MEDICINE

## 2024-08-19 PROCEDURE — 99999 PR PBB SHADOW E&M-EST. PATIENT-LVL IV: CPT | Mod: PBBFAC,,, | Performed by: FAMILY MEDICINE

## 2024-08-19 PROCEDURE — 3074F SYST BP LT 130 MM HG: CPT | Mod: CPTII,S$GLB,, | Performed by: FAMILY MEDICINE

## 2024-08-19 PROCEDURE — 99397 PER PM REEVAL EST PAT 65+ YR: CPT | Mod: S$GLB,,, | Performed by: FAMILY MEDICINE

## 2024-08-19 PROCEDURE — 3288F FALL RISK ASSESSMENT DOCD: CPT | Mod: CPTII,S$GLB,, | Performed by: FAMILY MEDICINE

## 2024-08-19 PROCEDURE — 1101F PT FALLS ASSESS-DOCD LE1/YR: CPT | Mod: CPTII,S$GLB,, | Performed by: FAMILY MEDICINE

## 2024-08-19 PROCEDURE — 3078F DIAST BP <80 MM HG: CPT | Mod: CPTII,S$GLB,, | Performed by: FAMILY MEDICINE

## 2024-08-19 PROCEDURE — 1125F AMNT PAIN NOTED PAIN PRSNT: CPT | Mod: CPTII,S$GLB,, | Performed by: FAMILY MEDICINE

## 2024-08-19 PROCEDURE — 1160F RVW MEDS BY RX/DR IN RCRD: CPT | Mod: CPTII,S$GLB,, | Performed by: FAMILY MEDICINE

## 2024-08-19 RX ORDER — VITAMIN B COMPLEX
1 CAPSULE ORAL
Qty: 90 CAPSULE | Refills: 4 | Status: SHIPPED | OUTPATIENT
Start: 2024-08-19

## 2024-08-19 NOTE — PATIENT INSTRUCTIONS
COULD CONSIDER TRIAL OF SWITCH TO WEEKLY MOUNJARO PRESCRIPTION-- 5->10 MG PER WEEK FOR TREATMENT OF PREDIABETES AND WEIGHT LOSS IF CAN OBTAIN ASSISTANCE THROUGH [facility].

## 2024-08-20 ENCOUNTER — PATIENT MESSAGE (OUTPATIENT)
Dept: FAMILY MEDICINE | Facility: CLINIC | Age: 74
End: 2024-08-20
Payer: MEDICARE

## 2024-08-26 RX ORDER — IPRATROPIUM BROMIDE 21 UG/1
2 SPRAY, METERED NASAL 2 TIMES DAILY
Qty: 30 ML | Refills: 3 | Status: CANCELLED | OUTPATIENT
Start: 2024-08-26

## 2024-08-28 ENCOUNTER — PATIENT MESSAGE (OUTPATIENT)
Dept: FAMILY MEDICINE | Facility: CLINIC | Age: 74
End: 2024-08-28
Payer: MEDICARE

## 2024-08-28 RX ORDER — IPRATROPIUM BROMIDE 21 UG/1
2 SPRAY, METERED NASAL 2 TIMES DAILY
Qty: 30 ML | Refills: 3 | Status: SHIPPED | OUTPATIENT
Start: 2024-08-28

## 2024-08-29 ENCOUNTER — PATIENT MESSAGE (OUTPATIENT)
Dept: FAMILY MEDICINE | Facility: CLINIC | Age: 74
End: 2024-08-29
Payer: MEDICARE

## 2024-08-30 ENCOUNTER — TELEPHONE (OUTPATIENT)
Dept: FAMILY MEDICINE | Facility: CLINIC | Age: 74
End: 2024-08-30
Payer: MEDICARE

## 2024-09-04 ENCOUNTER — TELEPHONE (OUTPATIENT)
Dept: FAMILY MEDICINE | Facility: CLINIC | Age: 74
End: 2024-09-04
Payer: MEDICARE

## 2024-10-22 ENCOUNTER — TELEPHONE (OUTPATIENT)
Dept: FAMILY MEDICINE | Facility: CLINIC | Age: 74
End: 2024-10-22
Payer: MEDICARE

## 2024-10-22 NOTE — TELEPHONE ENCOUNTER
----- Message from Ginny sent at 10/21/2024  1:12 PM CDT -----  Regarding: Self  Type: Patient Call Back     What is the request in detail: Pt is requesting a call back from nurse     Can the clinic reply by [facility]? No     Would the patient rather a call back or a response via [facility]? Call back    Best call back number: .[phone]      Additional Information:    Thank you.

## 2024-10-23 ENCOUNTER — TELEPHONE (OUTPATIENT)
Dept: FAMILY MEDICINE | Facility: CLINIC | Age: 74
End: 2024-10-23

## 2024-10-23 ENCOUNTER — CLINICAL SUPPORT (OUTPATIENT)
Dept: FAMILY MEDICINE | Facility: CLINIC | Age: 74
End: 2024-10-23
Payer: MEDICARE

## 2024-10-23 ENCOUNTER — PATIENT MESSAGE (OUTPATIENT)
Dept: FAMILY MEDICINE | Facility: CLINIC | Age: 74
End: 2024-10-23

## 2024-10-23 ENCOUNTER — E-VISIT (OUTPATIENT)
Dept: FAMILY MEDICINE | Facility: CLINIC | Age: 74
End: 2024-10-23
Payer: MEDICARE

## 2024-10-23 VITALS — HEART RATE: 76 BPM | DIASTOLIC BLOOD PRESSURE: 76 MMHG | OXYGEN SATURATION: 99 % | SYSTOLIC BLOOD PRESSURE: 144 MMHG

## 2024-10-23 DIAGNOSIS — I10 BENIGN ESSENTIAL HYPERTENSION: ICD-10-CM

## 2024-10-23 DIAGNOSIS — I50.32 DIASTOLIC DYSFUNCTION WITH CHRONIC HEART FAILURE: ICD-10-CM

## 2024-10-23 DIAGNOSIS — R73.03 PREDIABETES: ICD-10-CM

## 2024-10-23 DIAGNOSIS — B96.89 ACUTE BACTERIAL SINUSITIS: ICD-10-CM

## 2024-10-23 DIAGNOSIS — J01.90 ACUTE BACTERIAL SINUSITIS: ICD-10-CM

## 2024-10-23 DIAGNOSIS — Z01.30 BP CHECK: Primary | ICD-10-CM

## 2024-10-23 DIAGNOSIS — I50.20 HFREF (HEART FAILURE WITH REDUCED EJECTION FRACTION): ICD-10-CM

## 2024-10-23 DIAGNOSIS — B34.9 VIRAL SYNDROME: ICD-10-CM

## 2024-10-23 DIAGNOSIS — U07.1 COVID-19 VIRUS INFECTION: Primary | ICD-10-CM

## 2024-10-23 LAB
CTP QC/QA: YES
CTP QC/QA: YES
POC MOLECULAR INFLUENZA A AGN: NEGATIVE
POC MOLECULAR INFLUENZA B AGN: NEGATIVE
SARS-COV-2 RDRP RESP QL NAA+PROBE: POSITIVE

## 2024-10-23 PROCEDURE — 99999 PR PBB SHADOW E&M-EST. PATIENT-LVL I: CPT | Mod: PBBFAC,,,

## 2024-10-23 RX ORDER — FLUTICASONE PROPIONATE 50 MCG
2 SPRAY, SUSPENSION (ML) NASAL DAILY
Qty: 16 G | Refills: 2 | Status: SHIPPED | OUTPATIENT
Start: 2024-10-23

## 2024-10-23 RX ORDER — DOXYCYCLINE 100 MG/1
100 CAPSULE ORAL EVERY 12 HOURS
Qty: 14 CAPSULE | Refills: 0 | Status: SHIPPED | OUTPATIENT
Start: 2024-10-23 | End: 2024-10-30

## 2024-10-23 NOTE — TELEPHONE ENCOUNTER
----- Message from Phyllis Gunter MD sent at 10/23/2024  3:13 PM CDT -----  Regarding: E Visit Flu/Covid testing  Please see if patient can come in today ASAP for flu and COVID testing, thank you

## 2024-10-23 NOTE — PROGRESS NOTES
Patient ID: Violet Mirza is a 74 y.o. female.    Chief Complaint: General Illness (Entered automatically based on patient selection in [employer].)    The patient initiated a request through [employer] on 10/23/2024 for evaluation and management with a chief complaint of General Illness (Entered automatically based on patient selection in [employer].)     I evaluated the questionnaire submission on 10/23/2024  .    [facility]-Cough And Cold    10/23/2024  9:17 AM CDT - Filed by Patient   What do you need help with? Cough, Cold, Sore Throat   Do you agree to participate in an E-Visit? Yes   If you have any of the following symptoms, go to your local emergency room or call 911: I acknowledge   What is the main issue you would like addressed today? Scratchy throat, yellow mucus, stuffy nose   Do you think you might have COVID or the Flu? No   Have you tested positive for COVID or Flu? No   What symptoms do you currently have?  Chills;  Fatigue;  Nasal Congestion;  Sore throat   Have you had any of the following? None of the above   Have you ever smoked? I smoked in the past   Have you had a fever? No   When did your symptoms first appear? 10/19/2024   In the last two weeks, have you been in close contact with someone who has COVID-19 or the Flu? No   List what you have done or taken to help your symptoms. Zicam melts   How severe are your symptoms? Moderate   Have your symptoms gotten better or worse since they started?  No change   Do you have transportation to get testing if it is needed and ordered for you at an [facility] location? Yes   Provide any additional information you feel is important.    Please attach any relevant images or files    Are you able to take your vital signs? No     VITALS /76, Pulse 76, Oxygen 99%    Encounter Diagnoses   Name Primary?    Viral syndrome     COVID-19 virus infection Yes    Acute bacterial sinusitis     HFrEF (heart failure with reduced ejection fraction)      Benign essential hypertension     Diastolic dysfunction with chronic heart failure     Prediabetes         Orders Placed This Encounter   Procedures    POCT Influenza A/B Molecular    POCT COVID-19 Rapid Screening      Medications Ordered This Encounter   Medications    doxycycline (VIBRAMYCIN) 100 MG Cap     Sig: Take 1 capsule (100 mg total) by mouth every 12 (twelve) hours. for 7 days     Dispense:  14 capsule     Refill:  0    fluticasone propionate (FLONASE) 50 mcg/actuation nasal spray     Si sprays (100 mcg total) by Each Nostril route once daily. Start by irrigating the collins with isotonic saline, blow gently then use the flonase as directed     Dispense:  16 g     Refill:  2       COVID RISK SCORE:  5      74-year-old female with viral symptoms-to be tested for flu and COVID.  COVID risks for is elevated at 5 secondary to underlying risk factors of heart failure with diastolic dysfunction and reduced ejection fraction along with controlled hypertension and prediabetes.      Still barely in the window for Paxlovid and would consider it if COVID positive given her comorbidities.  Address supportive care and will re-evaluate following the results of her viral swabs.    Per her E visit-sounds like symptoms are moderate, overall tolerable and have been going on about 4 days      ADDENDUM: COVID POSITIVE-- has URI symptoms only with stable vital signs. No fever, cough or shortness of breath. Very bothered by sinus pressure with thick mucus--feels this is getting worse after discussing with her.  Supportive Care for COVID-- Mucinex 1200 mg BID with plenty of water, nasal saline irrigation and Flonase Nasal spray. Doxycycline for superimposed presumed Bacterial Sinusitis.       Follow up for to follow up on lab results, return as needed for new concerns.          E-Visit Time Trackin minutes + 3 minutes on addendum = 11 minutes

## 2024-10-28 ENCOUNTER — PATIENT MESSAGE (OUTPATIENT)
Dept: FAMILY MEDICINE | Facility: CLINIC | Age: 74
End: 2024-10-28
Payer: MEDICARE

## 2024-10-28 DIAGNOSIS — J34.89 SINUS PRESSURE: Primary | ICD-10-CM

## 2024-10-28 RX ORDER — METHYLPREDNISOLONE 4 MG/1
TABLET ORAL
Qty: 21 EACH | Refills: 0 | Status: SHIPPED | OUTPATIENT
Start: 2024-10-28 | End: 2024-11-18

## 2024-11-01 DIAGNOSIS — I10 BENIGN ESSENTIAL HYPERTENSION: ICD-10-CM

## 2024-11-01 RX ORDER — LOSARTAN POTASSIUM 100 MG/1
100 TABLET ORAL DAILY
Qty: 90 TABLET | Refills: 3 | Status: SHIPPED | OUTPATIENT
Start: 2024-11-01 | End: 2025-11-01

## 2024-12-02 ENCOUNTER — OFFICE VISIT (OUTPATIENT)
Dept: CARDIOLOGY | Facility: CLINIC | Age: 74
End: 2024-12-02
Payer: MEDICARE

## 2024-12-02 VITALS
OXYGEN SATURATION: 96 % | HEART RATE: 80 BPM | WEIGHT: 191.5 LBS | DIASTOLIC BLOOD PRESSURE: 79 MMHG | HEIGHT: 68 IN | SYSTOLIC BLOOD PRESSURE: 123 MMHG | BODY MASS INDEX: 29.02 KG/M2

## 2024-12-02 DIAGNOSIS — I49.3 PVC'S (PREMATURE VENTRICULAR CONTRACTIONS): ICD-10-CM

## 2024-12-02 DIAGNOSIS — I48.0 PAROXYSMAL ATRIAL FIBRILLATION: ICD-10-CM

## 2024-12-02 DIAGNOSIS — I10 BENIGN ESSENTIAL HYPERTENSION: ICD-10-CM

## 2024-12-02 DIAGNOSIS — I50.32 DIASTOLIC DYSFUNCTION WITH CHRONIC HEART FAILURE: ICD-10-CM

## 2024-12-02 DIAGNOSIS — I87.2 VENOUS INSUFFICIENCY OF BOTH LOWER EXTREMITIES: ICD-10-CM

## 2024-12-02 DIAGNOSIS — I50.20 HFREF (HEART FAILURE WITH REDUCED EJECTION FRACTION): Primary | ICD-10-CM

## 2024-12-02 LAB
OHS QRS DURATION: 112 MS
OHS QTC CALCULATION: 439 MS

## 2024-12-02 PROCEDURE — 3078F DIAST BP <80 MM HG: CPT | Mod: CPTII,S$GLB,, | Performed by: INTERNAL MEDICINE

## 2024-12-02 PROCEDURE — 93000 ELECTROCARDIOGRAM COMPLETE: CPT | Mod: S$GLB,,, | Performed by: STUDENT IN AN ORGANIZED HEALTH CARE EDUCATION/TRAINING PROGRAM

## 2024-12-02 PROCEDURE — 3074F SYST BP LT 130 MM HG: CPT | Mod: CPTII,S$GLB,, | Performed by: INTERNAL MEDICINE

## 2024-12-02 PROCEDURE — 1126F AMNT PAIN NOTED NONE PRSNT: CPT | Mod: CPTII,S$GLB,, | Performed by: INTERNAL MEDICINE

## 2024-12-02 PROCEDURE — 3288F FALL RISK ASSESSMENT DOCD: CPT | Mod: CPTII,S$GLB,, | Performed by: INTERNAL MEDICINE

## 2024-12-02 PROCEDURE — 99999 PR PBB SHADOW E&M-EST. PATIENT-LVL IV: CPT | Mod: PBBFAC,,, | Performed by: INTERNAL MEDICINE

## 2024-12-02 PROCEDURE — 1159F MED LIST DOCD IN RCRD: CPT | Mod: CPTII,S$GLB,, | Performed by: INTERNAL MEDICINE

## 2024-12-02 PROCEDURE — 99214 OFFICE O/P EST MOD 30 MIN: CPT | Mod: S$GLB,,, | Performed by: INTERNAL MEDICINE

## 2024-12-02 PROCEDURE — 1101F PT FALLS ASSESS-DOCD LE1/YR: CPT | Mod: CPTII,S$GLB,, | Performed by: INTERNAL MEDICINE

## 2024-12-02 NOTE — PROGRESS NOTES
Cardiology Clinic Visit    History of Present Illness:       Violet Mirza is a pleasant 74 y.o. female with PMH noted below in assessment/plan  Presents for routine follow up with Cardiology.  No symptoms noted today.  Exercises daily, largely walking.  Compliant with current medications.  P.r.n. use of Lasix, his thought noted a significant lower extremity edema.Denies Chest Discomfort/SANTANA/SOB/Palpitation/Syncope      History obtained by patient interview and supplemented by nursing documentation and chart review.   Objective   PMHx:  has a past medical history of Chondromalacia of right patella, Diverticulitis, GERD (gastroesophageal reflux disease), Hypoglycemia (11/22/2014), OA (osteoarthritis) of knee, Osteopenia (7/26/2013), Personal history of kidney stones (7/26/2013), Rosacea (7/26/2013), and Tear of medial meniscus of right knee.   SurgHx:  has a past surgical history that includes Adenoidectomy; Tonsillectomy; Knee arthroscopy w/ meniscal repair (Right, 5/14); Foot surgery (Bilateral); Tubal ligation; Knee surgery (Left); Lipoma resection (Bilateral, 4/22/2019); and Rotator cuff repair (Right, 08/26/2021).   FamHx: family history includes Cataracts in her mother; Diabetes in her mother; Glaucoma in her mother; Hypertension in her father and mother; No Known Problems in her brother, daughter, daughter, sister, sister, and son; Stroke in her father.   SocialHx:  reports that she quit smoking about 19 years ago. Her smoking use included cigarettes. She started smoking about 49 years ago. She has a 7.5 pack-year smoking history. She has quit using smokeless tobacco. She reports that she does not drink alcohol and does not use drugs.  Home Meds:  Current Outpatient Medications   Medication Instructions    b complex vitamins capsule 1 capsule, Oral, With breakfast    diclofenac sodium (VOLTAREN) 1 % Gel Apply 4 g  topically to affected area 4 times a day as needed.    fluticasone propionate (FLONASE) 100  "mcg, Each Nostril, Daily, Start by irrigating the collins with isotonic saline, blow gently then use the flonase as directed    furosemide (LASIX) 20 mg, Oral, Daily    ipratropium (ATROVENT) 21 mcg (0.03 %) nasal spray 2 sprays, Each Nostril, 2 times daily    levocetirizine (XYZAL) 5 mg, Oral, Nightly    levothyroxine (SYNTHROID) 112 mcg, Oral, Daily    losartan (COZAAR) 100 MG tablet TAKE 1 TABLET BY MOUTH ONCE DAILY    metoprolol succinate (TOPROL-XL) 200 mg, Oral, Daily    neomycin-polymyxin-dexamethasone (DEXACINE) 3.5 mg/g-10,000 unit/g-0.1 % Oint apply to the right eye at bedtime    neomycin-polymyxin-dexamethasone (MAXITROL) 3.5mg/mL-10,000 unit/mL-0.1 % DrpS instill 1 drop in the right eye three times a day    OZEMPIC 2 mg, Subcutaneous, Every 7 days    prednisoLONE acetate (PRED FORTE) 1 % DrpS Place into both eyes.    traMADoL (ULTRAM) 50 mg, Every 6 hours PRN    XARELTO 20 mg, Oral, With dinner     Objective/Exam:   /79 (BP Location: Left arm)   Pulse 80   Ht 5' 8" (1.727 m)   Wt 86.8 kg (191 lb 7.5 oz)   SpO2 96%   BMI 29.11 kg/m²    Wt Readings from Last 4 Encounters:   12/02/24 86.8 kg (191 lb 7.5 oz)   08/19/24 87.4 kg (192 lb 10.9 oz)   04/04/24 86.7 kg (191 lb 4 oz)   02/19/24 88.2 kg (194 lb 7.1 oz)      Constitutional: NAD, Atraumatic, Conversant   HEENT: MMM, Sclera anicteric, No JVD   Cardiovascular: RRR, no murmurs noted, no chest tenderness to palpation, 2+ radial pulses b/l  Pulmonary: normal respiratory effort, CTAB, no crackles, wheezes  Abdominal: S/NT/ND  Musculoskeletal: No lower extremity edema noted b/l, mild varicosity bilateral legs  Neurological: No gross neurological deficits  Skin: W/D/I  Psych: Appropriate affect, normal mood  Labs/Imaging/Procedures   Personally reviewed  Lab Results   Component Value Date     08/12/2024    K 4.4 08/12/2024     08/12/2024    CO2 29 08/12/2024    BUN 20 08/12/2024    CREATININE 0.8 08/12/2024    ANIONGAP 10 08/12/2024     Lab " "Results   Component Value Date    HGBA1C 5.3 08/12/2024     Lab Results   Component Value Date     (H) 02/07/2018    BNP 1,067 (H) 01/29/2018    BNP 1,148 (H) 01/22/2018      Lab Results   Component Value Date    WBC 8.19 08/12/2024    HGB 13.4 08/12/2024    HCT 41.9 08/12/2024     08/12/2024    GRAN 5.4 08/12/2024    GRAN 66.4 08/12/2024     Lab Results   Component Value Date    CHOL 197 08/12/2024    HDL 65 08/12/2024    LDLCALC 114.0 08/12/2024    LDLCALC 108.4 02/12/2024    LDLCALC 119.4 02/03/2023    TRIG 90 08/12/2024     Lab Results   Component Value Date    TSH 0.854 08/12/2024     No results found for: "APOLIPOPROTE"  No results found for: "LIPOA"       TTE:  Results for orders placed during the hospital encounter of 10/24/23    Echo Saline Bubble? No    Interpretation Summary    Left Ventricle: The left ventricle is mildly dilated. Normal wall thickness. Mild global hypokinesis present. There is mildly reduced systolic function with a visually estimated ejection fraction of 40 - 45%. Biplane (2D) method of discs ejection fraction is 41%. Grade I diastolic dysfunction. Elevated left ventricular filling pressure.    Left Atrium: Left atrium is severely dilated.    Right Ventricle: Normal right ventricular cavity size. Wall thickness is normal. Right ventricle wall motion  is normal. Systolic function is normal.    Mitral Valve: There is mild regurgitation.    Pulmonary Artery: The estimated pulmonary artery systolic pressure is 37 mmHg.    IVC/SVC: Normal venous pressure at 3 mmHg.    Stress Testing:   No results found for this or any previous visit.     Coronary Angiogram:  Results for orders placed during the hospital encounter of 01/22/18    Cath lab procedure    Narrative  Date of Procedure:  01/24/2018    A. Indication/Pre-Operative Diagnosis    The patient is a 67 year old female that was referred for catheterization by Dr. Josiah Brandt for atrial fibrillation and cardiomypathy or " decreased LV function.    B. Summary/Post-Operative Diagnosis    Normal coronary arteries.  Diastolic dysfunction.  Severely depressed EF.  NICM.    C. HPI    I have reviewed the history and physical completed on 01/24/2018. The patient has been examined and I concur with the findings from 01/24/2018.    Patient history was obtained from the patient.    Counseling: The patient was counseled regarding the potential risks and benefits of this procedure, as well as possible alternative approaches to the problem and gave informed consent.    The ASA Score was Class III.    [facility] Risk Score for MACE is 2.80%. [facility] Risk Score for Death is 0.60%.    Height: 68 in.      Weight: 233 lbs.      BMI: 35.40 kg/m2    OUTPATIENT MEDICATIONS: Medications were reviewed.  ALLERGIES: Allergies were reviewed.    Laboratory data revealed:    01/22/2018 INR  1.1, PTI  11.1  01/24/2018 GLU  106, HGB  13.3, NA  134, K  4.5, HCT  43.2, PLT  298, CREAT  0.9, WBCIR  12.76, BUN  18        ACS Enzymes:    01/22/2018 TROP  0.040  01/23/2018 TROP  0.042          D. Hemodynamic Results    LVEDP (Pre): 29 mmHg  LVEDP (Post): 30 mmHg  Ejection Fraction: 10%  Global LV Function: severely depressed    E. Angiographic Results    Diagnostic:    Patient has a right dominant coronary artery.    - Left Main Coronary Artery:  The LM is normal. There is DEEPALI 3 flow.  - Left Anterior Descending Artery:  The LAD is normal. There is DEEPALI 3 flow.  - Left Circumflex Artery:  The LCX is normal. There is DEEPALI 3 flow.  - Right Coronary Artery:  The RCA is normal. There is DEEPALI 3 flow.  - Radial:  The radial was not studied.      Intervention    Radial:  The following items were used: Us Probe Cover.    F. Details of Procedure    PROCEDURES PERFORMED: LHC, Left Ventriculogram and Coronary Angio    ANESTHESIA: Conscious sedation was achieved with 100 mcg of FENTANYL 100MCG/2ML and 2 mg of Versed. Local anesthesia was achieved with 20 ml of Lidocaine 1%.  Moderate conscious sedation was performed and cardiorespiratory functions were monitored the  entire procedure by Varsha Munoz RN. Sedation began at 01:00 PM and concluded at 01:23 PM, totalling 22.8 minutes.    PRIMARY SURGEON: Go Duran MD    COMPLICATIONS: There were no complications.    Medications given on sterile field: Lidocaine 1% (20 ml).    Medications given during procedure: Heparin 1000u/500cc Bag (2 bags), Verapamil Inj 2ml / 5 Mg (1.25 mg), Heparin 1000u/ml Inj (3750 units), Nitroglycerine (tridil) 5mg/ml (1250 mcg), Fentanyl 100mcg/2ml (100 mcg) and Versed (2 mg).    The patient was brought to the catheterization laboratory after premedication with oral Benadryl 50 mg. Bilateral groin prepped and draped. Right radial prepped and draped. The Kit, Manifold was flushed and connected to contrast. A Us Probe Cover was  applied to the right radial. After local anesthesia, a Sheath 4/5 Fr Glide Slender was inserted into the right Radial artery.    AORTA    A Wire Bentson 035 X 260 was inserted into the Aorta.    RCA    A Cath 5fr Alexx was inserted into the ostial RCA. The Wire Bentson 035 X 260 was removed. Angiography performed in multiple views in the right coronary arteries.    LM    The Cath 5fr Alexx was repositioned into the ostial LM. Angiography performed in multiple views in the left coronary arteries. The Cath 5fr Alexx was removed.    Left  VENTRICLE    Attempted to insert Cath 5fr Pigtail 125cm into the left ventricle. The Cath 5fr Pigtail 125cm was removed. A Cath 4fr Pigtail 145 was inserted into the left ventricle. Hemodynamics recorded in the left ventricle. Angiography performed in the left  ventricle.    The Cath 4fr Pigtail 145 was removed. Total Visipaque injected was 70.0 ml. Total Visipaque used was 150.0 ml. The Sheath 4/5 Fr Glide Slender was removed. A Closure Vascband Radial R was applied to the right radial.      Fluoroscopy Time 5.5 minutes  Radiation Dose 727.5  mGy  Contrast Injected 70 ml Visipaque  Contrast Used  150 ml Visipaque        Procedure log documented by RT Jesusita and verified by Go Duran MD    ESTIMATED BLOOD LOSS is < 50 cc.    SPECIMEN: No specimen.    G. Recommendations    1. Routine post-cath care.  2. Maximize medical management.  3. Risk factor reductions.  4. Weight loss.  5. medical management of HFrEF    I certify that I was present for catheter insertion, catheter manipulation, angiography, angiographic interpretation, and all interventional procedures performed on this patient.    This document has been electronically  SIGNED BY: Go Duran MD On: 01/24/2018 13:54      Personal: -retired     31 minutes were spent on this visit including time personally:  -Reviewing Medical records & lab results  -Independently reviewing and interpreting (if not documented by myself) EKGs, echocardiograms, catherizations   -Obtaining a history, performing a relevant exam, counseling/educating the patient/family  -Documenting clinical information in the EMR including ordering of tests  -Care coordination and communicating with other health care providers       Problem List:     1. HFrEF (heart failure with reduced ejection fraction)    2. Venous insufficiency of both lower extremities    3. Paroxysmal atrial fibrillation    4. PVC's (premature ventricular contractions)    5. Diastolic dysfunction with chronic heart failure    6. Benign essential hypertension      Assessment/Plan:   Violet Mirza is a 74 y.o. female with a PMHx of pAfib (on Xarelto), NICM w/ HfrecEF (10->40), HTN, LHC nlm cors, venous insuff presenting for routine follow up without major complaints as per above.    -TTE to reassess LV EF  -continue metoprolol 2 mg XL  -continue losartan 100 mg daily  -BP at goal today  -Lasix 20 mg p.r.n.  -Rivaroxaban 20 mg daily for paroxysmal atrial fibrillation stroke prophylaxis  -follow up in 1 year above normal, sooner if  symptoms change      Thank you for the opportunity to care for this patient. Please don't hesitate to reach out with any questions/concerns        Raymond Conway MD  Interventional Cardiology  [facility] [place]

## 2024-12-11 DIAGNOSIS — E03.9 ACQUIRED HYPOTHYROIDISM: ICD-10-CM

## 2024-12-11 RX ORDER — LEVOTHYROXINE SODIUM 112 UG/1
112 TABLET ORAL DAILY
Qty: 90 TABLET | Refills: 2 | Status: SHIPPED | OUTPATIENT
Start: 2024-12-11

## 2024-12-11 NOTE — TELEPHONE ENCOUNTER
Care Due:                  Date            Visit Type   Department     Provider  --------------------------------------------------------------------------------                                EP -                              PRIMARY      [facility] FAMILY  Last Visit: 08-      CARE ([facility])   NADINE Gunter                              EP -                              PRIMARY      [facility] INTERNAL  Next Visit: 02-      CARE ([facility])   NADINE Gunter                                                            Last  Test          Frequency    Reason                     Performed    Due Date  --------------------------------------------------------------------------------    HBA1C.......  6 months...  semaglutide..............  08- 02-    [facility] Embedded Care Due Messages. Reference number: [id number].   12/11/2024 9:46:04 AM CST

## 2024-12-11 NOTE — TELEPHONE ENCOUNTER
Refill Decision Note   Violet Mirza  is requesting a refill authorization.  Brief Assessment and Rationale for Refill:  Approve     Medication Therapy Plan: TSH-WNL; FLOS      Comments:     Note composed:10:24 AM 12/11/2024

## 2025-01-08 ENCOUNTER — PATIENT MESSAGE (OUTPATIENT)
Dept: INTERNAL MEDICINE | Facility: CLINIC | Age: 75
End: 2025-01-08
Payer: MEDICARE

## 2025-01-09 DIAGNOSIS — I48.0 PAROXYSMAL ATRIAL FIBRILLATION: ICD-10-CM

## 2025-01-09 DIAGNOSIS — I50.22 CHRONIC SYSTOLIC HEART FAILURE: ICD-10-CM

## 2025-01-09 RX ORDER — METOPROLOL SUCCINATE 200 MG/1
200 TABLET, EXTENDED RELEASE ORAL DAILY
Qty: 90 TABLET | Refills: 2 | Status: SHIPPED | OUTPATIENT
Start: 2025-01-09

## 2025-01-09 NOTE — TELEPHONE ENCOUNTER
No care due was identified.  [facility] Embedded Care Due Messages. Reference number: [id number].   1/09/2025 9:47:09 AM CST

## 2025-01-09 NOTE — TELEPHONE ENCOUNTER
Refill Decision Note   Violet Mirza  is requesting a refill authorization.  Brief Assessment and Rationale for Refill:  Approve     Medication Therapy Plan:         Comments:     Note composed:3:18 PM 01/09/2025             Appointments     Last Visit   10/23/2024 Phyllis Gunter MD   Next Visit   2/21/2025 Phyllis Gunter MD

## 2025-01-14 ENCOUNTER — PATIENT MESSAGE (OUTPATIENT)
Dept: INTERNAL MEDICINE | Facility: CLINIC | Age: 75
End: 2025-01-14
Payer: MEDICARE

## 2025-01-15 ENCOUNTER — TELEPHONE (OUTPATIENT)
Dept: INTERNAL MEDICINE | Facility: CLINIC | Age: 75
End: 2025-01-15
Payer: MEDICARE

## 2025-01-15 ENCOUNTER — PATIENT MESSAGE (OUTPATIENT)
Dept: INTERNAL MEDICINE | Facility: CLINIC | Age: 75
End: 2025-01-15
Payer: MEDICARE

## 2025-01-28 ENCOUNTER — PATIENT MESSAGE (OUTPATIENT)
Dept: INTERNAL MEDICINE | Facility: CLINIC | Age: 75
End: 2025-01-28
Payer: MEDICARE

## 2025-01-31 ENCOUNTER — PATIENT MESSAGE (OUTPATIENT)
Dept: INTERNAL MEDICINE | Facility: CLINIC | Age: 75
End: 2025-01-31
Payer: MEDICARE

## 2025-01-31 DIAGNOSIS — R73.03 PREDIABETES: ICD-10-CM

## 2025-01-31 RX ORDER — SEMAGLUTIDE 2.68 MG/ML
2 INJECTION, SOLUTION SUBCUTANEOUS
Qty: 3 ML | Refills: 0 | Status: SHIPPED | OUTPATIENT
Start: 2025-01-31 | End: 2026-01-31

## 2025-02-01 ENCOUNTER — PATIENT MESSAGE (OUTPATIENT)
Dept: INTERNAL MEDICINE | Facility: CLINIC | Age: 75
End: 2025-02-01
Payer: MEDICARE

## 2025-02-03 NOTE — TELEPHONE ENCOUNTER
Please let Violet know that she can come by for samples on tomorrow Tuesday or Wednesday to hold her over until she gets her prescription, thanks

## 2025-02-14 ENCOUNTER — PATIENT MESSAGE (OUTPATIENT)
Dept: INTERNAL MEDICINE | Facility: CLINIC | Age: 75
End: 2025-02-14
Payer: MEDICARE

## 2025-02-14 ENCOUNTER — LAB VISIT (OUTPATIENT)
Dept: LAB | Facility: HOSPITAL | Age: 75
End: 2025-02-14
Attending: INTERNAL MEDICINE
Payer: MEDICARE

## 2025-02-14 ENCOUNTER — TELEPHONE (OUTPATIENT)
Dept: CARDIOLOGY | Facility: CLINIC | Age: 75
End: 2025-02-14
Payer: MEDICARE

## 2025-02-14 ENCOUNTER — TELEPHONE (OUTPATIENT)
Dept: INTERNAL MEDICINE | Facility: CLINIC | Age: 75
End: 2025-02-14
Payer: MEDICARE

## 2025-02-14 ENCOUNTER — PATIENT MESSAGE (OUTPATIENT)
Dept: CARDIOLOGY | Facility: CLINIC | Age: 75
End: 2025-02-14
Payer: MEDICARE

## 2025-02-14 DIAGNOSIS — I10 BENIGN ESSENTIAL HYPERTENSION: ICD-10-CM

## 2025-02-14 DIAGNOSIS — E53.8 DEFICIENCY OF OTHER SPECIFIED B GROUP VITAMINS: ICD-10-CM

## 2025-02-14 DIAGNOSIS — R79.89 LOW VITAMIN B12 LEVEL: ICD-10-CM

## 2025-02-14 DIAGNOSIS — E03.9 ACQUIRED HYPOTHYROIDISM: ICD-10-CM

## 2025-02-14 DIAGNOSIS — Z79.899 MEDICATION MANAGEMENT: ICD-10-CM

## 2025-02-14 DIAGNOSIS — R73.03 PREDIABETES: ICD-10-CM

## 2025-02-14 DIAGNOSIS — E55.9 VITAMIN D DEFICIENCY: ICD-10-CM

## 2025-02-14 LAB
25(OH)D3+25(OH)D2 SERPL-MCNC: 66 NG/ML (ref 30–96)
ALBUMIN SERPL BCP-MCNC: 3.9 G/DL (ref 3.5–5.2)
ALP SERPL-CCNC: 112 U/L (ref 40–150)
ALT SERPL W/O P-5'-P-CCNC: 23 U/L (ref 10–44)
ANION GAP SERPL CALC-SCNC: 12 MMOL/L (ref 8–16)
AST SERPL-CCNC: 26 U/L (ref 10–40)
BILIRUB SERPL-MCNC: 0.3 MG/DL (ref 0.1–1)
BUN SERPL-MCNC: 11 MG/DL (ref 8–23)
CALCIUM SERPL-MCNC: 9.4 MG/DL (ref 8.7–10.5)
CHLORIDE SERPL-SCNC: 102 MMOL/L (ref 95–110)
CHOLEST SERPL-MCNC: 214 MG/DL (ref 120–199)
CHOLEST/HDLC SERPL: 3.3 {RATIO} (ref 2–5)
CO2 SERPL-SCNC: 27 MMOL/L (ref 23–29)
CREAT SERPL-MCNC: 0.8 MG/DL (ref 0.5–1.4)
EST. GFR  (NO RACE VARIABLE): >60 ML/MIN/1.73 M^2
ESTIMATED AVG GLUCOSE: 105 MG/DL (ref 68–131)
GLUCOSE SERPL-MCNC: 92 MG/DL (ref 70–110)
HBA1C MFR BLD: 5.3 % (ref 4–5.6)
HDLC SERPL-MCNC: 65 MG/DL (ref 40–75)
HDLC SERPL: 30.4 % (ref 20–50)
LDLC SERPL CALC-MCNC: 124 MG/DL (ref 63–159)
NONHDLC SERPL-MCNC: 149 MG/DL
POTASSIUM SERPL-SCNC: 4 MMOL/L (ref 3.5–5.1)
PROT SERPL-MCNC: 7.3 G/DL (ref 6–8.4)
SODIUM SERPL-SCNC: 141 MMOL/L (ref 136–145)
TRIGL SERPL-MCNC: 125 MG/DL (ref 30–150)
TSH SERPL DL<=0.005 MIU/L-ACNC: 1 UIU/ML (ref 0.4–4)
VIT B12 SERPL-MCNC: 494 PG/ML (ref 210–950)

## 2025-02-14 PROCEDURE — 82607 VITAMIN B-12: CPT | Performed by: FAMILY MEDICINE

## 2025-02-14 PROCEDURE — 36415 COLL VENOUS BLD VENIPUNCTURE: CPT | Performed by: FAMILY MEDICINE

## 2025-02-14 PROCEDURE — 83036 HEMOGLOBIN GLYCOSYLATED A1C: CPT | Performed by: FAMILY MEDICINE

## 2025-02-14 PROCEDURE — 80061 LIPID PANEL: CPT | Performed by: FAMILY MEDICINE

## 2025-02-14 PROCEDURE — 80053 COMPREHEN METABOLIC PANEL: CPT | Performed by: FAMILY MEDICINE

## 2025-02-14 PROCEDURE — 84443 ASSAY THYROID STIM HORMONE: CPT | Performed by: FAMILY MEDICINE

## 2025-02-14 PROCEDURE — 82306 VITAMIN D 25 HYDROXY: CPT | Performed by: FAMILY MEDICINE

## 2025-02-14 NOTE — TELEPHONE ENCOUNTER
----- Message from Nedra sent at 2/14/2025 12:42 PM CST -----  Contact: Elena  Type:  Patient Call          Who Called: [facility] - Elena AGUSTIN        Does the patient know what this is regarding?: Requesting a call back about validating the patient chronic illness ; pt would need a update so that she can have continual benefits ;  Ref # [id number];  please advise              Best Call Back Number:[phone]            Additional Information: Fax

## 2025-02-14 NOTE — TELEPHONE ENCOUNTER
----- Message from Zena sent at 2/14/2025 11:58 AM CST -----  Regarding: Call back  Contact: [phone]  Who Called: PT     Patient called in requesting to speak with you. Did not specify why. Please advise.

## 2025-02-14 NOTE — TELEPHONE ENCOUNTER
Called [facility] and spoke with Sally was able to give the verbal over the phone.

## 2025-02-14 NOTE — TELEPHONE ENCOUNTER
Spoke with patient. Patient requesting office staff to contact [facility] to provide diagnosis of medical hx for extra member eligibility benefits.   Patient stated [facility] needs that information provided from pcp or cardiologist.   Advise patient to reach out to pcp.   Patient requesting for pcp and cardiologist to provide for securing benefits.   Advise patient pcp is taking care of the information as I can see notes from pcp.    Caity KAM

## 2025-02-14 NOTE — TELEPHONE ENCOUNTER
----- Message from Nedra sent at 2/14/2025 12:42 PM CST -----  Contact: Elena  Type:  Patient Call          Who Called: [facility] - Elena AGUSTIN        Does the patient know what this is regarding?: Requesting a call back about validating the patient chronic illness ; pt would need a update so that she can have continual benefits ;  Ref # [id number];  please advise              Best Call Back Number:[phone]            Additional Information: Fax

## 2025-02-21 ENCOUNTER — OFFICE VISIT (OUTPATIENT)
Dept: INTERNAL MEDICINE | Facility: CLINIC | Age: 75
End: 2025-02-21
Payer: MEDICARE

## 2025-02-21 VITALS
WEIGHT: 187.63 LBS | TEMPERATURE: 98 F | HEIGHT: 68 IN | DIASTOLIC BLOOD PRESSURE: 80 MMHG | SYSTOLIC BLOOD PRESSURE: 134 MMHG | BODY MASS INDEX: 28.44 KG/M2 | OXYGEN SATURATION: 99 % | HEART RATE: 71 BPM

## 2025-02-21 DIAGNOSIS — G89.29 CHRONIC PAIN OF RIGHT KNEE: ICD-10-CM

## 2025-02-21 DIAGNOSIS — E66.3 OVERWEIGHT (BMI 25.0-29.9): ICD-10-CM

## 2025-02-21 DIAGNOSIS — I48.0 PAROXYSMAL ATRIAL FIBRILLATION: ICD-10-CM

## 2025-02-21 DIAGNOSIS — I50.32 DIASTOLIC DYSFUNCTION WITH CHRONIC HEART FAILURE: ICD-10-CM

## 2025-02-21 DIAGNOSIS — Z78.0 OSTEOPENIA AFTER MENOPAUSE: ICD-10-CM

## 2025-02-21 DIAGNOSIS — J30.0 VASOMOTOR RHINITIS: ICD-10-CM

## 2025-02-21 DIAGNOSIS — Z79.899 MEDICATION MANAGEMENT: ICD-10-CM

## 2025-02-21 DIAGNOSIS — M85.80 OSTEOPENIA AFTER MENOPAUSE: ICD-10-CM

## 2025-02-21 DIAGNOSIS — J30.9 CHRONIC ALLERGIC RHINITIS: ICD-10-CM

## 2025-02-21 DIAGNOSIS — E03.9 ACQUIRED HYPOTHYROIDISM: ICD-10-CM

## 2025-02-21 DIAGNOSIS — I10 BENIGN ESSENTIAL HYPERTENSION: ICD-10-CM

## 2025-02-21 DIAGNOSIS — E55.9 VITAMIN D DEFICIENCY: ICD-10-CM

## 2025-02-21 DIAGNOSIS — I50.20 HFREF (HEART FAILURE WITH REDUCED EJECTION FRACTION): ICD-10-CM

## 2025-02-21 DIAGNOSIS — Z79.01 CURRENT USE OF LONG TERM ANTICOAGULATION: ICD-10-CM

## 2025-02-21 DIAGNOSIS — Z12.31 SCREENING MAMMOGRAM FOR BREAST CANCER: ICD-10-CM

## 2025-02-21 DIAGNOSIS — R73.03 PREDIABETES: Primary | ICD-10-CM

## 2025-02-21 DIAGNOSIS — I87.2 VENOUS INSUFFICIENCY OF BOTH LOWER EXTREMITIES: ICD-10-CM

## 2025-02-21 DIAGNOSIS — M25.561 CHRONIC PAIN OF RIGHT KNEE: ICD-10-CM

## 2025-02-21 NOTE — PROGRESS NOTES
Office Visit    Patient Name: Violet Mirza    : 1950  MRN: [id number]    Subjective:  Violet is a 74 y.o. female who presents today for:    Follow-up    Annual physical with me 24  CARDIOLOGY FOLLOW-UP, Dr Conway 24  -TTE to reassess LV EF--had appointment but declined due tot he $200 co pay   -continue metoprolol 2 mg XL  -continue losartan 100 mg daily  -BP at goal today  -Lasix 20 mg p.r.n.  -Rivaroxaban 20 mg daily for paroxysmal atrial fibrillation stroke prophylaxis  -follow up in 1 year above normal, sooner if symptoms change      Seen by ORTHO Dr Callejas [facility]-most recently 24 for R knee pain--Synvisc injections are helping, has a history of left knee replacement which is doing well but she is trying to prolonged surgery on the right knee.  Also seen by ortho for h/o shoulder pain     73 yo patient of mine who presents for annual physical with lab review and monitoring of chronic conditions that include paroxysmal AFib(2018 in [facility] was given diltiazem & converted spontaneously to NSR), HFrEF (EF 40-45% 10/24/23) followed by cardiology, hypothyroidism, hypertension, obesity, prediabetes, osteopenia, rosacea, remote prior history of kidney stones.     Labs drawn prior to office visit 25 all WNL.  A1c stable at 5.3(peak A1c was 6.3 22) with normal liver and kidney function.  B12 improved to 494, lipids with .   TSH 0.9 w/ normal Free T4 on Levothyroxine 112 mcg daily every day but skips .   she is not on a statin- no history of obstructive coronary artery disease.     She is currently on OZEMPIC 2 mg/week but she is having trouble obtaining her usual prescription from [facility].    Also take 2,000 total of D3 daily. But no other vitamins.      She is postmenopausal.  Previously had a gynecologist-- Renny Deshpande, seen in January yearly.    He retired, and I now order her yearly mammograms-- through [facility], most recently 3/1/24.     Today she is feeling  overall well, tolerating the Ozempic 2 mg / week without any significant side effects and pleased that she has continued to loose additional weight on the increased dose.    Frustrated by her recent difficulties obtaining the Ozempic and wishes she could be on a higher dose of GLP 1 as she feels her weight loss has been very slow.   Feels some benefit in terms of decreased joint pain     Blood Pressure controlled on toprol xl 200 mg daily, and cozaar 100 mg daily, along with Xarelto 20 for stroke prevention in Paroxysmal A Fib.   Decreased lasix to PRN with good results and she is currently only taking Lasix 20 once per week on Sundays.      Acute Complaints: None but Right knee pain is stable with voltaren cream, bone on bone and followed by Dr Britta hampton, Synvisc injections periodically  She denies chest pain, shortness a breath and leg swelling.    General lifestyle habits are as follows:    Diet: healthy and watches salt and carbs-- home cooked meals, Hydration is fair-- about 2 daily but more with exercise, and has cut back on sugars some   Exercise: recently fair -- doing a home [employer] Work Out about 2+ times weekly, walking and some home weights. Gets heart rate up   Sleep: fair-- improving since decreasing Lasix to p.r.n.. About 5 hours at night and she can rest during the day as needed. Up at night to use the bathroom-maybe only 1-2 times instead of multiple.  Weight:  Has lost about 50 lb altogether & about 40 with the help of Ozempic, recently down about 5 additional with improved BMI of 28.      Immunizations: TDaP 4/5/19-- repeat 10 years, yearly FLU 10/11/24, SHINGRIX 2/2 7/13/20, Pneumovax 23 11/3/2014, PREVNAR 13 10/18/2015, COVID-19 completed 2/8/21 w/ 2nd pfizer booster 8/11/22-- FURTHER COVID-19 VACCINES DECLINED   RSV 11/6/23     Screening Tests: DEXA 2/9/24- improved Osteopenia-- RECHECK 2 years(2/2026),  mammo 3/1/24- Repeat 1 year & ordered, PAP not indicated-- >65 w/o h/o abnormal,  colonoscopy through [facility] 1/22/2015-- repeat 10 years(1/2025) , Hep C (-) 10/14/2015     Eye/Dental: UTD with Both- yearly, EYE MARIO 2/23/24, 11/8/23 - CATARACT EXTRACTION R Eye, L Cataract removed- laser 2/2 scar tissue complications     Saw Cardiology Dr. Rausch electrophysiology 10/18/2019 and there was a concern for greater numbers of PVCs noted on EKG.    He advised a Holter monitor, which she declined, but since her ejection fraction is overall stable on echo,  okay with monitoring      ECHO 10/24/23:    Left Ventricle: The left ventricle is mildly dilated. Normal wall thickness. Mild global hypokinesis present. There is mildly reduced systolic function with a visually estimated ejection fraction of 40 - 45%. Biplane (2D)     method of discs ejection fraction is 41%. Grade I diastolic dysfunction. Elevated left ventricular filling pressure.    Left Atrium: Left atrium is severely dilated.    Right Ventricle: Normal right ventricular cavity size. Wall thickness is normal. Right ventricle wall motion  is normal. Systolic function is normal.    Mitral Valve: There is mild regurgitation.    Pulmonary Artery: The estimated pulmonary artery systolic pressure is 37 mmHg.    IVC/SVC: Normal venous pressure at 3 mmHg.       PAST MEDICAL HISTORY, SURGICAL/SOCIAL/FAMILY HISTORY REVIEWED AS PER CHART, WITH PERTINENT FINDINGS INCLUDED IN HISTORY SECTION OF NOTE.     Current Medications    Medication List with Changes/Refills   Current Medications    B COMPLEX VITAMINS CAPSULE    Take 1 capsule by mouth daily with breakfast.    DICLOFENAC SODIUM (VOLTAREN) 1 % GEL    Apply 4 g  topically to affected area 4 times a day as needed.    FLUTICASONE PROPIONATE (FLONASE) 50 MCG/ACTUATION NASAL SPRAY    2 sprays (100 mcg total) by Each Nostril route once daily. Start by irrigating the collins with isotonic saline, blow gently then use the flonase as directed    FUROSEMIDE (LASIX) 20 MG TABLET    Take 1 tablet (20 mg total) by mouth  once daily.    IPRATROPIUM (ATROVENT) 21 MCG (0.03 %) NASAL SPRAY    2 sprays by Each Nostril route 2 (two) times daily.    LEVOCETIRIZINE (XYZAL) 5 MG TABLET    Take 1 tablet (5 mg total) by mouth every evening.    LEVOTHYROXINE (SYNTHROID) 112 MCG TABLET    Take 1 tablet (112 mcg total) by mouth once daily.    LOSARTAN (COZAAR) 100 MG TABLET    TAKE 1 TABLET BY MOUTH ONCE DAILY    METOPROLOL SUCCINATE (TOPROL-XL) 200 MG 24 HR TABLET    Take 1 tablet (200 mg total) by mouth once daily.    NEOMYCIN-POLYMYXIN-DEXAMETHASONE (DEXACINE) 3.5 MG/G-10,000 UNIT/G-0.1 % OINT    apply to the right eye at bedtime    NEOMYCIN-POLYMYXIN-DEXAMETHASONE (MAXITROL) 3.5MG/ML-10,000 UNIT/ML-0.1 % DRPS    instill 1 drop in the right eye three times a day    PREDNISOLONE ACETATE (PRED FORTE) 1 % DRPS    Place into both eyes.    RIVAROXABAN (XARELTO) 20 MG TAB    Take 1 tablet (20 mg total) by mouth daily with dinner or evening meal.    SEMAGLUTIDE (OZEMPIC) 2 MG/DOSE (8 MG/3 ML) PNIJ    Inject 2 mg into the skin every 7 days.    SEMAGLUTIDE (OZEMPIC) 2 MG/DOSE (8 MG/3 ML) PNIJ    Inject 2 mg into the skin every 7 days.    TRAMADOL (ULTRAM) 50 MG TABLET    Take 50 mg by mouth every 6 (six) hours as needed for Pain.       Allergies   Review of patient's allergies indicates:   Allergen Reactions    Aldactone [spironolactone] Other (See Comments)     Hyperkalemia when initiated on 01/23/2018          Review of Systems (Pertinent positives)  Review of Systems   Constitutional:  Negative for unexpected weight change.   HENT:  Positive for congestion and rhinorrhea.    Eyes:  Negative for visual disturbance.   Respiratory:  Negative for chest tightness and shortness of breath.    Cardiovascular:  Negative for chest pain, palpitations and leg swelling.   Gastrointestinal:  Negative for constipation, diarrhea, nausea and vomiting.   Musculoskeletal:  Positive for arthralgias (R knee pain).   Skin:  Negative for rash.   Allergic/Immunologic:  "Positive for environmental allergies.   Neurological:  Negative for dizziness, light-headedness and headaches.       /80 (BP Location: Left arm, Patient Position: Sitting)   Pulse 71   Temp 97.6 °F (36.4 °C)   Ht 5' 8" (1.727 m)   Wt 85.1 kg (187 lb 9.8 oz)   SpO2 99%   BMI 28.53 kg/m²     Physical Exam  Vitals reviewed.   Constitutional:       General: She is not in acute distress.     Appearance: Normal appearance. She is well-developed.   HENT:      Head: Normocephalic and atraumatic.   Eyes:      Conjunctiva/sclera: Conjunctivae normal.   Cardiovascular:      Rate and Rhythm: Normal rate and regular rhythm.   Pulmonary:      Effort: Pulmonary effort is normal.      Breath sounds: Normal breath sounds.   Musculoskeletal:      Right lower leg: No edema.      Left lower leg: No edema.   Skin:     General: Skin is warm and dry.   Neurological:      General: No focal deficit present.      Mental Status: She is alert and oriented to person, place, and time.   Psychiatric:         Mood and Affect: Mood normal.         Behavior: Behavior normal.           Assessment/Plan:  Violet Mirza is a 74 y.o. female who presents today for :        ICD-10-CM ICD-9-CM    1. Prediabetes  R73.03 790.29       2. Overweight (BMI 25.0-29.9)  E66.3 278.02       3. Benign essential hypertension  I10 401.1 Hemoglobin A1C      Comprehensive Metabolic Panel      Lipid Panel      TSH      CBC Auto Differential      Magnesium      4. Diastolic dysfunction with chronic heart failure  I50.32 428.32 Hemoglobin A1C      Comprehensive Metabolic Panel      Lipid Panel      TSH      CBC Auto Differential      Magnesium      5. Paroxysmal atrial fibrillation  I48.0 427.31 Hemoglobin A1C      Comprehensive Metabolic Panel      Lipid Panel      TSH      CBC Auto Differential      Magnesium      6. HFrEF (heart failure with reduced ejection fraction)  I50.20 428.20       7. Current use of long term anticoagulation  Z79.01 V58.61       8. " Acquired hypothyroidism  E03.9 244.9       9. Vitamin D deficiency  E55.9 268.9       10. Osteopenia after menopause  M85.80 733.90     Z78.0 V49.81       11. Chronic pain of right knee  M25.561 719.46     G89.29 338.29       12. Medication management  Z79.899 V58.69 Hemoglobin A1C      Comprehensive Metabolic Panel      Lipid Panel      TSH      CBC Auto Differential      Magnesium      13. Screening mammogram for breast cancer  Z12.31 V76.12 Mammo Digital Screening Bilat      14. Venous insufficiency of both lower extremities  I87.2 459.81       15. Vasomotor rhinitis  J30.0 477.9       16. Chronic allergic rhinitis  J30.9 477.9         NEXT DEXA DUE 2/9/26  MAMMO 3/1/24-- repeat 1 year & Ordered  NEXT CSCOPE 1/2025-- had unremarkable colonoscopy in 2015 and due to age greater than 75, medical comorbidities have advise discontinuing routine colon cancer screening     OBESITY-->OVERWEIGHT BMI  W/ PREDIABETES: Intolerant of Metformin, RESPONDED WELL TO OZEMPIC. Continue 2 mg / week as A1c now with improvement to 5.3 from 6.3 max and she has lost another 5 lb recently-about 40 lb altogether since starting Ozempic.  REPEAT A1C/LABS, WEIGHT CHECK IN 6 MONTHS.   Low carb diet & regular exercise advised.      H/O HFwREF,DD, PAFIB: following with cardiology yearly and saw Dr Conway-- last seen 12/2/24 & F/U 1 year advised, Continued improved/stable EF on most recent ECHO 10/24/23 of 40-45%   CARDIAC MEDS INCLUDE: Toprol xl 200 mg daily, and Cozaar 100 mg daily, along with Xarelto 20 for stroke prevention in Paroxysmal A Fib. Euvolemic on dose of 20 mg Lasix weekly.   Continue current regimen- she was not able to obtain the updated echo advise due to co-pay concerns.     R KNEE PAIN 2/2 OA: Following with ORTHO Dr Callejas for Periodic Synvisc injections. Knee pain is limiting--considering partial R Knee replacement that she had and did well with on the left, though with recent weight loss she reports some improvement.      HYPOTHYROIDISM: TSH 0.8 on levothyroxine 112 mcg and now SKIPPING SUNDAY DOSE s/p wt loss w/ ozempic. ongoing monitoring w/ recheck in 6 Months, sooner if concerns.       OP-->OSTEOPENIA: improved to osteopenia with normalization of Vitamin D, regular exercise, recheck DEXA DUE 2/9/2026     Allergies/Vasomotor Rhinitis: stable on daily Xyzal, thought she cannot go with out it. Rhinorrhea improved with Atrovent Nasal spray for vasomotor rhinitis per ENT Dr Almanza     Venous insufficiency:  improved following vein procedure and with reduced swelling 2/2 lasix and weight loss. Also noting benefit from a specialty varicose vein cream            There are no Patient Instructions on file for this visit.      Follow up in about 6 months (around 8/21/2025) for to follow up on lab results, return as needed for new concerns.

## 2025-02-22 ENCOUNTER — RESULTS FOLLOW-UP (OUTPATIENT)
Dept: FAMILY MEDICINE | Facility: CLINIC | Age: 75
End: 2025-02-22
Payer: MEDICARE

## 2025-02-25 ENCOUNTER — TELEPHONE (OUTPATIENT)
Dept: INTERNAL MEDICINE | Facility: CLINIC | Age: 75
End: 2025-02-25
Payer: MEDICARE

## 2025-03-03 ENCOUNTER — HOSPITAL ENCOUNTER (OUTPATIENT)
Dept: RADIOLOGY | Facility: HOSPITAL | Age: 75
Discharge: HOME | End: 2025-03-03
Attending: FAMILY MEDICINE
Payer: MEDICARE

## 2025-03-03 DIAGNOSIS — Z12.31 SCREENING MAMMOGRAM FOR BREAST CANCER: ICD-10-CM

## 2025-03-03 PROCEDURE — 77067 SCR MAMMO BI INCL CAD: CPT | Mod: 26,,, | Performed by: RADIOLOGY

## 2025-03-03 PROCEDURE — 77063 BREAST TOMOSYNTHESIS BI: CPT | Mod: TC

## 2025-03-03 PROCEDURE — 77063 BREAST TOMOSYNTHESIS BI: CPT | Mod: 26,,, | Performed by: RADIOLOGY

## 2025-03-20 ENCOUNTER — TELEPHONE (OUTPATIENT)
Dept: INTERNAL MEDICINE | Facility: CLINIC | Age: 75
End: 2025-03-20
Payer: MEDICARE

## 2025-03-24 DIAGNOSIS — Z00.00 ENCOUNTER FOR MEDICARE ANNUAL WELLNESS EXAM: ICD-10-CM

## 2025-04-24 ENCOUNTER — PATIENT MESSAGE (OUTPATIENT)
Dept: INTERNAL MEDICINE | Facility: CLINIC | Age: 75
End: 2025-04-24
Payer: MEDICARE

## 2025-04-30 ENCOUNTER — PATIENT MESSAGE (OUTPATIENT)
Dept: INTERNAL MEDICINE | Facility: CLINIC | Age: 75
End: 2025-04-30
Payer: MEDICARE

## 2025-05-02 NOTE — TELEPHONE ENCOUNTER
Yesica, MD Gareth Dillon, MD Elzbieta Giron, NP Priscila Sosa, MD Jannie Lemus, MD Lyric Prescott, MD Valery Moreno, MD Kacey Billings, MD Maria Antonia Thrasher, NP Keisha Patel, MD Melida Jackson, MD Barbara Maloney, MD Jagjit Wright, MD An Ya, MD Jesi Mosqueda, MD Melissa Bhakta, DO Girish Ojeda, MD Buck Garrison, MD Jeramy Holland, MD Selma Bang, MD Jose Palomares, MD Saba Steel, MD Masha Ballard, MD Hang Cuellar, MD Chuy Sung, MD Martina Garrison, MD Doe Amador, MD Zohra Stokes, MD Shyam Gunter, MD Phyllis Christie, MD Shyam Raymundo, MD Alonzo Kaplan, MD David Maldonado, MD Karla Francois, MD Villalobos

## 2025-06-25 ENCOUNTER — PATIENT MESSAGE (OUTPATIENT)
Dept: INTERNAL MEDICINE | Facility: CLINIC | Age: 75
End: 2025-06-25
Payer: MEDICARE

## 2025-06-25 ENCOUNTER — TELEPHONE (OUTPATIENT)
Dept: INTERNAL MEDICINE | Facility: CLINIC | Age: 75
End: 2025-06-25
Payer: MEDICARE

## 2025-06-30 ENCOUNTER — PATIENT MESSAGE (OUTPATIENT)
Dept: INTERNAL MEDICINE | Facility: CLINIC | Age: 75
End: 2025-06-30
Payer: MEDICARE

## 2025-06-30 DIAGNOSIS — I48.0 PAROXYSMAL ATRIAL FIBRILLATION: ICD-10-CM

## 2025-06-30 DIAGNOSIS — Z79.01 CURRENT USE OF LONG TERM ANTICOAGULATION: ICD-10-CM

## 2025-06-30 DIAGNOSIS — I50.22 CHRONIC SYSTOLIC HEART FAILURE: ICD-10-CM

## 2025-06-30 NOTE — TELEPHONE ENCOUNTER
Care Due:                  Date            Visit Type   Department     Provider  --------------------------------------------------------------------------------                                EP -                              PRIMARY      [facility] INTERNAL  Last Visit: 02-      CARE ([facility])   NADINE Gunter                               -                              PRIMARY      [facility] INTERNAL  Next Visit: 08-      CARE ([facility])   NADINE Gunter                                                            Last  Test          Frequency    Reason                     Performed    Due Date  --------------------------------------------------------------------------------    CBC.........  12 months..  rivaroxaban..............  08- 08-    HBA1C.......  6 months...  semaglutide..............  02- 08-    [facility] Embedded Care Due Messages. Reference number: [id number].   6/30/2025 9:34:28 AM CDT

## 2025-08-15 ENCOUNTER — LAB VISIT (OUTPATIENT)
Dept: LAB | Facility: HOSPITAL | Age: 75
End: 2025-08-15
Attending: FAMILY MEDICINE
Payer: MEDICARE

## 2025-08-15 DIAGNOSIS — I48.0 PAROXYSMAL ATRIAL FIBRILLATION: ICD-10-CM

## 2025-08-15 DIAGNOSIS — Z79.899 MEDICATION MANAGEMENT: ICD-10-CM

## 2025-08-15 DIAGNOSIS — I50.32 DIASTOLIC DYSFUNCTION WITH CHRONIC HEART FAILURE: ICD-10-CM

## 2025-08-15 DIAGNOSIS — I10 BENIGN ESSENTIAL HYPERTENSION: ICD-10-CM

## 2025-08-15 LAB
ABSOLUTE EOSINOPHIL (OHS): 0.2 K/UL
ABSOLUTE MONOCYTE (OHS): 0.46 K/UL (ref 0.3–1)
ABSOLUTE NEUTROPHIL COUNT (OHS): 4.74 K/UL (ref 1.8–7.7)
ALBUMIN SERPL BCP-MCNC: 3.9 G/DL (ref 3.5–5.2)
ALP SERPL-CCNC: 121 UNIT/L (ref 40–150)
ALT SERPL W/O P-5'-P-CCNC: 18 UNIT/L (ref 10–44)
ANION GAP (OHS): 10 MMOL/L (ref 8–16)
AST SERPL-CCNC: 31 UNIT/L (ref 11–45)
BASOPHILS # BLD AUTO: 0.04 K/UL
BASOPHILS NFR BLD AUTO: 0.6 %
BILIRUB SERPL-MCNC: 0.3 MG/DL (ref 0.1–1)
BUN SERPL-MCNC: 12 MG/DL (ref 8–23)
CALCIUM SERPL-MCNC: 9.5 MG/DL (ref 8.7–10.5)
CHLORIDE SERPL-SCNC: 102 MMOL/L (ref 95–110)
CHOLEST SERPL-MCNC: 201 MG/DL (ref 120–199)
CHOLEST/HDLC SERPL: 3 {RATIO} (ref 2–5)
CO2 SERPL-SCNC: 30 MMOL/L (ref 23–29)
CREAT SERPL-MCNC: 0.7 MG/DL (ref 0.5–1.4)
EAG (OHS): 105 MG/DL (ref 68–131)
ERYTHROCYTE [DISTWIDTH] IN BLOOD BY AUTOMATED COUNT: 13.5 % (ref 11.5–14.5)
GFR SERPLBLD CREATININE-BSD FMLA CKD-EPI: >60 ML/MIN/1.73/M2
GLUCOSE SERPL-MCNC: 108 MG/DL (ref 70–110)
HBA1C MFR BLD: 5.3 % (ref 4–5.6)
HCT VFR BLD AUTO: 42.1 % (ref 37–48.5)
HDLC SERPL-MCNC: 68 MG/DL (ref 40–75)
HDLC SERPL: 33.8 % (ref 20–50)
HGB BLD-MCNC: 13.3 GM/DL (ref 12–16)
IMM GRANULOCYTES # BLD AUTO: 0.02 K/UL (ref 0–0.04)
IMM GRANULOCYTES NFR BLD AUTO: 0.3 % (ref 0–0.5)
LDLC SERPL CALC-MCNC: 114 MG/DL (ref 63–159)
LYMPHOCYTES # BLD AUTO: 1.69 K/UL (ref 1–4.8)
MAGNESIUM SERPL-MCNC: 2 MG/DL (ref 1.6–2.6)
MCH RBC QN AUTO: 28.6 PG (ref 27–31)
MCHC RBC AUTO-ENTMCNC: 31.6 G/DL (ref 32–36)
MCV RBC AUTO: 91 FL (ref 82–98)
NONHDLC SERPL-MCNC: 133 MG/DL
NUCLEATED RBC (/100WBC) (OHS): 0 /100 WBC
PLATELET # BLD AUTO: 237 K/UL (ref 150–450)
PMV BLD AUTO: 11.2 FL (ref 9.2–12.9)
POTASSIUM SERPL-SCNC: 4 MMOL/L (ref 3.5–5.1)
PROT SERPL-MCNC: 7 GM/DL (ref 6–8.4)
RBC # BLD AUTO: 4.65 M/UL (ref 4–5.4)
RELATIVE EOSINOPHIL (OHS): 2.8 %
RELATIVE LYMPHOCYTE (OHS): 23.6 % (ref 18–48)
RELATIVE MONOCYTE (OHS): 6.4 % (ref 4–15)
RELATIVE NEUTROPHIL (OHS): 66.3 % (ref 38–73)
SODIUM SERPL-SCNC: 142 MMOL/L (ref 136–145)
TRIGL SERPL-MCNC: 95 MG/DL (ref 30–150)
TSH SERPL-ACNC: 1.08 UIU/ML (ref 0.4–4)
WBC # BLD AUTO: 7.15 K/UL (ref 3.9–12.7)

## 2025-08-15 PROCEDURE — 36415 COLL VENOUS BLD VENIPUNCTURE: CPT

## 2025-08-15 PROCEDURE — 83735 ASSAY OF MAGNESIUM: CPT

## 2025-08-15 PROCEDURE — 84443 ASSAY THYROID STIM HORMONE: CPT

## 2025-08-15 PROCEDURE — 82465 ASSAY BLD/SERUM CHOLESTEROL: CPT

## 2025-08-15 PROCEDURE — 84132 ASSAY OF SERUM POTASSIUM: CPT

## 2025-08-15 PROCEDURE — 83036 HEMOGLOBIN GLYCOSYLATED A1C: CPT

## 2025-08-15 PROCEDURE — 85025 COMPLETE CBC W/AUTO DIFF WBC: CPT

## 2025-08-22 ENCOUNTER — OFFICE VISIT (OUTPATIENT)
Dept: INTERNAL MEDICINE | Facility: CLINIC | Age: 75
End: 2025-08-22
Payer: MEDICARE

## 2025-08-22 VITALS
BODY MASS INDEX: 29.07 KG/M2 | HEART RATE: 77 BPM | TEMPERATURE: 98 F | OXYGEN SATURATION: 98 % | SYSTOLIC BLOOD PRESSURE: 134 MMHG | HEIGHT: 68 IN | WEIGHT: 191.81 LBS | DIASTOLIC BLOOD PRESSURE: 76 MMHG

## 2025-08-22 DIAGNOSIS — J30.89 ENVIRONMENTAL AND SEASONAL ALLERGIES: ICD-10-CM

## 2025-08-22 DIAGNOSIS — I50.32 DIASTOLIC DYSFUNCTION WITH CHRONIC HEART FAILURE: ICD-10-CM

## 2025-08-22 DIAGNOSIS — M17.11 PRIMARY OSTEOARTHRITIS OF RIGHT KNEE: ICD-10-CM

## 2025-08-22 DIAGNOSIS — I48.0 PAROXYSMAL ATRIAL FIBRILLATION: ICD-10-CM

## 2025-08-22 DIAGNOSIS — I87.2 VENOUS INSUFFICIENCY OF BOTH LOWER EXTREMITIES: ICD-10-CM

## 2025-08-22 DIAGNOSIS — Z00.00 ROUTINE GENERAL MEDICAL EXAMINATION AT A HEALTH CARE FACILITY: Primary | ICD-10-CM

## 2025-08-22 DIAGNOSIS — M85.80 OSTEOPENIA AFTER MENOPAUSE: ICD-10-CM

## 2025-08-22 DIAGNOSIS — I50.20 HFREF (HEART FAILURE WITH REDUCED EJECTION FRACTION): ICD-10-CM

## 2025-08-22 DIAGNOSIS — E55.9 VITAMIN D DEFICIENCY: ICD-10-CM

## 2025-08-22 DIAGNOSIS — I10 BENIGN ESSENTIAL HYPERTENSION: ICD-10-CM

## 2025-08-22 DIAGNOSIS — E66.3 OVERWEIGHT (BMI 25.0-29.9): ICD-10-CM

## 2025-08-22 DIAGNOSIS — R73.03 PREDIABETES: ICD-10-CM

## 2025-08-22 DIAGNOSIS — Z78.0 OSTEOPENIA AFTER MENOPAUSE: ICD-10-CM

## 2025-08-22 DIAGNOSIS — Z79.899 MEDICATION MANAGEMENT: ICD-10-CM

## 2025-08-22 DIAGNOSIS — Z79.01 CURRENT USE OF LONG TERM ANTICOAGULATION: ICD-10-CM

## 2025-08-22 DIAGNOSIS — R07.81 RIB PAIN ON RIGHT SIDE: ICD-10-CM

## 2025-08-22 DIAGNOSIS — E03.9 ACQUIRED HYPOTHYROIDISM: ICD-10-CM

## 2025-08-22 PROCEDURE — 99999 PR PBB SHADOW E&M-EST. PATIENT-LVL IV: CPT | Mod: PBBFAC,,, | Performed by: FAMILY MEDICINE

## 2025-08-22 RX ORDER — METHOCARBAMOL 750 MG/1
750 TABLET, FILM COATED ORAL 3 TIMES DAILY PRN
Qty: 30 TABLET | Refills: 1 | Status: SHIPPED | OUTPATIENT
Start: 2025-08-22 | End: 2025-09-11

## 2025-08-25 ENCOUNTER — TELEPHONE (OUTPATIENT)
Dept: CARDIOLOGY | Facility: CLINIC | Age: 75
End: 2025-08-25
Payer: MEDICARE

## 2025-08-25 ENCOUNTER — PATIENT MESSAGE (OUTPATIENT)
Dept: INTERNAL MEDICINE | Facility: CLINIC | Age: 75
End: 2025-08-25
Payer: MEDICARE

## 2025-08-25 DIAGNOSIS — I48.0 PAROXYSMAL ATRIAL FIBRILLATION: Primary | ICD-10-CM

## 2025-08-25 DIAGNOSIS — G47.00 INSOMNIA, UNSPECIFIED TYPE: ICD-10-CM

## 2025-08-25 DIAGNOSIS — I50.20 HFREF (HEART FAILURE WITH REDUCED EJECTION FRACTION): ICD-10-CM

## 2025-08-25 DIAGNOSIS — Z79.01 CURRENT USE OF LONG TERM ANTICOAGULATION: ICD-10-CM

## 2025-08-25 DIAGNOSIS — R35.1 NOCTURIA: ICD-10-CM

## 2025-08-26 RX ORDER — DABIGATRAN ETEXILATE 150 MG/1
150 CAPSULE ORAL 2 TIMES DAILY
Qty: 180 CAPSULE | Refills: 4 | Status: SHIPPED | OUTPATIENT
Start: 2025-08-26 | End: 2025-08-27

## 2025-08-29 RX ORDER — AMITRIPTYLINE HYDROCHLORIDE 10 MG/1
TABLET, FILM COATED ORAL
Qty: 60 TABLET | Refills: 3 | Status: SHIPPED | OUTPATIENT
Start: 2025-08-29

## 2025-09-02 ENCOUNTER — PATIENT MESSAGE (OUTPATIENT)
Dept: INTERNAL MEDICINE | Facility: CLINIC | Age: 75
End: 2025-09-02
Payer: MEDICARE

## 2025-09-02 DIAGNOSIS — I48.0 PAROXYSMAL ATRIAL FIBRILLATION: ICD-10-CM

## 2025-09-02 DIAGNOSIS — Z79.01 CURRENT USE OF LONG TERM ANTICOAGULATION: Primary | ICD-10-CM

## 2025-09-05 ENCOUNTER — TELEPHONE (OUTPATIENT)
Dept: INTERNAL MEDICINE | Facility: CLINIC | Age: 75
End: 2025-09-05
Payer: MEDICARE

## 2025-09-05 DIAGNOSIS — I48.0 PAROXYSMAL ATRIAL FIBRILLATION: ICD-10-CM

## 2025-09-05 DIAGNOSIS — Z79.01 CURRENT USE OF LONG TERM ANTICOAGULATION: ICD-10-CM

## (undated) DEVICE — DRESSING TELFA N ADH 3X8

## (undated) DEVICE — SUT MCRYL PLUS 4-0 PS2 27IN

## (undated) DEVICE — MANIFOLD 4 PORT

## (undated) DEVICE — SUT 4/0 27IN PDS II VIO MON

## (undated) DEVICE — PACK DRAPE UNIVERSAL CONVERTOR

## (undated) DEVICE — SUT PROLENE 2-0 SH 36IN BLU

## (undated) DEVICE — ELECTRODE REM PLYHSV RETURN 9

## (undated) DEVICE — SYR 10CC LUER LOCK

## (undated) DEVICE — SUT 2-0 12-18IN SILK

## (undated) DEVICE — DRAPE INCISE IOBAN 2 23X23IN

## (undated) DEVICE — ADHESIVE DERMABOND ADVANCED

## (undated) DEVICE — SEE MEDLINE ITEM 153151

## (undated) DEVICE — SEE MEDLINE ITEM 152622

## (undated) DEVICE — COVER OVERHEAD SURG LT BLUE

## (undated) DEVICE — SEE MEDLINE ITEM 156955

## (undated) DEVICE — GLOVE PROTEXIS LTX MICRO  7.5

## (undated) DEVICE — APPLICATOR CHLORAPREP ORN 26ML

## (undated) DEVICE — NDL HYPDRM 23X15

## (undated) DEVICE — SEE MEDLINE ITEM 157117

## (undated) DEVICE — SEE MEDLINE ITEM 157116